# Patient Record
Sex: MALE | Race: BLACK OR AFRICAN AMERICAN | Employment: OTHER | URBAN - METROPOLITAN AREA
[De-identification: names, ages, dates, MRNs, and addresses within clinical notes are randomized per-mention and may not be internally consistent; named-entity substitution may affect disease eponyms.]

---

## 2017-10-26 PROBLEM — S34.3XXA CAUDA EQUINA SPINAL CORD INJURY (HCC): Status: ACTIVE | Noted: 2017-10-26

## 2017-10-26 PROBLEM — R29.898 LEG WEAKNESS, BILATERAL: Status: ACTIVE | Noted: 2017-10-26

## 2017-10-26 PROBLEM — S39.92XA LOWER BACK INJURY: Status: ACTIVE | Noted: 2017-10-26

## 2017-10-26 PROBLEM — I10 ESSENTIAL HYPERTENSION: Status: ACTIVE | Noted: 2017-10-26

## 2017-10-26 PROBLEM — R29.898 WEAKNESS OF LEFT HAND: Status: ACTIVE | Noted: 2017-10-26

## 2017-10-26 PROBLEM — C61 PROSTATE CANCER (HCC): Status: ACTIVE | Noted: 2017-10-26

## 2017-10-26 PROBLEM — M16.0 OSTEOARTHRITIS OF BOTH HIPS: Status: ACTIVE | Noted: 2017-10-26

## 2017-10-26 PROBLEM — M54.59 INTRACTABLE LOW BACK PAIN: Status: ACTIVE | Noted: 2017-10-26

## 2017-10-26 PROBLEM — M48.061 SPINAL STENOSIS OF LUMBAR REGION AT MULTIPLE LEVELS: Status: ACTIVE | Noted: 2017-10-26

## 2018-03-20 ENCOUNTER — HOSPITAL ENCOUNTER (OUTPATIENT)
Dept: GENERAL RADIOLOGY | Age: 63
Discharge: HOME | End: 2018-03-22
Payer: MEDICAID

## 2018-03-20 ENCOUNTER — HOSPITAL ENCOUNTER (OUTPATIENT)
Age: 63
End: 2018-03-20
Payer: MEDICAID

## 2018-03-20 DIAGNOSIS — S34.3XXD CAUDA EQUINA SPINAL CORD INJURY, SUBSEQUENT ENCOUNTER: ICD-10-CM

## 2018-03-20 DIAGNOSIS — R29.898 LEG WEAKNESS, BILATERAL: ICD-10-CM

## 2018-03-20 DIAGNOSIS — M48.061 SPINAL STENOSIS OF LUMBAR REGION AT MULTIPLE LEVELS: ICD-10-CM

## 2018-03-20 PROCEDURE — 72100 X-RAY EXAM L-S SPINE 2/3 VWS: CPT

## 2018-03-21 ENCOUNTER — TELEPHONE (OUTPATIENT)
Dept: FAMILY MEDICINE CLINIC | Age: 63
End: 2018-03-21

## 2018-03-23 ENCOUNTER — OFFICE VISIT (OUTPATIENT)
Dept: FAMILY MEDICINE CLINIC | Age: 63
End: 2018-03-23
Payer: MEDICAID

## 2018-03-23 VITALS
HEART RATE: 61 BPM | OXYGEN SATURATION: 100 % | SYSTOLIC BLOOD PRESSURE: 145 MMHG | DIASTOLIC BLOOD PRESSURE: 80 MMHG | TEMPERATURE: 98 F | WEIGHT: 186.6 LBS | BODY MASS INDEX: 26.03 KG/M2

## 2018-03-23 DIAGNOSIS — M48.061 SPINAL STENOSIS OF LUMBAR REGION AT MULTIPLE LEVELS: Primary | ICD-10-CM

## 2018-03-23 PROCEDURE — G8484 FLU IMMUNIZE NO ADMIN: HCPCS | Performed by: FAMILY MEDICINE

## 2018-03-23 PROCEDURE — 3017F COLORECTAL CA SCREEN DOC REV: CPT | Performed by: FAMILY MEDICINE

## 2018-03-23 PROCEDURE — G8419 CALC BMI OUT NRM PARAM NOF/U: HCPCS | Performed by: FAMILY MEDICINE

## 2018-03-23 PROCEDURE — 99212 OFFICE O/P EST SF 10 MIN: CPT | Performed by: FAMILY MEDICINE

## 2018-03-23 PROCEDURE — 1036F TOBACCO NON-USER: CPT | Performed by: FAMILY MEDICINE

## 2018-03-23 PROCEDURE — G8427 DOCREV CUR MEDS BY ELIG CLIN: HCPCS | Performed by: FAMILY MEDICINE

## 2018-03-23 PROCEDURE — 99213 OFFICE O/P EST LOW 20 MIN: CPT | Performed by: FAMILY MEDICINE

## 2018-03-23 NOTE — PROGRESS NOTES
tablets by mouth every 4 hours as needed for Pain 30 tablet 0    vitamin D (ERGOCALCIFEROL) 72476 units capsule Take 1 capsule by mouth once a week 8 capsule 0     No current facility-administered medications for this visit. Allergies:    Ibuprofen and Benzocaine      Review of Systems:  Review of Systems   Constitutional: Negative for chills, fever, malaise/fatigue and weight loss. Respiratory: Negative for cough, shortness of breath and wheezing. Cardiovascular: Negative for chest pain, palpitations and leg swelling. Gastrointestinal: Negative for abdominal pain, constipation, diarrhea, nausea and vomiting. Musculoskeletal: Positive for back pain. Neurological: Positive for focal weakness (left LE weakness that is improving). Negative for dizziness, sensory change and headaches. Physical Exam   Vitals: BP (!) 145/80   Pulse 61   Temp 98 °F (36.7 °C) (Oral)   Wt 186 lb 9.6 oz (84.6 kg)   SpO2 100%   BMI 26.03 kg/m²   General Appearance: Well developed and Well nourished. No acute distress. HEENT: Normocephalic, atraumatic. Conjunctiva/corneas clear, EOM's intact, no pallor or icterus. Neck: Supple, symmetrical, trachea midline, no cervical LAD. Chest wall/Lung: Clear to auscultation bilaterally,  respirations unlabored. No rales/wheezing/rubs  Heart: Regular rate and rhythm, S1 and S2 normal. Pedal pulses 2+,  no edema  Abdomen: Soft, non-tender, bowel sounds normoactive, no masses, no organomegaly  Musculokeletal: No joint swelling, no muscle tenderness. Back brace in place. Diminished ROM of back due to brace. Neurologic:   Alert&Oriented. CNII-XII grossly intact. Normal and symmetric  strength in UEs. 5/5 strength in right LE and 4/5 strength in left LE,   Ambulates with walker            Labs and Imaging Studies   Basic Labs  No results found for this visit on 03/23/18.     Imaging Studies:  Radiology:   Xr Lumbar Spine (2-3 Views)    Result Date: 3/20/2018  Patient

## 2018-03-27 ENCOUNTER — TELEPHONE (OUTPATIENT)
Dept: FAMILY MEDICINE CLINIC | Age: 63
End: 2018-03-27

## 2018-03-28 ASSESSMENT — ENCOUNTER SYMPTOMS
NAUSEA: 0
SHORTNESS OF BREATH: 0
WHEEZING: 0
CONSTIPATION: 0
VOMITING: 0
DIARRHEA: 0
ABDOMINAL PAIN: 0
BACK PAIN: 1
COUGH: 0

## 2018-05-02 ENCOUNTER — TELEPHONE (OUTPATIENT)
Dept: FAMILY MEDICINE CLINIC | Age: 63
End: 2018-05-02

## 2018-05-24 ENCOUNTER — HOSPITAL ENCOUNTER (OUTPATIENT)
Age: 63
Discharge: HOME | End: 2018-05-26
Payer: MEDICAID

## 2018-05-24 LAB — PROSTATE SPECIFIC ANTIGEN: 0.13 NG/ML (ref 0–4)

## 2018-05-24 PROCEDURE — 84153 ASSAY OF PSA TOTAL: CPT

## 2018-06-13 ENCOUNTER — OFFICE VISIT (OUTPATIENT)
Dept: FAMILY MEDICINE CLINIC | Age: 63
End: 2018-06-13
Payer: MEDICAID

## 2018-06-13 VITALS
RESPIRATION RATE: 16 BRPM | SYSTOLIC BLOOD PRESSURE: 130 MMHG | HEIGHT: 71 IN | BODY MASS INDEX: 26.18 KG/M2 | HEART RATE: 78 BPM | WEIGHT: 187 LBS | OXYGEN SATURATION: 98 % | DIASTOLIC BLOOD PRESSURE: 62 MMHG | TEMPERATURE: 97.8 F

## 2018-06-13 DIAGNOSIS — C61 PROSTATE CANCER (HCC): ICD-10-CM

## 2018-06-13 DIAGNOSIS — E55.9 VITAMIN D DEFICIENCY: ICD-10-CM

## 2018-06-13 DIAGNOSIS — I10 ESSENTIAL HYPERTENSION: Primary | ICD-10-CM

## 2018-06-13 DIAGNOSIS — Z98.890 S/P LUMBAR SPINE OPERATION: ICD-10-CM

## 2018-06-13 PROCEDURE — 99212 OFFICE O/P EST SF 10 MIN: CPT | Performed by: FAMILY MEDICINE

## 2018-06-13 PROCEDURE — 99213 OFFICE O/P EST LOW 20 MIN: CPT | Performed by: FAMILY MEDICINE

## 2018-06-14 RX ORDER — GABAPENTIN 100 MG/1
100 CAPSULE ORAL 3 TIMES DAILY
Qty: 90 CAPSULE | Refills: 3 | Status: SHIPPED | OUTPATIENT
Start: 2018-06-14 | End: 2018-06-14 | Stop reason: SDUPTHER

## 2018-06-14 RX ORDER — ERGOCALCIFEROL (VITAMIN D2) 1250 MCG
50000 CAPSULE ORAL WEEKLY
Qty: 8 CAPSULE | Refills: 0 | Status: SHIPPED | OUTPATIENT
Start: 2018-06-14 | End: 2018-08-27 | Stop reason: ALTCHOICE

## 2018-06-14 RX ORDER — MELATONIN
1 DAILY
Qty: 30 TABLET | Refills: 3 | Status: SHIPPED | OUTPATIENT
Start: 2018-06-14 | End: 2018-08-27 | Stop reason: SDUPTHER

## 2018-06-14 RX ORDER — TAMSULOSIN HYDROCHLORIDE 0.4 MG/1
0.4 CAPSULE ORAL DAILY
Qty: 30 CAPSULE | Refills: 3 | Status: SHIPPED | OUTPATIENT
Start: 2018-06-14 | End: 2018-08-27 | Stop reason: SDUPTHER

## 2018-06-14 RX ORDER — CHLORTHALIDONE 25 MG/1
25 TABLET ORAL DAILY
Qty: 30 TABLET | Refills: 3 | Status: SHIPPED | OUTPATIENT
Start: 2018-06-14 | End: 2018-08-27 | Stop reason: SDUPTHER

## 2018-06-14 RX ORDER — PANTOPRAZOLE SODIUM 40 MG/1
40 TABLET, DELAYED RELEASE ORAL
Qty: 30 TABLET | Refills: 3 | Status: SHIPPED | OUTPATIENT
Start: 2018-06-14 | End: 2018-08-27 | Stop reason: SDUPTHER

## 2018-06-14 RX ORDER — PSEUDOEPHEDRINE HCL 30 MG
100 TABLET ORAL 2 TIMES DAILY
Qty: 60 CAPSULE | Refills: 3 | Status: SHIPPED | OUTPATIENT
Start: 2018-06-14 | End: 2018-08-27 | Stop reason: SDUPTHER

## 2018-06-14 RX ORDER — LISINOPRIL 10 MG/1
10 TABLET ORAL DAILY
Qty: 30 TABLET | Refills: 3 | Status: SHIPPED | OUTPATIENT
Start: 2018-06-14 | End: 2018-08-27 | Stop reason: SDUPTHER

## 2018-06-14 RX ORDER — FAMOTIDINE 20 MG/1
20 TABLET, FILM COATED ORAL NIGHTLY
Qty: 30 TABLET | Refills: 3 | Status: SHIPPED | OUTPATIENT
Start: 2018-06-14 | End: 2018-08-27 | Stop reason: SDUPTHER

## 2018-06-14 RX ORDER — GABAPENTIN 100 MG/1
100 CAPSULE ORAL 3 TIMES DAILY
Qty: 90 CAPSULE | Refills: 3 | Status: SHIPPED | OUTPATIENT
Start: 2018-06-14 | End: 2018-08-27 | Stop reason: SDUPTHER

## 2018-06-14 RX ORDER — FERROUS SULFATE 325(65) MG
325 TABLET ORAL 2 TIMES DAILY WITH MEALS
Qty: 60 TABLET | Refills: 3 | Status: SHIPPED | OUTPATIENT
Start: 2018-06-14 | End: 2018-08-27 | Stop reason: SDUPTHER

## 2018-06-18 ASSESSMENT — ENCOUNTER SYMPTOMS
BACK PAIN: 1
SHORTNESS OF BREATH: 0
NAUSEA: 0
WHEEZING: 0
COUGH: 0
ABDOMINAL PAIN: 0
DIARRHEA: 0
VOMITING: 0

## 2018-07-06 ENCOUNTER — TELEPHONE (OUTPATIENT)
Dept: FAMILY MEDICINE CLINIC | Age: 63
End: 2018-07-06

## 2018-08-27 ENCOUNTER — TELEPHONE (OUTPATIENT)
Dept: FAMILY MEDICINE CLINIC | Age: 63
End: 2018-08-27

## 2018-08-27 ENCOUNTER — OFFICE VISIT (OUTPATIENT)
Dept: FAMILY MEDICINE CLINIC | Age: 63
End: 2018-08-27
Payer: MEDICAID

## 2018-08-27 VITALS
SYSTOLIC BLOOD PRESSURE: 118 MMHG | HEART RATE: 68 BPM | WEIGHT: 183 LBS | BODY MASS INDEX: 25.62 KG/M2 | OXYGEN SATURATION: 96 % | TEMPERATURE: 98.2 F | DIASTOLIC BLOOD PRESSURE: 56 MMHG | RESPIRATION RATE: 16 BRPM | HEIGHT: 71 IN

## 2018-08-27 DIAGNOSIS — Z98.890 S/P LUMBAR SPINE OPERATION: ICD-10-CM

## 2018-08-27 DIAGNOSIS — I10 ESSENTIAL HYPERTENSION: ICD-10-CM

## 2018-08-27 DIAGNOSIS — I10 ESSENTIAL HYPERTENSION: Primary | ICD-10-CM

## 2018-08-27 DIAGNOSIS — C61 PROSTATE CANCER (HCC): ICD-10-CM

## 2018-08-27 DIAGNOSIS — E55.9 VITAMIN D DEFICIENCY: ICD-10-CM

## 2018-08-27 DIAGNOSIS — M16.0 OSTEOARTHRITIS OF BOTH HIPS, UNSPECIFIED OSTEOARTHRITIS TYPE: ICD-10-CM

## 2018-08-27 DIAGNOSIS — M54.50 ACUTE BILATERAL LOW BACK PAIN WITHOUT SCIATICA: ICD-10-CM

## 2018-08-27 PROCEDURE — 99213 OFFICE O/P EST LOW 20 MIN: CPT | Performed by: STUDENT IN AN ORGANIZED HEALTH CARE EDUCATION/TRAINING PROGRAM

## 2018-08-27 PROCEDURE — 99212 OFFICE O/P EST SF 10 MIN: CPT | Performed by: STUDENT IN AN ORGANIZED HEALTH CARE EDUCATION/TRAINING PROGRAM

## 2018-08-27 RX ORDER — PANTOPRAZOLE SODIUM 40 MG/1
40 TABLET, DELAYED RELEASE ORAL
Qty: 30 TABLET | Refills: 3 | Status: SHIPPED | OUTPATIENT
Start: 2018-08-27 | End: 2018-11-27 | Stop reason: SDUPTHER

## 2018-08-27 RX ORDER — TAMSULOSIN HYDROCHLORIDE 0.4 MG/1
0.4 CAPSULE ORAL DAILY
Qty: 30 CAPSULE | Refills: 3 | Status: SHIPPED | OUTPATIENT
Start: 2018-08-27 | End: 2019-03-01 | Stop reason: ALTCHOICE

## 2018-08-27 RX ORDER — FERROUS SULFATE 325(65) MG
325 TABLET ORAL 2 TIMES DAILY WITH MEALS
Qty: 60 TABLET | Refills: 3 | Status: SHIPPED | OUTPATIENT
Start: 2018-08-27 | End: 2019-03-01 | Stop reason: ALTCHOICE

## 2018-08-27 RX ORDER — GABAPENTIN 100 MG/1
100 CAPSULE ORAL 3 TIMES DAILY
Qty: 90 CAPSULE | Refills: 3 | Status: SHIPPED | OUTPATIENT
Start: 2018-08-27 | End: 2018-11-27 | Stop reason: SDUPTHER

## 2018-08-27 RX ORDER — ACETAMINOPHEN 325 MG/1
650 TABLET ORAL EVERY 4 HOURS PRN
Qty: 30 TABLET | Refills: 0 | Status: SHIPPED | OUTPATIENT
Start: 2018-08-27 | End: 2019-05-31

## 2018-08-27 RX ORDER — GABAPENTIN 100 MG/1
100 CAPSULE ORAL 3 TIMES DAILY
Qty: 90 CAPSULE | Refills: 3 | Status: CANCELLED | OUTPATIENT
Start: 2018-08-27

## 2018-08-27 RX ORDER — CHLORTHALIDONE 25 MG/1
25 TABLET ORAL DAILY
Qty: 30 TABLET | Refills: 3 | Status: SHIPPED | OUTPATIENT
Start: 2018-08-27 | End: 2018-11-27 | Stop reason: SDUPTHER

## 2018-08-27 RX ORDER — LISINOPRIL 10 MG/1
10 TABLET ORAL DAILY
Qty: 30 TABLET | Refills: 3 | Status: SHIPPED | OUTPATIENT
Start: 2018-08-27 | End: 2018-11-27 | Stop reason: SDUPTHER

## 2018-08-27 RX ORDER — FAMOTIDINE 20 MG/1
20 TABLET, FILM COATED ORAL NIGHTLY
Qty: 30 TABLET | Refills: 3 | Status: SHIPPED | OUTPATIENT
Start: 2018-08-27 | End: 2019-03-01 | Stop reason: ALTCHOICE

## 2018-08-27 RX ORDER — PSEUDOEPHEDRINE HCL 30 MG
100 TABLET ORAL 2 TIMES DAILY
Qty: 60 CAPSULE | Refills: 3 | Status: SHIPPED | OUTPATIENT
Start: 2018-08-27 | End: 2019-03-01 | Stop reason: ALTCHOICE

## 2018-08-27 RX ORDER — MELATONIN
1 DAILY
Qty: 30 TABLET | Refills: 3 | Status: SHIPPED | OUTPATIENT
Start: 2018-08-27 | End: 2019-03-01 | Stop reason: ALTCHOICE

## 2018-08-27 ASSESSMENT — PATIENT HEALTH QUESTIONNAIRE - PHQ9
SUM OF ALL RESPONSES TO PHQ QUESTIONS 1-9: 0
2. FEELING DOWN, DEPRESSED OR HOPELESS: 0
SUM OF ALL RESPONSES TO PHQ9 QUESTIONS 1 & 2: 0
1. LITTLE INTEREST OR PLEASURE IN DOING THINGS: 0
SUM OF ALL RESPONSES TO PHQ QUESTIONS 1-9: 0

## 2018-08-27 NOTE — TELEPHONE ENCOUNTER
The patient is at the pharmacy now.   The medication refills he has on file at the pharmacy cannot be filled because they are under DR Colby Hernández name and she is no longer under [facility]  The pharmacy tech said to have the current dr caleb Ricci all of the patients medications so they can be filled

## 2018-08-27 NOTE — PROGRESS NOTES
S: 61 y.o. male for follow up; HTN, HLD, chronic back pain, prostate CA. Has right lower extremity numbness chronically following back surgery. Tolerates gabapentin, which helps. HTN and HLD, taking medications. No CP or SOB, no palpitations. Prostate CA, sees urology regularly. In treatment. O: VS: BP (!) 118/56 (Site: Left Arm, Position: Sitting, Cuff Size: Medium Adult)   Pulse 68   Temp 98.2 °F (36.8 °C) (Oral)   Resp 16   Ht 5' 11\" (1.803 m)   Wt 183 lb (83 kg)   SpO2 96%   BMI 25.52 kg/m²    General: NAD, appropriate affect and grooming; ambulating well   HENT:  Clear   Neck:  Supple, no LA    CV:  RRR, no gallops, rubs, or murmurs   Resp: CTAB   Abd:  Soft, nontender   Ext:  No edema; distal MS intact. SLR caused pain on right side. Impression: HTN, HLD, chronic low back pain, prostate CA  Plan: continue current medications; may increase gabapentin gradually. RTO 3 months or sooner prn. Labs before next visit, check lipids, etc.  Offer vaccines. Attending Physician Statement  I have discussed the case, including pertinent history and exam findings with the resident. I agree with the documented assessment and plan.

## 2018-08-27 NOTE — LETTER
Attention: [facility]    Please be advised that my patient Randolphwilla Falcon. Veronica Martel has a history of chronic pain. He recently underwent corrective surgery of his spine. He has since had progression of his neuropathic pains in his legs. For this reason he is indicated to take gabapentin (Neurontin) three times daily. Please feel free to call with any concerns or questions.          Kayley Mcnulty MD.

## 2018-08-27 NOTE — LETTER
Attention: [facility]    Please be advised that my patient Randolphwilla Verdugoos. Veronica Martel has a history of chronic pain. He recently underwent corrective surgery of his spine. He has since had progression of his neuropathic pains in his legs. For this reason he is indicated to take gabapentin (Neurontin) three times daily. Please feel free to call with any concerns or questions.          Kayley Mcnulty MD.

## 2018-08-28 ENCOUNTER — HOSPITAL ENCOUNTER (EMERGENCY)
Age: 63
Discharge: HOME | End: 2018-08-28
Attending: EMERGENCY MEDICINE
Payer: MEDICAID

## 2018-08-28 ENCOUNTER — APPOINTMENT (OUTPATIENT)
Dept: CT IMAGING | Age: 63
End: 2018-08-28
Payer: MEDICAID

## 2018-08-28 VITALS
RESPIRATION RATE: 16 BRPM | WEIGHT: 183 LBS | HEART RATE: 71 BPM | BODY MASS INDEX: 25.62 KG/M2 | SYSTOLIC BLOOD PRESSURE: 141 MMHG | DIASTOLIC BLOOD PRESSURE: 75 MMHG | OXYGEN SATURATION: 97 % | TEMPERATURE: 97 F | HEIGHT: 71 IN

## 2018-08-28 DIAGNOSIS — R51.9 ACUTE NONINTRACTABLE HEADACHE, UNSPECIFIED HEADACHE TYPE: Primary | ICD-10-CM

## 2018-08-28 LAB
ALBUMIN SERPL-MCNC: 4.1 G/DL (ref 3.5–5.2)
ALP BLD-CCNC: 133 U/L (ref 40–129)
ALT SERPL-CCNC: 13 U/L (ref 0–40)
ANION GAP SERPL CALCULATED.3IONS-SCNC: 14 MMOL/L (ref 7–16)
AST SERPL-CCNC: 17 U/L (ref 0–39)
BASOPHILS ABSOLUTE: 0.03 E9/L (ref 0–0.2)
BASOPHILS RELATIVE PERCENT: 0.6 % (ref 0–2)
BILIRUB SERPL-MCNC: <0.2 MG/DL (ref 0–1.2)
BUN BLDV-MCNC: 33 MG/DL (ref 8–23)
CALCIUM SERPL-MCNC: 10.1 MG/DL (ref 8.6–10.2)
CHLORIDE BLD-SCNC: 103 MMOL/L (ref 98–107)
CO2: 23 MMOL/L (ref 22–29)
CREAT SERPL-MCNC: 1.3 MG/DL (ref 0.7–1.2)
EKG ATRIAL RATE: 77 BPM
EKG P AXIS: 39 DEGREES
EKG P-R INTERVAL: 122 MS
EKG Q-T INTERVAL: 400 MS
EKG QRS DURATION: 92 MS
EKG QTC CALCULATION (BAZETT): 452 MS
EKG R AXIS: -21 DEGREES
EKG T AXIS: 43 DEGREES
EKG VENTRICULAR RATE: 77 BPM
EOSINOPHILS ABSOLUTE: 0.17 E9/L (ref 0.05–0.5)
EOSINOPHILS RELATIVE PERCENT: 3.2 % (ref 0–6)
GFR AFRICAN AMERICAN: >60
GFR NON-AFRICAN AMERICAN: 56 ML/MIN/1.73
GLUCOSE BLD-MCNC: 105 MG/DL (ref 74–109)
HCT VFR BLD CALC: 38.6 % (ref 37–54)
HEMOGLOBIN: 12.6 G/DL (ref 12.5–16.5)
IMMATURE GRANULOCYTES #: 0.01 E9/L
IMMATURE GRANULOCYTES %: 0.2 % (ref 0–5)
LYMPHOCYTES ABSOLUTE: 1.44 E9/L (ref 1.5–4)
LYMPHOCYTES RELATIVE PERCENT: 27.4 % (ref 20–42)
MCH RBC QN AUTO: 28.5 PG (ref 26–35)
MCHC RBC AUTO-ENTMCNC: 32.6 % (ref 32–34.5)
MCV RBC AUTO: 87.3 FL (ref 80–99.9)
MONOCYTES ABSOLUTE: 0.57 E9/L (ref 0.1–0.95)
MONOCYTES RELATIVE PERCENT: 10.8 % (ref 2–12)
NEUTROPHILS ABSOLUTE: 3.04 E9/L (ref 1.8–7.3)
NEUTROPHILS RELATIVE PERCENT: 57.8 % (ref 43–80)
PDW BLD-RTO: 13.3 FL (ref 11.5–15)
PLATELET # BLD: 278 E9/L (ref 130–450)
PMV BLD AUTO: 10.4 FL (ref 7–12)
POTASSIUM SERPL-SCNC: 4.2 MMOL/L (ref 3.5–5)
RBC # BLD: 4.42 E12/L (ref 3.8–5.8)
SODIUM BLD-SCNC: 140 MMOL/L (ref 132–146)
TOTAL PROTEIN: 8.2 G/DL (ref 6.4–8.3)
TROPONIN: <0.01 NG/ML (ref 0–0.03)
WBC # BLD: 5.3 E9/L (ref 4.5–11.5)

## 2018-08-28 PROCEDURE — 2580000003 HC RX 258: Performed by: EMERGENCY MEDICINE

## 2018-08-28 PROCEDURE — 99284 EMERGENCY DEPT VISIT MOD MDM: CPT

## 2018-08-28 PROCEDURE — 36415 COLL VENOUS BLD VENIPUNCTURE: CPT

## 2018-08-28 PROCEDURE — 84484 ASSAY OF TROPONIN QUANT: CPT

## 2018-08-28 PROCEDURE — 80053 COMPREHEN METABOLIC PANEL: CPT

## 2018-08-28 PROCEDURE — 6360000002 HC RX W HCPCS: Performed by: EMERGENCY MEDICINE

## 2018-08-28 PROCEDURE — 70450 CT HEAD/BRAIN W/O DYE: CPT

## 2018-08-28 PROCEDURE — 96374 THER/PROPH/DIAG INJ IV PUSH: CPT

## 2018-08-28 PROCEDURE — 93005 ELECTROCARDIOGRAM TRACING: CPT | Performed by: NURSE PRACTITIONER

## 2018-08-28 PROCEDURE — 96375 TX/PRO/DX INJ NEW DRUG ADDON: CPT

## 2018-08-28 PROCEDURE — 85025 COMPLETE CBC W/AUTO DIFF WBC: CPT

## 2018-08-28 RX ORDER — 0.9 % SODIUM CHLORIDE 0.9 %
1000 INTRAVENOUS SOLUTION INTRAVENOUS ONCE
Status: COMPLETED | OUTPATIENT
Start: 2018-08-28 | End: 2018-08-28

## 2018-08-28 RX ORDER — DIPHENHYDRAMINE HYDROCHLORIDE 50 MG/ML
12.5 INJECTION INTRAMUSCULAR; INTRAVENOUS ONCE
Status: COMPLETED | OUTPATIENT
Start: 2018-08-28 | End: 2018-08-28

## 2018-08-28 RX ORDER — KETOROLAC TROMETHAMINE 30 MG/ML
15 INJECTION, SOLUTION INTRAMUSCULAR; INTRAVENOUS ONCE
Status: COMPLETED | OUTPATIENT
Start: 2018-08-28 | End: 2018-08-28

## 2018-08-28 RX ADMIN — PROCHLORPERAZINE EDISYLATE 10 MG: 5 INJECTION INTRAMUSCULAR; INTRAVENOUS at 14:44

## 2018-08-28 RX ADMIN — SODIUM CHLORIDE 1000 ML: 9 INJECTION, SOLUTION INTRAVENOUS at 14:41

## 2018-08-28 RX ADMIN — DIPHENHYDRAMINE HYDROCHLORIDE 12.5 MG: 50 INJECTION, SOLUTION INTRAMUSCULAR; INTRAVENOUS at 14:46

## 2018-08-28 RX ADMIN — KETOROLAC TROMETHAMINE 15 MG: 30 INJECTION, SOLUTION INTRAMUSCULAR at 14:43

## 2018-08-28 ASSESSMENT — PAIN SCALES - GENERAL
PAINLEVEL_OUTOF10: 10
PAINLEVEL_OUTOF10: 10

## 2018-08-28 ASSESSMENT — PAIN DESCRIPTION - PAIN TYPE: TYPE: ACUTE PAIN

## 2018-08-28 ASSESSMENT — PAIN DESCRIPTION - LOCATION: LOCATION: HEAD

## 2018-08-28 NOTE — ED PROVIDER NOTES
use drugs. Family History: family history is not on file. The patients home medications have been reviewed. Allergies: Ibuprofen and Benzocaine      ---------------------------------------------------PHYSICAL EXAM--------------------------------------    Constitutional/General: Alert and oriented x3, well appearing, non toxic in NAD  Head: Normocephalic and atraumatic  Eyes: PERRL, EOMI  Mouth: Oropharynx clear, handling secretions  Neck: Supple, full ROM  Respiratory: Lungs clear to auscultation bilaterally, no wheezes, rales, or rhonchi. Not in respiratory distress  Cardiovascular:  Regular rate. Regular rhythm. No murmurs, gallops, or rubs. 2+ distal pulses  Chest: No chest wall tenderness  GI:  Abdomen Soft, Non tender, Non distended. +BS. No rebound, guarding, or rigidity. No pulsatile masses. Musculoskeletal: Moves all extremities x 4. Warm and well perfused, no edema. Integument: skin warm and dry. No rashes. Neurologic: GCS 15, no focal deficits, symmetric strength 5/5 in the upper and lower extremities bilaterally. Normal speech. Normal cerebellar function. No acute focal neurological deficits can be appreciated. Psychiatric: Normal Affect      -------------------------------------------------- RESULTS -------------------------------------------------  I have personally reviewed all laboratory and imaging results for this patient. Results are listed below.      LABS:  Results for orders placed or performed during the hospital encounter of 08/28/18   CBC Auto Differential   Result Value Ref Range    WBC 5.3 4.5 - 11.5 E9/L    RBC 4.42 3.80 - 5.80 E12/L    Hemoglobin 12.6 12.5 - 16.5 g/dL    Hematocrit 38.6 37.0 - 54.0 %    MCV 87.3 80.0 - 99.9 fL    MCH 28.5 26.0 - 35.0 pg    MCHC 32.6 32.0 - 34.5 %    RDW 13.3 11.5 - 15.0 fL    Platelets 847 785 - 156 E9/L    MPV 10.4 7.0 - 12.0 fL    Neutrophils % 57.8 43.0 - 80.0 %    Immature Granulocytes % 0.2 0.0 - 5.0 %    Lymphocytes % 27.4 20.0 - 42.0 %    Monocytes % 10.8 2.0 - 12.0 %    Eosinophils % 3.2 0.0 - 6.0 %    Basophils % 0.6 0.0 - 2.0 %    Neutrophils # 3.04 1.80 - 7.30 E9/L    Immature Granulocytes # 0.01 E9/L    Lymphocytes # 1.44 (L) 1.50 - 4.00 E9/L    Monocytes # 0.57 0.10 - 0.95 E9/L    Eosinophils # 0.17 0.05 - 0.50 E9/L    Basophils # 0.03 0.00 - 0.20 E9/L   Comprehensive Metabolic Panel   Result Value Ref Range    Sodium 140 132 - 146 mmol/L    Potassium 4.2 3.5 - 5.0 mmol/L    Chloride 103 98 - 107 mmol/L    CO2 23 22 - 29 mmol/L    Anion Gap 14 7 - 16 mmol/L    Glucose 105 74 - 109 mg/dL    BUN 33 (H) 8 - 23 mg/dL    CREATININE 1.3 (H) 0.7 - 1.2 mg/dL    GFR Non-African American 56 >=60 mL/min/1.73    GFR African American >60     Calcium 10.1 8.6 - 10.2 mg/dL    Total Protein 8.2 6.4 - 8.3 g/dL    Alb 4.1 3.5 - 5.2 g/dL    Total Bilirubin <0.2 0.0 - 1.2 mg/dL    Alkaline Phosphatase 133 (H) 40 - 129 U/L    ALT 13 0 - 40 U/L    AST 17 0 - 39 U/L   Troponin   Result Value Ref Range    Troponin <0.01 0.00 - 0.03 ng/mL   EKG 12 Lead   Result Value Ref Range    Ventricular Rate 77 BPM    Atrial Rate 77 BPM    P-R Interval 122 ms    QRS Duration 92 ms    Q-T Interval 400 ms    QTc Calculation (Bazett) 452 ms    P Axis 39 degrees    R Axis -21 degrees    T Axis 43 degrees       RADIOLOGY:  Interpreted by Radiologist.  CT Head WO Contrast   Final Result   1. No evidence of intracranial hemorrhage, extra axial collection,   space-occupying mass lesion or mass effect, midline shift, or acute   territorial infarction. If there is strong clinical suspicion for   acute infarct of the brain, then MR imaging of the brain with   diffusion-weighted sequence may be obtained for further evaluation. 2. Chronic involutional changes and moderate microvascular ischemic   disease as described above. EKG:  This EKG is signed and interpreted by the EP.     Time: 1141  Rate: 77  Rhythm: normal sinus   Interpretation: NSR with no ischemic changes or arrhythmias   Comparison: stable as compared to patient's most recent EKG and no previous EKG available    ------------------------- NURSING NOTES AND VITALS REVIEWED ---------------------------   The nursing notes within the ED encounter and vital signs as below have been reviewed by myself. BP (!) 141/75   Pulse 71   Temp 97 °F (36.1 °C) (Oral)   Resp 16   Ht 5' 11\" (1.803 m)   Wt 183 lb (83 kg)   SpO2 97%   BMI 25.52 kg/m²   Oxygen Saturation Interpretation: Normal    The patients available past medical records and past encounters were reviewed. ------------------------------ ED COURSE/MEDICAL DECISION MAKING----------------------  Medications   0.9 % sodium chloride bolus (0 mLs Intravenous Stopped 8/28/18 1924)   ketorolac (TORADOL) injection 15 mg (15 mg Intravenous Given 8/28/18 1443)   prochlorperazine (COMPAZINE) injection 10 mg (10 mg Intravenous Given 8/28/18 1444)   diphenhydrAMINE (BENADRYL) injection 12.5 mg (12.5 mg Intravenous Given 8/28/18 1446)       Medical Decision Making: Christiano Duran presents with a headache. Pt with normal vital signs and is very well appearing. Was at a doctor's appointment when he developed the headache and suggested to him to come here to be evaluated. Not worst headache in his life. Has not yet taken any analgesia. No falls or head trauma. No associated symptoms. Labs all baseline for the patient and CT head showing no acute findings. Pt treated symptomatically with IVF, toradol, compazine, and benadryl. Headache resolved and pt standing, asking to be discharged on reevaluation. All questions answered. Return indications and follow up discussed. Patient agreeable with the plan and discharge. This patient's ED course included: a personal history and physicial examination, re-evaluation prior to disposition and IV medications    This patient has remained hemodynamically stable during their ED course.     Re-Evaluations:           Time 3:47 PM  Patient is stable. Discussed results. Upon re-examination, patient is resting, NAD. Patients symptoms are improving. States that his headache feels better. The results of today's workup were reviewed and discussed with the patient, and any questions or concerns were answered. Emergency provider has shared the specific conditions for return, as well as the importance of follow-up with PCP. Counseling: The emergency provider has spoken with the patient and discussed todays results, in addition to providing specific details for the plan of care and counseling regarding the diagnosis and prognosis. Questions are answered at this time and they are agreeable with the plan.       --------------------------------- IMPRESSION AND DISPOSITION ---------------------------------    IMPRESSION  1. Acute nonintractable headache, unspecified headache type        DISPOSITION  Disposition: Discharge to home  Patient condition is stable    SCRIBE ATTESTATION  8/28/18, 1:54 PM.    This note is prepared by Jonas Nickerson acting as Scribe for Amanda Pepper MD.    Amanda Pepper MD:  The scribe's documentation has been prepared under my direction and personally reviewed by me in its entirety. I confirm that the note above accurately reflects all work, treatment, procedures, and medical decision making performed by me.              Amanda Pepper MD  09/04/18 [id number]

## 2018-09-08 ASSESSMENT — ENCOUNTER SYMPTOMS
NAUSEA: 0
BACK PAIN: 1
COLOR CHANGE: 0
COUGH: 0
WHEEZING: 0
DIARRHEA: 0
SHORTNESS OF BREATH: 0
VOMITING: 0
RHINORRHEA: 0
ABDOMINAL PAIN: 0
CONSTIPATION: 0
SORE THROAT: 0
ABDOMINAL DISTENTION: 0

## 2018-11-26 ENCOUNTER — HOSPITAL ENCOUNTER (OUTPATIENT)
Age: 63
Discharge: HOME | End: 2018-11-28
Payer: MEDICAID

## 2018-11-26 LAB — PROSTATE SPECIFIC ANTIGEN: 0.18 NG/ML (ref 0–4)

## 2018-11-26 PROCEDURE — 84153 ASSAY OF PSA TOTAL: CPT

## 2018-11-27 ENCOUNTER — OFFICE VISIT (OUTPATIENT)
Dept: FAMILY MEDICINE CLINIC | Age: 63
End: 2018-11-27
Payer: MEDICAID

## 2018-11-27 VITALS
WEIGHT: 191 LBS | BODY MASS INDEX: 26.74 KG/M2 | OXYGEN SATURATION: 93 % | RESPIRATION RATE: 16 BRPM | HEIGHT: 71 IN | DIASTOLIC BLOOD PRESSURE: 73 MMHG | SYSTOLIC BLOOD PRESSURE: 118 MMHG | HEART RATE: 78 BPM | TEMPERATURE: 97.9 F

## 2018-11-27 DIAGNOSIS — Z98.890 S/P LUMBAR SPINE OPERATION: ICD-10-CM

## 2018-11-27 DIAGNOSIS — I10 ESSENTIAL HYPERTENSION: Primary | ICD-10-CM

## 2018-11-27 DIAGNOSIS — E78.5 HYPERLIPIDEMIA, UNSPECIFIED HYPERLIPIDEMIA TYPE: ICD-10-CM

## 2018-11-27 PROCEDURE — 99213 OFFICE O/P EST LOW 20 MIN: CPT | Performed by: STUDENT IN AN ORGANIZED HEALTH CARE EDUCATION/TRAINING PROGRAM

## 2018-11-27 PROCEDURE — 99212 OFFICE O/P EST SF 10 MIN: CPT | Performed by: STUDENT IN AN ORGANIZED HEALTH CARE EDUCATION/TRAINING PROGRAM

## 2018-11-27 RX ORDER — PANTOPRAZOLE SODIUM 40 MG/1
40 TABLET, DELAYED RELEASE ORAL
Qty: 30 TABLET | Refills: 3 | Status: SHIPPED | OUTPATIENT
Start: 2018-11-27 | End: 2019-06-27 | Stop reason: SDUPTHER

## 2018-11-27 RX ORDER — GABAPENTIN 300 MG/1
300 CAPSULE ORAL 2 TIMES DAILY
Qty: 120 CAPSULE | Refills: 3 | Status: SHIPPED | OUTPATIENT
Start: 2018-11-27 | End: 2018-11-27

## 2018-11-27 RX ORDER — CHLORTHALIDONE 25 MG/1
25 TABLET ORAL DAILY
Qty: 30 TABLET | Refills: 3 | Status: SHIPPED | OUTPATIENT
Start: 2018-11-27 | End: 2019-05-31

## 2018-11-27 RX ORDER — GABAPENTIN 100 MG/1
300 CAPSULE ORAL 2 TIMES DAILY
Qty: 180 CAPSULE | Refills: 3 | Status: SHIPPED | OUTPATIENT
Start: 2018-11-27 | End: 2019-03-01 | Stop reason: ALTCHOICE

## 2018-11-27 RX ORDER — LISINOPRIL 10 MG/1
10 TABLET ORAL DAILY
Qty: 30 TABLET | Refills: 3 | Status: SHIPPED | OUTPATIENT
Start: 2018-11-27 | End: 2019-06-27 | Stop reason: SDUPTHER

## 2018-11-27 NOTE — PROGRESS NOTES
Attending Physician Statement    S:   Chief Complaint   Patient presents with    Hypertension     stomach pain    Health Maintenance     declines flu shot      Patient is a 61year old male with  History of Prostate cancer, hypertension, hld     Hypertension controlled and asymptomatic . Take his medications without issues. Has chronic back pain and neuropathy on gabapentin. Has had back surgeries. Has been taking 200mg bid of gabapentin . Continues to have numbness and tingling and feels like this is not well controlled. Follows with Dr. Susana Nelson for prostate cancer. Has been having abdominal pain. Passes gas and then feels better. Denies constipation,diarrhea, nausea, vomiting. No bloody or black stool. Pain is in left lower quadrant. Pain mostly present at night. Wakes him up from sleep. Does not radiate. O: Blood pressure 118/73, pulse 78, temperature 97.9 °F (36.6 °C), temperature source Oral, resp. rate 16, height 5' 11\" (1.803 m), weight 191 lb (86.6 kg), SpO2 93 %. Exam:   Heart - RRR   Lungs - clear   Abd - BS+, soft, NT/ND   Neuro - decreased sensation right than left. A: hypertension, prostate cancer, back pain with neuropathy , abdominal pain   P:  Increase water and fiber - if belly pain persists patient to follow up . Gabapentin 300mg bid    Follow-up as ordered    Attending Attestation   I have discussed the case, including pertinent history and exam findings with the resident. I agree with the documented assessment and plan.
well-nourished. No distress. HENT:   Head: Normocephalic. Right Ear: External ear normal.   Left Ear: External ear normal.   Nose: Nose normal.   Mouth/Throat: Oropharynx is clear and moist.   Eyes: Conjunctivae and EOM are normal. Right eye exhibits no discharge. Left eye exhibits no discharge. No scleral icterus. Neck: Normal range of motion. Neck supple. No JVD present. No thyromegaly present. Cardiovascular: Normal rate, regular rhythm, normal heart sounds and intact distal pulses. No murmur heard. Pulmonary/Chest: Effort normal and breath sounds normal. No respiratory distress. He has no wheezes. Abdominal: Soft. Bowel sounds are normal. He exhibits no distension. There is no tenderness. Musculoskeletal: He exhibits no edema, tenderness or deformity. Neurological: He is alert and oriented to person, place, and time. A sensory deficit is present. No cranial nerve deficit. Skin: Skin is warm and dry. No rash noted. He is not diaphoretic. No erythema. Vitals reviewed. ASSESSMENT/PLAN:  1. S/P lumbar spine operation  - gabapentin (NEURONTIN) 100 MG capsule; Take 3 capsules by mouth 2 times daily for 120 days. .  Dispense: 180 capsule; Refill: 3 (300 mg capsule not covered by insurance)    2. Essential hypertension  - chlorthalidone (HYGROTON) 25 MG tablet; Take 1 tablet by mouth daily  Dispense: 30 tablet; Refill: 3  - lisinopril (PRINIVIL;ZESTRIL) 10 MG tablet; Take 1 tablet by mouth daily  Dispense: 30 tablet; Refill: 3    3. Hyperlipidemia, unspecified hyperlipidemia type  - cpm      Return in about 3 months (around 2/27/2019) for f/u chronic conditions. An electronic signature was used to authenticate this note.     --Adriana Villatoro MD on 11/29/2018 at 1:10 PM

## 2018-11-29 ASSESSMENT — ENCOUNTER SYMPTOMS
ABDOMINAL PAIN: 0
CONSTIPATION: 0
BLOOD IN STOOL: 0
COUGH: 0
VOMITING: 0
CHEST TIGHTNESS: 0
DIARRHEA: 0
NAUSEA: 0
SHORTNESS OF BREATH: 0
EYE PAIN: 0
ABDOMINAL DISTENTION: 0
WHEEZING: 0
COLOR CHANGE: 0
EYE ITCHING: 0
RHINORRHEA: 0
BACK PAIN: 1

## 2019-02-14 ENCOUNTER — OFFICE VISIT (OUTPATIENT)
Dept: FAMILY MEDICINE CLINIC | Age: 64
End: 2019-02-14
Payer: MEDICAID

## 2019-02-14 VITALS
DIASTOLIC BLOOD PRESSURE: 70 MMHG | TEMPERATURE: 97.6 F | HEART RATE: 63 BPM | RESPIRATION RATE: 18 BRPM | OXYGEN SATURATION: 96 % | WEIGHT: 197.3 LBS | SYSTOLIC BLOOD PRESSURE: 128 MMHG | BODY MASS INDEX: 27.62 KG/M2 | HEIGHT: 71 IN

## 2019-02-14 DIAGNOSIS — S39.92XA INJURY OF LOW BACK, INITIAL ENCOUNTER: Primary | ICD-10-CM

## 2019-02-14 PROCEDURE — 99213 OFFICE O/P EST LOW 20 MIN: CPT | Performed by: STUDENT IN AN ORGANIZED HEALTH CARE EDUCATION/TRAINING PROGRAM

## 2019-02-14 PROCEDURE — 6360000002 HC RX W HCPCS

## 2019-02-14 PROCEDURE — 99212 OFFICE O/P EST SF 10 MIN: CPT | Performed by: STUDENT IN AN ORGANIZED HEALTH CARE EDUCATION/TRAINING PROGRAM

## 2019-02-14 RX ORDER — KETOROLAC TROMETHAMINE 30 MG/ML
30 INJECTION, SOLUTION INTRAMUSCULAR; INTRAVENOUS ONCE
Status: SHIPPED | OUTPATIENT
Start: 2019-02-14 | End: 2019-02-19

## 2019-02-14 RX ORDER — CYCLOBENZAPRINE HCL 10 MG
10 TABLET ORAL 3 TIMES DAILY PRN
Qty: 30 TABLET | Refills: 0 | Status: SHIPPED | OUTPATIENT
Start: 2019-02-14 | End: 2019-02-24

## 2019-02-14 RX ORDER — KETOROLAC TROMETHAMINE 10 MG/1
10 TABLET, FILM COATED ORAL EVERY 6 HOURS PRN
Qty: 20 TABLET | Refills: 0 | Status: SHIPPED | OUTPATIENT
Start: 2019-02-14 | End: 2019-02-14

## 2019-02-14 RX ORDER — KETOROLAC TROMETHAMINE 30 MG/ML
30 INJECTION, SOLUTION INTRAMUSCULAR; INTRAVENOUS ONCE
Status: COMPLETED | OUTPATIENT
Start: 2019-02-14 | End: 2019-02-14

## 2019-02-14 RX ADMIN — KETOROLAC TROMETHAMINE 30 MG: 30 INJECTION, SOLUTION INTRAMUSCULAR; INTRAVENOUS at 15:02

## 2019-02-14 ASSESSMENT — ENCOUNTER SYMPTOMS
DIARRHEA: 0
CHEST TIGHTNESS: 0
RHINORRHEA: 0
WHEEZING: 0
COUGH: 0
VOMITING: 0
BLOOD IN STOOL: 0
BACK PAIN: 1
ABDOMINAL DISTENTION: 0
EYE PAIN: 0
EYE ITCHING: 0
NAUSEA: 0
ABDOMINAL PAIN: 0
SHORTNESS OF BREATH: 0
COLOR CHANGE: 0
CONSTIPATION: 0

## 2019-02-14 ASSESSMENT — PATIENT HEALTH QUESTIONNAIRE - PHQ9
SUM OF ALL RESPONSES TO PHQ9 QUESTIONS 1 & 2: 0
SUM OF ALL RESPONSES TO PHQ QUESTIONS 1-9: 0
1. LITTLE INTEREST OR PLEASURE IN DOING THINGS: 0
SUM OF ALL RESPONSES TO PHQ QUESTIONS 1-9: 0
2. FEELING DOWN, DEPRESSED OR HOPELESS: 0

## 2019-03-01 ENCOUNTER — OFFICE VISIT (OUTPATIENT)
Dept: FAMILY MEDICINE CLINIC | Age: 64
End: 2019-03-01
Payer: MEDICAID

## 2019-03-01 VITALS
BODY MASS INDEX: 27.44 KG/M2 | HEIGHT: 71 IN | TEMPERATURE: 98.1 F | DIASTOLIC BLOOD PRESSURE: 94 MMHG | SYSTOLIC BLOOD PRESSURE: 144 MMHG | HEART RATE: 67 BPM | RESPIRATION RATE: 16 BRPM | WEIGHT: 196 LBS | OXYGEN SATURATION: 99 %

## 2019-03-01 DIAGNOSIS — J06.9 VIRAL URI: Primary | ICD-10-CM

## 2019-03-01 DIAGNOSIS — S39.92XD INJURY OF LOW BACK, SUBSEQUENT ENCOUNTER: ICD-10-CM

## 2019-03-01 PROCEDURE — 99213 OFFICE O/P EST LOW 20 MIN: CPT | Performed by: STUDENT IN AN ORGANIZED HEALTH CARE EDUCATION/TRAINING PROGRAM

## 2019-03-01 PROCEDURE — 99212 OFFICE O/P EST SF 10 MIN: CPT | Performed by: STUDENT IN AN ORGANIZED HEALTH CARE EDUCATION/TRAINING PROGRAM

## 2019-03-01 RX ORDER — DEXTROMETHORPHAN POLISTIREX 30 MG/5ML
60 SUSPENSION ORAL 2 TIMES DAILY PRN
COMMUNITY
Start: 2019-03-01 | End: 2019-03-11

## 2019-03-08 ASSESSMENT — ENCOUNTER SYMPTOMS
VOMITING: 0
NAUSEA: 0
SHORTNESS OF BREATH: 0
EYE PAIN: 0
COLOR CHANGE: 0
ABDOMINAL DISTENTION: 0
CONSTIPATION: 0
EYE ITCHING: 0
BACK PAIN: 1
RHINORRHEA: 0
WHEEZING: 0
BLOOD IN STOOL: 0
COUGH: 0
DIARRHEA: 0
CHEST TIGHTNESS: 0
ABDOMINAL PAIN: 0

## 2019-03-27 ENCOUNTER — TELEPHONE (OUTPATIENT)
Dept: FAMILY MEDICINE CLINIC | Age: 64
End: 2019-03-27

## 2019-03-27 DIAGNOSIS — M48.061 SPINAL STENOSIS OF LUMBAR REGION AT MULTIPLE LEVELS: Primary | ICD-10-CM

## 2019-03-27 DIAGNOSIS — Z98.890 S/P LUMBAR SPINE OPERATION: ICD-10-CM

## 2019-03-27 DIAGNOSIS — S39.92XD INJURY OF LOW BACK, SUBSEQUENT ENCOUNTER: ICD-10-CM

## 2019-04-01 ENCOUNTER — HOSPITAL ENCOUNTER (OUTPATIENT)
Dept: PHYSICAL THERAPY | Age: 64
Setting detail: THERAPIES SERIES
Discharge: HOME | End: 2019-04-01
Payer: MEDICAID

## 2019-04-01 PROCEDURE — 97161 PT EVAL LOW COMPLEX 20 MIN: CPT
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
## 2019-04-15 ENCOUNTER — HOSPITAL ENCOUNTER (EMERGENCY)
Age: 64
Discharge: HOME | End: 2019-04-15
Attending: EMERGENCY MEDICINE
Payer: MEDICAID

## 2019-04-15 ENCOUNTER — APPOINTMENT (OUTPATIENT)
Dept: GENERAL RADIOLOGY | Age: 64
End: 2019-04-15
Payer: MEDICAID

## 2019-04-15 VITALS
HEART RATE: 101 BPM | OXYGEN SATURATION: 98 % | SYSTOLIC BLOOD PRESSURE: 114 MMHG | RESPIRATION RATE: 18 BRPM | TEMPERATURE: 97.1 F | DIASTOLIC BLOOD PRESSURE: 85 MMHG

## 2019-04-15 DIAGNOSIS — M79.89 PAIN AND SWELLING OF TOE OF RIGHT FOOT: ICD-10-CM

## 2019-04-15 DIAGNOSIS — M79.674 PAIN AND SWELLING OF TOE OF RIGHT FOOT: ICD-10-CM

## 2019-04-15 DIAGNOSIS — M10.9 ACUTE GOUT OF RIGHT FOOT, UNSPECIFIED CAUSE: Primary | ICD-10-CM

## 2019-04-15 LAB
ALBUMIN SERPL-MCNC: 4.1 G/DL (ref 3.5–5.2)
ALP BLD-CCNC: 136 U/L (ref 40–129)
ALT SERPL-CCNC: 14 U/L (ref 0–40)
ANION GAP SERPL CALCULATED.3IONS-SCNC: 14 MMOL/L (ref 7–16)
AST SERPL-CCNC: 15 U/L (ref 0–39)
BASOPHILS ABSOLUTE: 0.04 E9/L (ref 0–0.2)
BASOPHILS RELATIVE PERCENT: 0.5 % (ref 0–2)
BILIRUB SERPL-MCNC: <0.2 MG/DL (ref 0–1.2)
BUN BLDV-MCNC: 25 MG/DL (ref 8–23)
C-REACTIVE PROTEIN: 3 MG/DL (ref 0–0.4)
CALCIUM SERPL-MCNC: 10.3 MG/DL (ref 8.6–10.2)
CHLORIDE BLD-SCNC: 102 MMOL/L (ref 98–107)
CO2: 26 MMOL/L (ref 22–29)
CREAT SERPL-MCNC: 1.3 MG/DL (ref 0.7–1.2)
EOSINOPHILS ABSOLUTE: 0.43 E9/L (ref 0.05–0.5)
EOSINOPHILS RELATIVE PERCENT: 5.1 % (ref 0–6)
GFR AFRICAN AMERICAN: >60
GFR NON-AFRICAN AMERICAN: 56 ML/MIN/1.73
GLUCOSE BLD-MCNC: 129 MG/DL (ref 74–99)
HCT VFR BLD CALC: 39.1 % (ref 37–54)
HEMOGLOBIN: 12.7 G/DL (ref 12.5–16.5)
IMMATURE GRANULOCYTES #: 0.03 E9/L
IMMATURE GRANULOCYTES %: 0.4 % (ref 0–5)
LACTIC ACID: 2.8 MMOL/L (ref 0.5–2.2)
LYMPHOCYTES ABSOLUTE: 1.72 E9/L (ref 1.5–4)
LYMPHOCYTES RELATIVE PERCENT: 20.5 % (ref 20–42)
MCH RBC QN AUTO: 28.2 PG (ref 26–35)
MCHC RBC AUTO-ENTMCNC: 32.5 % (ref 32–34.5)
MCV RBC AUTO: 86.7 FL (ref 80–99.9)
MONOCYTES ABSOLUTE: 0.73 E9/L (ref 0.1–0.95)
MONOCYTES RELATIVE PERCENT: 8.7 % (ref 2–12)
NEUTROPHILS ABSOLUTE: 5.44 E9/L (ref 1.8–7.3)
NEUTROPHILS RELATIVE PERCENT: 64.8 % (ref 43–80)
PDW BLD-RTO: 13.7 FL (ref 11.5–15)
PLATELET # BLD: 343 E9/L (ref 130–450)
PMV BLD AUTO: 11.1 FL (ref 7–12)
POTASSIUM SERPL-SCNC: 3.4 MMOL/L (ref 3.5–5)
RBC # BLD: 4.51 E12/L (ref 3.8–5.8)
SEDIMENTATION RATE, ERYTHROCYTE: 84 MM/HR (ref 0–15)
SODIUM BLD-SCNC: 142 MMOL/L (ref 132–146)
TOTAL PROTEIN: 8.5 G/DL (ref 6.4–8.3)
URIC ACID, SERUM: 8.1 MG/DL (ref 3.4–7)
WBC # BLD: 8.4 E9/L (ref 4.5–11.5)

## 2019-04-15 PROCEDURE — 99283 EMERGENCY DEPT VISIT LOW MDM: CPT

## 2019-04-15 PROCEDURE — 80053 COMPREHEN METABOLIC PANEL: CPT

## 2019-04-15 PROCEDURE — 73630 X-RAY EXAM OF FOOT: CPT

## 2019-04-15 PROCEDURE — 6360000002 HC RX W HCPCS: Performed by: STUDENT IN AN ORGANIZED HEALTH CARE EDUCATION/TRAINING PROGRAM

## 2019-04-15 PROCEDURE — 6370000000 HC RX 637 (ALT 250 FOR IP): Performed by: STUDENT IN AN ORGANIZED HEALTH CARE EDUCATION/TRAINING PROGRAM

## 2019-04-15 PROCEDURE — 86140 C-REACTIVE PROTEIN: CPT

## 2019-04-15 PROCEDURE — 36415 COLL VENOUS BLD VENIPUNCTURE: CPT

## 2019-04-15 PROCEDURE — 85651 RBC SED RATE NONAUTOMATED: CPT

## 2019-04-15 PROCEDURE — 85025 COMPLETE CBC W/AUTO DIFF WBC: CPT

## 2019-04-15 PROCEDURE — 83605 ASSAY OF LACTIC ACID: CPT

## 2019-04-15 PROCEDURE — 84550 ASSAY OF BLOOD/URIC ACID: CPT

## 2019-04-15 RX ORDER — COLCHICINE 0.6 MG/1
0.6 TABLET ORAL 2 TIMES DAILY
Qty: 10 TABLET | Refills: 0 | Status: SHIPPED | OUTPATIENT
Start: 2019-04-15 | End: 2019-06-27

## 2019-04-15 RX ORDER — COLCHICINE 0.6 MG/1
1.2 TABLET ORAL ONCE
Status: COMPLETED | OUTPATIENT
Start: 2019-04-15 | End: 2019-04-15

## 2019-04-15 RX ORDER — DEXAMETHASONE 4 MG/1
10 TABLET ORAL ONCE
Status: DISCONTINUED | OUTPATIENT
Start: 2019-04-15 | End: 2019-04-15

## 2019-04-15 RX ORDER — DEXAMETHASONE SODIUM PHOSPHATE 10 MG/ML
10 INJECTION INTRAMUSCULAR; INTRAVENOUS ONCE
Status: COMPLETED | OUTPATIENT
Start: 2019-04-15 | End: 2019-04-15

## 2019-04-15 RX ORDER — COLCHICINE 0.6 MG/1
1.2 TABLET ORAL ONCE
Status: DISCONTINUED | OUTPATIENT
Start: 2019-04-15 | End: 2019-04-15

## 2019-04-15 RX ADMIN — COLCHICINE 1.2 MG: 0.6 TABLET, FILM COATED ORAL at 16:00

## 2019-04-15 RX ADMIN — DEXAMETHASONE SODIUM PHOSPHATE 10 MG: 10 INJECTION INTRAMUSCULAR; INTRAVENOUS at 15:45

## 2019-04-15 ASSESSMENT — PAIN DESCRIPTION - ORIENTATION: ORIENTATION: RIGHT

## 2019-04-15 ASSESSMENT — PAIN SCALES - GENERAL: PAINLEVEL_OUTOF10: 10

## 2019-04-15 ASSESSMENT — ENCOUNTER SYMPTOMS
VOMITING: 0
DIARRHEA: 0
WHEEZING: 0
SHORTNESS OF BREATH: 0
ABDOMINAL PAIN: 0
COUGH: 0
CONSTIPATION: 0
COLOR CHANGE: 0
NAUSEA: 0

## 2019-04-15 ASSESSMENT — PAIN DESCRIPTION - PAIN TYPE: TYPE: ACUTE PAIN

## 2019-04-15 ASSESSMENT — PAIN DESCRIPTION - LOCATION: LOCATION: FOOT

## 2019-04-15 NOTE — ED PROVIDER NOTES
Patient is a 60-year-old male with history of hypertension, lytic bone lesions of the hip, prostate cancer, who presents to the ED for right foot swelling. Patient states that he noticed on Friday that he began having right sided toe pain. Patient states that he called his PCP and got a hold of the nurse practitioner and stated that with history of lytic bone lesions to come to the ED for further evaluation. Patient states that he has not had any recent trauma to the area. He does state on Friday, he took some Toradol as well as Flexeril for his back pain, and states that he had an anaphylactic reaction with the Toradol or the box or rale. He was instructed to discontinue both, and the patient symptoms did decrease. He thinks that his right foot swelling could be related to the anaphylaxis seen. Patient denies any numbness or tingling to the area. Patient states that he does have tenderness to the right 1st metatarsal. He does not have a history of gout. The history is provided by the patient. No  was used. Review of Systems   Constitutional: Negative for chills and fever. Respiratory: Negative for cough, shortness of breath and wheezing. Cardiovascular: Positive for leg swelling (Right-sided foot swelling.). Negative for chest pain and palpitations. Gastrointestinal: Negative for abdominal pain, constipation, diarrhea, nausea and vomiting. Genitourinary: Negative for dysuria and hematuria. Musculoskeletal: Negative for neck pain and neck stiffness. Skin: Negative for color change, pallor, rash and wound. Neurological: Negative for dizziness, syncope, light-headedness, numbness and headaches. Psychiatric/Behavioral: Negative for confusion and decreased concentration. The patient is not nervous/anxious. Physical Exam   Constitutional: He is oriented to person, place, and time. He appears well-developed and well-nourished. No distress.    HENT:   Head: Normocephalic and atraumatic. Right Ear: External ear normal.   Left Ear: External ear normal.   Mouth/Throat: No oropharyngeal exudate. Eyes: Pupils are equal, round, and reactive to light. EOM are normal.   Neck: Normal range of motion. Cardiovascular: Normal rate, regular rhythm, normal heart sounds and intact distal pulses. Exam reveals no gallop and no friction rub. No murmur heard. Pulmonary/Chest: Effort normal and breath sounds normal. No respiratory distress. He has no wheezes. He has no rales. He exhibits no tenderness. Abdominal: Soft. Bowel sounds are normal. He exhibits no distension and no mass. There is no tenderness. There is no rebound and no guarding. No hernia. Musculoskeletal: Normal range of motion. He exhibits edema (Metatarsal and distal edema of the right dorsal aspect of the foot, with tenderness to palpation of the right 1st metatarsal.) and tenderness. He exhibits no deformity. Lymphadenopathy:     He has no cervical adenopathy. Neurological: He is alert and oriented to person, place, and time. No cranial nerve deficit. Skin: Skin is warm and dry. Capillary refill takes less than 2 seconds. No rash noted. He is not diaphoretic. No erythema. No pallor. Psychiatric: He has a normal mood and affect. His behavior is normal. Judgment and thought content normal.   Nursing note and vitals reviewed. Procedures  --------------------------------------------- PAST HISTORY ---------------------------------------------  Past Medical History:  has a past medical history of Cancer ([facility] [address] Erectile dysfunction, Hyperlipidemia, Hypertension, Osteoarthritis, hip/pelvic region and thigh, and Prostate cancer ([facility] [address] Past Surgical History:  has a past surgical history that includes Prostate Biopsy; Leg Surgery; hip surgery (Right); Lumbar spine surgery (10/30/2017); and back surgery. Social History:  reports that he quit smoking about 15 months ago.  He has a 2.50 pack-year smoking history. He has never used smokeless tobacco. He reports that he drinks alcohol. He reports that he does not use drugs. Family History: family history is not on file. The patients home medications have been reviewed.     Allergies: Ibuprofen and Benzocaine    -------------------------------------------------- RESULTS -------------------------------------------------  Labs:  Results for orders placed or performed during the hospital encounter of 04/15/19   CBC Auto Differential   Result Value Ref Range    WBC 8.4 4.5 - 11.5 E9/L    RBC 4.51 3.80 - 5.80 E12/L    Hemoglobin 12.7 12.5 - 16.5 g/dL    Hematocrit 39.1 37.0 - 54.0 %    MCV 86.7 80.0 - 99.9 fL    MCH 28.2 26.0 - 35.0 pg    MCHC 32.5 32.0 - 34.5 %    RDW 13.7 11.5 - 15.0 fL    Platelets 296 292 - 701 E9/L    MPV 11.1 7.0 - 12.0 fL    Neutrophils % 64.8 43.0 - 80.0 %    Immature Granulocytes % 0.4 0.0 - 5.0 %    Lymphocytes % 20.5 20.0 - 42.0 %    Monocytes % 8.7 2.0 - 12.0 %    Eosinophils % 5.1 0.0 - 6.0 %    Basophils % 0.5 0.0 - 2.0 %    Neutrophils # 5.44 1.80 - 7.30 E9/L    Immature Granulocytes # 0.03 E9/L    Lymphocytes # 1.72 1.50 - 4.00 E9/L    Monocytes # 0.73 0.10 - 0.95 E9/L    Eosinophils # 0.43 0.05 - 0.50 E9/L    Basophils # 0.04 0.00 - 0.20 E9/L   Comprehensive Metabolic Panel   Result Value Ref Range    Sodium 142 132 - 146 mmol/L    Potassium 3.4 (L) 3.5 - 5.0 mmol/L    Chloride 102 98 - 107 mmol/L    CO2 26 22 - 29 mmol/L    Anion Gap 14 7 - 16 mmol/L    Glucose 129 (H) 74 - 99 mg/dL    BUN 25 (H) 8 - 23 mg/dL    CREATININE 1.3 (H) 0.7 - 1.2 mg/dL    GFR Non-African American 56 >=60 mL/min/1.73    GFR African American >60     Calcium 10.3 (H) 8.6 - 10.2 mg/dL    Total Protein 8.5 (H) 6.4 - 8.3 g/dL    Alb 4.1 3.5 - 5.2 g/dL    Total Bilirubin <0.2 0.0 - 1.2 mg/dL    Alkaline Phosphatase 136 (H) 40 - 129 U/L    ALT 14 0 - 40 U/L    AST 15 0 - 39 U/L   Lactic Acid, Plasma   Result Value Ref Range    Lactic Acid 2.8 (H) 0.5 - 2.2 mmol/L Uric acid   Result Value Ref Range    Uric Acid, Serum 8.1 (H) 3.4 - 7.0 mg/dL   Sedimentation Rate   Result Value Ref Range    Sed Rate 84 (H) 0 - 15 mm/Hr   C-reactive protein   Result Value Ref Range    CRP 3.0 (H) 0.0 - 0.4 mg/dL       Radiology:  XR FOOT RIGHT (MIN 3 VIEWS)   Final Result   Probable cystic degenerative change involving the head of   the right first metatarsal. No fracture or dislocation. Soft tissue   prominence, or swelling.          ------------------------- NURSING NOTES AND VITALS REVIEWED ---------------------------  Date / Time Roomed:  4/15/2019  2:18 PM  ED Bed Assignment:  14B/14B-14    The nursing notes within the ED encounter and vital signs as below have been reviewed. /85   Pulse 101   Temp 97.1 °F (36.2 °C) (Infrared)   Resp 18   SpO2 98%   Oxygen Saturation Interpretation: Normal      ------------------------------------------ PROGRESS NOTES ------------------------------------------         5:16 PM  I have spoken with the patient and discussed todays results, in addition to providing specific details for the plan of care and counseling regarding the diagnosis and prognosis. Their questions are answered at this time and they are agreeable with the plan. I discussed at length with them reasons for immediate return here for re evaluation. They will followup with their primary care physician by calling their office on Monday.      --------------------------------- ADDITIONAL PROVIDER NOTES ---------------------------------  At this time the patient is without objective evidence of an acute process requiring hospitalization or inpatient management. They have remained hemodynamically stable throughout their entire ED visit and are stable for discharge with outpatient follow-up. The plan has been discussed in detail and they are aware of the specific conditions for emergent return, as well as the importance of follow-up.       Discharge Medication List as of 4/15/2019  3:52 PM      START taking these medications    Details   colchicine (COLCRYS) 0.6 MG tablet Take 1 tablet by mouth 2 times daily for 5 days, Disp-10 tablet, R-0Print             Diagnosis:  1. Acute gout of right foot, unspecified cause    2. Pain and swelling of toe of right foot        Disposition:  Patient's disposition: Discharge to home  Patient's condition is stable. MDM  Number of Diagnoses or Management Options  Acute gout of right foot, unspecified cause:   Pain and swelling of toe of right foot:   Diagnosis management comments: Patient is a 60-year-old male who presents to the ED with right-sided foot swelling. Patient was evaluated, was found to have mild edema to the right distal aspect of the metatarsals of the right foot, as well as tenderness palpation of the right 1st metatarsal. Bilateral pedal pulses were positive. Patient lab work was remarkable for a uric acid, as well as sediment rate. Patient was otherwise resting comfortably in no acute distress. This could be related to gout, versus lower swelling and edema from anaphylactic reaction to Toradol or Flexeril. Patient otherwise had no other physical exam findings, the remainder of his lab was unremarkable. Patient will be discharged at this time, and instructed to follow-up with his PCP. Patient was instructed to come back to the ED if his symptoms worsen.        Amount and/or Complexity of Data Reviewed  Clinical lab tests: ordered and reviewed  Tests in the radiology section of CPT®: ordered and reviewed                Steve Amaro DO  Resident  04/15/19 0678

## 2019-04-15 NOTE — ED NOTES
FIRST PROVIDER CONTACT ASSESSMENT NOTE      Department of Emergency Medicine   4/15/19  12:11 PM    Chief Complaint: Leg Swelling (Patient has right side leg and body swelling, states Dr Ruth Lam sent him over to check for bone cancer. )      History of Present Illness:    Lisandro Cooney is a 59 y.o. male who presents to the ED by private car for right foot pain. Sent in by PCP to \"get checked for bone cancer in foot. \" Denies injury or trauma. Pain with ambulation. Focused Screening Exam:  Constitutional:  Alert, appears stated age and is in no distress.       *ALLERGIES*     Ibuprofen and Benzocaine     ED Triage Vitals   BP Temp Temp Source Pulse Resp SpO2 Height Weight   04/15/19 1208 04/15/19 1208 04/15/19 1208 04/15/19 1158 04/15/19 1208 04/15/19 1158 -- --   114/85 97.1 °F (36.2 °C) Infrared 90 18 96 %          Initial Plan of Care:  Initiate Treatment-Testing, Proceed toTreatment Area When Bed Available for ED Attending/MLP to Continue Care    -----------------END OF FIRST PROVIDER CONTACT ASSESSMENT NOTE--------------  Electronically signed by CARLOS Mcdermott   DD: 4/15/19       CARLOS Mcdermott  04/15/19 9586

## 2019-04-16 ENCOUNTER — TELEPHONE (OUTPATIENT)
Dept: FAMILY MEDICINE CLINIC | Age: 64
End: 2019-04-16

## 2019-04-16 NOTE — TELEPHONE ENCOUNTER
Patient needs prior auth for Colcrys, this medication was prescribed at [facility] [place] [facility]. Pharmacy told him that PCP has to do Prior auth.

## 2019-04-16 NOTE — ED NOTES
ED Note:  Patient presented to the ED due to inability to obtain colchicine prescription due to prior authorization. Discussed w/ Dr. Leena Guidry, called new prescription into [facility] for prednisone 40 mg PO daily x 7 days, no refills.     Mouna Greene, PharmD, BCPS 4/16/2019 12:19 PM  Pager [phone]

## 2019-04-18 ENCOUNTER — HOSPITAL ENCOUNTER (OUTPATIENT)
Dept: PHYSICAL THERAPY | Age: 64
Setting detail: THERAPIES SERIES
Discharge: HOME | End: 2019-04-18
Payer: MEDICAID

## 2019-04-18 NOTE — PROGRESS NOTES
[facility]                Phone: [phone]  Fax: [phone]    Physical Therapy  Cancellation/No-show Note  Patient Name:  Venessa Souza  :  1955   Date:  2019    For today's appointment patient:  [x]  Cancelled  [x]  Rescheduled appointment  []  No-show     Reason given by patient:  []  Patient ill  []  Conflicting appointment  []  No transportation    []  Conflict with work  []  No reason given  [x]  Other:   Pt reports that he has gout and his foot is still swollen and painful. Rescheduled appointment today. He reports that he is going to  gout medications today.           Electronically signed by:    Eliel Olivera DPT  [id number]

## 2019-04-23 ENCOUNTER — HOSPITAL ENCOUNTER (OUTPATIENT)
Dept: PHYSICAL THERAPY | Age: 64
Setting detail: THERAPIES SERIES
Discharge: HOME | End: 2019-04-23
Payer: MEDICAID

## 2019-04-23 NOTE — PROGRESS NOTES
[address] [facility]                Phone: [phone]  Fax: [phone]    Physical Therapy  Cancellation/No-show Note  Patient Name:  Ghazal Russo  :  1955   Date:  2019    For today's appointment patient:  [x]  Cancelled  []  Rescheduled appointment  []  No-show     Reason given by patient:  []  Patient ill  []  Conflicting appointment  []  No transportation    []  Conflict with work  [x]  No reason given  []  Other:          Electronically signed by:    Wily Inman DPT  [id number]

## 2019-04-26 ENCOUNTER — HOSPITAL ENCOUNTER (OUTPATIENT)
Age: 64
Discharge: HOME | End: 2019-04-28
Payer: MEDICAID

## 2019-04-26 ENCOUNTER — OFFICE VISIT (OUTPATIENT)
Dept: PAIN MANAGEMENT | Age: 64
End: 2019-04-26
Payer: MEDICAID

## 2019-04-26 VITALS
OXYGEN SATURATION: 99 % | SYSTOLIC BLOOD PRESSURE: 130 MMHG | HEART RATE: 62 BPM | RESPIRATION RATE: 16 BRPM | TEMPERATURE: 98.7 F | DIASTOLIC BLOOD PRESSURE: 74 MMHG | WEIGHT: 196 LBS | BODY MASS INDEX: 27.44 KG/M2 | HEIGHT: 71 IN

## 2019-04-26 DIAGNOSIS — M16.0 PRIMARY OSTEOARTHRITIS OF BOTH HIPS: ICD-10-CM

## 2019-04-26 DIAGNOSIS — M51.9 LUMBAR DISC DISORDER: Primary | ICD-10-CM

## 2019-04-26 DIAGNOSIS — M47.816 LUMBAR SPONDYLOSIS: ICD-10-CM

## 2019-04-26 DIAGNOSIS — M48.061 SPINAL STENOSIS OF LUMBAR REGION WITHOUT NEUROGENIC CLAUDICATION: ICD-10-CM

## 2019-04-26 DIAGNOSIS — M47.816 LUMBAR FACET ARTHROPATHY: ICD-10-CM

## 2019-04-26 DIAGNOSIS — G89.4 CHRONIC PAIN SYNDROME: ICD-10-CM

## 2019-04-26 LAB — SPECIFIC GRAVITY UA: 1.02 (ref 1–1.03)

## 2019-04-26 PROCEDURE — 99204 OFFICE O/P NEW MOD 45 MIN: CPT | Performed by: PAIN MEDICINE

## 2019-04-26 PROCEDURE — 80307 DRUG TEST PRSMV CHEM ANLYZR: CPT

## 2019-04-26 PROCEDURE — G0480 DRUG TEST DEF 1-7 CLASSES: HCPCS

## 2019-04-26 NOTE — PROGRESS NOTES
Hector Emmanuel presents to the [facility] on 4/26/2019. Ashley Carpenter is complaining of pain back. The pain is constant. The pain is described as aching. Pain is rated on his best day at a 10, on his worst day at a 10, and on average at a 10 on the VAS scale. He took his last dose of        Any procedures since your last visit. He has not been on anticoagulation medications to include none and is managed by none. Pacemaker or defibrilator: No Physician managing device is none.        /74   Pulse 62   Temp 98.7 °F (37.1 °C) (Oral)   Resp 16   Ht 5' 11\" (1.803 m)   Wt 196 lb (88.9 kg)   SpO2 99%   BMI 27.34 kg/m²

## 2019-04-29 ENCOUNTER — HOSPITAL ENCOUNTER (OUTPATIENT)
Dept: GENERAL RADIOLOGY | Age: 64
Discharge: HOME | End: 2019-05-01
Payer: MEDICAID

## 2019-04-29 ENCOUNTER — HOSPITAL ENCOUNTER (OUTPATIENT)
Age: 64
Discharge: HOME | End: 2019-05-01
Payer: MEDICAID

## 2019-04-29 DIAGNOSIS — R52 PAIN: ICD-10-CM

## 2019-04-29 PROCEDURE — 73521 X-RAY EXAM HIPS BI 2 VIEWS: CPT

## 2019-04-30 LAB
Lab: NORMAL
REPORT: NORMAL
THIS TEST SENT TO: NORMAL

## 2019-05-01 LAB
6AM URINE: <10 NG/ML
7-AMINOCLONAZEPAM, URINE: <5 NG/ML
ALPHA-HYDROXYALPRAZOLAM, URINE: <5 NG/ML
ALPHA-HYDROXYMIDAZOLAM, URINE: <20 NG/ML
ALPRAZOLAM, URINE: <5 NG/ML
CHLORDIAZEPOXIDE, URINE: <20 NG/ML
CLONAZEPAM, URINE: <5 NG/ML
CODEINE, URINE: <20 NG/ML
DIAZEPAM, URINE: <20 NG/ML
HYDROCODONE, URINE: <20 NG/ML
HYDROMORPHONE, URINE: <20 NG/ML
LORAZEPAM, URINE: <20 NG/ML
MIDAZOLAM, URINE: <20 NG/ML
MORPHINE URINE: <20 NG/ML
NORDIAZEPAM, URINE: <20 NG/ML
NORHYDROCODONE, URINE: <20 NG/ML
NOROXYCODONE, URINE: <20 NG/ML
NOROXYMORPHONE, URINE: <20 NG/ML
OXAZEPAM, URINE: <20 NG/ML
OXYCODONE, URINE CONFIRMATION: <20 NG/ML
OXYMORPHONE, URINE: <20 NG/ML
TEMAZEPAM, URINE: <20 NG/ML

## 2019-05-20 ENCOUNTER — HOSPITAL ENCOUNTER (OUTPATIENT)
Dept: PHYSICAL THERAPY | Age: 64
Setting detail: THERAPIES SERIES
Discharge: HOME | End: 2019-05-20
Payer: MEDICAID

## 2019-05-20 NOTE — PROGRESS NOTES
[address] [facility]                Phone: [phone]   Fax: [phone]      Physical Therapy  Non compliance/Attendance Issue    DATE:   5/20/2019            MRN: [id number]     PATIENT NAME:  Miguel Asa              Diagnosis:  BLE weakness, Chronic low back pain         Total Visits to Date: eval only   Cancels/No shows to Date: 2/1    Dear Dr. Ced Albright        This is to notify you that per our physical therapy department policy your patient, noted above, is being discharged from Physical Therapy due to the following reason(s):     · If a Physical Therapy out patient is absent from three consecutive scheduled appointments without notification    · If a Physical Therapy out patient is absent for 50% of the scheduled visits     · Pt's last contact with PT department was on initial eval which was on 4/1/19. If you have any questions, please feel free to contact me at [phone].     Thank You,    Electronically Signed by:   Ravin Rajput DPT        [id number]  5/20/2019

## 2019-05-29 ENCOUNTER — HOSPITAL ENCOUNTER (OUTPATIENT)
Age: 64
Discharge: HOME | End: 2019-05-31
Payer: MEDICAID

## 2019-05-29 LAB — PROSTATE SPECIFIC ANTIGEN: 0.13 NG/ML (ref 0–4)

## 2019-05-29 PROCEDURE — G0103 PSA SCREENING: HCPCS

## 2019-05-31 ENCOUNTER — OFFICE VISIT (OUTPATIENT)
Dept: PAIN MANAGEMENT | Age: 64
End: 2019-05-31
Payer: MEDICAID

## 2019-05-31 ENCOUNTER — TELEPHONE (OUTPATIENT)
Dept: PAIN MANAGEMENT | Age: 64
End: 2019-05-31

## 2019-05-31 VITALS
OXYGEN SATURATION: 98 % | SYSTOLIC BLOOD PRESSURE: 130 MMHG | RESPIRATION RATE: 16 BRPM | TEMPERATURE: 98.9 F | HEIGHT: 71 IN | BODY MASS INDEX: 27.44 KG/M2 | DIASTOLIC BLOOD PRESSURE: 70 MMHG | WEIGHT: 196 LBS | HEART RATE: 63 BPM

## 2019-05-31 DIAGNOSIS — M16.0 PRIMARY OSTEOARTHRITIS OF BOTH HIPS: ICD-10-CM

## 2019-05-31 DIAGNOSIS — M51.9 LUMBAR DISC DISORDER: ICD-10-CM

## 2019-05-31 DIAGNOSIS — M48.061 SPINAL STENOSIS OF LUMBAR REGION WITHOUT NEUROGENIC CLAUDICATION: ICD-10-CM

## 2019-05-31 DIAGNOSIS — M47.816 LUMBAR FACET ARTHROPATHY: ICD-10-CM

## 2019-05-31 DIAGNOSIS — M47.816 LUMBAR SPONDYLOSIS: ICD-10-CM

## 2019-05-31 DIAGNOSIS — M47.816 LUMBAR FACET ARTHROPATHY: Primary | ICD-10-CM

## 2019-05-31 DIAGNOSIS — G89.4 CHRONIC PAIN SYNDROME: ICD-10-CM

## 2019-05-31 DIAGNOSIS — M48.061 SPINAL STENOSIS, LUMBAR REGION, WITHOUT NEUROGENIC CLAUDICATION: Primary | ICD-10-CM

## 2019-05-31 PROCEDURE — 99214 OFFICE O/P EST MOD 30 MIN: CPT | Performed by: PAIN MEDICINE

## 2019-05-31 PROCEDURE — 99213 OFFICE O/P EST LOW 20 MIN: CPT | Performed by: PAIN MEDICINE

## 2019-05-31 RX ORDER — CHLORTHALIDONE 25 MG/1
25 TABLET ORAL DAILY
COMMUNITY
End: 2019-06-27 | Stop reason: SDUPTHER

## 2019-05-31 RX ORDER — MULTIVIT-MIN/IRON/FOLIC ACID/K 18-600-40
CAPSULE ORAL
COMMUNITY
End: 2019-06-27 | Stop reason: SDUPTHER

## 2019-05-31 RX ORDER — TRAMADOL HYDROCHLORIDE 50 MG/1
50 TABLET ORAL DAILY PRN
Qty: 30 TABLET | Refills: 0 | Status: SHIPPED | OUTPATIENT
Start: 2019-05-31 | End: 2019-06-28 | Stop reason: SDUPTHER

## 2019-05-31 NOTE — PROGRESS NOTES
Kylie Napier presents to the [address] [address] on 5/31/2019. Sydney Biswas is complaining of pain in lower back. . The pain is constant. The pain is described as aching. Pain is rated on his best day at a 8, on his worst day at a 10, and on average at a 8 on the VAS scale. He took his last dose of nothing at this time . Any procedures since your last visit: No,  He has not been on anticoagulation medications to include none and is managed by NA.      Pacemaker or defibrilator: No Physician managing device is NA.       /70   Pulse 63   Temp 98.9 °F (37.2 °C) (Oral)   Resp 16   Ht 5' 11\" (1.803 m)   Wt 196 lb (88.9 kg)   SpO2 98%   BMI 27.34 kg/m²

## 2019-05-31 NOTE — PROGRESS NOTES
[facility]  [address] [facility], [address] [place] [address]  [phone]    Follow up Note      Marisela Ann     Date of Visit:  5/31/2019    CC:  Patient presents for follow up   Chief Complaint   Patient presents with    Lower Back Pain     review xray results     HPI:    Pain is unchanged. Change in quality of symptoms:no. Medication side effects:not applicable . Recent diagnostic testing:bilateral hip Xray. Recent interventional procedures:none. .    He has not been on anticoagulation medications to include none. The patient  has not been on herbal supplements. The patient is not diabetic. Imaging: CT lumbar spine 01/2018   ALERT:  THIS IS AN ABNORMAL REPORT   1. Postoperative changes of the lumbar spine status post posterior   spinal fixation L2-S1. No hardware failure. No change in alignment. 2. Questionable abutment of exiting left L3, exiting left L4 and   suspected abutment of exiting bilateral L5 spinal nerve roots at   appropriate levels, see above for level level details. 3. Suspected left adrenal adenoma. Continued surveillance within 6-12   months recommended.      Bilateral hip Xrays 04/2019  Severe degenerative changes of bilateral hips. Previous treatments: Physical Therapy, Surgery L2-S1 fusion and medications. .         Potential Aberrant Drug-Related Behavior:  No    Urine Drug Screening:  First office visit urine screen showed no narcotics    OARRS report:  05/2019 consistent     Past Medical History:   Diagnosis Date    Cancer [facility]) PROSTATE    2005, patient reports bone mets    Erectile dysfunction     Hyperlipidemia     Hypertension 2005    Osteoarthritis, hip/pelvic region and thigh 3/29/2013    Prostate cancer ([facility] [address] 2/26/2013     Past Surgical History:   Procedure Laterality Date    BACK SURGERY      HIP SURGERY Right     LEG SURGERY      LUMBAR SPINE SURGERY  10/30/2017    PLIF L2 - S1   and laminectomy    PROSTATE BIOPSY Prior to Admission medications    Medication Sig Start Date End Date Taking? Authorizing Provider   colchicine (COLCRYS) 0.6 MG tablet Take 1 tablet by mouth 2 times daily for 5 days 4/15/19 4/20/19  Clayburn Sleeper, DO   pantoprazole (PROTONIX) 40 MG tablet Take 1 tablet by mouth every morning (before breakfast) 18   Jo Ann Ferrara MD   chlorthalidone (HYGROTON) 25 MG tablet Take 1 tablet by mouth daily 18   Jo Ann Ferrara MD   lisinopril (PRINIVIL;ZESTRIL) 10 MG tablet Take 1 tablet by mouth daily 18   Jo Ann Ferrara MD   acetaminophen (TYLENOL) 325 MG tablet Take 2 tablets by mouth every 4 hours as needed for Pain 18   Jo Ann Ferrara MD     Allergies   Allergen Reactions    Ibuprofen Swelling    Benzocaine      EDEMA      Social History     Socioeconomic History    Marital status:       Spouse name: Not on file    Number of children: Not on file    Years of education: Not on file    Highest education level: Not on file   Occupational History     Employer: NONE   Social Needs    Financial resource strain: Not on file    Food insecurity:     Worry: Not on file     Inability: Not on file    Transportation needs:     Medical: Not on file     Non-medical: Not on file   Tobacco Use    Smoking status: Former Smoker     Packs/day: 0.25     Years: 10.00     Pack years: 2.50     Last attempt to quit: 2017     Years since quittin.4    Smokeless tobacco: Never Used   Substance and Sexual Activity    Alcohol use: Yes     Comment: SOCIALLY    Drug use: No    Sexual activity: Not Currently     Partners: Female   Lifestyle    Physical activity:     Days per week: Not on file     Minutes per session: Not on file    Stress: Not on file   Relationships    Social connections:     Talks on phone: Not on file     Gets together: Not on file     Attends [employer] service: Not on file     Active member of club or organization: Not on file     Attends meetings of clubs or organizations: Not on file     Relationship status: Not on file    Intimate partner violence:     Fear of current or ex partner: Not on file     Emotionally abused: Not on file     Physically abused: Not on file     Forced sexual activity: Not on file   Other Topics Concern    Not on file   Social History Narrative    Not on file     No family history on file. REVIEW OF SYSTEMS:     Vickie Cosby denies fever/chills, chest pain, shortness of breath, new bowel or bladder complaints. All other review of systems was negative. PHYSICAL EXAMINATION:      /70   Pulse 63   Temp 98.9 °F (37.2 °C) (Oral)   Resp 16   Ht 5' 11\" (1.803 m)   Wt 196 lb (88.9 kg)   SpO2 98%   BMI 27.34 kg/m²     General:       General appearance:   frail, elderly, pleasant and well-hydrated. , in mild to moderate discomfort and A & O x3  Build:Normal Weight     HEENT:     Head:normocephalic and atraumatic  Pupils:regular, round and equal.  Sclera: icterus absent,      Lungs:     Breathing:Breathing Pattern: regular, no distress     Abdomen:     Shape:non-distended and normal  Tenderness:none     Lumbar spine:     Spine inspection:surgical incision scar   CVA tenderness:No   Palpation:tenderness paravertebral muscles, facet loading, left, right, positive and tenderness.   Range of motion:abnormal severely Lateral bending, flexion, extension rotation bilateral and is  painful.     Musculoskeletal:     Trigger points in Paraveteral:absent bilaterally  SI joint tenderness:positive right, positive left              JIMMY test:negative right, negative             left  Piriformis tenderness:negative right, negative left  Trochanteric bursa tenderness:negative right, negative left  SLR:negative right, negative left, sitting      Extremities:     Tremors:None bilaterally upper and lower  Range of motion:pain with internal rotation of hips positive and limited Left worse than Right  Intact:Yes  Cyanosis:none  Edema:Normal     Neurological:     Sensory:diminsihed to light touch Right foot   Motor:                             Right Quadriceps1/5          Left Quadriceps3/5           Right Gastrocnemius2/5    Left Gastrocnemius4/5  Right Ant Tibialis0/5  Left Ant Tibialis4/5  Reflexes:    Right Quadriceps reflex0  Left Quadriceps reflex0  Right Achilles reflex0  Left Achilles reflex0  Gait:antalgic with a walker      Dermatology:     Skin:no unusual rashes and no skin lesions     Impression:  Low back pain with no radiculopathy with h/o L2-S1 fusion in 2017 with h/o gout and advannced prostate cancer  Patient was doing okay until he fell recently and is complaining of increased pain(according to  notes his most recent lumbar spine Xray shows that fusion is intact)  Plan:  Follow up on his low back pain  Results of bilateral hip Xray were discussed with the patient. Will consider hip injections if needed later  Will hold off on interventional procedures for his low back  Patient had side effects with NSAIDs (facial swelling)  Will start patient on Ultram 50 mg QD PRN  Reviewed first office visit urine screen  OARRS report reviewed  Patient encouraged to stay active, will refer patient to aquatherapy  Treatment plan discussed with the patient including medications and procedure side effects       We discussed with the patient that combining opioids, benzodiazepines, alcohol, illicit drugs or sleep aids increases the risk of respiratory depression including death. We discussed that these medications may cause drowsiness, sedation or dizziness and have counseled the patient not to drive or operate machinery. We have discussed that these medications will be prescribed only by one provider. We have discussed with the patient about age related risk factors and have thoroughly discussed the importance of taking these medications as prescribed.  The patient verbalizes understanding. margot Abreu M.D.

## 2019-05-31 NOTE — TELEPHONE ENCOUNTER
Isidoro Cordoba called and wanted his script sent to the [facility] fax # [phone], but the pharmacy did not come up, message left for patient to call.

## 2019-06-03 ENCOUNTER — TELEPHONE (OUTPATIENT)
Dept: PAIN MANAGEMENT | Age: 64
End: 2019-06-03

## 2019-06-06 ENCOUNTER — TELEPHONE (OUTPATIENT)
Dept: PAIN MANAGEMENT | Age: 64
End: 2019-06-06

## 2019-06-06 NOTE — TELEPHONE ENCOUNTER
Coni Fernando called stating he wanted Dr. July Zaldivar notified about physical/aqua therapy. I called her office to make them aware. Also he wanted [employer] to do the physical /aquatherapy.  I faxed to [phone] to get the evaluation started

## 2019-06-27 ENCOUNTER — OFFICE VISIT (OUTPATIENT)
Dept: FAMILY MEDICINE CLINIC | Age: 64
End: 2019-06-27
Payer: MEDICAID

## 2019-06-27 VITALS
SYSTOLIC BLOOD PRESSURE: 142 MMHG | WEIGHT: 197 LBS | TEMPERATURE: 98.2 F | DIASTOLIC BLOOD PRESSURE: 88 MMHG | BODY MASS INDEX: 27.58 KG/M2 | HEART RATE: 80 BPM | OXYGEN SATURATION: 97 % | RESPIRATION RATE: 18 BRPM | HEIGHT: 71 IN

## 2019-06-27 DIAGNOSIS — M48.061 SPINAL STENOSIS OF LUMBAR REGION AT MULTIPLE LEVELS: ICD-10-CM

## 2019-06-27 DIAGNOSIS — I10 ESSENTIAL HYPERTENSION: ICD-10-CM

## 2019-06-27 DIAGNOSIS — R73.9 ELEVATED BLOOD SUGAR: Primary | ICD-10-CM

## 2019-06-27 LAB — HBA1C MFR BLD: 6.2 %

## 2019-06-27 PROCEDURE — 99212 OFFICE O/P EST SF 10 MIN: CPT | Performed by: STUDENT IN AN ORGANIZED HEALTH CARE EDUCATION/TRAINING PROGRAM

## 2019-06-27 PROCEDURE — 99213 OFFICE O/P EST LOW 20 MIN: CPT | Performed by: STUDENT IN AN ORGANIZED HEALTH CARE EDUCATION/TRAINING PROGRAM

## 2019-06-27 PROCEDURE — 83036 HEMOGLOBIN GLYCOSYLATED A1C: CPT | Performed by: STUDENT IN AN ORGANIZED HEALTH CARE EDUCATION/TRAINING PROGRAM

## 2019-06-27 RX ORDER — LISINOPRIL 10 MG/1
10 TABLET ORAL DAILY
Qty: 30 TABLET | Refills: 3 | Status: SHIPPED | OUTPATIENT
Start: 2019-06-27 | End: 2019-10-10 | Stop reason: SDUPTHER

## 2019-06-27 RX ORDER — CHLORTHALIDONE 25 MG/1
25 TABLET ORAL DAILY
Qty: 30 TABLET | Refills: 3 | Status: SHIPPED | OUTPATIENT
Start: 2019-06-27 | End: 2019-11-18 | Stop reason: ALTCHOICE

## 2019-06-27 RX ORDER — PANTOPRAZOLE SODIUM 40 MG/1
40 TABLET, DELAYED RELEASE ORAL
Qty: 30 TABLET | Refills: 3 | Status: SHIPPED | OUTPATIENT
Start: 2019-06-27 | End: 2019-10-10 | Stop reason: SDUPTHER

## 2019-06-27 RX ORDER — MULTIVIT-MIN/IRON/FOLIC ACID/K 18-600-40
1 CAPSULE ORAL DAILY
Qty: 30 CAPSULE | Refills: 3 | Status: SHIPPED | OUTPATIENT
Start: 2019-06-27 | End: 2019-10-10 | Stop reason: SDUPTHER

## 2019-06-27 NOTE — PROGRESS NOTES
For refills   Hx HTN   Back pain   Prostate Ca on treatment   Didn't take medications today  Was in a hurry   BPs at home normal    Sees pain mgmnt and Neuro   To get aquatherapy   No improved      Blood pressure (!) 142/88, pulse 80, temperature 98.2 °F (36.8 °C), temperature source Oral, resp. rate 18, height 5' 11\" (1.803 m), weight 197 lb (89.4 kg), SpO2 97 %. HEENT WNL     Heart regular    Lungs clear    abd non-tender      No edema    Pulses intact         refill rx  Labs as ordered  RTO 8 weekms    Attending Physician Statement  I have discussed the case, including pertinent history and exam findings with the resident. I agree with the documented assessment and plan.

## 2019-06-28 DIAGNOSIS — M47.816 LUMBAR FACET ARTHROPATHY: ICD-10-CM

## 2019-06-28 DIAGNOSIS — M48.061 SPINAL STENOSIS OF LUMBAR REGION WITHOUT NEUROGENIC CLAUDICATION: ICD-10-CM

## 2019-06-29 ASSESSMENT — ENCOUNTER SYMPTOMS
BLOOD IN STOOL: 0
EYE PAIN: 0
ABDOMINAL PAIN: 0
RHINORRHEA: 0
DIARRHEA: 0
EYE ITCHING: 0
CHEST TIGHTNESS: 0
VOMITING: 0
WHEEZING: 0
SHORTNESS OF BREATH: 0
ABDOMINAL DISTENTION: 0
CONSTIPATION: 0
NAUSEA: 0
COUGH: 0
COLOR CHANGE: 0
BACK PAIN: 1

## 2019-06-29 NOTE — PROGRESS NOTES
2019     Christine Cotter (:  1955) is a 59 y.o. male, here for evaluation of the following medical concerns:    HPI  Chronic Back Pain: Pain is better. On average, pain is perceived as moderate (4-6 pain scale). Change in quality of symptoms: yes - improved. Associated symptoms: weakness- chronic weakness of LE's, albiet improved with medication and change in gait- more stability compared to previously . He denies paresthesias, saddle anesthesia and new bowel or bladder dysfunction. Current treatment: ultram.  Medication side effects: none. Recent diagnostic testing: none. Hypertension:  Home blood pressure monitoring: No.  He is adherent to a low sodium diet. Patient denies chest pain, shortness of breath, headache, lightheadedness, blurred vision, peripheral edema, palpitations, dry cough and fatigue. Antihypertensive medication side effects: no medication side effects noted. Use of agents associated with hypertension: none. Sodium (mmol/L)   Date Value   04/15/2019 142    BUN (mg/dL)   Date Value   04/15/2019 25 (H)    Glucose (mg/dL)   Date Value   04/15/2019 129 (H)   2010 122 (H)      Potassium (mmol/L)   Date Value   04/15/2019 3.4 (L)    CREATININE (mg/dL)   Date Value   04/15/2019 1.3 (H)           Review of Systems   Constitutional: Negative for activity change, fatigue, fever and unexpected weight change. HENT: Negative for congestion, rhinorrhea and tinnitus. Eyes: Negative for pain, itching and visual disturbance. Respiratory: Negative for cough, chest tightness, shortness of breath and wheezing. Cardiovascular: Negative for chest pain, palpitations and leg swelling. Gastrointestinal: Negative for abdominal distention, abdominal pain, blood in stool, constipation, diarrhea, nausea and vomiting. Endocrine: Negative for cold intolerance, heat intolerance, polydipsia and polyuria.    Genitourinary: Negative for frequency, hematuria and urgency. Musculoskeletal: Positive for arthralgias, back pain and gait problem. Negative for myalgias. Skin: Negative for color change, pallor, rash and wound. Neurological: Positive for weakness. Negative for dizziness, light-headedness, numbness and headaches. Psychiatric/Behavioral: Negative for agitation and suicidal ideas. The patient is not nervous/anxious. Prior to Visit Medications    Medication Sig Taking? Authorizing Provider   chlorthalidone (HYGROTON) 25 MG tablet Take 1 tablet by mouth daily Yes Zan Paz MD   lisinopril (PRINIVIL;ZESTRIL) 10 MG tablet Take 1 tablet by mouth daily Yes Zan Paz MD   pantoprazole (PROTONIX) 40 MG tablet Take 1 tablet by mouth every morning (before breakfast) Yes Zan Paz MD   Cholecalciferol (VITAMIN D) 2000 units CAPS capsule Take 1 capsule by mouth daily Yes Zan Paz MD   goserelin (ZOLADEX) 10.8 MG injection Inject 10.8 mg into the skin every 3 months Yes Historical Provider, MD   traMADol (ULTRAM) 50 MG tablet Take 1 tablet by mouth daily as needed for Pain for up to 30 days. Yes Hamilton Rich MD        Social History     Tobacco Use    Smoking status: Former Smoker     Packs/day: 0.25     Years: 10.00     Pack years: 2.50     Last attempt to quit: 2017     Years since quittin.5    Smokeless tobacco: Never Used   Substance Use Topics    Alcohol use: Yes     Comment: SOCIALLY        Vitals:    19 1420 19 1432   BP: (!) 148/84 (!) 142/88   Site: Right Upper Arm Right Upper Arm   Position: Sitting Sitting   Cuff Size: Medium Adult Medium Adult   Pulse: 80    Resp: 18    Temp: 98.2 °F (36.8 °C)    TempSrc: Oral    SpO2: 97%    Weight: 197 lb (89.4 kg)    Height: 5' 11\" (1.803 m)      Estimated body mass index is 27.48 kg/m² as calculated from the following:    Height as of this encounter: 5' 11\" (1.803 m). Weight as of this encounter: 197 lb (89.4 kg).     Physical Exam Constitutional: He is oriented to person, place, and time. He appears well-developed and well-nourished. No distress. HENT:   Head: Normocephalic. Right Ear: External ear normal.   Left Ear: External ear normal.   Nose: Nose normal.   Mouth/Throat: Oropharynx is clear and moist.   Eyes: Conjunctivae and EOM are normal. Right eye exhibits no discharge. Left eye exhibits no discharge. No scleral icterus. Neck: Normal range of motion. Neck supple. No JVD present. No thyromegaly present. Cardiovascular: Normal rate, regular rhythm, normal heart sounds and intact distal pulses. No murmur heard. Pulmonary/Chest: Effort normal and breath sounds normal. No respiratory distress. He has no wheezes. Abdominal: Soft. Bowel sounds are normal. He exhibits no distension. There is no tenderness. Musculoskeletal: He exhibits no edema, tenderness or deformity. Walks with wheeled walker, nontender to palpation, pain with SLR and quick movements of bilateral lower extremitie     Neurological: He is alert and oriented to person, place, and time. No cranial nerve deficit. Skin: Skin is warm and dry. No rash noted. He is not diaphoretic. No erythema. Vitals reviewed. ASSESSMENT/PLAN:  1. Essential hypertension  - elevated secondary to missing his medications today. Asymptomatic  - refills for lisinopril (PRINIVIL;ZESTRIL) 10 MG tablet; Take 1 tablet by mouth daily  Dispense: 30 tablet; Refill: 3  - CBC; Future  - COMPREHENSIVE METABOLIC PANEL; Future  - LIPID PANEL; Future  - Vitamin D 25 Hydroxy; Future    2. Spinal stenosis of lumbar region at multiple levels  - continue with pain management  - much improved with ultram    3. Elevated blood sugar  -screening for DM    Return in about 3 months (around 9/27/2019) for f/u htn. An electronic signature was used to authenticate this note.     --Jayla Dasilva MD on 6/29/2019 at 6:21 PM

## 2019-07-01 ENCOUNTER — TELEPHONE (OUTPATIENT)
Dept: PAIN MANAGEMENT | Age: 64
End: 2019-07-01

## 2019-07-01 RX ORDER — TRAMADOL HYDROCHLORIDE 50 MG/1
50 TABLET ORAL DAILY PRN
Qty: 21 TABLET | Refills: 0 | Status: SHIPPED | OUTPATIENT
Start: 2019-07-01 | End: 2019-07-18 | Stop reason: SDUPTHER

## 2019-07-01 NOTE — TELEPHONE ENCOUNTER
Kapil Ordonez called in checking on his script, instructed that it is taken care of and he can pick it up.

## 2019-07-18 ENCOUNTER — OFFICE VISIT (OUTPATIENT)
Dept: PAIN MANAGEMENT | Age: 64
End: 2019-07-18
Payer: MEDICAID

## 2019-07-18 VITALS
HEIGHT: 70 IN | TEMPERATURE: 98.9 F | BODY MASS INDEX: 27.77 KG/M2 | SYSTOLIC BLOOD PRESSURE: 130 MMHG | WEIGHT: 194 LBS | HEART RATE: 89 BPM | OXYGEN SATURATION: 97 % | DIASTOLIC BLOOD PRESSURE: 80 MMHG

## 2019-07-18 DIAGNOSIS — M51.9 LUMBAR DISC DISORDER: ICD-10-CM

## 2019-07-18 DIAGNOSIS — M48.061 SPINAL STENOSIS OF LUMBAR REGION WITHOUT NEUROGENIC CLAUDICATION: ICD-10-CM

## 2019-07-18 DIAGNOSIS — M47.816 LUMBAR FACET ARTHROPATHY: Primary | ICD-10-CM

## 2019-07-18 DIAGNOSIS — M16.0 PRIMARY OSTEOARTHRITIS OF BOTH HIPS: ICD-10-CM

## 2019-07-18 DIAGNOSIS — M47.816 LUMBAR SPONDYLOSIS: ICD-10-CM

## 2019-07-18 DIAGNOSIS — M48.061 SPINAL STENOSIS, LUMBAR REGION, WITHOUT NEUROGENIC CLAUDICATION: ICD-10-CM

## 2019-07-18 DIAGNOSIS — G89.4 CHRONIC PAIN SYNDROME: ICD-10-CM

## 2019-07-18 PROCEDURE — 99213 OFFICE O/P EST LOW 20 MIN: CPT | Performed by: PAIN MEDICINE

## 2019-07-18 PROCEDURE — 99213 OFFICE O/P EST LOW 20 MIN: CPT

## 2019-07-18 RX ORDER — TRAMADOL HYDROCHLORIDE 50 MG/1
50 TABLET ORAL DAILY PRN
Qty: 30 TABLET | Refills: 1 | Status: SHIPPED | OUTPATIENT
Start: 2019-07-18 | End: 2019-08-17

## 2019-07-18 NOTE — PROGRESS NOTES
[address]  [address] [facility], [address] [place] [address] [place]  [phone]    Follow up Note      Greg Lyons     Date of Visit:  7/18/2019    CC:  Patient presents for follow up   Chief Complaint   Patient presents with    Back Pain     lower back     HPI:    Pain is unchanged. Change in quality of symptoms:no. Medication side effects:none  Recent diagnostic testing:none  Recent interventional procedures:none. .    He has not been on anticoagulation medications to include none. The patient  has not been on herbal supplements. The patient is not diabetic. Imaging: CT lumbar spine 01/2018   ALERT:  THIS IS AN ABNORMAL REPORT   1. Postoperative changes of the lumbar spine status post posterior   spinal fixation L2-S1. No hardware failure. No change in alignment. 2. Questionable abutment of exiting left L3, exiting left L4 and   suspected abutment of exiting bilateral L5 spinal nerve roots at   appropriate levels   3. Suspected left adrenal adenoma. Continued surveillance within 6-12   months recommended.      Bilateral hip Xrays 04/2019  Severe degenerative changes of bilateral hips. Previous treatments: Physical Therapy, Surgery L2-S1 fusion and medications. .         Potential Aberrant Drug-Related Behavior:  No    Urine Drug Screening:  First office visit urine screen showed no narcotics    OARRS report:  05/2019 consistent   07/2019 consistent   Past Medical History:   Diagnosis Date    Cancer [facility]) PROSTATE    2005, patient reports bone mets    Erectile dysfunction     Hyperlipidemia     Hypertension 2005    Osteoarthritis, hip/pelvic region and thigh 3/29/2013    Prostate cancer ([facility] [address] 2/26/2013     Past Surgical History:   Procedure Laterality Date    BACK SURGERY      HIP SURGERY Right     LEG SURGERY      LUMBAR SPINE SURGERY  10/30/2017    PLIF L2 - S1   and laminectomy    PROSTATE BIOPSY       Prior to Admission medications    Medication Sig Start Date

## 2019-07-18 NOTE — PROGRESS NOTES
Grover Toussaint presents to the [address] [address] on 7/18/2019. Martín Gary is complaining of pain lower back. The pain is persistent. The pain is described as aching. Pain is rated on his best day at a 7, on his worst day at a 10, and on average at a 8 on the VAS scale. He took his last dose of Tramadol July 15th. Any procedures since your last visit: No, with  % relief. He has not been on anticoagulation medications to include none and is managed by . Pacemaker or defibrilator: No Physician managing device is . /80 (Site: Left Upper Arm, Position: Sitting, Cuff Size: Medium Adult)   Pulse 89   Temp 98.9 °F (37.2 °C) (Oral)   Ht 5' 10\" (1.778 m)   Wt 194 lb (88 kg)   SpO2 97%   BMI 27.84 kg/m²      No LMP for male patient.

## 2019-07-22 ENCOUNTER — HOSPITAL ENCOUNTER (OUTPATIENT)
Dept: PHYSICAL THERAPY | Age: 64
Setting detail: THERAPIES SERIES
Discharge: HOME | End: 2019-07-22
Payer: MEDICAID

## 2019-07-22 PROCEDURE — 97161 PT EVAL LOW COMPLEX 20 MIN: CPT | Performed by: PHYSICAL THERAPIST

## 2019-07-22 ASSESSMENT — PAIN DESCRIPTION - FREQUENCY: FREQUENCY: CONTINUOUS

## 2019-07-22 ASSESSMENT — PAIN DESCRIPTION - LOCATION: LOCATION: BACK

## 2019-07-22 ASSESSMENT — PAIN SCALES - GENERAL: PAINLEVEL_OUTOF10: 7

## 2019-07-22 ASSESSMENT — PAIN DESCRIPTION - PAIN TYPE: TYPE: CHRONIC PAIN

## 2019-07-22 ASSESSMENT — PAIN DESCRIPTION - ORIENTATION: ORIENTATION: LOWER

## 2019-07-22 ASSESSMENT — PAIN DESCRIPTION - DESCRIPTORS: DESCRIPTORS: ACHING

## 2019-07-22 NOTE — PROGRESS NOTES
Flat foot contact across BLEs  Balance  Sitting - Static: Good  Sitting - Dynamic: Good  Standing - Static: Fair  Standing - Dynamic: Fair     Assessment   Conditions Requiring Skilled Therapeutic Intervention  Assessment: pt presents to PT s/p L2-S1 PLIF (2017); limited AROM/strength across trunk with hindered posturing/gait   Prognosis: Fair  Decision Making: Low Complexity  Patient Education: pt educated on importance of upright posturing   REQUIRES PT FOLLOW UP: Yes         Plan   Plan  Times per week: 2x/week x 6 weeks for aquatic therapy   Current Treatment Recommendations: Aquatics    Goals  Short term goals  Time Frame for Short term goals: 3 weeks   Short term goal 1: increase AROM of trunk to within 50% functional limits   Short term goal 2: increase strength of trunk/BLEs to grossly 4- to 4/5 to improve posture/gait  Short term goal 3: increase ambulation with pt demonstrating increased heel-toe progression with use of AD   Short term goal 4: decrease pain to < 5/10 across back region with activity   Long term goals  Time Frame for Long term goals : 6 weeks   Long term goal 1: increase AROM of trunk to within 75% functional limits   Long term goal 2: increase strength of trunk/BLEs to grossly 4 to 4+/5 to improve posture/gait  Long term goal 3: increase ambulation with pt demonstrating proper gait mechanics with/without AD   Long term goal 4: decrease pain to < 3/10 across back region with activity   Long term goal 5: pt demonstrates independence with HEP   Patient Goals   Patient goals : to reduce back pain        Therapy Time   Individual Concurrent Group Co-treatment   Time In 0815         Time Out 0900         Minutes [address], PT

## 2019-07-26 ENCOUNTER — TELEPHONE (OUTPATIENT)
Dept: FAMILY MEDICINE CLINIC | Age: 64
End: 2019-07-26

## 2019-07-26 ENCOUNTER — HOSPITAL ENCOUNTER (OUTPATIENT)
Dept: PHYSICAL THERAPY | Age: 64
Setting detail: THERAPIES SERIES
Discharge: HOME | End: 2019-07-26
Payer: MEDICAID

## 2019-07-26 DIAGNOSIS — M10.9 GOUT, UNSPECIFIED CAUSE, UNSPECIFIED CHRONICITY, UNSPECIFIED SITE: Primary | ICD-10-CM

## 2019-07-26 PROCEDURE — 97113 AQUATIC THERAPY/EXERCISES: CPT

## 2019-07-26 RX ORDER — COLCHICINE 0.6 MG/1
0.6 TABLET ORAL DAILY
Qty: 30 TABLET | Refills: 3 | Status: SHIPPED | OUTPATIENT
Start: 2019-07-26 | End: 2019-07-26 | Stop reason: SDUPTHER

## 2019-07-26 RX ORDER — COLCHICINE 0.6 MG/1
0.6 TABLET ORAL DAILY
Qty: 30 TABLET | Refills: 3 | Status: SHIPPED | OUTPATIENT
Start: 2019-07-26 | End: 2019-12-06 | Stop reason: SDUPTHER

## 2019-07-26 NOTE — PROGRESS NOTES
[facility]  Phone: [phone] Fax: [phone]        Physical Therapy Aquatic Flow Sheet   Date:  2019    Patient Name:  Greg Lyons    :  1955  Restrictions/Precautions:    Diagnosis:   spinal stenosis   Treatment Diagnosis:   spinal stenosis   Insurance/Certification information:Medicaid     Referring Physician:  Aubree Foley [facility] signed (Y/N):    Visit# / total visits:   Pain level: 8/10   Electronically signed by:  Angelita Reed PTA  Time JS:7155  Time Out:1030    Subjective:  Patient presents to PT with c/o back pain. Patient reports ongoing issue. Had PLIF L2-S1 from Oct 2017. Patient has had therapy in the past with minimal relief. Patient did have past fall. Followed up with surgeon with no damage to hardware. Patient admits to limited AROM across trunk. States hindered ADLs. Patient ambulates with rollator at all times. Patient admits to LE weakness due OA B hips. Pt takes tramadol for pain relief.     Key  B= Belt DB= Dumbells T= Theratube   H= Hydrotone N= Noodles W= Weights   P= Paddles S= Speedo equipment K= Kickboard     Exercises/Activities   Warm-up/Amb    Exercises      Slow forward  5  HR/TR  20x    Slow sideways  5  Marches  20x    Slow backwards    Mini-squats  20x    Medium forward    4-way SLR  20x    Medium sideways    Hip circles/fig 8      Small shuffle    Hamstring curls      Jog    Knee extension      Braiding    Pelvic tilts      Bicycling    Scap squeezes          Shoulder flex/ext      Functional    Shoulder abd/add      Step    Shoulder H. abd/add      Lifting    Shoulder IR/ER      Hand to opp knee    Rowing      Push down squat    Bilateral pull down      UE PNF    Push/pull      LE PNF    Push downs      Wall push ups    Arm circles      SLS    Elbow flex/ext          Chin tuck      Stretching    UT shrugs/rolls      Gastroc/Soleus    Rocking horse      Hamstring          SKTC    Other      Piriformis          Hip flexor

## 2019-07-29 ENCOUNTER — HOSPITAL ENCOUNTER (OUTPATIENT)
Dept: PHYSICAL THERAPY | Age: 64
Setting detail: THERAPIES SERIES
Discharge: HOME | End: 2019-07-29
Payer: MEDICAID

## 2019-07-29 PROCEDURE — 97113 AQUATIC THERAPY/EXERCISES: CPT

## 2019-07-29 NOTE — PROGRESS NOTES
[facility]  Phone: [phone] Fax: [phone]        Physical Therapy Aquatic Flow Sheet   Date:  2019    Patient Name:  Lala Interiano    :  1955  Restrictions/Precautions:    Diagnosis:   spinal stenosis   Treatment Diagnosis:   spinal stenosis   Insurance/Certification information:Medicaid     Referring Physician:  Aubree [facility] signed (Y/N):    Visit# / total visits: 3/12  Pain level: 8/10   Electronically signed by:  Ambrosio Sinclair PTA  Time AL:970  Time Out:1030    Key  B= Belt DB= Dumbells T= Theratube   H= Hydrotone N= Noodles W= Weights   P= Paddles S= Speedo equipment K= Kickboard     Exercises/Activities   Warm-up/Amb    Exercises      Slow forward  5  HR/TR  20x    Slow sideways  5  Marches  20x    Slow backwards    Mini-squats  20x    Medium forward    4-way SLR  20x    Medium sideways    Hip circles/fig 8      Small shuffle    Hamstring curls      Jog    Knee extension      Braiding    Pelvic tilts      Bicycling    Scap squeezes          Shoulder flex/ext      Functional    Shoulder abd/add      Step    Shoulder H. abd/add      Lifting    Shoulder IR/ER      Hand to opp knee    Rowing      Push down squat    Bilateral pull down      UE PNF    Push/pull      LE PNF    Push downs      Wall push ups    Arm circles      SLS    Elbow flex/ext          Chin tuck      Stretching    UT shrugs/rolls      Gastroc/Soleus    Rocking horse      Hamstring          SKTC    Other      Piriformis          Hip flexor          Ladder pull          Pec stretch          Post deltoid           Timed Code Treatment Minutes:  45    Total Treatment Minutes:  55    Treatment/Activity Tolerance:  [] Patient tolerated treatment well [x] Patient limited by fatique  [x] Patient limited by pain [] Patient limited by other medical complications  [] Other: deconditioned status -many rest breaks -very low tolerance for ALL activity-Close Sup per PTA    Prognosis: [] Good [x]

## 2019-08-09 ENCOUNTER — HOSPITAL ENCOUNTER (OUTPATIENT)
Dept: PHYSICAL THERAPY | Age: 64
Setting detail: THERAPIES SERIES
Discharge: HOME | End: 2019-08-09
Payer: MEDICAID

## 2019-08-09 PROCEDURE — 97113 AQUATIC THERAPY/EXERCISES: CPT

## 2019-08-12 ENCOUNTER — HOSPITAL ENCOUNTER (OUTPATIENT)
Dept: PHYSICAL THERAPY | Age: 64
Setting detail: THERAPIES SERIES
Discharge: HOME | End: 2019-08-12
Payer: MEDICAID

## 2019-08-12 PROCEDURE — 97113 AQUATIC THERAPY/EXERCISES: CPT

## 2019-08-12 NOTE — PROGRESS NOTES
[facility]  Phone: [phone] Fax: [phone]        Physical Therapy Aquatic Flow Sheet   Date:  2019    Patient Name:  Briana Bowers    :  1955  Restrictions/Precautions:    Diagnosis:   spinal stenosis   Treatment Diagnosis:   spinal stenosis   Insurance/Certification information:Medicaid     Referring Physician:  Aubree Foley [facility] signed (Y/N):    Visit# / total visits:   Pain level: 8/10   Electronically signed by:  Esthela Jones, PTA  Time PZ:5186  Time Out:1030    Key  B= Belt DB= Dumbells T= Theratube   H= Hydrotone N= Noodles W= Weights   P= Paddles S= Speedo equipment K= Kickboard     Exercises/Activities   Warm-up/Amb    Exercises      Slow forward  5  HR/TR  20x    Slow sideways  5  Marches  20x    Slow backwards  5  Mini-squats  20x    Medium forward    4-way SLR  20x    Medium sideways    Hip circles/fig 8      Small shuffle    Hamstring curls      Jog    Knee extension      Braiding    Pelvic tilts      Bicycling  5  Scap squeezes      Marching  5  Shoulder flex/ext      Functional    Shoulder abd/add      Step    Shoulder H. abd/add      Lifting    Shoulder IR/ER      Hand to opp knee    Rowing      Push down squat    Bilateral pull down      UE PNF    Push/pull      LE PNF    Push downs      Wall push ups    Arm circles      SLS    Elbow flex/ext          Chin tuck      Stretching    UT shrugs/rolls      Gastroc/Soleus    Rocking horse      Hamstring          SKTC    Other      Piriformis          Hip flexor          Ladder pull          Pec stretch          Post deltoid           Timed Code Treatment Minutes:  45    Total Treatment Minutes:  55    Treatment/Activity Tolerance:  [] Patient tolerated treatment well [x] Patient limited by fatique  [x] Patient limited by pain [] Patient limited by other medical complications  [] Other: deconditioned status -many rest breaks -very low tolerance for ALL activity-Close Sup per PTA    Prognosis: []

## 2019-08-13 ENCOUNTER — TELEPHONE (OUTPATIENT)
Dept: PAIN MANAGEMENT | Age: 64
End: 2019-08-13

## 2019-08-16 ENCOUNTER — HOSPITAL ENCOUNTER (OUTPATIENT)
Dept: PHYSICAL THERAPY | Age: 64
Setting detail: THERAPIES SERIES
Discharge: HOME | End: 2019-08-16
Payer: MEDICAID

## 2019-08-16 PROCEDURE — 97113 AQUATIC THERAPY/EXERCISES: CPT

## 2019-08-16 NOTE — PROGRESS NOTES
[facility]  Phone: [phone] Fax: [phone]        Physical Therapy Aquatic Flow Sheet   Date:  2019    Patient Name:  Jinny Hodgkin    :  1955  Restrictions/Precautions:    Diagnosis:   spinal stenosis   Treatment Diagnosis:   spinal stenosis   Insurance/Certification information:Medicaid     Referring Physician:  Aubree Foley [facility] signed (Y/N):    Visit# / total visits:   Pain level: 8/10   Electronically signed by:  Juanpablo Park PTA  Time FL:2161  Time Out:1030    Subjective:  Pt reports \"I want to be able to put my socks on without using sock aid. \"  Pt is at  aquatic treatment sessions; feels 50% better since initiation of therapy.     Key  B= Belt DB= Dumbells T= Theratube   H= Hydrotone N= Noodles W= Weights   P= Paddles S= Speedo equipment K= Kickboard     Exercises/Activities   Warm-up/Amb    Exercises      Slow forward  5  HR/TR  20x    Slow sideways  5  Marches  20x    Slow backwards  5  Mini-squats  20x    Medium forward    4-way SLR  20x    Medium sideways    Hip circles/fig 8      Small shuffle    Hamstring curls      Jog    Knee extension      Braiding    Pelvic tilts      Bicycling  5  Scap squeezes      Marching  5  Shoulder flex/ext      Functional    Shoulder abd/add      Step    Shoulder H. abd/add      Lifting    Shoulder IR/ER      Hand to opp knee    Rowing      Push down squat    Bilateral pull down      UE PNF    Push/pull      LE PNF    Push downs      Wall push ups    Arm circles      SLS    Elbow flex/ext          Chin tuck      Stretching    UT shrugs/rolls      Gastroc/Soleus    Rocking horse      Hamstring          SKTC    Other      Piriformis          Hip flexor          Ladder pull          Pec stretch          Post deltoid           Timed Code Treatment Minutes:  45    Total Treatment Minutes:  55    Treatment/Activity Tolerance:  [x] Patient tolerated treatment well [] Patient limited by dwight  [] Patient limited by pain [] Patient limited by other medical complications  [x] Other: Pt pleasant and cooperative -gives great effort  Functional improvement status -less rest breaks -better tolerance for ALL activity-PTA on deck    Prognosis: [] Good [x] Fair  [] Poor    Patient Requires Follow-up: [x] Yes  [] No    Plan:   [x] Continue per plan of care [] Alter current plan (see comments)  [] Plan of care initiated [] Hold pending MD visit [] Discharge        Electronically signed by:  Maryjo Bird  PTA [id number]

## 2019-08-22 ENCOUNTER — NURSE ONLY (OUTPATIENT)
Dept: FAMILY MEDICINE CLINIC | Age: 64
End: 2019-08-22
Payer: MEDICAID

## 2019-08-22 ENCOUNTER — HOSPITAL ENCOUNTER (OUTPATIENT)
Age: 64
Discharge: HOME | End: 2019-08-24
Payer: MEDICAID

## 2019-08-22 DIAGNOSIS — M10.9 GOUT OF FOOT, UNSPECIFIED CAUSE, UNSPECIFIED CHRONICITY, UNSPECIFIED LATERALITY: ICD-10-CM

## 2019-08-22 LAB
ALBUMIN SERPL-MCNC: 4.6 G/DL (ref 3.5–5.2)
ALP BLD-CCNC: 117 U/L (ref 40–129)
ALT SERPL-CCNC: 16 U/L (ref 0–40)
ANION GAP SERPL CALCULATED.3IONS-SCNC: 16 MMOL/L (ref 7–16)
AST SERPL-CCNC: 22 U/L (ref 0–39)
BILIRUB SERPL-MCNC: 0.4 MG/DL (ref 0–1.2)
BUN BLDV-MCNC: 22 MG/DL (ref 8–23)
CALCIUM SERPL-MCNC: 10.3 MG/DL (ref 8.6–10.2)
CHLORIDE BLD-SCNC: 105 MMOL/L (ref 98–107)
CHOLESTEROL, TOTAL: 232 MG/DL (ref 0–199)
CO2: 22 MMOL/L (ref 22–29)
CREAT SERPL-MCNC: 1.3 MG/DL (ref 0.7–1.2)
GFR AFRICAN AMERICAN: >60
GFR NON-AFRICAN AMERICAN: 55 ML/MIN/1.73
GLUCOSE BLD-MCNC: 99 MG/DL (ref 74–99)
HCT VFR BLD CALC: 42.3 % (ref 37–54)
HDLC SERPL-MCNC: 52 MG/DL
HEMOGLOBIN: 13.4 G/DL (ref 12.5–16.5)
LDL CHOLESTEROL CALCULATED: 163 MG/DL (ref 0–99)
MCH RBC QN AUTO: 28.8 PG (ref 26–35)
MCHC RBC AUTO-ENTMCNC: 31.7 % (ref 32–34.5)
MCV RBC AUTO: 91 FL (ref 80–99.9)
PDW BLD-RTO: 14 FL (ref 11.5–15)
PLATELET # BLD: 230 E9/L (ref 130–450)
PMV BLD AUTO: 11.7 FL (ref 7–12)
POTASSIUM SERPL-SCNC: 4.2 MMOL/L (ref 3.5–5)
RBC # BLD: 4.65 E12/L (ref 3.8–5.8)
SODIUM BLD-SCNC: 143 MMOL/L (ref 132–146)
TOTAL PROTEIN: 8.3 G/DL (ref 6.4–8.3)
TRIGL SERPL-MCNC: 85 MG/DL (ref 0–149)
VITAMIN D 25-HYDROXY: 41 NG/ML (ref 30–100)
VLDLC SERPL CALC-MCNC: 17 MG/DL
WBC # BLD: 5.2 E9/L (ref 4.5–11.5)

## 2019-08-22 PROCEDURE — 80061 LIPID PANEL: CPT

## 2019-08-22 PROCEDURE — 82306 VITAMIN D 25 HYDROXY: CPT

## 2019-08-22 PROCEDURE — 85027 COMPLETE CBC AUTOMATED: CPT

## 2019-08-22 PROCEDURE — 36415 COLL VENOUS BLD VENIPUNCTURE: CPT

## 2019-08-22 PROCEDURE — 80053 COMPREHEN METABOLIC PANEL: CPT

## 2019-08-22 NOTE — PROGRESS NOTES
Pt was here for labs only today  When results are in pt needs them to be faxed to the [facility] Dr. Roseanna Halsted fax #[phone].

## 2019-09-04 ENCOUNTER — HOSPITAL ENCOUNTER (OUTPATIENT)
Dept: PHYSICAL THERAPY | Age: 64
Setting detail: THERAPIES SERIES
Discharge: HOME | End: 2019-09-04
Payer: MEDICAID

## 2019-09-04 PROCEDURE — 97113 AQUATIC THERAPY/EXERCISES: CPT

## 2019-09-06 ENCOUNTER — HOSPITAL ENCOUNTER (OUTPATIENT)
Dept: PHYSICAL THERAPY | Age: 64
Setting detail: THERAPIES SERIES
Discharge: HOME | End: 2019-09-06
Payer: MEDICAID

## 2019-09-06 PROCEDURE — 97113 AQUATIC THERAPY/EXERCISES: CPT

## 2019-09-06 NOTE — PROGRESS NOTES
receptive  Timed Code Treatment Minutes:  45    Total Treatment Minutes:  55    Treatment/Activity Tolerance:  [x] Patient tolerated treatment well [] Patient limited by fatique  [] Patient limited by pain [] Patient limited by other medical complications  [x] Other:  LTG achieved: Pt able increase ambulation;demonstrating proper gait mechanics without AD   Prognosis: [] Good [x] Fair  [] Poor    Patient Requires Follow-up: [x] Yes  [] No    Plan:   [x] Continue per plan of care [] Alter current plan (see comments)  [] Plan of care initiated [] Hold pending MD visit [] Discharge        Electronically signed by:  Shauna Lux  PTA [id number]

## 2019-09-09 ENCOUNTER — APPOINTMENT (OUTPATIENT)
Dept: PHYSICAL THERAPY | Age: 64
End: 2019-09-09
Payer: MEDICAID

## 2019-09-13 ENCOUNTER — APPOINTMENT (OUTPATIENT)
Dept: PHYSICAL THERAPY | Age: 64
End: 2019-09-13
Payer: MEDICAID

## 2019-09-16 ENCOUNTER — APPOINTMENT (OUTPATIENT)
Dept: PHYSICAL THERAPY | Age: 64
End: 2019-09-16
Payer: MEDICAID

## 2019-09-17 ENCOUNTER — TELEPHONE (OUTPATIENT)
Dept: PAIN MANAGEMENT | Age: 64
End: 2019-09-17

## 2019-09-18 ENCOUNTER — OFFICE VISIT (OUTPATIENT)
Dept: PAIN MANAGEMENT | Age: 64
End: 2019-09-18
Payer: MEDICAID

## 2019-09-18 VITALS
BODY MASS INDEX: 27.16 KG/M2 | HEIGHT: 71 IN | TEMPERATURE: 98.2 F | WEIGHT: 194 LBS | RESPIRATION RATE: 16 BRPM | OXYGEN SATURATION: 99 % | SYSTOLIC BLOOD PRESSURE: 118 MMHG | DIASTOLIC BLOOD PRESSURE: 78 MMHG | HEART RATE: 62 BPM

## 2019-09-18 DIAGNOSIS — M47.816 LUMBAR FACET ARTHROPATHY: ICD-10-CM

## 2019-09-18 DIAGNOSIS — M16.0 PRIMARY OSTEOARTHRITIS OF BOTH HIPS: ICD-10-CM

## 2019-09-18 DIAGNOSIS — M48.061 SPINAL STENOSIS OF LUMBAR REGION WITHOUT NEUROGENIC CLAUDICATION: ICD-10-CM

## 2019-09-18 DIAGNOSIS — G89.4 CHRONIC PAIN SYNDROME: ICD-10-CM

## 2019-09-18 DIAGNOSIS — M47.816 LUMBAR SPONDYLOSIS: Primary | ICD-10-CM

## 2019-09-18 PROCEDURE — 99213 OFFICE O/P EST LOW 20 MIN: CPT | Performed by: NURSE PRACTITIONER

## 2019-09-18 RX ORDER — TRAMADOL HYDROCHLORIDE 50 MG/1
TABLET ORAL
Refills: 0 | COMMUNITY
Start: 2019-08-19 | End: 2019-09-18 | Stop reason: SDUPTHER

## 2019-09-18 RX ORDER — TRAMADOL HYDROCHLORIDE 50 MG/1
50 TABLET ORAL DAILY PRN
Qty: 30 TABLET | Refills: 1 | Status: SHIPPED | OUTPATIENT
Start: 2019-09-18 | End: 2019-11-18 | Stop reason: SDUPTHER

## 2019-09-18 NOTE — PROGRESS NOTES
Marysol Montiel presents to the [facility] on 9/18/2019. Carolee Celis is complaining of pain in lower back. . The pain is constant. The pain is described as aching. Pain is rated on his best day at a 7, on his worst day at a 10, and on average at a 8 on the VAS scale. He took his last dose of Tramadol last Friday. .        Any procedures since your last visit: No, Patient is currently going to PT  has not been on anticoagulation medications to include none and is managed by NA. Pacemaker or defibrilator: No Physician managing device is NA.       /78   Pulse 62   Temp 98.2 °F (36.8 °C) (Oral)   Resp 16   Ht 5' 11\" (1.803 m)   Wt 194 lb (88 kg)   SpO2 99%   BMI 27.06 kg/m²      No LMP for male patient.

## 2019-09-18 NOTE — PROGRESS NOTES
Procedure Laterality Date    BACK SURGERY      HIP SURGERY Right     LEG SURGERY      LUMBAR SPINE SURGERY  10/30/2017    PLIF L2 - S1   and laminectomy    PROSTATE BIOPSY         Prior to Admission medications    Medication Sig Start Date End Date Taking? Authorizing Provider   colchicine (COLCRYS) 0.6 MG tablet Take 1 tablet by mouth daily 19  Yes Jose Soler MD   chlorthalidone (HYGROTON) 25 MG tablet Take 1 tablet by mouth daily 19  Yes Jose Soler MD   lisinopril (PRINIVIL;ZESTRIL) 10 MG tablet Take 1 tablet by mouth daily 19  Yes Jose Soler MD   pantoprazole (PROTONIX) 40 MG tablet Take 1 tablet by mouth every morning (before breakfast) 19  Yes Jose Soler MD   Cholecalciferol (VITAMIN D) 2000 units CAPS capsule Take 1 capsule by mouth daily 19  Yes Jose Soler MD   goserelin (ZOLADEX) 10.8 MG injection Inject 10.8 mg into the skin every 3 months   Yes Historical Provider, MD   traMADol Tilmon Dynes) 50 MG tablet  19   Historical Provider, MD       Allergies   Allergen Reactions    Ibuprofen Swelling    Benzocaine      EDEMA        Social History     Socioeconomic History    Marital status:       Spouse name: Not on file    Number of children: Not on file    Years of education: Not on file    Highest education level: Not on file   Occupational History     Employer: NONE   Social Needs    Financial resource strain: Not on file    Food insecurity:     Worry: Not on file     Inability: Not on file    Transportation needs:     Medical: Not on file     Non-medical: Not on file   Tobacco Use    Smoking status: Former Smoker     Packs/day: 0.25     Years: 10.00     Pack years: 2.50     Last attempt to quit: 2017     Years since quittin.7    Smokeless tobacco: Never Used   Substance and Sexual Activity    Alcohol use: Yes     Comment: SOCIALLY    Drug use: No    Sexual activity: Not Currently     Partners: Female Lifestyle    Physical activity:     Days per week: Not on file     Minutes per session: Not on file    Stress: Not on file   Relationships    Social connections:     Talks on phone: Not on file     Gets together: Not on file     Attends Latter day service: Not on file     Active member of club or organization: Not on file     Attends meetings of clubs or organizations: Not on file     Relationship status: Not on file    Intimate partner violence:     Fear of current or ex partner: Not on file     Emotionally abused: Not on file     Physically abused: Not on file     Forced sexual activity: Not on file   Other Topics Concern    Not on file   Social History Narrative    Not on file       History reviewed. No pertinent family history. REVIEW OF SYSTEMS:     Arian Mulligan denies fever/chills, chest pain, shortness of breath, new bowel or bladder complaints. All other review of systems was negative. Denies any new neurologic complaints and/or red flag symptoms. PHYSICAL EXAMINATION:      /78   Pulse 62   Temp 98.2 °F (36.8 °C) (Oral)   Resp 16   Ht 5' 11\" (1.803 m)   Wt 194 lb (88 kg)   SpO2 99%   BMI 27.06 kg/m²     General:      General appearance: Pleasant, elderly and well-hydrated. In moderate discomfort and alert and oriented x3  Build:Normal weight  Function:Rises from a seated position with difficulty     HEENT:     Head:normocephalic and atraumatic  Pupils:regular, round and equal.  Sclera: icterus absent     Abdomen:     Shape: normal and non-distended  Tenderness:none  Guarding:none     Lumbar spine:     Spine inspection: Incisional scar  CVA tenderness: None  Palpation: Tenderness paravertebral muscles, facet loading, left, right, positive and tenderness. Range of motion: Abnormal severely Lateral bending, flexion, extension rotation bilateral and is painful.  Walks in a flexed position     Musculoskeletal:     Trigger points in Paravertebral: absent bilaterally  SI joint tenderness: Positive Right and Left   Fabien test: Negative  Piriformis tenderness: Negative  Trochanteric bursa tenderness: Negative  SLR: Negative, sitting     Extremities:     Tremors: None bilaterally upper and lower  Range of motion: Generally normal shoulders, pain with internal rotation of hips positive  Left worse than right  Intact: Yes  Edema: Normal     Neurological:     Sensory: Diminished to light touch Right foot   Motor:  Right Quadriceps1/5  Left Quadriceps3/5  Right Gastrocnemius2/5  Left Gastrocnemius4/5  Right Ant Tibialis0/5  Left Ant Tibialis4/5  Reflexes:  Right Quadriceps reflex0  Left Quadriceps reflex0  Right Achilles reflex0  Left Achilles reflex0  Gait: Antalgic with a walker      Dermatology:     Skin: No unusual rashes and no skin lesions    Assessment/Plan:    Patient seen for low back pain with no radiculopathy  Managed on his current regimen  H/o decompressive lumbar fusion and laminectomy in 2017 L2-S1, gout and advanced prostate CA  Not interested in interventions   Not a candidate for NSAIDS  Failed Gabapentin  Continue Tramadol 50 mg daily prn for severe pain   [facility] report reviewed  Patient encouraged to remain active as tolerated  Patient continues with aqua therapy which is helpful  Treatment plan discussed with the patient including medications and side effects    ccreferring physic    Kelsie Head, APRN - CNP

## 2019-09-20 ENCOUNTER — HOSPITAL ENCOUNTER (OUTPATIENT)
Dept: PHYSICAL THERAPY | Age: 64
Setting detail: THERAPIES SERIES
Discharge: HOME | End: 2019-09-20
Payer: MEDICAID

## 2019-09-20 PROCEDURE — 97113 AQUATIC THERAPY/EXERCISES: CPT

## 2019-09-23 ENCOUNTER — TELEPHONE (OUTPATIENT)
Dept: PAIN MANAGEMENT | Age: 64
End: 2019-09-23

## 2019-09-23 ENCOUNTER — HOSPITAL ENCOUNTER (OUTPATIENT)
Dept: PHYSICAL THERAPY | Age: 64
Setting detail: THERAPIES SERIES
Discharge: HOME | End: 2019-09-23
Payer: MEDICAID

## 2019-09-23 DIAGNOSIS — M47.816 LUMBAR FACET ARTHROPATHY: ICD-10-CM

## 2019-09-23 DIAGNOSIS — G89.4 CHRONIC PAIN SYNDROME: Primary | ICD-10-CM

## 2019-09-23 DIAGNOSIS — M16.0 PRIMARY OSTEOARTHRITIS OF BOTH HIPS: ICD-10-CM

## 2019-09-23 DIAGNOSIS — M51.9 LUMBAR DISC DISORDER: ICD-10-CM

## 2019-09-23 DIAGNOSIS — M47.816 LUMBAR SPONDYLOSIS: ICD-10-CM

## 2019-09-23 PROCEDURE — 97113 AQUATIC THERAPY/EXERCISES: CPT

## 2019-09-27 ENCOUNTER — HOSPITAL ENCOUNTER (OUTPATIENT)
Dept: PHYSICAL THERAPY | Age: 64
Setting detail: THERAPIES SERIES
Discharge: HOME | End: 2019-09-27
Payer: MEDICAID

## 2019-09-27 PROCEDURE — 97113 AQUATIC THERAPY/EXERCISES: CPT

## 2019-09-30 ENCOUNTER — HOSPITAL ENCOUNTER (OUTPATIENT)
Dept: PHYSICAL THERAPY | Age: 64
Setting detail: THERAPIES SERIES
Discharge: HOME | End: 2019-09-30
Payer: MEDICAID

## 2019-10-04 ENCOUNTER — HOSPITAL ENCOUNTER (OUTPATIENT)
Dept: PHYSICAL THERAPY | Age: 64
Setting detail: THERAPIES SERIES
Discharge: HOME | End: 2019-10-04
Payer: MEDICAID

## 2019-10-04 ENCOUNTER — APPOINTMENT (OUTPATIENT)
Dept: PHYSICAL THERAPY | Age: 64
End: 2019-10-04
Payer: MEDICAID

## 2019-10-04 PROCEDURE — 97164 PT RE-EVAL EST PLAN CARE: CPT | Performed by: PHYSICAL THERAPIST

## 2019-10-07 ENCOUNTER — HOSPITAL ENCOUNTER (OUTPATIENT)
Dept: PHYSICAL THERAPY | Age: 64
Setting detail: THERAPIES SERIES
Discharge: HOME | End: 2019-10-07
Payer: MEDICAID

## 2019-10-07 PROCEDURE — 97113 AQUATIC THERAPY/EXERCISES: CPT

## 2019-10-10 ENCOUNTER — OFFICE VISIT (OUTPATIENT)
Dept: FAMILY MEDICINE CLINIC | Age: 64
End: 2019-10-10
Payer: MEDICAID

## 2019-10-10 VITALS
RESPIRATION RATE: 18 BRPM | WEIGHT: 192 LBS | DIASTOLIC BLOOD PRESSURE: 79 MMHG | OXYGEN SATURATION: 97 % | SYSTOLIC BLOOD PRESSURE: 128 MMHG | BODY MASS INDEX: 26.88 KG/M2 | HEIGHT: 71 IN | TEMPERATURE: 98.4 F | HEART RATE: 66 BPM

## 2019-10-10 DIAGNOSIS — I10 ESSENTIAL HYPERTENSION: Primary | ICD-10-CM

## 2019-10-10 DIAGNOSIS — M10.9 GOUT OF FOOT, UNSPECIFIED CAUSE, UNSPECIFIED CHRONICITY, UNSPECIFIED LATERALITY: ICD-10-CM

## 2019-10-10 DIAGNOSIS — E78.5 HYPERLIPIDEMIA, UNSPECIFIED HYPERLIPIDEMIA TYPE: ICD-10-CM

## 2019-10-10 PROCEDURE — 99212 OFFICE O/P EST SF 10 MIN: CPT | Performed by: STUDENT IN AN ORGANIZED HEALTH CARE EDUCATION/TRAINING PROGRAM

## 2019-10-10 PROCEDURE — 99213 OFFICE O/P EST LOW 20 MIN: CPT | Performed by: STUDENT IN AN ORGANIZED HEALTH CARE EDUCATION/TRAINING PROGRAM

## 2019-10-10 RX ORDER — ATORVASTATIN CALCIUM 40 MG/1
40 TABLET, FILM COATED ORAL DAILY
Qty: 90 TABLET | Refills: 1 | Status: SHIPPED | OUTPATIENT
Start: 2019-10-10 | End: 2019-12-06 | Stop reason: SDUPTHER

## 2019-10-10 RX ORDER — LISINOPRIL 10 MG/1
10 TABLET ORAL DAILY
Qty: 30 TABLET | Refills: 3 | Status: SHIPPED | OUTPATIENT
Start: 2019-10-10 | End: 2019-10-10

## 2019-10-10 RX ORDER — MULTIVIT-MIN/IRON/FOLIC ACID/K 18-600-40
1 CAPSULE ORAL DAILY
Qty: 30 CAPSULE | Refills: 3 | Status: SHIPPED | OUTPATIENT
Start: 2019-10-10 | End: 2020-01-03 | Stop reason: SDUPTHER

## 2019-10-10 RX ORDER — PANTOPRAZOLE SODIUM 40 MG/1
40 TABLET, DELAYED RELEASE ORAL
Qty: 30 TABLET | Refills: 3 | Status: SHIPPED | OUTPATIENT
Start: 2019-10-10 | End: 2020-01-03 | Stop reason: SDUPTHER

## 2019-10-10 RX ORDER — LISINOPRIL 20 MG/1
20 TABLET ORAL DAILY
Qty: 60 TABLET | Refills: 3 | Status: SHIPPED | OUTPATIENT
Start: 2019-10-10 | End: 2020-01-03 | Stop reason: SDUPTHER

## 2019-10-10 RX ORDER — CHLORTHALIDONE 25 MG/1
25 TABLET ORAL DAILY
Qty: 30 TABLET | Refills: 3 | Status: CANCELLED | OUTPATIENT
Start: 2019-10-10

## 2019-10-11 ENCOUNTER — HOSPITAL ENCOUNTER (OUTPATIENT)
Dept: PHYSICAL THERAPY | Age: 64
Setting detail: THERAPIES SERIES
Discharge: HOME | End: 2019-10-11
Payer: MEDICAID

## 2019-10-11 PROCEDURE — 97113 AQUATIC THERAPY/EXERCISES: CPT

## 2019-10-12 ASSESSMENT — ENCOUNTER SYMPTOMS
COLOR CHANGE: 0
EYE PAIN: 0
SHORTNESS OF BREATH: 0
BACK PAIN: 1
BLOOD IN STOOL: 0
EYE ITCHING: 0
CONSTIPATION: 0
VOMITING: 0
NAUSEA: 0
CHEST TIGHTNESS: 0
RHINORRHEA: 0
ABDOMINAL PAIN: 0
WHEEZING: 0
DIARRHEA: 0
COUGH: 0
ABDOMINAL DISTENTION: 0

## 2019-10-14 ENCOUNTER — HOSPITAL ENCOUNTER (OUTPATIENT)
Dept: PHYSICAL THERAPY | Age: 64
Setting detail: THERAPIES SERIES
Discharge: HOME | End: 2019-10-14
Payer: MEDICAID

## 2019-10-14 PROCEDURE — 97113 AQUATIC THERAPY/EXERCISES: CPT

## 2019-10-18 ENCOUNTER — HOSPITAL ENCOUNTER (OUTPATIENT)
Dept: PHYSICAL THERAPY | Age: 64
Setting detail: THERAPIES SERIES
Discharge: HOME | End: 2019-10-18
Payer: MEDICAID

## 2019-10-18 PROCEDURE — 97113 AQUATIC THERAPY/EXERCISES: CPT

## 2019-10-21 ENCOUNTER — HOSPITAL ENCOUNTER (OUTPATIENT)
Dept: PHYSICAL THERAPY | Age: 64
Setting detail: THERAPIES SERIES
Discharge: HOME | End: 2019-10-21
Payer: MEDICAID

## 2019-10-21 PROCEDURE — 97113 AQUATIC THERAPY/EXERCISES: CPT

## 2019-10-25 ENCOUNTER — HOSPITAL ENCOUNTER (OUTPATIENT)
Dept: PHYSICAL THERAPY | Age: 64
Setting detail: THERAPIES SERIES
Discharge: HOME | End: 2019-10-25
Payer: MEDICAID

## 2019-10-25 PROCEDURE — 97113 AQUATIC THERAPY/EXERCISES: CPT

## 2019-10-28 ENCOUNTER — APPOINTMENT (OUTPATIENT)
Dept: PHYSICAL THERAPY | Age: 64
End: 2019-10-28
Payer: MEDICAID

## 2019-11-01 ENCOUNTER — HOSPITAL ENCOUNTER (OUTPATIENT)
Dept: PHYSICAL THERAPY | Age: 64
Setting detail: THERAPIES SERIES
Discharge: HOME | End: 2019-11-01
Payer: MEDICAID

## 2019-11-01 PROCEDURE — 97113 AQUATIC THERAPY/EXERCISES: CPT

## 2019-11-04 ENCOUNTER — HOSPITAL ENCOUNTER (OUTPATIENT)
Dept: PHYSICAL THERAPY | Age: 64
Setting detail: THERAPIES SERIES
Discharge: HOME | End: 2019-11-04
Payer: MEDICAID

## 2019-11-04 PROCEDURE — 97113 AQUATIC THERAPY/EXERCISES: CPT

## 2019-11-08 ENCOUNTER — HOSPITAL ENCOUNTER (OUTPATIENT)
Dept: PHYSICAL THERAPY | Age: 64
Setting detail: THERAPIES SERIES
Discharge: HOME | End: 2019-11-08
Payer: MEDICAID

## 2019-11-08 PROCEDURE — 97113 AQUATIC THERAPY/EXERCISES: CPT

## 2019-11-11 ENCOUNTER — HOSPITAL ENCOUNTER (OUTPATIENT)
Dept: PHYSICAL THERAPY | Age: 64
Setting detail: THERAPIES SERIES
Discharge: HOME | End: 2019-11-11
Payer: MEDICAID

## 2019-11-11 ENCOUNTER — TELEPHONE (OUTPATIENT)
Dept: PAIN MANAGEMENT | Age: 64
End: 2019-11-11

## 2019-11-11 DIAGNOSIS — M47.816 LUMBAR FACET ARTHROPATHY: ICD-10-CM

## 2019-11-11 DIAGNOSIS — G89.4 CHRONIC PAIN SYNDROME: ICD-10-CM

## 2019-11-11 DIAGNOSIS — M51.9 LUMBAR DISC DISORDER: Primary | ICD-10-CM

## 2019-11-11 DIAGNOSIS — M47.816 LUMBAR SPONDYLOSIS: ICD-10-CM

## 2019-11-11 DIAGNOSIS — M48.061 SPINAL STENOSIS OF LUMBAR REGION WITHOUT NEUROGENIC CLAUDICATION: ICD-10-CM

## 2019-11-11 PROCEDURE — 97113 AQUATIC THERAPY/EXERCISES: CPT

## 2019-11-15 ENCOUNTER — HOSPITAL ENCOUNTER (OUTPATIENT)
Dept: PHYSICAL THERAPY | Age: 64
Setting detail: THERAPIES SERIES
Discharge: HOME | End: 2019-11-15
Payer: MEDICAID

## 2019-11-18 ENCOUNTER — OFFICE VISIT (OUTPATIENT)
Dept: PAIN MANAGEMENT | Age: 64
End: 2019-11-18
Payer: MEDICAID

## 2019-11-18 ENCOUNTER — HOSPITAL ENCOUNTER (OUTPATIENT)
Age: 64
Discharge: HOME | End: 2019-11-20
Payer: MEDICAID

## 2019-11-18 VITALS
RESPIRATION RATE: 16 BRPM | OXYGEN SATURATION: 99 % | WEIGHT: 194 LBS | TEMPERATURE: 98.4 F | HEART RATE: 60 BPM | BODY MASS INDEX: 27.16 KG/M2 | DIASTOLIC BLOOD PRESSURE: 80 MMHG | HEIGHT: 71 IN | SYSTOLIC BLOOD PRESSURE: 120 MMHG

## 2019-11-18 DIAGNOSIS — M48.061 SPINAL STENOSIS OF LUMBAR REGION WITHOUT NEUROGENIC CLAUDICATION: ICD-10-CM

## 2019-11-18 DIAGNOSIS — G89.4 CHRONIC PAIN SYNDROME: ICD-10-CM

## 2019-11-18 DIAGNOSIS — M51.9 LUMBAR DISC DISORDER: Primary | ICD-10-CM

## 2019-11-18 DIAGNOSIS — M47.816 LUMBAR SPONDYLOSIS: ICD-10-CM

## 2019-11-18 DIAGNOSIS — M16.0 PRIMARY OSTEOARTHRITIS OF BOTH HIPS: ICD-10-CM

## 2019-11-18 DIAGNOSIS — M47.816 LUMBAR FACET ARTHROPATHY: ICD-10-CM

## 2019-11-18 LAB — SPECIFIC GRAVITY UA: 1.02 (ref 1–1.03)

## 2019-11-18 PROCEDURE — G0480 DRUG TEST DEF 1-7 CLASSES: HCPCS

## 2019-11-18 PROCEDURE — 80307 DRUG TEST PRSMV CHEM ANLYZR: CPT

## 2019-11-18 PROCEDURE — 99213 OFFICE O/P EST LOW 20 MIN: CPT | Performed by: NURSE PRACTITIONER

## 2019-11-18 RX ORDER — TRAMADOL HYDROCHLORIDE 50 MG/1
50 TABLET ORAL DAILY PRN
Qty: 30 TABLET | Refills: 1 | Status: SHIPPED | OUTPATIENT
Start: 2019-11-18 | End: 2020-01-16 | Stop reason: SDUPTHER

## 2019-11-21 ENCOUNTER — HOSPITAL ENCOUNTER (OUTPATIENT)
Dept: PHYSICAL THERAPY | Age: 64
Setting detail: THERAPIES SERIES
End: 2019-11-21
Payer: MEDICAID

## 2019-11-21 LAB
Lab: NORMAL
REPORT: NORMAL
THIS TEST SENT TO: NORMAL

## 2019-12-02 ENCOUNTER — HOSPITAL ENCOUNTER (OUTPATIENT)
Dept: PHYSICAL THERAPY | Age: 64
Setting detail: THERAPIES SERIES
Discharge: HOME | End: 2019-12-02
Payer: COMMERCIAL

## 2019-12-02 PROCEDURE — 97164 PT RE-EVAL EST PLAN CARE: CPT | Performed by: PHYSICAL THERAPIST

## 2019-12-05 ENCOUNTER — HOSPITAL ENCOUNTER (OUTPATIENT)
Dept: PHYSICAL THERAPY | Age: 64
Setting detail: THERAPIES SERIES
Discharge: HOME | End: 2019-12-05
Payer: COMMERCIAL

## 2019-12-05 PROCEDURE — 97110 THERAPEUTIC EXERCISES: CPT | Performed by: PHYSICAL THERAPIST

## 2019-12-06 ENCOUNTER — OFFICE VISIT (OUTPATIENT)
Dept: FAMILY MEDICINE CLINIC | Age: 64
End: 2019-12-06
Payer: MEDICAID

## 2019-12-06 VITALS
BODY MASS INDEX: 26.6 KG/M2 | OXYGEN SATURATION: 97 % | HEIGHT: 71 IN | TEMPERATURE: 98.3 F | WEIGHT: 190 LBS | HEART RATE: 76 BPM | SYSTOLIC BLOOD PRESSURE: 129 MMHG | DIASTOLIC BLOOD PRESSURE: 74 MMHG

## 2019-12-06 DIAGNOSIS — R14.0 ABDOMINAL BLOATING: ICD-10-CM

## 2019-12-06 DIAGNOSIS — E78.5 HYPERLIPIDEMIA, UNSPECIFIED HYPERLIPIDEMIA TYPE: ICD-10-CM

## 2019-12-06 DIAGNOSIS — M10.9 GOUT, UNSPECIFIED CAUSE, UNSPECIFIED CHRONICITY, UNSPECIFIED SITE: ICD-10-CM

## 2019-12-06 DIAGNOSIS — M16.11 PRIMARY OSTEOARTHRITIS OF RIGHT HIP: Primary | ICD-10-CM

## 2019-12-06 PROCEDURE — 99213 OFFICE O/P EST LOW 20 MIN: CPT | Performed by: STUDENT IN AN ORGANIZED HEALTH CARE EDUCATION/TRAINING PROGRAM

## 2019-12-06 PROCEDURE — 99212 OFFICE O/P EST SF 10 MIN: CPT | Performed by: STUDENT IN AN ORGANIZED HEALTH CARE EDUCATION/TRAINING PROGRAM

## 2019-12-06 RX ORDER — COLCHICINE 0.6 MG/1
0.6 TABLET ORAL DAILY
Qty: 90 TABLET | Refills: 3 | Status: SHIPPED | OUTPATIENT
Start: 2019-12-06 | End: 2020-01-03 | Stop reason: SDUPTHER

## 2019-12-06 RX ORDER — ATORVASTATIN CALCIUM 40 MG/1
40 TABLET, FILM COATED ORAL DAILY
Qty: 90 TABLET | Refills: 3 | Status: SHIPPED | OUTPATIENT
Start: 2019-12-06 | End: 2020-01-03 | Stop reason: SDUPTHER

## 2019-12-06 RX ORDER — SIMETHICONE 80 MG
80 TABLET,CHEWABLE ORAL 4 TIMES DAILY PRN
Qty: 180 TABLET | Refills: 3 | Status: SHIPPED | OUTPATIENT
Start: 2019-12-06 | End: 2020-01-03 | Stop reason: SDUPTHER

## 2019-12-10 ENCOUNTER — HOSPITAL ENCOUNTER (OUTPATIENT)
Dept: PHYSICAL THERAPY | Age: 64
Setting detail: THERAPIES SERIES
Discharge: HOME | End: 2019-12-10
Payer: COMMERCIAL

## 2019-12-10 PROCEDURE — 97110 THERAPEUTIC EXERCISES: CPT | Performed by: PHYSICAL THERAPIST

## 2019-12-10 ASSESSMENT — ENCOUNTER SYMPTOMS
COLOR CHANGE: 0
ABDOMINAL DISTENTION: 1
CHEST TIGHTNESS: 0
EYE PAIN: 0
RHINORRHEA: 0
NAUSEA: 0
EYE ITCHING: 0
SHORTNESS OF BREATH: 0
BLOOD IN STOOL: 0
COUGH: 0
BACK PAIN: 0
CONSTIPATION: 1
WHEEZING: 0
DIARRHEA: 0
ABDOMINAL PAIN: 1
VOMITING: 0

## 2019-12-16 ENCOUNTER — HOSPITAL ENCOUNTER (OUTPATIENT)
Dept: PHYSICAL THERAPY | Age: 64
Setting detail: THERAPIES SERIES
Discharge: HOME | End: 2019-12-16
Payer: COMMERCIAL

## 2019-12-16 PROCEDURE — 97110 THERAPEUTIC EXERCISES: CPT | Performed by: PHYSICAL THERAPIST

## 2019-12-27 ENCOUNTER — HOSPITAL ENCOUNTER (OUTPATIENT)
Dept: PHYSICAL THERAPY | Age: 64
Setting detail: THERAPIES SERIES
Discharge: HOME | End: 2019-12-27
Payer: COMMERCIAL

## 2019-12-27 PROCEDURE — 97530 THERAPEUTIC ACTIVITIES: CPT

## 2019-12-27 PROCEDURE — 97110 THERAPEUTIC EXERCISES: CPT

## 2020-01-03 ENCOUNTER — OFFICE VISIT (OUTPATIENT)
Dept: FAMILY MEDICINE CLINIC | Age: 65
End: 2020-01-03
Payer: MEDICARE

## 2020-01-03 VITALS
DIASTOLIC BLOOD PRESSURE: 78 MMHG | OXYGEN SATURATION: 96 % | HEART RATE: 75 BPM | WEIGHT: 187 LBS | BODY MASS INDEX: 26.08 KG/M2 | TEMPERATURE: 98.2 F | RESPIRATION RATE: 16 BRPM | SYSTOLIC BLOOD PRESSURE: 142 MMHG

## 2020-01-03 PROCEDURE — 99212 OFFICE O/P EST SF 10 MIN: CPT | Performed by: STUDENT IN AN ORGANIZED HEALTH CARE EDUCATION/TRAINING PROGRAM

## 2020-01-03 PROCEDURE — 99213 OFFICE O/P EST LOW 20 MIN: CPT | Performed by: STUDENT IN AN ORGANIZED HEALTH CARE EDUCATION/TRAINING PROGRAM

## 2020-01-03 RX ORDER — MULTIVIT-MIN/IRON/FOLIC ACID/K 18-600-40
1 CAPSULE ORAL DAILY
Qty: 30 CAPSULE | Refills: 5 | Status: SHIPPED
Start: 2020-01-03 | End: 2020-09-22

## 2020-01-03 RX ORDER — COLCHICINE 0.6 MG/1
0.6 TABLET ORAL DAILY
Qty: 90 TABLET | Refills: 5 | Status: SHIPPED
Start: 2020-01-03 | End: 2020-09-22

## 2020-01-03 RX ORDER — LISINOPRIL 20 MG/1
20 TABLET ORAL DAILY
Qty: 60 TABLET | Refills: 5 | Status: SHIPPED
Start: 2020-01-03 | End: 2020-09-22 | Stop reason: SDUPTHER

## 2020-01-03 RX ORDER — PANTOPRAZOLE SODIUM 40 MG/1
40 TABLET, DELAYED RELEASE ORAL
Qty: 30 TABLET | Refills: 5 | Status: SHIPPED
Start: 2020-01-03 | End: 2020-09-22

## 2020-01-03 RX ORDER — ATORVASTATIN CALCIUM 40 MG/1
40 TABLET, FILM COATED ORAL DAILY
Qty: 90 TABLET | Refills: 5 | Status: SHIPPED
Start: 2020-01-03 | End: 2020-09-22

## 2020-01-03 RX ORDER — SIMETHICONE 80 MG
80 TABLET,CHEWABLE ORAL 4 TIMES DAILY PRN
Qty: 180 TABLET | Refills: 5 | Status: SHIPPED
Start: 2020-01-03 | End: 2020-09-22

## 2020-01-03 ASSESSMENT — ENCOUNTER SYMPTOMS
COLOR CHANGE: 0
VOMITING: 0
CHEST TIGHTNESS: 0
ABDOMINAL PAIN: 0
BLOOD IN STOOL: 0
WHEEZING: 0
SHORTNESS OF BREATH: 0
EYE ITCHING: 0
COUGH: 0
EYE PAIN: 0
NAUSEA: 0
RHINORRHEA: 0
BACK PAIN: 1
CONSTIPATION: 0
ABDOMINAL DISTENTION: 0
DIARRHEA: 0

## 2020-01-03 ASSESSMENT — PATIENT HEALTH QUESTIONNAIRE - PHQ9: DEPRESSION UNABLE TO ASSESS: PT REFUSES

## 2020-01-03 NOTE — PROGRESS NOTES
polyuria. Genitourinary: Negative for frequency, hematuria and urgency. Musculoskeletal: Positive for arthralgias, back pain, gait problem and myalgias. Skin: Negative for color change, pallor, rash and wound. Neurological: Negative for dizziness, weakness, light-headedness and headaches. Psychiatric/Behavioral: Negative for agitation and suicidal ideas. The patient is not nervous/anxious. Prior to Visit Medications    Medication Sig Taking?  Authorizing Provider   atorvastatin (LIPITOR) 40 MG tablet Take 1 tablet by mouth daily Yes Macario Biswas MD   colchicine (COLCRYS) 0.6 MG tablet Take 1 tablet by mouth daily Yes Macario Biswas MD   simethicone (MYLICON) 80 MG chewable tablet Take 1 tablet by mouth 4 times daily as needed for Flatulence Yes Macario Biswas MD   pantoprazole (PROTONIX) 40 MG tablet Take 1 tablet by mouth every morning (before breakfast) Yes Macario Biswas MD   Cholecalciferol (VITAMIN D) 50 MCG (2000 UT) CAPS capsule Take 1 capsule by mouth daily Yes Macario Biswas MD   lisinopril (PRINIVIL;ZESTRIL) 20 MG tablet Take 1 tablet by mouth daily Yes Macario Biswas MD   Lidocaine (ZTLIDO) 1.8 % PTCH Given sample Yes ANASTASIA Genao - CNP   goserelin (ZOLADEX) 10.8 MG injection Inject 10.8 mg into the skin every 3 months Yes Historical Provider, MD        Social History     Tobacco Use    Smoking status: Former Smoker     Packs/day: 0.25     Years: 10.00     Pack years: 2.50     Last attempt to quit: 2017     Years since quittin.0    Smokeless tobacco: Never Used   Substance Use Topics    Alcohol use: Yes     Comment: SOCIALLY        Vitals:    20 0901 20 0907 20 0924   BP: (!) 164/87 (!) 174/94 (!) 142/78   Site: Right Upper Arm Right Upper Arm    Position: Sitting Sitting    Cuff Size: Medium Adult Medium Adult    Pulse: 75     Resp: 16     Temp: 98.2 °F (36.8 °C)     TempSrc: Oral     SpO2: 96%     Weight: 187 lb (84.8 kg) Estimated body mass index is 26.08 kg/m² as calculated from the following:    Height as of 12/6/19: 5' 11\" (1.803 m). Weight as of this encounter: 187 lb (84.8 kg). Physical Exam  Vitals signs reviewed. Constitutional:       General: He is not in acute distress. Appearance: He is well-developed. He is not diaphoretic. HENT:      Head: Normocephalic. Nose: Nose normal.      Mouth/Throat:      Mouth: Mucous membranes are moist.   Eyes:      General: No scleral icterus. Right eye: No discharge. Left eye: No discharge. Conjunctiva/sclera: Conjunctivae normal.   Neck:      Musculoskeletal: Normal range of motion and neck supple. Thyroid: No thyromegaly. Vascular: No JVD. Cardiovascular:      Rate and Rhythm: Normal rate and regular rhythm. Heart sounds: Normal heart sounds. No murmur. Pulmonary:      Effort: Pulmonary effort is normal. No respiratory distress. Breath sounds: Normal breath sounds. No wheezing. Abdominal:      General: Bowel sounds are normal. There is no distension. Palpations: Abdomen is soft. Tenderness: There is no tenderness. Musculoskeletal:         General: No tenderness or deformity. Right hip: He exhibits decreased range of motion and decreased strength. He exhibits no tenderness, no bony tenderness, no swelling and no crepitus. Left hip: He exhibits decreased range of motion and decreased strength. He exhibits no tenderness, no bony tenderness, no swelling and no crepitus. Skin:     General: Skin is warm and dry. Findings: No erythema or rash. Neurological:      Mental Status: He is alert and oriented to person, place, and time. Cranial Nerves: No cranial nerve deficit. ASSESSMENT/PLAN:  1. Essential hypertension  - elevated secondary to acute stressors and anxiety.  Improved with relaxation  - Relaxation techniques provided  - Refill lisinopril  - Come back for BP check in 2-3 months      2. Hyperlipidemia, unspecified hyperlipidemia type  - lipid panel reviewed, recheck with next visit  - atorvastatin (LIPITOR) 40 MG tablet; Take 1 tablet by mouth daily  Dispense: 90 tablet; Refill: 5    3. Gout, unspecified cause, unspecified chronicity, unspecified site  - colchicine (COLCRYS) 0.6 MG tablet; Take 1 tablet by mouth daily  Dispense: 90 tablet; Refill: 5    4. Abdominal bloating  - simethicone (MYLICON) 80 MG chewable tablet; Take 1 tablet by mouth 4 times daily as needed for Flatulence  Dispense: 180 tablet; Refill: 5      Return in about 3 months (around 4/3/2020) for f/u bp. An electronic signature was used to authenticate this note.     --Gabriele Torres MD on 1/3/2020 at 9:58 AM

## 2020-01-03 NOTE — PATIENT INSTRUCTIONS
Patient Education      Patient Education        Learning About Guided Imagery for Stress  What are guided imagery and stress? Stress is what you feel when you have to handle more than you are used to. A lot of things can cause stress. You may feel stress when you go on a job interview, take a test, or run a race. This kind of short-term stress is normal and even useful. It can help you if you need to work hard or react quickly. Stress also can last a long time. Long-term stress is caused by stressful situations or events. Examples of long-term stress include long-term health problems, ongoing problems at work, and conflicts in your family. Long-term stress can harm your health. Guided imagery is a technique of directed thoughts and suggestions that guide your mind toward a relaxed, focused state. This technique helps you use your mind to take you to a calm, peaceful place. You can use it to relax, which can lower blood pressure and reduce other problems related to stress. How does guided imagery help to relieve stress? Because of the way the mind and body are connected, guided imagery can make you feel like you are experiencing something just by imagining it. You can achieve a relaxed state when you imagine all the details of a safe, comfortable place, such as a beach or a garden. This relaxed state may help you feel more in control of your emotions and thought processes. Feeling in control may improve your attitude, health, and sense of well-being. How do you do guided imagery? You can use a smartphone janeen or a video to lead you through the process. You use all of your senses in guided imagery. For example, if you want a tropical setting, you can imagine the warm breeze on your skin, the bright blue of the water, the sound of the surf, the sweet scent of tropical flowers, and the taste of coconut. Imagining those things can make you actually feel like you're there.   But you don't have to imagine the tropics to feel peace. Guided imagery can take you to your own restful place. To give guided imagery a try, follow these steps:  · Lean back comfortably in your chair. If you can, close your eyes. Put your arms on the armrests, or fold your hands in your lap. · Take a deep breath through your nose. Breathe in slowly, and then let the air out completely through your mouth. · Do it again slowly. Keep breathing like this. Gather up any tension in your body, and send it out with every breath. · Let a feeling of warmth spread from your lungs to your neck and head, down your arms to your fingertips, through your body and into your legs, all the way down to your toes. Stay this way for a moment. · Now imagine a pleasant day. You're at a park or at a place you've visited in the past where you felt at peace. · In your mind's eye, look at what lies in front of you. Maybe you see the sun, filtered through trees. Maybe clouds are drifting by. · Look to one side, and then the other. Notice the feel of the air around you. Notice how it feels on your face and on your arms. · Stay here for a while. Let it become real for you. Follow-up care is a key part of your treatment and safety. Be sure to make and go to all appointments, and call your doctor if you are having problems. It's also a good idea to know your test results and keep a list of the medicines you take. Where can you learn more? Go to https://[employer]. org and sign in to your [employer] account. Enter N291 in the Paperlinks box to learn more about \"Learning About Guided Imagery for Stress. \"     If you do not have an account, please click on the \"Sign Up Now\" link. Current as of: June 28, 2018  Content Version: 12.1  © 6144-0514 [employer]. Care instructions adapted under license by [facility] ([facility]).  If you have questions about a medical condition or this instruction, always ask your healthcare professional. [employer] disclaims any warranty or liability for your use of this information. Learning About High Blood Pressure  What is high blood pressure? Blood pressure is a measure of how hard the blood pushes against the walls of your arteries. It's normal for blood pressure to go up and down throughout the day, but if it stays up, you have high blood pressure. Another name for high blood pressure is hypertension. Two numbers tell you your blood pressure. The first number is the systolic pressure. It shows how hard the blood pushes when your heart is pumping. The second number is the diastolic pressure. It shows how hard the blood pushes between heartbeats, when your heart is relaxed and filling with blood. Your doctor will give you a goal for your blood pressure. Your goal will be based on your health and your age. High blood pressure (hypertension) means that the top number stays high, or the bottom number stays high, or both. High blood pressure increases the risk of stroke, heart attack, and other problems. You and your doctor will talk about your risks of these problems based on your blood pressure. What happens when you have high blood pressure? · Blood flows through your arteries with too much force. Over time, this damages the walls of your arteries. But you can't feel it. High blood pressure usually doesn't cause symptoms. · Fat and calcium start to build up in your arteries. This buildup is called plaque. Plaque makes your arteries narrower and stiffer. Blood can't flow through them as easily. · This lack of good blood flow starts to damage some of the organs in your body. This can lead to problems such as coronary artery disease and heart attack, heart failure, stroke, kidney failure, and eye damage. How can you prevent high blood pressure? · Stay at a healthy weight. · Try to limit how much sodium you eat to less than 2,300 milligrams (mg) a day.  If you limit your sodium to 1,500 mg a

## 2020-01-07 ENCOUNTER — OFFICE VISIT (OUTPATIENT)
Dept: ORTHOPEDIC SURGERY | Age: 65
End: 2020-01-07
Payer: MEDICARE

## 2020-01-07 ENCOUNTER — HOSPITAL ENCOUNTER (OUTPATIENT)
Dept: GENERAL RADIOLOGY | Age: 65
Discharge: HOME | End: 2020-01-09
Payer: MEDICARE

## 2020-01-07 VITALS
BODY MASS INDEX: 26.18 KG/M2 | HEART RATE: 54 BPM | HEIGHT: 71 IN | WEIGHT: 187 LBS | SYSTOLIC BLOOD PRESSURE: 178 MMHG | DIASTOLIC BLOOD PRESSURE: 81 MMHG

## 2020-01-07 PROCEDURE — 99203 OFFICE O/P NEW LOW 30 MIN: CPT | Performed by: ORTHOPAEDIC SURGERY

## 2020-01-07 PROCEDURE — 73521 X-RAY EXAM HIPS BI 2 VIEWS: CPT

## 2020-01-07 PROCEDURE — 99212 OFFICE O/P EST SF 10 MIN: CPT | Performed by: ORTHOPAEDIC SURGERY

## 2020-01-07 PROCEDURE — 99202 OFFICE O/P NEW SF 15 MIN: CPT

## 2020-01-07 NOTE — PROGRESS NOTES
no pedal edema, no clubbing or cyanosis, no pedal edema noted, no edema, redness or tenderness in the calves or thighs, Roldan's sign negative bilaterally, no ulcers, gangrene or atrophic changes  Skin - normal coloration and turgor, no rashes, no suspicious skin lesions noted      XR: X-ray taken today of bilateral hips shows severe DJD bilaterally with subchondral cyst and bone-on-bone arthritis bilaterally. DISCUSSION: Talk to the patient detail about the fact that his hips are significantly warm. There is a very good chance he will need total hip arthroplasties. He would like to try an injection in the left hip which I think is very reasonable due to the fact that it works so on the right side. We will order an interventional radiology injection of the left hip. I will then see him back in 3 months. He is agreeable with the plan.     IMP: Bilateral hip severe degenerative joint disease, primary      PLAN:    Injection left hip by interventional radiology    Follow-up in 3 months    Call sooner with any questions, concerns or issues with injection

## 2020-01-08 ENCOUNTER — TELEPHONE (OUTPATIENT)
Dept: FAMILY MEDICINE CLINIC | Age: 65
End: 2020-01-08

## 2020-01-08 NOTE — TELEPHONE ENCOUNTER
Aydee Beauchamp called in and is stating that his BP is still elevated and giving him extreme headaches, he is asking if you could please put him back on his previous BP meds.  Please advise  Thank you

## 2020-01-09 RX ORDER — METOPROLOL SUCCINATE 25 MG/1
25 TABLET, EXTENDED RELEASE ORAL DAILY
Qty: 90 TABLET | Refills: 1 | Status: SHIPPED
Start: 2020-01-09 | End: 2020-09-22

## 2020-01-13 ENCOUNTER — APPOINTMENT (OUTPATIENT)
Dept: CT IMAGING | Age: 65
End: 2020-01-13
Payer: COMMERCIAL

## 2020-01-13 ENCOUNTER — PRE-PROCEDURE TELEPHONE (OUTPATIENT)
Dept: INTERVENTIONAL RADIOLOGY/VASCULAR | Age: 65
End: 2020-01-13

## 2020-01-13 ENCOUNTER — HOSPITAL ENCOUNTER (EMERGENCY)
Age: 65
Discharge: HOME | End: 2020-01-14
Attending: EMERGENCY MEDICINE
Payer: COMMERCIAL

## 2020-01-13 VITALS
RESPIRATION RATE: 14 BRPM | OXYGEN SATURATION: 98 % | SYSTOLIC BLOOD PRESSURE: 164 MMHG | BODY MASS INDEX: 26.18 KG/M2 | WEIGHT: 187 LBS | TEMPERATURE: 97.5 F | DIASTOLIC BLOOD PRESSURE: 99 MMHG | HEIGHT: 71 IN | HEART RATE: 72 BPM

## 2020-01-13 LAB
ALBUMIN SERPL-MCNC: 4 G/DL (ref 3.5–5.2)
ALP BLD-CCNC: 133 U/L (ref 40–129)
ALT SERPL-CCNC: 17 U/L (ref 0–40)
ANION GAP SERPL CALCULATED.3IONS-SCNC: 11 MMOL/L (ref 7–16)
AST SERPL-CCNC: 21 U/L (ref 0–39)
BILIRUB SERPL-MCNC: <0.2 MG/DL (ref 0–1.2)
BUN BLDV-MCNC: 25 MG/DL (ref 8–23)
CALCIUM SERPL-MCNC: 10.8 MG/DL (ref 8.6–10.2)
CHLORIDE BLD-SCNC: 104 MMOL/L (ref 98–107)
CO2: 30 MMOL/L (ref 22–29)
CREAT SERPL-MCNC: 1.4 MG/DL (ref 0.7–1.2)
GFR AFRICAN AMERICAN: >60
GFR NON-AFRICAN AMERICAN: >60 ML/MIN/1.73
GLUCOSE BLD-MCNC: 110 MG/DL (ref 74–99)
HCT VFR BLD CALC: 40.7 % (ref 37–54)
HEMOGLOBIN: 12.5 G/DL (ref 12.5–16.5)
MCH RBC QN AUTO: 28 PG (ref 26–35)
MCHC RBC AUTO-ENTMCNC: 30.7 % (ref 32–34.5)
MCV RBC AUTO: 91.3 FL (ref 80–99.9)
PDW BLD-RTO: 13.7 FL (ref 11.5–15)
PLATELET # BLD: 261 E9/L (ref 130–450)
PMV BLD AUTO: 11.3 FL (ref 7–12)
POTASSIUM SERPL-SCNC: 3.9 MMOL/L (ref 3.5–5)
RBC # BLD: 4.46 E12/L (ref 3.8–5.8)
SODIUM BLD-SCNC: 145 MMOL/L (ref 132–146)
TOTAL PROTEIN: 8 G/DL (ref 6.4–8.3)
WBC # BLD: 6.1 E9/L (ref 4.5–11.5)

## 2020-01-13 PROCEDURE — 99284 EMERGENCY DEPT VISIT MOD MDM: CPT

## 2020-01-13 PROCEDURE — 36415 COLL VENOUS BLD VENIPUNCTURE: CPT

## 2020-01-13 PROCEDURE — 85027 COMPLETE CBC AUTOMATED: CPT

## 2020-01-13 PROCEDURE — 70450 CT HEAD/BRAIN W/O DYE: CPT

## 2020-01-13 PROCEDURE — 84484 ASSAY OF TROPONIN QUANT: CPT

## 2020-01-13 PROCEDURE — 93005 ELECTROCARDIOGRAM TRACING: CPT | Performed by: EMERGENCY MEDICINE

## 2020-01-13 PROCEDURE — 80053 COMPREHEN METABOLIC PANEL: CPT

## 2020-01-13 RX ORDER — METOCLOPRAMIDE HYDROCHLORIDE 5 MG/ML
10 INJECTION INTRAMUSCULAR; INTRAVENOUS ONCE
Status: COMPLETED | OUTPATIENT
Start: 2020-01-13 | End: 2020-01-14

## 2020-01-13 RX ORDER — DIPHENHYDRAMINE HYDROCHLORIDE 50 MG/ML
25 INJECTION INTRAMUSCULAR; INTRAVENOUS ONCE
Status: COMPLETED | OUTPATIENT
Start: 2020-01-13 | End: 2020-01-14

## 2020-01-14 ENCOUNTER — HOSPITAL ENCOUNTER (OUTPATIENT)
Dept: INTERVENTIONAL RADIOLOGY/VASCULAR | Age: 65
Discharge: HOME | End: 2020-01-16
Payer: COMMERCIAL

## 2020-01-14 VITALS
TEMPERATURE: 98 F | SYSTOLIC BLOOD PRESSURE: 180 MMHG | DIASTOLIC BLOOD PRESSURE: 85 MMHG | OXYGEN SATURATION: 98 % | HEART RATE: 52 BPM | RESPIRATION RATE: 18 BRPM

## 2020-01-14 LAB
EKG ATRIAL RATE: 62 BPM
EKG P AXIS: 42 DEGREES
EKG P-R INTERVAL: 138 MS
EKG Q-T INTERVAL: 462 MS
EKG QRS DURATION: 102 MS
EKG QTC CALCULATION (BAZETT): 468 MS
EKG R AXIS: 20 DEGREES
EKG T AXIS: 55 DEGREES
EKG VENTRICULAR RATE: 62 BPM
TROPONIN: <0.01 NG/ML (ref 0–0.03)

## 2020-01-14 PROCEDURE — 20610 DRAIN/INJ JOINT/BURSA W/O US: CPT

## 2020-01-14 PROCEDURE — 6360000004 HC RX CONTRAST MEDICATION: Performed by: NURSE PRACTITIONER

## 2020-01-14 PROCEDURE — 96374 THER/PROPH/DIAG INJ IV PUSH: CPT

## 2020-01-14 PROCEDURE — 6360000002 HC RX W HCPCS: Performed by: NURSE PRACTITIONER

## 2020-01-14 PROCEDURE — 77002 NEEDLE LOCALIZATION BY XRAY: CPT

## 2020-01-14 PROCEDURE — 7100000011 HC PHASE II RECOVERY - ADDTL 15 MIN

## 2020-01-14 PROCEDURE — 93010 ELECTROCARDIOGRAM REPORT: CPT | Performed by: INTERNAL MEDICINE

## 2020-01-14 PROCEDURE — 2500000003 HC RX 250 WO HCPCS: Performed by: NURSE PRACTITIONER

## 2020-01-14 PROCEDURE — 7100000010 HC PHASE II RECOVERY - FIRST 15 MIN

## 2020-01-14 PROCEDURE — 2500000003 HC RX 250 WO HCPCS: Performed by: RADIOLOGY

## 2020-01-14 PROCEDURE — 6360000002 HC RX W HCPCS: Performed by: EMERGENCY MEDICINE

## 2020-01-14 PROCEDURE — 96375 TX/PRO/DX INJ NEW DRUG ADDON: CPT

## 2020-01-14 RX ORDER — LIDOCAINE HYDROCHLORIDE 20 MG/ML
INJECTION, SOLUTION INFILTRATION; PERINEURAL
Status: COMPLETED | OUTPATIENT
Start: 2020-01-14 | End: 2020-01-14

## 2020-01-14 RX ORDER — BUPIVACAINE HYDROCHLORIDE 2.5 MG/ML
INJECTION, SOLUTION EPIDURAL; INFILTRATION; INTRACAUDAL
Status: COMPLETED | OUTPATIENT
Start: 2020-01-14 | End: 2020-01-14

## 2020-01-14 RX ORDER — LIDOCAINE HYDROCHLORIDE 10 MG/ML
INJECTION, SOLUTION EPIDURAL; INFILTRATION; INTRACAUDAL; PERINEURAL
Status: COMPLETED | OUTPATIENT
Start: 2020-01-14 | End: 2020-01-14

## 2020-01-14 RX ORDER — TRIAMCINOLONE ACETONIDE 40 MG/ML
INJECTION, SUSPENSION INTRA-ARTICULAR; INTRAMUSCULAR
Status: COMPLETED | OUTPATIENT
Start: 2020-01-14 | End: 2020-01-14

## 2020-01-14 RX ADMIN — LIDOCAINE HYDROCHLORIDE 1 ML: 10 INJECTION, SOLUTION EPIDURAL; INFILTRATION; INTRACAUDAL; PERINEURAL at 09:54

## 2020-01-14 RX ADMIN — IOVERSOL 2 ML: 678 INJECTION INTRA-ARTERIAL; INTRAVENOUS at 09:53

## 2020-01-14 RX ADMIN — LIDOCAINE HYDROCHLORIDE 1 ML: 20 INJECTION, SOLUTION INFILTRATION; PERINEURAL at 09:49

## 2020-01-14 RX ADMIN — LIDOCAINE HYDROCHLORIDE 1 ML: 20 INJECTION, SOLUTION INFILTRATION; PERINEURAL at 09:50

## 2020-01-14 RX ADMIN — LIDOCAINE HYDROCHLORIDE 4 ML: 20 INJECTION, SOLUTION INFILTRATION; PERINEURAL at 09:39

## 2020-01-14 RX ADMIN — METOCLOPRAMIDE 10 MG: 5 INJECTION, SOLUTION INTRAMUSCULAR; INTRAVENOUS at 00:16

## 2020-01-14 RX ADMIN — TRIAMCINOLONE ACETONIDE 40 MG: 40 INJECTION, SUSPENSION INTRA-ARTICULAR; INTRAMUSCULAR at 09:53

## 2020-01-14 RX ADMIN — BUPIVACAINE HYDROCHLORIDE 1 ML: 2.5 INJECTION, SOLUTION EPIDURAL; INFILTRATION; INTRACAUDAL; PERINEURAL at 09:54

## 2020-01-14 RX ADMIN — DIPHENHYDRAMINE HYDROCHLORIDE 25 MG: 50 INJECTION, SOLUTION INTRAMUSCULAR; INTRAVENOUS at 00:16

## 2020-01-14 RX ADMIN — LIDOCAINE HYDROCHLORIDE 1 ML: 20 INJECTION, SOLUTION INFILTRATION; PERINEURAL at 09:42

## 2020-01-14 ASSESSMENT — PAIN - FUNCTIONAL ASSESSMENT
PAIN_FUNCTIONAL_ASSESSMENT: 0-10
PAIN_FUNCTIONAL_ASSESSMENT: 0-10

## 2020-01-14 NOTE — BRIEF OP NOTE
Brief Postoperative Note    [place]  YOB: 1955  [id number]    Pre-operative Diagnosis:Osteoarthritis. Post-operative Diagnosis: Same    Procedure: Fluoroscopic guided left hip steroid injection. Anesthesia: Local    Medications: Kenolog 40mg, lidocaine 1%, marcaine 0.25%    Estimated Blood Loss: < 10 cc    Provider: Anusha Meza. Shira Muse CNP under the indirect supervision of Hayden HOWARD     Complications: none    Specimen obtained: none     Findings: none     Electronically signed by ANASTASIA Young CNP  1/14/20 9:07 AM

## 2020-01-14 NOTE — PROGRESS NOTES
5381 Patient arrived via ambulatory with self  to Radiology department for Image guided left hip injection. Allergies, home medications, H&P and fasting instructions reviewed with patient. Vital signs taken. 3931 Procedural instructions given by EMILY Reid NP, questions answered, understanding expressed and consent signed. 8837 To procedure via cart with RN.  1005 Returned via cart with RN. Alert. Vss.Dressing dry and intact to left hip injection site. 1020 Pt. Aware that bp 180/85,states he was in ER this am and spoke with his primary doctor regarding bp. States bp meds. adjusted and he will be fine. States he must catch the bus at 1045. Asymptomatic of elevated bp.  1035 Discharge instructions reviewed with patient and understanding expressed. Signed copy given to patient and copy placed in folder for Medical Records. Vaughn Discharged ambulatory with self. Blu Chris

## 2020-01-14 NOTE — ED PROVIDER NOTES
Troponin   Result Value Ref Range    Troponin <0.01 0.00 - 0.03 ng/mL   EKG 12 Lead   Result Value Ref Range    Ventricular Rate 62 BPM    Atrial Rate 62 BPM    P-R Interval 138 ms    QRS Duration 102 ms    Q-T Interval 462 ms    QTc Calculation (Bazett) 468 ms    P Axis 42 degrees    R Axis 20 degrees    T Axis 55 degrees       RADIOLOGY:  Interpreted by Radiologist.  CT Head WO Contrast   Final Result      No evidence for acute intracranial process. Cortical atrophy and chronic periventricular microangiopathy. EKG: This EKG is signed and interpreted by the EP. EKG shows normal sinus rhythm with sinus arrhythmia at a 62 bpm.  Normal axis. Normal QRS. No ST elevation or depression noted. no  STEMI.    ------------------------- NURSING NOTES AND VITALS REVIEWED ---------------------------   The nursing notes within the ED encounter and vital signs as below have been reviewed by myself. BP (!) 164/99   Pulse 72   Temp 97.5 °F (36.4 °C) (Temporal)   Resp 14   Ht 5' 11\" (1.803 m)   Wt 187 lb (84.8 kg)   SpO2 98%   BMI 26.08 kg/m²   Oxygen Saturation Interpretation: Normal    The patients available past medical records and past encounters were reviewed. ------------------------------ ED COURSE/MEDICAL DECISION MAKING----------------------  Medications   diphenhydrAMINE (BENADRYL) injection 25 mg (25 mg Intravenous Given 1/14/20 0016)   metoclopramide (REGLAN) injection 10 mg (10 mg Intravenous Given 1/14/20 0016)       Medical Decision Making: This is a 49-year-old male presented to the ED for headache and elevated blood pressure. Patient underwent laboratory work-up which showed a normal CBC normal chemistry except for a glucose of 110 BUN and creatinine 25/1.4. Troponin negative. EKG shows no ischemic findings. CT head showed no acute intracranial abnormalities. Patient given Benadryl and Reglan with resolution of his headache.   Patient's blood pressure has down trended and is currently in the 155L systolic. Patient is currently asymptomatic therefore he will be discharged home with close PCP follow-up. Return precautions given. Patient agrees with plan. This patient's ED course included: a personal history and physicial examination and re-evaluation prior to disposition    This patient has remained hemodynamically stable during their ED course. Re-Evaluations:           Re-evaluation. Patients symptoms are improving      Counseling: The emergency provider has spoken with the patient and discussed todays results, in addition to providing specific details for the plan of care and counseling regarding the diagnosis and prognosis. Questions are answered at this time and they are agreeable with the plan.     --------------------------------- IMPRESSION AND DISPOSITION ---------------------------------    IMPRESSION  1. Essential hypertension    2. Nonintractable episodic headache, unspecified headache type        DISPOSITION  Disposition: Discharge to home  Patient condition is stable    1/13/20, 10:08 PM.    This note is prepared by Francois Devine, acting as Scribe for Andrew Carrion DO. Andrew Carrion DO:  The scribe's documentation has been prepared under my direction and personally reviewed by me in its entirety. I confirm that the note above accurately reflects all work, treatment, procedures, and medical decision making performed by me.         Andrew Carrion DO  01/14/20 4020

## 2020-01-14 NOTE — H&P
Interventional Radiology  Attending Pre-operative History and Physical    DIAGNOSIS:    Patient Active Problem List   Diagnosis    Prostate cancer ([facility]    HTN (hypertension)    Osteoarthritis, hip/pelvic region and thigh    Lytic bone lesion of hip on xray    Synovitis of hip    Degenerative arthritis of hip    Smoking    Intractable low back pain    Cauda equina spinal cord injury ([facility]    Spinal stenosis of lumbar region at multiple levels    Essential hypertension    Osteoarthritis of both hips    Prostate cancer ([facility]    Lower back injury    Weakness of left hand    Leg weakness, bilateral    Chronic pain syndrome    Lumbar disc disorder    Lumbar spondylosis    Spinal stenosis of lumbar region without neurogenic claudication    Lumbar facet arthropathy    Primary osteoarthritis of both hips       CHIEF COMPLAINT: Osteoarthritis.        Current Outpatient Medications:     metoprolol succinate (TOPROL XL) 25 MG extended release tablet, Take 1 tablet by mouth daily, Disp: 90 tablet, Rfl: 1    atorvastatin (LIPITOR) 40 MG tablet, Take 1 tablet by mouth daily, Disp: 90 tablet, Rfl: 5    colchicine (COLCRYS) 0.6 MG tablet, Take 1 tablet by mouth daily, Disp: 90 tablet, Rfl: 5    simethicone (MYLICON) 80 MG chewable tablet, Take 1 tablet by mouth 4 times daily as needed for Flatulence, Disp: 180 tablet, Rfl: 5    pantoprazole (PROTONIX) 40 MG tablet, Take 1 tablet by mouth every morning (before breakfast), Disp: 30 tablet, Rfl: 5    Cholecalciferol (VITAMIN D) 50 MCG (2000 UT) CAPS capsule, Take 1 capsule by mouth daily, Disp: 30 capsule, Rfl: 5    lisinopril (PRINIVIL;ZESTRIL) 20 MG tablet, Take 1 tablet by mouth daily, Disp: 60 tablet, Rfl: 5    Lidocaine (ZTLIDO) 1.8 % PTCH, Given sample, Disp: 2 patch, Rfl: 0    goserelin (ZOLADEX) 10.8 MG injection, Inject 10.8 mg into the skin every 3 months, Disp: , Rfl:     Allergies   Allergen Reactions    Ibuprofen Swelling    Forced sexual activity: Not on file   Other Topics Concern    Not on file   Social History Narrative    Not on file       ROS: Non-contributory other than as noted above    PHYSICAL EXAM:      Heent: Alert and orientated. Heart:  Rapid regular rhythm    Lungs:  demonstrate no contraindications to proceed    Abdomen:  normal      DATA:  CBC:   Lab Results   Component Value Date    WBC 6.1 01/13/2020    RBC 4.46 01/13/2020    HGB 12.5 01/13/2020    HCT 40.7 01/13/2020    MCV 91.3 01/13/2020    MCH 28.0 01/13/2020    MCHC 30.7 01/13/2020    RDW 13.7 01/13/2020     01/13/2020    MPV 11.3 01/13/2020     CBC with Differential:    Lab Results   Component Value Date    WBC 6.1 01/13/2020    RBC 4.46 01/13/2020    HGB 12.5 01/13/2020    HCT 40.7 01/13/2020     01/13/2020    MCV 91.3 01/13/2020    MCH 28.0 01/13/2020    MCHC 30.7 01/13/2020    RDW 13.7 01/13/2020    SEGSPCT 67 03/27/2013    LYMPHOPCT 20.5 04/15/2019    MONOPCT 8.7 04/15/2019    BASOPCT 0.5 04/15/2019    MONOSABS 0.73 04/15/2019    LYMPHSABS 1.72 04/15/2019    EOSABS 0.43 04/15/2019    BASOSABS 0.04 04/15/2019     Platelets:    Lab Results   Component Value Date     01/13/2020     BUN/Creatinine:    Lab Results   Component Value Date    BUN 25 01/13/2020    CREATININE 1.4 01/13/2020       ASSESSMENT AND PLAN:  1. Fluoroscopic guided left hip steroid injection. 2.  Procedure options, risks and benefits reviewed with patient. Patient expresses understanding.     Electronically signed by ANASTASIA Powell CNP,  on 1/14/20 at 9:06 AM

## 2020-01-14 NOTE — DISCHARGE INSTR - COC
Continuity of Care Form    Patient Name: Paris Denise   :  1955  MRN:  [id number]    Admit date:  2020  Discharge date:  ***    Code Status Order: Prior   Advance Directives:     Admitting Physician:  No admitting provider for patient encounter. PCP: Jaciel Rivas MD    Discharging Nurse: [facility] Unit/Room#: [address] [facility]  Discharging Unit Phone Number: ***    Emergency Contact:   Extended Emergency Contact Information  Primary Emergency Contact: Brock Mohan Phone: [phone]  Relation: Other   needed? No    Past Surgical History:  Past Surgical History:   Procedure Laterality Date    BACK SURGERY      HIP SURGERY Right     LEG SURGERY      LUMBAR SPINE SURGERY  10/30/2017    PLIF L2 - S1   and laminectomy    PROSTATE BIOPSY         Immunization History:   Immunization History   Administered Date(s) Administered    Influenza Virus Vaccine 2015    Pneumococcal Polysaccharide ([facility]jlen[facility]) 2015       Active Problems:  Patient Active Problem List   Diagnosis Code    Prostate cancer ([facility] C61    HTN (hypertension) I10    Osteoarthritis, hip/pelvic region and thigh M16.9    Lytic bone lesion of hip on xray M89.8X5    Synovitis of hip M65.9    Degenerative arthritis of hip M16.9    Smoking F17.200    Intractable low back pain M54.5    Cauda equina spinal cord injury ([address] S34. 3XXA    Spinal stenosis of lumbar region at multiple levels M48.061    Essential hypertension I10    Osteoarthritis of both hips M16.0    Prostate cancer ([facility] C61    Lower back injury S39. 92XA    Weakness of left hand R29.898    Leg weakness, bilateral R29.898    Chronic pain syndrome G89.4    Lumbar disc disorder M51.9    Lumbar spondylosis M47.816    Spinal stenosis of lumbar region without neurogenic claudication M48.061    Lumbar facet arthropathy M47.816    Primary osteoarthritis of both hips M16.0       Isolation/Infection:   Isolation          No Status/Restrictions: [address] [facility] [employer]}  Other Medical Equipment (for information only, NOT a DME order):  {EQUIPMENT:991773241}  Other Treatments: ***    Patient's personal belongings (please select all that are sent with patient):  {CHP DME Belongings:633646715}    RN SIGNATURE:  {Esignature:337437290}    CASE MANAGEMENT/SOCIAL WORK SECTION    Inpatient Status Date: ***    Readmission Risk Assessment Score:  Readmission Risk              Risk of Unplanned Readmission:        0           Discharging to Facility/ Agency   · Name:   · Address:  · Phone:  · Fax:    Dialysis Facility (if applicable)   · Name:  · Address:  · Dialysis Schedule:  · Phone:  · Fax:    / signature: {Esignature:582703744}    PHYSICIAN SECTION    Prognosis: {Prognosis:8404089400}    Condition at Discharge: [address] Patient Condition:229311061}    Rehab Potential (if transferring to Rehab): {Prognosis:3893815052}    Recommended Labs or Other Treatments After Discharge: ***    Physician Certification: I certify the above information and transfer of Arely Mclain  is necessary for the continuing treatment of the diagnosis listed and that he requires {Admit to Appropriate Level of Care:35940} for {GREATER/LESS:231074835} 30 days.      Update Admission H&P: {CHP DME Changes in YYUMQ:057863969}    PHYSICIAN SIGNATURE:  {Esignature:671566263}

## 2020-01-16 ENCOUNTER — TELEPHONE (OUTPATIENT)
Dept: PAIN MANAGEMENT | Age: 65
End: 2020-01-16

## 2020-01-16 RX ORDER — TRAMADOL HYDROCHLORIDE 50 MG/1
50 TABLET ORAL DAILY PRN
Qty: 6 TABLET | Refills: 0 | Status: SHIPPED | OUTPATIENT
Start: 2020-01-16 | End: 2020-01-22 | Stop reason: SDUPTHER

## 2020-01-16 NOTE — TELEPHONE ENCOUNTER
Daren Isbell had an appointment today, but his ride did not come, he was sitting at 0700 for his ride to come. He needs his Tramadol. Oarrs consistent. 12/18/2019 2 11/18/2019 Tramadol Hcl 50 Mg Tablet   30.00 30 Da Angela Wallis [id number] [facility] ([id number]) 1 5.00 MME Medicaid [place]  Rescheduled for 1-22-20.

## 2020-01-22 ENCOUNTER — TELEPHONE (OUTPATIENT)
Dept: PAIN MANAGEMENT | Age: 65
End: 2020-01-22

## 2020-01-22 ENCOUNTER — OFFICE VISIT (OUTPATIENT)
Dept: PAIN MANAGEMENT | Age: 65
End: 2020-01-22
Payer: COMMERCIAL

## 2020-01-22 VITALS
BODY MASS INDEX: 26.18 KG/M2 | TEMPERATURE: 98.5 F | HEIGHT: 71 IN | WEIGHT: 187 LBS | HEART RATE: 60 BPM | OXYGEN SATURATION: 99 % | RESPIRATION RATE: 16 BRPM | SYSTOLIC BLOOD PRESSURE: 136 MMHG | DIASTOLIC BLOOD PRESSURE: 80 MMHG

## 2020-01-22 PROCEDURE — G8419 CALC BMI OUT NRM PARAM NOF/U: HCPCS | Performed by: PHYSICIAN ASSISTANT

## 2020-01-22 PROCEDURE — 1036F TOBACCO NON-USER: CPT | Performed by: PHYSICIAN ASSISTANT

## 2020-01-22 PROCEDURE — 99213 OFFICE O/P EST LOW 20 MIN: CPT | Performed by: PHYSICIAN ASSISTANT

## 2020-01-22 PROCEDURE — G8484 FLU IMMUNIZE NO ADMIN: HCPCS | Performed by: PHYSICIAN ASSISTANT

## 2020-01-22 PROCEDURE — G8427 DOCREV CUR MEDS BY ELIG CLIN: HCPCS | Performed by: PHYSICIAN ASSISTANT

## 2020-01-22 PROCEDURE — 3017F COLORECTAL CA SCREEN DOC REV: CPT | Performed by: PHYSICIAN ASSISTANT

## 2020-01-22 RX ORDER — TRAMADOL HYDROCHLORIDE 50 MG/1
50 TABLET ORAL DAILY PRN
Qty: 30 TABLET | Refills: 0 | Status: SHIPPED
Start: 2020-01-22 | End: 2020-02-20 | Stop reason: SDUPTHER

## 2020-01-22 NOTE — PROGRESS NOTES
education: Not on file    Highest education level: Not on file   Occupational History     Employer: NONE   Social Needs    Financial resource strain: Not on file    Food insecurity:     Worry: Not on file     Inability: Not on file    Transportation needs:     Medical: Not on file     Non-medical: Not on file   Tobacco Use    Smoking status: Former Smoker     Packs/day: 0.25     Years: 10.00     Pack years: 2.50     Last attempt to quit: 2017     Years since quittin.0    Smokeless tobacco: Never Used   Substance and Sexual Activity    Alcohol use: Yes     Comment: SOCIALLY    Drug use: No    Sexual activity: Not Currently     Partners: Female   Lifestyle    Physical activity:     Days per week: Not on file     Minutes per session: Not on file    Stress: Not on file   Relationships    Social connections:     Talks on phone: Not on file     Gets together: Not on file     Attends Anabaptism service: Not on file     Active member of club or organization: Not on file     Attends meetings of clubs or organizations: Not on file     Relationship status: Not on file    Intimate partner violence:     Fear of current or ex partner: Not on file     Emotionally abused: Not on file     Physically abused: Not on file     Forced sexual activity: Not on file   Other Topics Concern    Not on file   Social History Narrative    Not on file       History reviewed. No pertinent family history. REVIEW OF SYSTEMS:     Nidhi Berman denies fever/chills, chest pain, shortness of breath, new bowel or bladder complaints. All other review of systems was negative. PHYSICAL EXAMINATION:      /80   Pulse 60   Temp 98.5 °F (36.9 °C) (Oral)   Resp 16   Ht 5' 11\" (1.803 m)   Wt 187 lb (84.8 kg)   SpO2 99%   BMI 26.08 kg/m²     General:      General appearance:   pleasant and well-hydrated.    , in no discomfort and A & O x3  Build:Normal Weight  Function:Moves about room without difficulty. HEENT:    Head:normocephalic and atraumatic  Pupils:regular, round and equal.  Sclera: icterus absent,    Lungs:    Breathing:Normal expansion. Clear to auscultation. No rales, rhonchi, or wheezing. Abdomen:    Shape:non-distended and normal  Tenderness:none  Guarding:none    Lumbar spine:    Spine inspection:surgical incision scar   CVA tenderness:No   Palpation:tenderness paravertebral muscles, midline tenderness, facet loading, left, right and negative. Range of motion:abnormal mildly Lateral bending, flexion, extension rotation bilateral and is not painful. Musculoskeletal:    SI joint tenderness:negative right, negative left              JIMMY test:not done right, not done left  Piriformis tenderness:negative right, negative left  Trochanteric bursa tenderness:negative right, negative left  SLR:negative right, negative left, sitting     Extremities:    Tremors:None bilaterally upper and lower  Range of motion:Generally normal shoulders, pain with internal rotation of hips negative. Intact:Yes  Varicose veins:not assessed   Pulses:radial pulse 2+ left  Cyanosis:none  Edema:Normal    Neurological:    Sensory:normal to light touch   Motor:         Right Quadriceps5/5          Left Quadriceps5/5           Right Gastrocnemius5/5    Left Gastrocnemius5/5  Right Ant Tibialis5/5  Left Ant Tibialis5/5  Gait:antalgic    Dermatology:    Skin:no unusual rashes, no skin lesions, no palpable subcutaneous nodules and good skin turgor    Impression:    Patient seen for low back pain with no radiculopathy and hip pain due to OA  H/o decompressive lumbar fusion and laminectomy in 2017 L2-S1, gout and advanced prostate CA  Not interested in interventions    Not a candidate for NSAIDS, failed membrane stabilizer  Continue Tramadol 50 mg daily prn for severe pain - ordered today. OARRS report reviewed 01/2020.   Patient encouraged to remain active as tolerated  Patient continues with physical therapy as it

## 2020-01-22 NOTE — TELEPHONE ENCOUNTER
[facility] [address] [facility] no longer does rehab, can we try [facility] on [facility], she has been there before. Thank You.

## 2020-01-30 ENCOUNTER — HOSPITAL ENCOUNTER (OUTPATIENT)
Dept: PHYSICAL THERAPY | Age: 65
Setting detail: THERAPIES SERIES
Discharge: HOME | End: 2020-01-30
Payer: COMMERCIAL

## 2020-01-30 PROCEDURE — 97161 PT EVAL LOW COMPLEX 20 MIN: CPT | Performed by: PHYSICAL THERAPIST

## 2020-01-30 ASSESSMENT — PAIN DESCRIPTION - LOCATION: LOCATION: BACK

## 2020-01-30 ASSESSMENT — PAIN SCALES - GENERAL: PAINLEVEL_OUTOF10: 8

## 2020-01-30 ASSESSMENT — PAIN DESCRIPTION - PAIN TYPE: TYPE: CHRONIC PAIN

## 2020-01-30 ASSESSMENT — PAIN DESCRIPTION - DESCRIPTORS: DESCRIPTORS: ACHING

## 2020-01-30 ASSESSMENT — PAIN DESCRIPTION - FREQUENCY: FREQUENCY: CONTINUOUS

## 2020-01-30 ASSESSMENT — PAIN DESCRIPTION - ORIENTATION: ORIENTATION: LOWER

## 2020-01-30 NOTE — PROGRESS NOTES
Functions, Activity limitations: Decreased functional mobility ; Decreased ROM; Decreased strength; Increased pain;Decreased posture  Assessment: pt presents to PT for chronic back pain; limited AROM/strength across trunk with hindered posturing/gait   Prognosis: Fair  Decision Making: Low Complexity  REQUIRES PT FOLLOW UP: Yes         Plan   Plan  Times per week: 2x/week x 6 weeks for aquatic therapy   Current Treatment Recommendations: Aquatics    Goals  Short term goals  Time Frame for Short term goals: 3 weeks   Short term goal 1: increase AROM of trunk to within 50% functional limits   Short term goal 2: increase strength of trunk/BLEs to grossly 4/5 to improve posture/gait  Short term goal 3: increase ambulation with pt demonstrating increased heel-toe progression without AD  Short term goal 4: decrease pain to < 5/10 across back region with activity   Long term goals  Time Frame for Long term goals : 6 weeks   Long term goal 1: increase AROM of trunk to within 75% functional limits   Long term goal 2: increase strength of trunk/BLEs to grossly 4 to 4+/5 to improve posture/gait  Long term goal 3: increase ambulation with pt demonstrating proper gait mechanics without AD   Long term goal 4: decrease pain to < 3/10 across back region with activity   Long term goal 5: pt demonstrates independence with HEP   Patient Goals   Patient goals : to reduce back pain        Therapy Time   Individual Concurrent Group Co-treatment   Time In 0805         Time Out 0835         Minutes 30                 Keegan Jensen PT

## 2020-02-04 ENCOUNTER — HOSPITAL ENCOUNTER (OUTPATIENT)
Dept: PHYSICAL THERAPY | Age: 65
Setting detail: THERAPIES SERIES
Discharge: HOME | End: 2020-02-04
Payer: COMMERCIAL

## 2020-02-04 PROCEDURE — 97113 AQUATIC THERAPY/EXERCISES: CPT

## 2020-02-04 NOTE — PROGRESS NOTES
[facility]  Phone: [phone] Fax: [phone]        Physical Therapy Aquatic Flow Sheet   Date:  2020    Patient Name:  Paolo Cross    :  1955  Restrictions/Precautions:    Diagnosis:   chronic pain syndrome   Treatment Diagnosis:  chronic pain syndrome    Insurance/Certification information:   Marcos Osorio   Referring Physician:  Ruthie GONZALEZ  Plan of care signed (Y/N):    Visit# / total visits:    Pain level: 9/10   Electronically signed by:  Melody Brand PTA  Time In:0900  Time [facility]:6355    Subjective:   Pt with multiple c/o pain due additional surgical procedure 4 weeks ago. He presents with Poor posture; trunk moderately flexed.     Key  B= Belt DB= Dumbells T= Theratube   H= Hydrotone N= Noodles W= Weights   P= Paddles S= Speedo equipment K= Kickboard     Exercises/Activities   Warm-up/Amb  Minutes  Exercises  Minutes    Slow forward  5  HR/TR  2 min    Slow sideways  5  Marches  2 min    Slow backwards  3minutes  Mini-squats  2 min    Medium forward   Forward backward kick  2 min    Medium sideways    Hip abduction  2 min    Small shuffle    Hamstring curls      Jog    Knee extension      Braiding    Pelvic tilts      Bicycling  3 min  Scap squeezes      Marching  3 min  Shoulder flex/ext      Functional    Shoulder abd/add      Step    Shoulder H. abd/add      Lifting    Shoulder IR/ER      Hand to opp knee    Rowing      Push down squat    Bilateral pull down      UE PNF    Push/pull      LE PNF    Push downs      Wall push ups    Arm circles      SLS    Elbow flex/ext          Chin tuck      Stretching    UT shrugs/rolls      Gastroc/Soleus    Rocking horse      Hamstring          SKTC    Other      Piriformis          Hip flexor          Ladder pull          Pec stretch          Post deltoid           Timed Code Treatment Minutes:  45    Total Treatment Minutes:  50    Treatment/Activity Tolerance:  [] Patient tolerated treatment well [x] Patient

## 2020-02-07 ENCOUNTER — HOSPITAL ENCOUNTER (OUTPATIENT)
Dept: PHYSICAL THERAPY | Age: 65
Setting detail: THERAPIES SERIES
Discharge: HOME | End: 2020-02-07
Payer: COMMERCIAL

## 2020-02-07 PROCEDURE — 97113 AQUATIC THERAPY/EXERCISES: CPT

## 2020-02-07 NOTE — PROGRESS NOTES
[facility]  Phone: [phone] Fax: [phone]        Physical Therapy Aquatic Flow Sheet   Date:  2020    Patient Name:  Neto Krueger    :  1955  Restrictions/Precautions:    Diagnosis:  chronic pain syndrome    Treatment Diagnosis:   chronic pain syndrome   Insurance/Certification information:  Christiano Almaraz   Referring Physician:  Lorne GONZALEZ  Plan of care signed (Y/N):    Visit# / total visits: 3 /12  Pain level: 5/10   Electronically signed by:  Quinton Delarosa PTA  Time IS:3530  Time Out:1030    Subjective:   Pt with multiple c/o pain due additional surgical procedure 4 weeks ago. He presents with Poor posture; trunk moderately flexed.     Key  B= Belt DB= Dumbells T= Theratube   H= Hydrotone N= Noodles W= Weights   P= Paddles S= Speedo equipment K= Kickboard     Exercises/Activities   Warm-up/Amb  Minutes  Exercises  Minutes     Slow forward   5  HR/TR  4 min    Slow sideways  5  Marches  4 min    Slow backwards  3minutes  Mini-squats  4 min    Medium forward  Hip abduction  2 min    Medium sideways    Forward backward kick  2 min    Small shuffle    Hamstring curls      Jog    Knee extension      Braiding    Pelvic tilts      Bicycling  3 minutes   Scap squeezes      Marching  3 minutes  Shoulder flex/ext      Functional    Shoulder abd/add      Step    Shoulder H. abd/add      Lifting    Shoulder IR/ER      Hand to opp knee    Rowing      Push down squat    Bilateral pull down      UE PNF    Push/pull      LE PNF    Push downs      Wall push ups    Arm circles      SLS    Elbow flex/ext          Chin tuck      Stretching    UT shrugs/rolls      Gastroc/Soleus    Rocking horse      Hamstring          SKTC    Other      Piriformis    Sit to stand   2 sets of 5 reps    Hip flexor          Ladder pull          Pec stretch          Post deltoid           Timed Code Treatment Minutes:  50    Total Treatment Minutes:  60  Treatment/Activity Tolerance:  [] Patient tolerated treatment well [x] Patient limited by fatique  [x] Patient limited by pain [] Patient limited by other medical complications  [] Other:     Prognosis: [] Good [x] Fair  [] Poor    Patient Requires Follow-up: [x] Yes  [] No    Plan:   [x] Continue per plan of care [] Alter current plan (see comments)  [] Plan of care initiated [] Hold pending MD visit [] Discharge        Electronically signed by:  Fracisco Foster PTA [id number]

## 2020-02-11 ENCOUNTER — HOSPITAL ENCOUNTER (OUTPATIENT)
Dept: PHYSICAL THERAPY | Age: 65
Setting detail: THERAPIES SERIES
Discharge: HOME | End: 2020-02-11
Payer: COMMERCIAL

## 2020-02-11 PROCEDURE — 97113 AQUATIC THERAPY/EXERCISES: CPT

## 2020-02-11 NOTE — PROGRESS NOTES
[facility]  Phone: [phone] Fax: [phone]        Physical Therapy Aquatic Flow Sheet   Date:  2020    Patient Name:  Rob Ly    :  1955  Restrictions/Precautions:    Diagnosis:    Treatment Diagnosis:    Insurance/Certification information:    Referring Physician:    Plan of care signed (Y/N):    Visit# / total visits:  /  Pain level: /10   Electronically signed by:  Kiesha Cornejo PTA  Time In:  Time Out:    Subjective:   Pt with multiple c/o pain due additional surgical procedure 4 weeks ago. He presents with Poor posture; trunk moderately flexed.     Key  B= Belt DB= Dumbells T= Theratube   H= Hydrotone N= Noodles W= Weights   P= Paddles S= Speedo equipment K= Kickboard     Exercises/Activities   Warm-up/Amb  Minutes  Exercises  Minutes    Slow forward   5  HR/TR  2 min    Slow sideways  5  Marches  4 min    Slow backwards  3minutes  Mini-squats  4 min    Medium forward    Hip abduction  4 min    Medium sideways   Forward backward kick  4 min    Small shuffle    Hamstring curls      Jog    Knee extension      Braiding    Pelvic tilts      Bicycling  5 -seated  Scap squeezes      Marching  5 seated  Shoulder flex/ext      Functional    Shoulder abd/add      Step    Shoulder H. abd/add      Lifting    Shoulder IR/ER      Hand to opp knee    Rowing      Push down squat    Bilateral pull down      UE PNF    Push/pull      LE PNF    Push downs      Wall push ups    Arm circles      SLS    Elbow flex/ext          Chin tuck      Stretching    UT shrugs/rolls      Gastroc/Soleus    Rocking horse      Hamstring          SKTC    Other      Piriformis          Hip flexor          Ladder pull          Pec stretch          Post deltoid           Timed Code Treatment Minutes:  55    Total Treatment Minutes:  60    Treatment/Activity Tolerance:  [] Patient tolerated treatment well [x] Patient limited by fatique  [x] Patient limited by pain [] Patient limited by other

## 2020-02-14 ENCOUNTER — HOSPITAL ENCOUNTER (OUTPATIENT)
Dept: PHYSICAL THERAPY | Age: 65
Setting detail: THERAPIES SERIES
Discharge: HOME | End: 2020-02-14
Payer: COMMERCIAL

## 2020-02-14 PROCEDURE — 97113 AQUATIC THERAPY/EXERCISES: CPT

## 2020-02-14 NOTE — PROGRESS NOTES
[facility]  Phone: [phone] Fax: [phone]        Physical Therapy Aquatic Flow Sheet   Date:  2020    Patient Name:  Ines Pallas    :  1955  Pain level: 6/10   Electronically signed by:  Cony Martinez PTA  Time QZ:0216  Time Out:1030    Subjective:   Pt with multiple c/o pain due additional surgical procedure 4 weeks ago. He presents with Poor posture; trunk moderately flexed.     Key  B= Belt DB= Dumbells T= Theratube   H= Hydrotone N= Noodles W= Weights   P= Paddles S= Speedo equipment K= Kickboard     Exercises/Activities   Warm-up/Amb  Minutes  Exercises  M    Slow forward   5  HR/TR  4 min    Slow sideways  5  Marches  4 min    Slow backwards  5  Mini-squats 4 min    Medium forward    4-way SLR  4 min    Medium sideways    Hip circles/fig 8      Small shuffle    Hamstring curls      Jog    Knee extension      Braiding    Pelvic tilts      Bicycling  5  Scap squeezes      Marching  5  Shoulder flex/ext      Functional    Shoulder abd/add      Step    Shoulder H. abd/add      Lifting    Shoulder IR/ER      Hand to opp knee    Rowing      Push down squat    Bilateral pull down      UE PNF    Push/pull      LE PNF    Push downs      Wall push ups    Arm circles      SLS    Elbow flex/ext          Chin tuck      Stretching    UT shrugs/rolls      Gastroc/Soleus    Rocking horse      Hamstring          SKTC    Other Post_rx paijn control    Piriformis    BLE/trunk hang   x 15 min    Hip flexor          Ladder pull          Pec stretch          Post deltoid           Timed Code Treatment Minutes:  45    Total Treatment Minutes:  55    Treatment/Activity Tolerance:  [] Patient tolerated treatment well [x] Patient limited by fatique  [x] Patient limited by pain [] Patient limited by other medical complications  [] Other:     Prognosis: [] Good [x] Fair  [] Poor    Patient Requires Follow-up: [x] Yes  [] No    Plan:   [x] Continue per plan of care [] Alter current plan (see comments)  [] Plan of care initiated [] Hold pending MD visit [] Discharge        Electronically signed by:  Rodney Jung PTA [id number]

## 2020-02-17 ENCOUNTER — HOSPITAL ENCOUNTER (EMERGENCY)
Age: 65
Discharge: HOME | End: 2020-02-17
Payer: COMMERCIAL

## 2020-02-17 VITALS
HEART RATE: 88 BPM | OXYGEN SATURATION: 98 % | SYSTOLIC BLOOD PRESSURE: 159 MMHG | TEMPERATURE: 96.3 F | RESPIRATION RATE: 18 BRPM | DIASTOLIC BLOOD PRESSURE: 83 MMHG

## 2020-02-17 PROCEDURE — 6370000000 HC RX 637 (ALT 250 FOR IP): Performed by: NURSE PRACTITIONER

## 2020-02-17 PROCEDURE — 99282 EMERGENCY DEPT VISIT SF MDM: CPT

## 2020-02-17 RX ORDER — TETRACAINE HYDROCHLORIDE 5 MG/ML
2 SOLUTION OPHTHALMIC ONCE
Status: COMPLETED | OUTPATIENT
Start: 2020-02-17 | End: 2020-02-17

## 2020-02-17 RX ORDER — ERYTHROMYCIN 5 MG/G
OINTMENT OPHTHALMIC
Qty: 1 TUBE | Refills: 0 | Status: SHIPPED | OUTPATIENT
Start: 2020-02-17 | End: 2020-02-27

## 2020-02-17 RX ADMIN — TETRACAINE HYDROCHLORIDE 2 DROP: 5 SOLUTION OPHTHALMIC at 09:00

## 2020-02-17 RX ADMIN — FLUORESCEIN SODIUM 1 MG: 1 STRIP OPHTHALMIC at 09:00

## 2020-02-17 ASSESSMENT — VISUAL ACUITY
OS: 20/25
OD: 20/30

## 2020-02-17 NOTE — ED PROVIDER NOTES
and symptoms which require emergent evaluation. Counseling: The emergency provider has spoken with the patient and discussed todays results, in addition to providing specific details for the plan of care and counseling regarding the diagnosis and prognosis. Questions are answered at this time and they are agreeable with the plan. Assessment      1. Abrasion of left cornea, initial encounter      Plan   Discharge to home  Patient condition is good    New Medications     Discharge Medication List as of 2/17/2020  9:17 AM      START taking these medications    Details   erythromycin (ROMYCIN) 5 MG/GM ophthalmic ointment Apply 0.5 inch to left eye QID for 5 days, Disp-1 Tube, R-0, Print           Electronically signed by ANASTASIA Gardner CNP   DD: 2/17/20  **This report was transcribed using voice recognition software. Every effort was made to ensure accuracy; however, inadvertent computerized transcription errors may be present.   END OF ED PROVIDER NOTE      ANASTASIA Batres CNP  02/17/20 [id number]

## 2020-02-20 ENCOUNTER — OFFICE VISIT (OUTPATIENT)
Dept: PAIN MANAGEMENT | Age: 65
End: 2020-02-20
Payer: COMMERCIAL

## 2020-02-20 VITALS
SYSTOLIC BLOOD PRESSURE: 128 MMHG | TEMPERATURE: 97.8 F | HEART RATE: 72 BPM | BODY MASS INDEX: 25.76 KG/M2 | WEIGHT: 184 LBS | HEIGHT: 71 IN | RESPIRATION RATE: 16 BRPM | DIASTOLIC BLOOD PRESSURE: 72 MMHG

## 2020-02-20 PROCEDURE — G8419 CALC BMI OUT NRM PARAM NOF/U: HCPCS | Performed by: PHYSICIAN ASSISTANT

## 2020-02-20 PROCEDURE — 99213 OFFICE O/P EST LOW 20 MIN: CPT | Performed by: PHYSICIAN ASSISTANT

## 2020-02-20 PROCEDURE — G8427 DOCREV CUR MEDS BY ELIG CLIN: HCPCS | Performed by: PHYSICIAN ASSISTANT

## 2020-02-20 PROCEDURE — 3017F COLORECTAL CA SCREEN DOC REV: CPT | Performed by: PHYSICIAN ASSISTANT

## 2020-02-20 PROCEDURE — 1036F TOBACCO NON-USER: CPT | Performed by: PHYSICIAN ASSISTANT

## 2020-02-20 PROCEDURE — G8484 FLU IMMUNIZE NO ADMIN: HCPCS | Performed by: PHYSICIAN ASSISTANT

## 2020-02-20 RX ORDER — TRAMADOL HYDROCHLORIDE 50 MG/1
50 TABLET ORAL DAILY PRN
Qty: 30 TABLET | Refills: 0 | Status: SHIPPED | OUTPATIENT
Start: 2020-02-21 | End: 2020-03-22

## 2020-02-20 NOTE — PROGRESS NOTES
Carisa Delgado presents to the [facility] on 2/20/2020. Nidhi Berman is complaining of pain in his low back. The pain is constant. The pain is described as aching, colicky, throbbing and sharp. Pain is rated on his best day at a 10, on his worst day at a 10, and on average at a 10 on the VAS scale. He took his last dose of Tramadol 2 days ago. Falguni De does not have issues with constipation. Any procedures since your last visit: No    He is not on NSAIDS and  is not on anticoagulation medications to include none. Pacemaker or defibrilator: No.       /72   Pulse 72   Temp 97.8 °F (36.6 °C) (Oral)   Resp 16   Ht 5' 11\" (1.803 m)   Wt 184 lb (83.5 kg)   BMI 25.66 kg/m²      No LMP for male patient.

## 2020-02-20 NOTE — PROGRESS NOTES
Social History     Socioeconomic History    Marital status:      Spouse name: Not on file    Number of children: Not on file    Years of education: Not on file    Highest education level: Not on file   Occupational History     Employer: NONE   Social Needs    Financial resource strain: Not on file    Food insecurity:     Worry: Not on file     Inability: Not on file    Transportation needs:     Medical: Not on file     Non-medical: Not on file   Tobacco Use    Smoking status: Former Smoker     Packs/day: 0.25     Years: 10.00     Pack years: 2.50     Last attempt to quit: 2017     Years since quittin.1    Smokeless tobacco: Never Used   Substance and Sexual Activity    Alcohol use: Yes     Comment: SOCIALLY    Drug use: No    Sexual activity: Not Currently     Partners: Female   Lifestyle    Physical activity:     Days per week: Not on file     Minutes per session: Not on file    Stress: Not on file   Relationships    Social connections:     Talks on phone: Not on file     Gets together: Not on file     Attends Church service: Not on file     Active member of club or organization: Not on file     Attends meetings of clubs or organizations: Not on file     Relationship status: Not on file    Intimate partner violence:     Fear of current or ex partner: Not on file     Emotionally abused: Not on file     Physically abused: Not on file     Forced sexual activity: Not on file   Other Topics Concern    Not on file   Social History Narrative    Not on file       History reviewed. No pertinent family history. REVIEW OF SYSTEMS:     Danie Holloway denies fever/chills, chest pain, shortness of breath, new bowel or bladder complaints. All other review of systems was negative.     PHYSICAL EXAMINATION:      /72   Pulse 72   Temp 97.8 °F (36.6 °C) (Oral)   Resp 16   Ht 5' 11\" (1.803 m)   Wt 184 lb (83.5 kg)   BMI 25.66 kg/m²     General:      General appearance:   pleasant and well-hydrated. , in no discomfort and A & O x3  Build:Normal Weight  Function:Moves about room without difficulty. HEENT:    Head:normocephalic and atraumatic  Pupils:regular, round and equal.  Sclera: icterus absent,    Lungs:    Breathing:Normal expansion. Clear to auscultation. No rales, rhonchi, or wheezing. Abdomen:    Shape:non-distended and normal  Tenderness:none  Guarding:none    Lumbar spine:    Spine inspection:surgical incision scar   CVA tenderness:No   Palpation:tenderness paravertebral muscles, midline tenderness, facet loading, left, right and negative. Range of motion:abnormal mildly Lateral bending, flexion, extension rotation bilateral and is not painful. Musculoskeletal:    SI joint tenderness:negative right, negative left              JIMMY test:not done right, not done left  Piriformis tenderness:negative right, negative left  Trochanteric bursa tenderness:negative right, negative left  SLR:negative right, negative left, sitting     Extremities:    Tremors:None bilaterally upper and lower  Range of motion:Generally normal shoulders, pain with internal rotation of hips negative. Intact:Yes  Varicose veins:not assessed   Pulses:radial pulse 2+ left  Cyanosis:none  Edema:Normal    Neurological:    Sensory:normal to light touch   Motor:         Right Quadriceps5/5          Left Quadriceps5/5           Right Gastrocnemius5/5    Left Gastrocnemius5/5  Right Ant Tibialis5/5  Left Ant Tibialis5/5  Gait:antalgic    Dermatology:    Skin:no unusual rashes, no skin lesions, no palpable subcutaneous nodules and good skin turgor    Impression:    Patient seen for low back pain with no radiculopathy and hip pain due to OA  H/o decompressive lumbar fusion and laminectomy in 2017 L2-S1, gout and advanced prostate CA  Not interested in interventions    Not a candidate for NSAIDS, failed membrane stabilizer  Continue Tramadol 50 mg daily prn for severe pain - ordered for 02/21/2020.   OARRS report

## 2020-02-28 ENCOUNTER — HOSPITAL ENCOUNTER (OUTPATIENT)
Dept: PHYSICAL THERAPY | Age: 65
Setting detail: THERAPIES SERIES
Discharge: HOME | End: 2020-02-28
Payer: COMMERCIAL

## 2020-02-28 PROCEDURE — 97113 AQUATIC THERAPY/EXERCISES: CPT

## 2020-02-28 NOTE — PROGRESS NOTES
Prognosis: [] Good [x] Fair  [] Poor    Patient Requires Follow-up: [x] Yes  [] No    Plan:   [x] Continue per plan of care [] Alter current plan (see comments)  [] Plan of care initiated [] Hold pending MD visit [] Discharge        Electronically signed by:  Monserrat Szymanski [facility] [id number]

## 2020-03-03 ENCOUNTER — HOSPITAL ENCOUNTER (OUTPATIENT)
Dept: PHYSICAL THERAPY | Age: 65
Setting detail: THERAPIES SERIES
Discharge: HOME | End: 2020-03-03
Payer: COMMERCIAL

## 2020-03-03 PROCEDURE — 97113 AQUATIC THERAPY/EXERCISES: CPT

## 2020-03-06 ENCOUNTER — HOSPITAL ENCOUNTER (OUTPATIENT)
Dept: PHYSICAL THERAPY | Age: 65
Setting detail: THERAPIES SERIES
Discharge: HOME | End: 2020-03-06
Payer: COMMERCIAL

## 2020-03-06 PROCEDURE — 97113 AQUATIC THERAPY/EXERCISES: CPT

## 2020-03-06 NOTE — PROGRESS NOTES
[facility]  Phone: [phone] Fax: [phone]        Physical Therapy Aquatic Flow Sheet   Date:  3/6/2020    Patient Name:  Paris Denise    :  1955  Restrictions/Precautions:    Diagnosis:  chronic pain syndrome    Treatment Diagnosis:   chronic pain syndrome   Insurance/Certification information:  [employer] [facility]   Referring Physician:  Curtis GONZALEZ  Plan of care signed (Y/N):    Visit# / total visits:   Pain level: 7-8/10  Electronically signed by:  Nima Cabrera PTA  Time In:1000  Time Out:1100   Subjective:  Pt presents with better posture this visit. He's making better effort to stand more upright; no AD required.   Key  B= Belt DB= Dumbells T= Theratube   H= Hydrotone N= Noodles W= Weights   P= Paddles S= Speedo equipment K= Kickboard     Exercises/Activities   Warm-up/Amb  Minutes  Exercises  Minutes     Slow forward  5  HR/TR  4 min    Slow sideways  5  Marches  4 min    Slow backwards  5 minutes  Mini-squats  4 min    Medium forward  Hip abduction  2 min    Medium sideways    Forward backward kick  2 min    Small shuffle    Hamstring curls      Jog    Knee extension      Braiding    Pelvic tilts      Bicycling  5 minutes   Scap squeezes      Marching  5 minutes  Shoulder flex/ext      Functional    Shoulder abd/add      Step    Shoulder H. abd/add      Lifting    Shoulder IR/ER      Hand to opp knee    Rowing      Push down squat    Bilateral pull down      UE PNF    Push/pull      LE PNF    Push downs      Wall push ups    Arm circles      SLS    Elbow flex/ext          Chin tuck      Stretching    UT shrugs/rolls      Gastroc/Soleus    Rocking horse      Hamstring          SKTC    Other      Piriformis    Sit to stand   2 sets of 10 reps    Hip flexor          Ladder pull          Pec stretch          Post deltoid           Timed Code Treatment Minutes:  50    Total Treatment Minutes:  60  Treatment/Activity Tolerance:  [x] Patient tolerated treatment well [x] Patient limited by fatique  [] Patient limited by pain [] Patient limited by other medical complications  [] Other:     Prognosis: [] Good [x] Fair  [] Poor    Patient Requires Follow-up: [x] Yes  [] No    Plan:   [x] Continue per plan of care [] Alter current plan (see comments)  [] Plan of care initiated [] Hold pending MD visit [] Discharge        Electronically signed by:  Jr Henderson PTA [id number]

## 2020-03-10 ENCOUNTER — HOSPITAL ENCOUNTER (OUTPATIENT)
Dept: PHYSICAL THERAPY | Age: 65
Setting detail: THERAPIES SERIES
Discharge: HOME | End: 2020-03-10
Payer: COMMERCIAL

## 2020-03-10 PROCEDURE — 97113 AQUATIC THERAPY/EXERCISES: CPT

## 2020-03-13 ENCOUNTER — HOSPITAL ENCOUNTER (OUTPATIENT)
Dept: PHYSICAL THERAPY | Age: 65
Setting detail: THERAPIES SERIES
Discharge: HOME | End: 2020-03-13
Payer: COMMERCIAL

## 2020-03-13 PROCEDURE — 97113 AQUATIC THERAPY/EXERCISES: CPT

## 2020-03-13 NOTE — PROGRESS NOTES
[facility]  Phone: [phone] Fax: [phone]        Physical Therapy Aquatic Flow Sheet   Date:  3/13/2020    Patient Name:  Yoselyn Patton    :  1955  Restrictions/Precautions:    Diagnosis:  chronic pain syndrome    Treatment Diagnosis:   chronic pain syndrome   Insurance/Certification information:  [employer] [facility]   Referring Physician:  Franky GONZALEZ  Plan of care signed (Y/N):    Visit# / total visits: 10/12  Pain level: 6-7/10  Electronically signed by:  Jordi Montero PTA  Time In:1000  Time Out:1100   Subjective:  Pt at 10 of 12 aquatic therapy visits. Physician recheck on 2020. Key  B= Belt DB= Dumbells T= Theratube   H= Hydrotone N= Noodles W= Weights   P= Paddles S= Speedo equipment K= Kickboard     Exercises/Activities   Warm-up/Amb  Minutes  Exercises  Minutes     Slow forward  5  HR/TR  4 min    Slow sideways  5  Marches  4 min    Slow backwards  5 minutes  Mini-squats  4 min    Medium forward  Hip abduction  2 min    Medium sideways    Forward backward kick  2 min    Small shuffle    Hamstring curls      Jog    Knee extension      Braiding    Pelvic tilts      Bicycling  5 minutes   Scap squeezes      Marching  5 minutes  Shoulder flex/ext      Functional    Shoulder abd/add      Step    Shoulder H. abd/add      Lifting    Shoulder IR/ER      Hand to opp knee    Rowing      Push down squat    Bilateral pull down      UE PNF    Push/pull      LE PNF    Push downs      Wall push ups    Arm circles      SLS    Elbow flex/ext          Chin tuck      Stretching    UT shrugs/rolls      Gastroc/Soleus    Rocking horse      Hamstring          SKTC    Other      Piriformis    Sit to stand   2 sets of 10 reps    Hip flexor          Ladder pull          Pec stretch          Post deltoid         Patient education on energy conservation. Instruct pt to alternate seated and standing activities-Pt receptive.   Timed Code Treatment Minutes:  50    Total Treatment

## 2020-03-17 ENCOUNTER — APPOINTMENT (OUTPATIENT)
Dept: PHYSICAL THERAPY | Age: 65
End: 2020-03-17
Payer: COMMERCIAL

## 2020-03-18 NOTE — PROGRESS NOTES
[address]  [address] [facility], [address] [place] [address] [place]  [phone]    Follow up Note      Paris Windham     Date of Visit:  3/19/2020    CC:  Patient presents for follow up   Chief Complaint   Patient presents with    Follow-up    Back Pain     entire back       HPI:    Pain is unchanged. Aquatherapy on hold due to construction and pandemic. Left hip still feeling good. Pain when transferring from sitting and standing. Appropriate analgesia with current medications regimen: yes  Change in quality of symptoms:no. Medication side effects:none. Recent diagnostic testing:none. Recent interventional procedures:none since last visit. He has not been on anticoagulation medications to include ASA, NSAIDS, Plavix, heparin, LMW heparin and warfarin. The patient  has not been on herbal supplements. The patient is not diabetic. Imaging studies:    CT lumbar spine 01/2018   ALERT:  THIS IS AN ABNORMAL REPORT   1. Postoperative changes of the lumbar spine status post posterior   spinal fixation L2-S1. No hardware failure. No change in alignment. 2. Questionable abutment of exiting left L3, exiting left L4 and   suspected abutment of exiting bilateral L5 spinal nerve roots at   appropriate levels   3. Suspected left adrenal adenoma. Continued surveillance within 6-12   months recommended.      Bilateral hip Xrays 04/2019  Severe degenerative changes of bilateral hips. Potential Aberrant Drug-Related Behavior: None     Urine Drug Screening:  First office visit urine screen showed no narcotics  11/2019:  Negative for all. Does take daily prn and reports takes last dose at approximately 6 pm.    02/2020:  + for cocaine and metabolite.   Negative for tramadol.       OARRS report:  5/2019 to 03/2020 consistent    Past Medical History:   Diagnosis Date    Cancer [facility]) PROSTATE    2005, patient reports bone mets    Erectile dysfunction     Hyperlipidemia     Hypertension 2005    Osteoarthritis, hip/pelvic region and thigh 3/29/2013    Prostate cancer ([facility] [address] 2/26/2013       Past Surgical History:   Procedure Laterality Date    BACK SURGERY      HIP SURGERY Right     LEG SURGERY      LUMBAR SPINE SURGERY  10/30/2017    PLIF L2 - S1   and laminectomy    PROSTATE BIOPSY         Prior to Admission medications    Medication Sig Start Date End Date Taking? Authorizing Provider   traMADol (ULTRAM) 50 MG tablet Take 1 tablet by mouth daily as needed for Pain for up to 30 days. 2/21/20 3/22/20 Yes CARLOS Clifton   colchicine (COLCRYS) 0.6 MG tablet Take 1 tablet by mouth daily 1/3/20  Yes Colletta Beach, MD   pantoprazole (PROTONIX) 40 MG tablet Take 1 tablet by mouth every morning (before breakfast) 1/3/20  Yes Colletta Beach, MD   Cholecalciferol (VITAMIN D) 50 MCG (2000 UT) CAPS capsule Take 1 capsule by mouth daily 1/3/20  Yes Colletta Beach, MD   lisinopril (PRINIVIL;ZESTRIL) 20 MG tablet Take 1 tablet by mouth daily 1/3/20  Yes Colletta Beach, MD   Lidocaine (ZTLIDO) 1.8 % PTCH Given sample 11/18/19  Yes ANASTASIA Bazan - CNP   goserelin (ZOLADEX) 10.8 MG injection Inject 10.8 mg into the skin every 3 months   Yes Historical Provider, MD   metoprolol succinate (TOPROL XL) 25 MG extended release tablet Take 1 tablet by mouth daily  Patient not taking: Reported on 3/19/2020 1/9/20   Colletta Beach, MD   atorvastatin (LIPITOR) 40 MG tablet Take 1 tablet by mouth daily  Patient not taking: Reported on 3/19/2020 1/3/20   Colletta Beach, MD   simethicone (MYLICON) 80 MG chewable tablet Take 1 tablet by mouth 4 times daily as needed for Flatulence  Patient not taking: Reported on 3/19/2020 1/3/20   Colletta Beach, MD       Allergies   Allergen Reactions    Ibuprofen Swelling    Benzocaine      EDEMA        Social History     Socioeconomic History    Marital status:       Spouse name: Not on file    Number of difficulty. HEENT:    Head:normocephalic and atraumatic  Pupils:regular, round and equal.  Sclera: icterus absent,    Lungs:    Breathing:Normal expansion. Clear to auscultation. No rales, rhonchi, or wheezing. Abdomen:    Shape:non-distended and normal  Tenderness:none  Guarding:none    Extremities:    Tremors:None bilaterally upper and lower  Range of motion:Generally normal shoulders, pain with internal rotation of hips negative. Intact:Yes  Varicose veins:not assessed   Cyanosis:none  Edema:Normal    Neurological:    Sensory:normal to light touch   Motor:         Right Quadriceps5/5          Left Quadriceps5/5           Right Gastrocnemius5/5    Left Gastrocnemius5/5  Right Ant Tibialis5/5  Left Ant Tibialis5/5  [address]    Dermatology:    Skin:no unusual rashes, no skin lesions, no palpable subcutaneous nodules and good skin turgor    Impression:    Patient seen for low back pain with no radiculopathy and hip pain due to OA  H/o decompressive lumbar fusion and laminectomy in 2017 L2-S1, gout and advanced prostate CA  Not interested in interventions    Not a candidate for NSAIDS, failed membrane stabilizer  Discontinue Tramadol 50 mg daily prn due to UDS inconsistency for cocaine. OARRS report reviewed 03/2020. Patient encouraged to remain active as tolerated  Patient continues with physical therapy as it is helpful. New aquatherapy script given today. Treatment plan discussed with the patient including medications and side effects  Urine screen reviewed and is inconsistent for cocaine and metabolite and inconsistent for no tramadol. He may remain a patient of ours but will not prescribe narcotics. ZT lido patches  - he is not sure if they are helping.   Has 2 boxes.         Controlled Substance Monitoring:    Acute and Chronic Pain Monitoring:   RX Monitoring 3/19/2020   Periodic Controlled Substance Monitoring Potential drug abuse or diversion identified, see note

## 2020-03-19 ENCOUNTER — OFFICE VISIT (OUTPATIENT)
Dept: PAIN MANAGEMENT | Age: 65
End: 2020-03-19
Payer: COMMERCIAL

## 2020-03-19 VITALS
RESPIRATION RATE: 16 BRPM | DIASTOLIC BLOOD PRESSURE: 66 MMHG | SYSTOLIC BLOOD PRESSURE: 124 MMHG | BODY MASS INDEX: 26.74 KG/M2 | TEMPERATURE: 97.9 F | HEIGHT: 71 IN | HEART RATE: 76 BPM | WEIGHT: 191 LBS

## 2020-03-19 PROCEDURE — G8427 DOCREV CUR MEDS BY ELIG CLIN: HCPCS | Performed by: PHYSICIAN ASSISTANT

## 2020-03-19 PROCEDURE — 1123F ACP DISCUSS/DSCN MKR DOCD: CPT | Performed by: PHYSICIAN ASSISTANT

## 2020-03-19 PROCEDURE — G8419 CALC BMI OUT NRM PARAM NOF/U: HCPCS | Performed by: PHYSICIAN ASSISTANT

## 2020-03-19 PROCEDURE — 99213 OFFICE O/P EST LOW 20 MIN: CPT | Performed by: PHYSICIAN ASSISTANT

## 2020-03-19 PROCEDURE — G8484 FLU IMMUNIZE NO ADMIN: HCPCS | Performed by: PHYSICIAN ASSISTANT

## 2020-03-19 PROCEDURE — 3017F COLORECTAL CA SCREEN DOC REV: CPT | Performed by: PHYSICIAN ASSISTANT

## 2020-03-19 PROCEDURE — 4040F PNEUMOC VAC/ADMIN/RCVD: CPT | Performed by: PHYSICIAN ASSISTANT

## 2020-03-19 PROCEDURE — 1036F TOBACCO NON-USER: CPT | Performed by: PHYSICIAN ASSISTANT

## 2020-03-19 NOTE — PROGRESS NOTES
Do you currently have any of the following:    Fever: No  Headache:  No  Cough: No  Shortness of breath: No  Exposed to anyone with these symptoms: No                                                                                                                David Razo presents to the [address] [address] on 3/19/2020. Santhosh Bradford is complaining of pain in his entire back. The pain is constant. The pain is described as colicky, shooting, sharp and numb. Pain is rated on his best day at a 8, on his worst day at a 10, and on average at a 9 on the VAS scale. He took his last dose of Tramadol 3 days ago. Earl Ames does not have issues with constipation. Any procedures since your last visit: No.    He is not on NSAIDS and  is not on anticoagulation medications to include none. Pacemaker or defibrilator: No.       /66   Pulse 76   Temp 97.9 °F (36.6 °C) (Oral)   Resp 16   Ht 5' 11\" (1.803 m)   Wt 193 lb (87.5 kg)   BMI 26.92 kg/m²      No LMP for male patient.

## 2020-03-26 ENCOUNTER — TELEPHONE (OUTPATIENT)
Dept: ORTHOPEDIC SURGERY | Age: 65
End: 2020-03-26

## 2020-03-27 NOTE — DISCHARGE SUMMARY
Date:  3/27/2020          Dear  TOMÁS Miller  :       This is to inform you that, as per 92Nhan Garza, your patient Birdie Stone is as of todays date being discharged from Physical Therapy secondary to the COVID-19 pandemic. Patients have been notified. A new prescription would be required to return to therapy. If you have any questions, please feel free to call at [phone]      Thank you          Gonzales Overton PTA 9840      The information contained in this Fax the designated recipients intend message only for the personal and confidential use named above. This information is to be handled as privileged and confidential.  If the reader of this message is not the intended recipient, you are hereby notified that you have received this document in error and that any review, dissemination, distribution or copying of this message is strictly prohibited. If you receive this communication in error, please notify us immediately at the above number and return the original to us by mail.   Thank you      [address]  T: [phone]  F: [phone]

## 2020-07-21 ENCOUNTER — HOSPITAL ENCOUNTER (OUTPATIENT)
Dept: ULTRASOUND IMAGING | Age: 65
Discharge: HOME | End: 2020-07-23
Payer: COMMERCIAL

## 2020-07-21 PROCEDURE — 76775 US EXAM ABDO BACK WALL LIM: CPT

## 2020-09-22 ENCOUNTER — OFFICE VISIT (OUTPATIENT)
Dept: FAMILY MEDICINE CLINIC | Age: 65
End: 2020-09-22
Payer: COMMERCIAL

## 2020-09-22 VITALS
BODY MASS INDEX: 25.9 KG/M2 | HEIGHT: 71 IN | SYSTOLIC BLOOD PRESSURE: 137 MMHG | HEART RATE: 68 BPM | OXYGEN SATURATION: 99 % | TEMPERATURE: 97.8 F | RESPIRATION RATE: 20 BRPM | DIASTOLIC BLOOD PRESSURE: 67 MMHG | WEIGHT: 185 LBS

## 2020-09-22 LAB — HBA1C MFR BLD: 6.2 %

## 2020-09-22 PROCEDURE — 99213 OFFICE O/P EST LOW 20 MIN: CPT | Performed by: STUDENT IN AN ORGANIZED HEALTH CARE EDUCATION/TRAINING PROGRAM

## 2020-09-22 PROCEDURE — G8417 CALC BMI ABV UP PARAM F/U: HCPCS | Performed by: STUDENT IN AN ORGANIZED HEALTH CARE EDUCATION/TRAINING PROGRAM

## 2020-09-22 PROCEDURE — 83036 HEMOGLOBIN GLYCOSYLATED A1C: CPT | Performed by: STUDENT IN AN ORGANIZED HEALTH CARE EDUCATION/TRAINING PROGRAM

## 2020-09-22 PROCEDURE — 3017F COLORECTAL CA SCREEN DOC REV: CPT | Performed by: STUDENT IN AN ORGANIZED HEALTH CARE EDUCATION/TRAINING PROGRAM

## 2020-09-22 PROCEDURE — 4040F PNEUMOC VAC/ADMIN/RCVD: CPT | Performed by: STUDENT IN AN ORGANIZED HEALTH CARE EDUCATION/TRAINING PROGRAM

## 2020-09-22 PROCEDURE — 1123F ACP DISCUSS/DSCN MKR DOCD: CPT | Performed by: STUDENT IN AN ORGANIZED HEALTH CARE EDUCATION/TRAINING PROGRAM

## 2020-09-22 PROCEDURE — 99212 OFFICE O/P EST SF 10 MIN: CPT | Performed by: STUDENT IN AN ORGANIZED HEALTH CARE EDUCATION/TRAINING PROGRAM

## 2020-09-22 PROCEDURE — 1036F TOBACCO NON-USER: CPT | Performed by: STUDENT IN AN ORGANIZED HEALTH CARE EDUCATION/TRAINING PROGRAM

## 2020-09-22 PROCEDURE — G8427 DOCREV CUR MEDS BY ELIG CLIN: HCPCS | Performed by: STUDENT IN AN ORGANIZED HEALTH CARE EDUCATION/TRAINING PROGRAM

## 2020-09-22 RX ORDER — CHLORTHALIDONE 25 MG/1
12.5 TABLET ORAL DAILY
COMMUNITY
End: 2020-09-22 | Stop reason: SDUPTHER

## 2020-09-22 RX ORDER — SILDENAFIL 100 MG/1
100 TABLET, FILM COATED ORAL PRN
Qty: 30 TABLET | Refills: 3 | Status: SHIPPED
Start: 2020-09-22 | End: 2021-01-12 | Stop reason: SDUPTHER

## 2020-09-22 RX ORDER — CHLORTHALIDONE 25 MG/1
12.5 TABLET ORAL DAILY
Qty: 30 TABLET | Refills: 2 | Status: SHIPPED
Start: 2020-09-22 | End: 2021-01-12 | Stop reason: SDUPTHER

## 2020-09-22 RX ORDER — LISINOPRIL 5 MG/1
5 TABLET ORAL DAILY
Qty: 90 TABLET | Refills: 1 | Status: SHIPPED
Start: 2020-09-22 | End: 2021-01-12 | Stop reason: SDUPTHER

## 2020-09-22 RX ORDER — CAPSAICIN 0.025 %
CREAM (GRAM) TOPICAL 2 TIMES DAILY
Qty: 45 G | Refills: 2 | Status: SHIPPED
Start: 2020-09-22 | End: 2021-12-20

## 2020-09-22 RX ORDER — CAPSAICIN 0.025 %
CREAM (GRAM) TOPICAL 2 TIMES DAILY
COMMUNITY
End: 2020-09-22 | Stop reason: SDUPTHER

## 2020-09-22 ASSESSMENT — ENCOUNTER SYMPTOMS
NAUSEA: 0
DIARRHEA: 0
VOMITING: 0
ABDOMINAL PAIN: 0
SHORTNESS OF BREATH: 0
COLOR CHANGE: 0
CHOKING: 0

## 2020-09-22 NOTE — PROGRESS NOTES
Patient is here for follow-up blood pressure. Patient is doing well. He is taking his medication regularly. He said his gout is stable and he is not taking any pills for the gout anymore. Patient denies any headache, dizziness, chest pain, palpitation, abdominal pain, leg swelling, blood in his stool or melena or hematemesis. Hypertension; controlled with medication. Compliance with medication    Gout; stable    Blood pressure 137/67, pulse 68, temperature 97.8 °F (36.6 °C), temperature source Oral, resp. rate 20, height 5' 11\" (1.803 m), weight 185 lb (83.9 kg), SpO2 99 %. HEENT WNL     Heart regular    Lungs clear    abd non-tender      No edema    Pulses intact       Assessment and plan  Hypertension  Stable  Continue current regimen    Patient declined for vaccination  We will order A1c because patient last A1c was 6.2  Follow-up in 4-month    Attending Physician Statement  I have discussed the case, including pertinent history and exam findings with the resident. I agree with the documented assessment and plan.

## 2020-09-22 NOTE — PROGRESS NOTES
[facility] MEDICINE RESIDENCY PROGRAM  DATE OF VISIT : 2020    Patient : Caity Muse   Age : 72 y.o.  : 1955   MRN : [id number]     Chief Complaint :   Chief Complaint   Patient presents with    Check-Up     new to provider       HPI : Caity Muse is 72 y.o. male with past medical history of hypertension, gout who presented to the clinic today for medication refill and follow-up. Patient reports he is doing well. He is taking his medication regularly. Denies any headache, dizziness, chest pain, palpitation, abdominal pain, leg swelling, blood in his stool, hematemesis or melena. Hypertension: Stable, compliance with medication    Gout: Stable. Patient is not taking any pills currently and denies any flare of symptoms. .        Past Medical History :  Past Medical History:   Diagnosis Date    Cancer ([address] PROSTATE    , patient reports bone mets    Erectile dysfunction     Hyperlipidemia     Hypertension     Osteoarthritis, hip/pelvic region and thigh 3/29/2013    Prostate cancer ([facility] [address] 2013     Past Surgical History:   Procedure Laterality Date    BACK SURGERY      HIP SURGERY Right     LEG SURGERY      LUMBAR SPINE SURGERY  10/30/2017    PLIF L2 - S1   and laminectomy    PROSTATE BIOPSY         Allergies : Allergies   Allergen Reactions    Ibuprofen Swelling    Benzocaine      EDEMA        Medication List :    Current Outpatient Medications   Medication Sig Dispense Refill    chlorthalidone (HYGROTON) 25 MG tablet Take 0.5 tablets by mouth daily 30 tablet 2    capsaicin (ZOSTRIX) 0.025 % cream Apply topically 2 times daily Apply topically 2 times daily. Apply topically 2 times daily Apply topically 2 times daily.  45 g 2    lisinopril (PRINIVIL;ZESTRIL) 5 MG tablet Take 1 tablet by mouth daily 90 tablet 1    sildenafil (VIAGRA) 100 MG tablet Take 1 tablet by mouth as needed for Erectile Dysfunction 30 tablet 3    goserelin (ZOLADEX) 10.8 MG injection Inject 10.8 mg into the skin every 3 months       No current facility-administered medications for this visit. Review of Systems :  Review of Systems   Constitutional: Negative for activity change, fatigue and fever. HENT: Negative for congestion. Eyes: Negative for visual disturbance. Respiratory: Negative for choking and shortness of breath. Cardiovascular: Negative for chest pain, palpitations and leg swelling. Gastrointestinal: Negative for abdominal pain, diarrhea, nausea and vomiting. Genitourinary: Negative for dysuria. Musculoskeletal: Negative for arthralgias. Skin: Negative for color change, pallor and rash. Neurological: Negative for syncope, facial asymmetry, speech difficulty, weakness and light-headedness. Hematological: Negative for adenopathy. Psychiatric/Behavioral: Negative for behavioral problems. Physical Exam :    Vitals: /67 (Site: Left Upper Arm, Position: Sitting, Cuff Size: Medium Adult)   Pulse 68   Temp 97.8 °F (36.6 °C) (Oral)   Resp 20   Ht 5' 11\" (1.803 m)   Wt 185 lb (83.9 kg)   SpO2 99%   BMI 25.80 kg/m²     General Appearance: Well developed, awake, alert, oriented, and not in acute distress  HEENT: NCAT, MMM, no pallor or icterus. Neck: Supple, symmetrical, trachea midline. .  Chest wall/Lung: Clear to auscultation bilaterally, respirations unlabored. No ronchi/wheezing/rales   Heart[de-identified] Regular rate and rhythm, normal S1 and S2, no murmurs, rubs or gallops. Abdomen: Soft, nontender, not distended +BSx4  Extremities: Extremities normal, atraumatic, no cyanosis, clubbing or edema. Skin: Skin color, texture, turgor normal, no rashes or lesions  Neurologic: Alert&Oriented x3. Moves all 4 limbs. Sensation grossly intact. Psychiatric: has a normal mood and affect. Behavior is normal.     Assessment & Plan : The patient is seen today for hypertension and medication refill    1.  Essential hypertension  -Controlled with medication  -Continue current regimen  - chlorthalidone (HYGROTON) 25 MG tablet; Take 0.5 tablets by mouth daily  Dispense: 30 tablet; Refill: 2  - lisinopril (PRINIVIL;ZESTRIL) 5 MG tablet; Take 1 tablet by mouth daily  Dispense: 90 tablet; Refill: 1    -Last A1c was 6.2.  -Repeat POCT glycosylated hemoglobin (Hb A1C) is 6.2 today.  -Controlled diet  Will follow-up A1c every 6-months    Patient declined flu vaccination today    Follow-up in 4 months.       Ricardo Goncalves MD

## 2020-11-03 PROBLEM — M47.816 LUMBAR FACET ARTHROPATHY: Status: RESOLVED | Noted: 2019-04-26 | Resolved: 2020-11-03

## 2021-01-12 ENCOUNTER — OFFICE VISIT (OUTPATIENT)
Dept: FAMILY MEDICINE CLINIC | Age: 66
End: 2021-01-12
Payer: COMMERCIAL

## 2021-01-12 VITALS
RESPIRATION RATE: 16 BRPM | BODY MASS INDEX: 26.04 KG/M2 | DIASTOLIC BLOOD PRESSURE: 74 MMHG | HEIGHT: 71 IN | HEART RATE: 59 BPM | WEIGHT: 186 LBS | OXYGEN SATURATION: 97 % | SYSTOLIC BLOOD PRESSURE: 131 MMHG | TEMPERATURE: 97.4 F

## 2021-01-12 DIAGNOSIS — I10 ESSENTIAL HYPERTENSION: ICD-10-CM

## 2021-01-12 LAB
ALBUMIN SERPL-MCNC: 4 G/DL (ref 3.5–5.2)
ALP BLD-CCNC: 99 U/L (ref 40–129)
ALT SERPL-CCNC: 11 U/L (ref 0–40)
ANION GAP SERPL CALCULATED.3IONS-SCNC: 14 MMOL/L (ref 7–16)
AST SERPL-CCNC: 18 U/L (ref 0–39)
BASOPHILS ABSOLUTE: 0.02 E9/L (ref 0–0.2)
BASOPHILS RELATIVE PERCENT: 0.4 % (ref 0–2)
BILIRUB SERPL-MCNC: 0.3 MG/DL (ref 0–1.2)
BUN BLDV-MCNC: 25 MG/DL (ref 8–23)
CALCIUM SERPL-MCNC: 9.8 MG/DL (ref 8.6–10.2)
CHLORIDE BLD-SCNC: 106 MMOL/L (ref 98–107)
CHOLESTEROL, TOTAL: 235 MG/DL (ref 0–199)
CO2: 24 MMOL/L (ref 22–29)
CREAT SERPL-MCNC: 1.2 MG/DL (ref 0.7–1.2)
EOSINOPHILS ABSOLUTE: 0.12 E9/L (ref 0.05–0.5)
EOSINOPHILS RELATIVE PERCENT: 2.2 % (ref 0–6)
GFR AFRICAN AMERICAN: >60
GFR NON-AFRICAN AMERICAN: >60 ML/MIN/1.73
GLUCOSE BLD-MCNC: 88 MG/DL (ref 74–99)
HBA1C MFR BLD: 6.2 % (ref 4–5.6)
HCT VFR BLD CALC: 41.2 % (ref 37–54)
HDLC SERPL-MCNC: 67 MG/DL
HEMOGLOBIN: 13.1 G/DL (ref 12.5–16.5)
IMMATURE GRANULOCYTES #: 0.02 E9/L
IMMATURE GRANULOCYTES %: 0.4 % (ref 0–5)
LDL CHOLESTEROL CALCULATED: 139 MG/DL (ref 0–99)
LYMPHOCYTES ABSOLUTE: 1.09 E9/L (ref 1.5–4)
LYMPHOCYTES RELATIVE PERCENT: 20.3 % (ref 20–42)
MCH RBC QN AUTO: 29.6 PG (ref 26–35)
MCHC RBC AUTO-ENTMCNC: 31.8 % (ref 32–34.5)
MCV RBC AUTO: 93 FL (ref 80–99.9)
MONOCYTES ABSOLUTE: 0.57 E9/L (ref 0.1–0.95)
MONOCYTES RELATIVE PERCENT: 10.6 % (ref 2–12)
NEUTROPHILS ABSOLUTE: 3.55 E9/L (ref 1.8–7.3)
NEUTROPHILS RELATIVE PERCENT: 66.1 % (ref 43–80)
PDW BLD-RTO: 13.7 FL (ref 11.5–15)
PLATELET # BLD: 245 E9/L (ref 130–450)
PMV BLD AUTO: 11.1 FL (ref 7–12)
POTASSIUM SERPL-SCNC: 3.8 MMOL/L (ref 3.5–5)
RBC # BLD: 4.43 E12/L (ref 3.8–5.8)
SODIUM BLD-SCNC: 144 MMOL/L (ref 132–146)
TOTAL PROTEIN: 7.6 G/DL (ref 6.4–8.3)
TRIGL SERPL-MCNC: 145 MG/DL (ref 0–149)
TSH SERPL DL<=0.05 MIU/L-ACNC: 0.85 UIU/ML (ref 0.27–4.2)
URIC ACID, SERUM: 8.3 MG/DL (ref 3.4–7)
VLDLC SERPL CALC-MCNC: 29 MG/DL
WBC # BLD: 5.4 E9/L (ref 4.5–11.5)

## 2021-01-12 PROCEDURE — 3017F COLORECTAL CA SCREEN DOC REV: CPT | Performed by: STUDENT IN AN ORGANIZED HEALTH CARE EDUCATION/TRAINING PROGRAM

## 2021-01-12 PROCEDURE — 1036F TOBACCO NON-USER: CPT | Performed by: STUDENT IN AN ORGANIZED HEALTH CARE EDUCATION/TRAINING PROGRAM

## 2021-01-12 PROCEDURE — 36415 COLL VENOUS BLD VENIPUNCTURE: CPT | Performed by: STUDENT IN AN ORGANIZED HEALTH CARE EDUCATION/TRAINING PROGRAM

## 2021-01-12 PROCEDURE — 99213 OFFICE O/P EST LOW 20 MIN: CPT | Performed by: STUDENT IN AN ORGANIZED HEALTH CARE EDUCATION/TRAINING PROGRAM

## 2021-01-12 PROCEDURE — G8417 CALC BMI ABV UP PARAM F/U: HCPCS | Performed by: STUDENT IN AN ORGANIZED HEALTH CARE EDUCATION/TRAINING PROGRAM

## 2021-01-12 PROCEDURE — G8427 DOCREV CUR MEDS BY ELIG CLIN: HCPCS | Performed by: STUDENT IN AN ORGANIZED HEALTH CARE EDUCATION/TRAINING PROGRAM

## 2021-01-12 PROCEDURE — 1123F ACP DISCUSS/DSCN MKR DOCD: CPT | Performed by: STUDENT IN AN ORGANIZED HEALTH CARE EDUCATION/TRAINING PROGRAM

## 2021-01-12 PROCEDURE — G8484 FLU IMMUNIZE NO ADMIN: HCPCS | Performed by: STUDENT IN AN ORGANIZED HEALTH CARE EDUCATION/TRAINING PROGRAM

## 2021-01-12 PROCEDURE — 4040F PNEUMOC VAC/ADMIN/RCVD: CPT | Performed by: STUDENT IN AN ORGANIZED HEALTH CARE EDUCATION/TRAINING PROGRAM

## 2021-01-12 PROCEDURE — 99212 OFFICE O/P EST SF 10 MIN: CPT | Performed by: STUDENT IN AN ORGANIZED HEALTH CARE EDUCATION/TRAINING PROGRAM

## 2021-01-12 RX ORDER — LISINOPRIL 5 MG/1
5 TABLET ORAL DAILY
Qty: 90 TABLET | Refills: 1 | Status: SHIPPED
Start: 2021-01-12 | End: 2021-06-28 | Stop reason: SDUPTHER

## 2021-01-12 RX ORDER — CHLORTHALIDONE 25 MG/1
12.5 TABLET ORAL DAILY
Qty: 30 TABLET | Refills: 2 | Status: SHIPPED
Start: 2021-01-12 | End: 2021-06-28 | Stop reason: SDUPTHER

## 2021-01-12 RX ORDER — SILDENAFIL 100 MG/1
100 TABLET, FILM COATED ORAL PRN
Qty: 30 TABLET | Refills: 0 | Status: SHIPPED
Start: 2021-01-12 | End: 2021-02-22

## 2021-01-12 ASSESSMENT — ENCOUNTER SYMPTOMS
BLOOD IN STOOL: 0
VOMITING: 0
BACK PAIN: 0
RHINORRHEA: 0
NAUSEA: 0
CHEST TIGHTNESS: 0
COUGH: 0
ABDOMINAL DISTENTION: 0
SHORTNESS OF BREATH: 0
ABDOMINAL PAIN: 0
COLOR CHANGE: 0

## 2021-01-12 ASSESSMENT — PATIENT HEALTH QUESTIONNAIRE - PHQ9
SUM OF ALL RESPONSES TO PHQ QUESTIONS 1-9: 0
1. LITTLE INTEREST OR PLEASURE IN DOING THINGS: 0

## 2021-01-12 NOTE — PATIENT INSTRUCTIONS
Patient Education        Preventing Falls: Care Instructions  Your Care Instructions     Getting around your home safely can be a challenge if you have injuries or health problems that make it easy for you to fall. Loose rugs and furniture in walkways are among the dangers for many older people who have problems walking or who have poor eyesight. People who have conditions such as arthritis, osteoporosis, or dementia also have to be careful not to fall. You can make your home safer with a few simple measures. Follow-up care is a key part of your treatment and safety. Be sure to make and go to all appointments, and call your doctor if you are having problems. It's also a good idea to know your test results and keep a list of the medicines you take. How can you care for yourself at home? Taking care of yourself  · You may get dizzy if you do not drink enough water. To prevent dehydration, drink plenty of fluids, enough so that your urine is light yellow or clear like water. Choose water and other caffeine-free clear liquids. If you have kidney, heart, or liver disease and have to limit fluids, talk with your doctor before you increase the amount of fluids you drink. · Exercise regularly to improve your strength, muscle tone, and balance. Walk if you can. Swimming may be a good choice if you cannot walk easily. · Have your vision and hearing checked each year or any time you notice a change. If you have trouble seeing and hearing, you might not be able to avoid objects and could lose your balance. · Know the side effects of the medicines you take. Ask your doctor or pharmacist whether the medicines you take can affect your balance. Sleeping pills or sedatives can affect your balance. · Limit the amount of alcohol you drink. Alcohol can impair your balance and other senses. · Ask your doctor whether calluses or corns on your feet need to be removed.  If you wear loose-fitting shoes because of calluses or corns, you can lose your balance and fall. · Talk to your doctor if you have numbness in your feet. Preventing falls at home  · Remove raised doorway thresholds, throw rugs, and clutter. Repair loose carpet or raised areas in the floor. · Move furniture and electrical cords to keep them out of walking paths. · Use nonskid floor wax, and wipe up spills right away, especially on ceramic tile floors. · If you use a walker or cane, put rubber tips on it. If you use crutches, clean the bottoms of them regularly with an abrasive pad, such as steel wool. · Keep your house well lit, especially Corlis Thai, and outside walkways. Use night-lights in areas such as hallways and bathrooms. Add extra light switches or use remote switches (such as switches that go on or off when you clap your hands) to make it easier to turn lights on if you have to get up during the night. · Install sturdy handrails on stairways. · Move items in your cabinets so that the things you use a lot are on the lower shelves (about waist level). · Keep a cordless phone and a flashlight with new batteries by your bed. If possible, put a phone in each of the main rooms of your house, or carry a cell phone in case you fall and cannot reach a phone. Or, you can wear a device around your neck or wrist. You push a button that sends a signal for help. · Wear low-heeled shoes that fit well and give your feet good support. Use footwear with nonskid soles. Check the heels and soles of your shoes for wear. Repair or replace worn heels or soles. · Do not wear socks without shoes on wood floors. · Walk on the grass when the sidewalks are slippery. If you live in an area that gets snow and ice in the winter, sprinkle salt on slippery steps and sidewalks. Preventing falls in the bath  · Install grab bars and nonskid mats inside and outside your shower or tub and near the toilet and sinks. · Use shower chairs and bath benches.   · Use a hand-held shower head that will allow you to sit while showering. · Get into a tub or shower by putting the weaker leg in first. Get out of a tub or shower with your strong side first.  · Repair loose toilet seats and consider installing a raised toilet seat to make getting on and off the toilet easier. · Keep your bathroom door unlocked while you are in the shower. Where can you learn more? Go to [email or web address] org and sign in to your [employer] account. Enter [phone] in the [employer] box to learn more about \"Preventing Falls: Care Instructions. \"     If you do not have an account, please click on the \"Sign Up Now\" link. Current as of: April 15, 2020               Content Version: 12.6  © 0161-0741 [employer]. Care instructions adapted under license by [facility] ([facility]). If you have questions about a medical condition or this instruction, always ask your healthcare professional. [address] any warranty or liability for your use of this information.

## 2021-01-12 NOTE — PROGRESS NOTES
Jem Quezada is a 60-year-old male here for follow-up for hypertension. Is currently taking chlorthalidone and lisinopril he is taking them daily reports no side effects with these medications. Is interested in taking Viagra however he was counseled about concern of taking Viagra with his current blood pressure medications. He is currently seen at the [address] [place] [address] where they ordered CBC and BMP on him. This was these results were normal.  He also reports that he received a colonoscopy there a few years prior. He is he receives [facility]. His home health aide told him his next colonoscopy is due in 2022. Results of his last colonoscopy showed one benign polyp. He was also offered the Pneumovax flu shot and shingles vaccine. He denied all 3 of these vaccines. 9    Review of systems: He denies chest pain, shortness of breath, headache, dizziness, nausea, vomiting,. He reports he is eating and drinking okay and is able to perform his daily activities. Blood pressure 131/74, pulse 59, temperature 97.4 °F (36.3 °C), temperature source Temporal, resp. rate 16, height 5' 11\" (1.803 m), weight 186 lb (84.4 kg), SpO2 97 %. HEENT WNL     Heart regular    Lungs clear    abd non-tender      No edema    Pulses intact     Assessment and plan  Hypertension: Elevated today. Patient is asymptomatic. Continue current regimen and keep blood pressure log book. Do lipid panel, A1c and TSH today    Patient wants refill on Viagra. 10 reports he need prior authorization and we will working on that    Follow-up in 2 to 3 months    Attending Physician Statement  I have discussed the case, including pertinent history and exam findings with the resident. I agree with the documented assessment and plan.

## 2021-01-12 NOTE — PROGRESS NOTES
[facility]  FAMILY MEDICINE RESIDENCY PROGRAM  DATE OF VISIT : 2021    Patient : Tuyet Rhoades   Age : 72 y.o.  : 1955   MRN : [id number]   Chief Complaint :   Chief Complaint   Patient presents with    Hypertension    Results       HPI : Tuyet Rhoades is 72 y.o. male with PMHx of HTN  who presented to the clinic today for lab result and medication Refill. He does follow with [facility] clinic. Patient is requesting for Viagra refill today. He ran out of re fill and he need prior authorization. HTN: Controlled with medications. Currently on Chlorthalidone and Lisinopril. Past Medical History :  Past Medical History:   Diagnosis Date    Cancer [facility]) PROSTATE    , patient reports bone mets    Erectile dysfunction     Hyperlipidemia     Hypertension     Osteoarthritis, hip/pelvic region and thigh 3/29/2013    Prostate cancer ([facility] [address] 2013     Past Surgical History:   Procedure Laterality Date    BACK SURGERY      HIP SURGERY Right     LEG SURGERY      LUMBAR SPINE SURGERY  10/30/2017    PLIF L2 - S1   and laminectomy    PROSTATE BIOPSY         Allergies : Allergies   Allergen Reactions    Ibuprofen Swelling    Benzocaine      EDEMA        Medication List :    Current Outpatient Medications   Medication Sig Dispense Refill    chlorthalidone (HYGROTON) 25 MG tablet Take 0.5 tablets by mouth daily 30 tablet 2    capsaicin (ZOSTRIX) 0.025 % cream Apply topically 2 times daily Apply topically 2 times daily. Apply topically 2 times daily Apply topically 2 times daily. 45 g 2    lisinopril (PRINIVIL;ZESTRIL) 5 MG tablet Take 1 tablet by mouth daily 90 tablet 1    sildenafil (VIAGRA) 100 MG tablet Take 1 tablet by mouth as needed for Erectile Dysfunction 30 tablet 3    goserelin (ZOLADEX) 10.8 MG injection Inject 10.8 mg into the skin every 3 months       No current facility-administered medications for this visit.          Review of Systems :  Review of Systems   Constitutional: Negative for activity change, fatigue and fever. HENT: Negative for congestion, rhinorrhea and sneezing. Eyes: Negative for visual disturbance. Respiratory: Negative for cough, chest tightness and shortness of breath. Cardiovascular: Negative for chest pain, palpitations and leg swelling. Gastrointestinal: Negative for abdominal distention, abdominal pain, blood in stool, nausea and vomiting. Genitourinary: Negative for difficulty urinating. Musculoskeletal: Positive for arthralgias. Negative for back pain and joint swelling. Skin: Negative for color change. Neurological: Negative for dizziness, weakness, numbness and headaches. Hematological: Negative for adenopathy. Psychiatric/Behavioral: Negative for agitation. Physical Exam :    Vitals: /74   Pulse 59   Temp 97.4 °F (36.3 °C) (Temporal)   Resp 16   Ht 5' 11\" (1.803 m)   Wt 186 lb (84.4 kg)   SpO2 97%   BMI 25.94 kg/m²     General Appearance: Well developed, awake, alert, oriented, and not in acute distress  HEENT: NCAT, MMM, no pallor or icterus. Neck: Supple, symmetrical, trachea midline. No carotid bruits. Chest wall/Lung: Clear to ausculation bilaterally, respirations unlabored. No ronchi/wheezing/rales   Heart[de-identified] Regular marysol and rhythm , normal S1 and S2, no murmurs  Abdomen: soft, non tender and not distended  Extremities: Extremities normal, atraumatic, no cyanosis, clubbing or edema. Skin: Skin color, texture, turgor normal, no rashes or lesions  Musculokeletal: ROM grossly normal in all joints of extremities, no obvious joint swelling. Neurologic: Alert&Oriented x3. Motor and sensation grossly intact. Psychiatric: has a normal mood and affect. Behavior is normal.       Assessment & Plan : Patient seen today for lab result and medication refill    1. Essential hypertension  - Today BP is elevated   - chlorthalidone (HYGROTON) 25 MG tablet;  Take 0.5 tablets by mouth daily Dispense: 30 tablet; Refill: 2  - lisinopril (PRINIVIL;ZESTRIL) 5 MG tablet; Take 1 tablet by mouth daily  Dispense: 90 tablet; Refill: 1  - Recent CBC, CMP and Urinalysis from [place] are normal  - will ordered Lipid panel, A1c, TSH     2. Joint pain/Arthalagia   - reviewed previous lab which showed elevated Uric acid  - repeat Uric acid today   - no obvious joint swelling or redness  - continue Capsaicin    Screening: Does have home health care. Next due for colonoscopy in 2022. Patient says his last colonoscopy showed one benign polyp. Declined for Pneumovax and Shingles vaccines.      Barb Humphrey MD  Family Medicine, Malia Gibbons MD

## 2021-02-22 ENCOUNTER — OFFICE VISIT (OUTPATIENT)
Dept: FAMILY MEDICINE CLINIC | Age: 66
End: 2021-02-22
Payer: COMMERCIAL

## 2021-02-22 VITALS
HEART RATE: 74 BPM | OXYGEN SATURATION: 97 % | HEIGHT: 71 IN | WEIGHT: 185 LBS | RESPIRATION RATE: 16 BRPM | DIASTOLIC BLOOD PRESSURE: 76 MMHG | BODY MASS INDEX: 25.9 KG/M2 | SYSTOLIC BLOOD PRESSURE: 150 MMHG | TEMPERATURE: 97.8 F

## 2021-02-22 DIAGNOSIS — I10 ESSENTIAL HYPERTENSION: Primary | ICD-10-CM

## 2021-02-22 DIAGNOSIS — I10 ESSENTIAL HYPERTENSION: ICD-10-CM

## 2021-02-22 DIAGNOSIS — M10.072 IDIOPATHIC GOUT INVOLVING TOE OF LEFT FOOT, UNSPECIFIED CHRONICITY: ICD-10-CM

## 2021-02-22 PROCEDURE — G8417 CALC BMI ABV UP PARAM F/U: HCPCS | Performed by: STUDENT IN AN ORGANIZED HEALTH CARE EDUCATION/TRAINING PROGRAM

## 2021-02-22 PROCEDURE — 3017F COLORECTAL CA SCREEN DOC REV: CPT | Performed by: STUDENT IN AN ORGANIZED HEALTH CARE EDUCATION/TRAINING PROGRAM

## 2021-02-22 PROCEDURE — 1036F TOBACCO NON-USER: CPT | Performed by: STUDENT IN AN ORGANIZED HEALTH CARE EDUCATION/TRAINING PROGRAM

## 2021-02-22 PROCEDURE — 1123F ACP DISCUSS/DSCN MKR DOCD: CPT | Performed by: STUDENT IN AN ORGANIZED HEALTH CARE EDUCATION/TRAINING PROGRAM

## 2021-02-22 PROCEDURE — G8484 FLU IMMUNIZE NO ADMIN: HCPCS | Performed by: STUDENT IN AN ORGANIZED HEALTH CARE EDUCATION/TRAINING PROGRAM

## 2021-02-22 PROCEDURE — 99213 OFFICE O/P EST LOW 20 MIN: CPT | Performed by: STUDENT IN AN ORGANIZED HEALTH CARE EDUCATION/TRAINING PROGRAM

## 2021-02-22 PROCEDURE — 99212 OFFICE O/P EST SF 10 MIN: CPT | Performed by: STUDENT IN AN ORGANIZED HEALTH CARE EDUCATION/TRAINING PROGRAM

## 2021-02-22 PROCEDURE — G8427 DOCREV CUR MEDS BY ELIG CLIN: HCPCS | Performed by: STUDENT IN AN ORGANIZED HEALTH CARE EDUCATION/TRAINING PROGRAM

## 2021-02-22 PROCEDURE — 4040F PNEUMOC VAC/ADMIN/RCVD: CPT | Performed by: STUDENT IN AN ORGANIZED HEALTH CARE EDUCATION/TRAINING PROGRAM

## 2021-02-22 RX ORDER — ALLOPURINOL 100 MG/1
100 TABLET ORAL DAILY
Qty: 90 TABLET | Refills: 1 | Status: SHIPPED
Start: 2021-03-08 | End: 2021-02-22

## 2021-02-22 RX ORDER — CAPSAICIN 0.025 %
CREAM (GRAM) TOPICAL 2 TIMES DAILY
Qty: 45 G | Refills: 2 | Status: CANCELLED | OUTPATIENT
Start: 2021-02-22

## 2021-02-22 RX ORDER — SILDENAFIL CITRATE 20 MG/1
20 TABLET ORAL PRN
Qty: 30 TABLET | Refills: 3 | Status: SHIPPED | OUTPATIENT
Start: 2021-02-22 | End: 2021-02-22

## 2021-02-22 RX ORDER — ATORVASTATIN CALCIUM 20 MG/1
20 TABLET, FILM COATED ORAL DAILY
Qty: 60 TABLET | Refills: 2 | Status: ON HOLD
Start: 2021-02-22 | End: 2021-12-15 | Stop reason: HOSPADM

## 2021-02-22 RX ORDER — SILDENAFIL CITRATE 20 MG/1
20 TABLET ORAL PRN
Qty: 30 TABLET | Refills: 3 | Status: SHIPPED | OUTPATIENT
Start: 2021-02-22 | End: 2021-02-24 | Stop reason: SDUPTHER

## 2021-02-22 RX ORDER — ATORVASTATIN CALCIUM 40 MG/1
20 TABLET, FILM COATED ORAL DAILY
Qty: 30 TABLET | Refills: 2 | Status: SHIPPED
Start: 2021-02-22 | End: 2021-02-22

## 2021-02-22 RX ORDER — ALLOPURINOL 100 MG/1
100 TABLET ORAL DAILY
Qty: 90 TABLET | Refills: 1 | Status: SHIPPED
Start: 2021-03-04 | End: 2021-12-13

## 2021-02-22 RX ORDER — LISINOPRIL 5 MG/1
5 TABLET ORAL DAILY
Qty: 90 TABLET | Refills: 1 | Status: CANCELLED | OUTPATIENT
Start: 2021-02-22

## 2021-02-22 RX ORDER — NAPROXEN 500 MG/1
500 TABLET ORAL 2 TIMES DAILY WITH MEALS
Qty: 60 TABLET | Refills: 1 | Status: SHIPPED
Start: 2021-02-22 | End: 2021-06-28

## 2021-02-22 RX ORDER — SILDENAFIL 100 MG/1
100 TABLET, FILM COATED ORAL PRN
Qty: 30 TABLET | Refills: 0 | Status: CANCELLED | OUTPATIENT
Start: 2021-02-22

## 2021-02-22 RX ORDER — CHLORTHALIDONE 25 MG/1
12.5 TABLET ORAL DAILY
Qty: 30 TABLET | Refills: 2 | Status: CANCELLED | OUTPATIENT
Start: 2021-02-22

## 2021-02-22 ASSESSMENT — ENCOUNTER SYMPTOMS
DIARRHEA: 0
NAUSEA: 0
VOMITING: 0
BACK PAIN: 1
ABDOMINAL PAIN: 0
COLOR CHANGE: 0
COUGH: 0
SORE THROAT: 0
SHORTNESS OF BREATH: 0

## 2021-02-22 NOTE — PROGRESS NOTES
S: 72 y.o. male here for HTN. Not controlled. No cp or palps. ED,   L big toe swelling today. Not on anything for this. \"artificial spinal cord\" per pt, follows w/ [facility]  HLD. The 10-year ASCVD risk score (Al Garrett, et al., 2013) is: 20.4%    Values used to calculate the score:      Age: 72 years      Sex: Male      Is Non- : Yes      Diabetic: No      Tobacco smoker: No      Systolic Blood Pressure: 056 mmHg      Is BP treated: Yes      HDL Cholesterol: 67 mg/dL      Total Cholesterol: 235 mg/dL   Not on statin. O: VS: BP (!) 150/76 (Site: Right Upper Arm, Position: Sitting, Cuff Size: Medium Adult)   Pulse 74   Temp 97.8 °F (36.6 °C) (Cerebral)   Resp 16   Ht 5' 11\" (1.803 m)   Wt 185 lb (83.9 kg)   SpO2 97%   BMI 25.80 kg/m²    General: NAD, alert and interacting appropriately. CV:  RRR, no gallops, rubs, or murmurs    Resp: CTAB   Abd:  Soft, nondistended   Ext:  No edema. Swelling and erythema L great toe. Impression: HTN. Gout. ED. HLD. Plan:   HTN marginally controlled, but home BP readings nl. CPM  Naproxen, then start allopurinol when attack resolved and continue naproxen for 2 wks afterward. rtc 1 mo for gout and uric acid recheck  Sildenafil, coupon given  lipitor    Attending Physician Statement  I have discussed the case, including pertinent history and exam findings with the resident. I agree with the documented assessment and plan.

## 2021-02-22 NOTE — PROGRESS NOTES
[facility] MEDICINE RESIDENCY PROGRAM  DATE OF VISIT : 2021    Patient : Ketan Jaimes   Age : 72 y.o.  : 1955   MRN : [id number]     Chief Complaint :   Chief Complaint   Patient presents with    Hypertension     refill       HPI : Ketan Jaimes is 72 y.o. male with PMHx of HTN  who presented to the clinic today for BP follow up    HTN: BP is improving but still elevated. Patient presents with BP logs which shows around 140-150/70-80. Denies any headache, dizziness, chest pain, sob, legs swelling or weakness. Currently on Lisinopril 5 MG adn Chorthalidone  12.5 MG daily. Left big toe swelling and pain: patient reports symptom started few days ago recently. Associated with redness, pain and limited movements. Patient said that he usually had similar symptoms every 2-3 weeks. Never treated for gout though his Uric acid was elevated previously. Did not try any medications. No hx of trauma or fall. Past Medical History :  Past Medical History:   Diagnosis Date    Cancer [facility]) PROSTATE    , patient reports bone mets    Erectile dysfunction     Hyperlipidemia     Hypertension     Osteoarthritis, hip/pelvic region and thigh 3/29/2013    Prostate cancer ([address] 2013     Past Surgical History:   Procedure Laterality Date    BACK SURGERY      HIP SURGERY Right     LEG SURGERY      LUMBAR SPINE SURGERY  10/30/2017    PLIF L2 - S1   and laminectomy    PROSTATE BIOPSY         Allergies : Allergies   Allergen Reactions    Ibuprofen Swelling    Benzocaine      EDEMA        Medication List :    Current Outpatient Medications   Medication Sig Dispense Refill    naproxen (NAPROSYN) 500 MG tablet Take 1 tablet by mouth 2 times daily (with meals) 60 tablet 1    [START ON 3/8/2021] allopurinol (ZYLOPRIM) 100 MG tablet Take 1 tablet by mouth daily Please start once flare up resolved.  90 tablet 1    atorvastatin (LIPITOR) 20 MG tablet Take 1 tablet by mouth daily 60 tablet 2    sildenafil (REVATIO) 20 MG tablet Take 1 tablet by mouth as needed (30 minutes before intercourse) 30 tablet 3    chlorthalidone (HYGROTON) 25 MG tablet Take 0.5 tablets by mouth daily 30 tablet 2    lisinopril (PRINIVIL;ZESTRIL) 5 MG tablet Take 1 tablet by mouth daily 90 tablet 1    capsaicin (ZOSTRIX) 0.025 % cream Apply topically 2 times daily Apply topically 2 times daily. Apply topically 2 times daily Apply topically 2 times daily. 45 g 2    goserelin (ZOLADEX) 10.8 MG injection Inject 10.8 mg into the skin every 3 months       No current facility-administered medications for this visit. Review of Systems :  Review of Systems   Constitutional: Negative for fatigue and fever. HENT: Negative for congestion, sneezing and sore throat. Eyes: Negative for visual disturbance. Respiratory: Negative for cough and shortness of breath. Cardiovascular: Negative for chest pain and palpitations. Gastrointestinal: Negative for abdominal pain, diarrhea, nausea and vomiting. Genitourinary: Negative for difficulty urinating. Musculoskeletal: Positive for arthralgias, back pain and joint swelling. Skin: Negative for color change. Neurological: Negative for dizziness, speech difficulty, weakness, numbness and headaches. Hematological: Does not bruise/bleed easily. Psychiatric/Behavioral: Negative for agitation. Physical Exam :    Vitals: BP (!) 150/76 (Site: Right Upper Arm, Position: Sitting, Cuff Size: Medium Adult)   Pulse 74   Temp 97.8 °F (36.6 °C) (Cerebral)   Resp 16   Ht 5' 11\" (1.803 m)   Wt 185 lb (83.9 kg)   SpO2 97%   BMI 25.80 kg/m²     General Appearance: Well developed, awake, alert, oriented, and not in acute distress  HEENT: NCAT, MMM, no pallor or icterus. Neck: Supple, symmetrical, trachea midline. No carotid bruits. Chest wall/Lung: Clear to ausculation, respirations unlabored.  No ronchi/wheezing/rales  Heart[de-identified] RRR, normal S1 and S2  Abdomen: soft, non tender and not distended   Extremities:left big toe is swollen, red and tender to touch. +ve tophi   Skin: Skin color, texture, turgor normal, no rashes or lesions  Musculokeletal: restricted ROM of left big toe  Neurologic: Alert&Oriented x3. Motor and sensation grossly intact. Psychiatric: has a normal mood and affect. Behavior is normal.     Assessment & Plan :    1. Essential hypertension  - improving but not well controlled  - continue current regimen  - keep BP logs at home  - The 10-year ASCVD risk score (Jayson Moss, et al., 2013) is: 20.4%  - will start on Lipitor 20 mg daily     2. Idiopathic gout involving toe of left foot, unspecified chronicity  - consistent with gout flare up  - recent Uric acid is elevated( 8.3)  - never diagnosed and treat for gout   - Naproxen 500 mg XBID and start Allopurinol once flare up resolve. Continue naproxen for 2 weeks with Allopurinol afterward. - follow up in 1 month   - will recheck uric acid and BMP     Given written prescription for Sildenafil and coupon  Given for discount.  Do not need prior authorization       Follow up in 1 month for gout and     Kiko Coffey MD

## 2021-02-24 RX ORDER — SILDENAFIL CITRATE 20 MG/1
20 TABLET ORAL PRN
Qty: 30 TABLET | Refills: 3 | Status: SHIPPED
Start: 2021-02-24 | End: 2021-12-20

## 2021-02-24 NOTE — TELEPHONE ENCOUNTER
Last Appointment:  2/22/2021  Future Appointments   Date Time Provider Alee Tapia   4/26/2021  9:40 AM MD Nawaf [facility]

## 2021-03-26 ENCOUNTER — HOSPITAL ENCOUNTER (OUTPATIENT)
Dept: NUCLEAR MEDICINE | Age: 66
Discharge: HOME | End: 2021-03-28
Payer: COMMERCIAL

## 2021-03-26 ENCOUNTER — HOSPITAL ENCOUNTER (OUTPATIENT)
Dept: CT IMAGING | Age: 66
Discharge: HOME | End: 2021-03-28
Payer: COMMERCIAL

## 2021-03-26 ENCOUNTER — HOSPITAL ENCOUNTER (OUTPATIENT)
Age: 66
Discharge: HOME | End: 2021-03-26
Payer: COMMERCIAL

## 2021-03-26 DIAGNOSIS — C61 MALIGNANT NEOPLASM OF PROSTATE (HCC): ICD-10-CM

## 2021-03-26 LAB
BUN BLDV-MCNC: 27 MG/DL (ref 8–23)
CREAT SERPL-MCNC: 1.2 MG/DL (ref 0.7–1.2)
GFR AFRICAN AMERICAN: >60
GFR NON-AFRICAN AMERICAN: >60 ML/MIN/1.73

## 2021-03-26 PROCEDURE — 84520 ASSAY OF UREA NITROGEN: CPT

## 2021-03-26 PROCEDURE — 6360000004 HC RX CONTRAST MEDICATION: Performed by: RADIOLOGY

## 2021-03-26 PROCEDURE — A9503 TC99M MEDRONATE: HCPCS | Performed by: RADIOLOGY

## 2021-03-26 PROCEDURE — 78306 BONE IMAGING WHOLE BODY: CPT | Performed by: RADIOLOGY

## 2021-03-26 PROCEDURE — 82565 ASSAY OF CREATININE: CPT

## 2021-03-26 PROCEDURE — 78306 BONE IMAGING WHOLE BODY: CPT

## 2021-03-26 PROCEDURE — 2580000003 HC RX 258: Performed by: RADIOLOGY

## 2021-03-26 PROCEDURE — 36415 COLL VENOUS BLD VENIPUNCTURE: CPT

## 2021-03-26 PROCEDURE — 74177 CT ABD & PELVIS W/CONTRAST: CPT

## 2021-03-26 PROCEDURE — 3430000000 HC RX DIAGNOSTIC RADIOPHARMACEUTICAL: Performed by: RADIOLOGY

## 2021-03-26 RX ORDER — TC 99M MEDRONATE 20 MG/10ML
25 INJECTION, POWDER, LYOPHILIZED, FOR SOLUTION INTRAVENOUS
Status: COMPLETED | OUTPATIENT
Start: 2021-03-26 | End: 2021-03-26

## 2021-03-26 RX ORDER — SODIUM CHLORIDE 0.9 % (FLUSH) 0.9 %
10 SYRINGE (ML) INJECTION ONCE
Status: COMPLETED | OUTPATIENT
Start: 2021-03-26 | End: 2021-03-26

## 2021-03-26 RX ADMIN — Medication 10 ML: at 11:26

## 2021-03-26 RX ADMIN — TC 99M MEDRONATE 25 MILLICURIE: 20 INJECTION, POWDER, LYOPHILIZED, FOR SOLUTION INTRAVENOUS at 09:43

## 2021-03-26 RX ADMIN — IOPAMIDOL 90 ML: 755 INJECTION, SOLUTION INTRAVENOUS at 11:26

## 2021-04-12 ENCOUNTER — HOSPITAL ENCOUNTER (OUTPATIENT)
Dept: GENERAL RADIOLOGY | Age: 66
Discharge: HOME | End: 2021-04-14
Payer: COMMERCIAL

## 2021-04-12 DIAGNOSIS — C61 MALIGNANT NEOPLASM OF PROSTATE (HCC): ICD-10-CM

## 2021-04-12 PROCEDURE — 77080 DXA BONE DENSITY AXIAL: CPT

## 2021-04-19 ENCOUNTER — HOSPITAL ENCOUNTER (EMERGENCY)
Age: 66
Discharge: HOME | End: 2021-04-19
Payer: COMMERCIAL

## 2021-04-19 ENCOUNTER — APPOINTMENT (OUTPATIENT)
Dept: GENERAL RADIOLOGY | Age: 66
End: 2021-04-19
Payer: COMMERCIAL

## 2021-04-19 VITALS
DIASTOLIC BLOOD PRESSURE: 77 MMHG | RESPIRATION RATE: 16 BRPM | HEART RATE: 55 BPM | OXYGEN SATURATION: 99 % | SYSTOLIC BLOOD PRESSURE: 146 MMHG | TEMPERATURE: 97 F

## 2021-04-19 DIAGNOSIS — T78.3XXA ANGIOEDEMA, INITIAL ENCOUNTER: Primary | ICD-10-CM

## 2021-04-19 LAB
ALBUMIN SERPL-MCNC: 3.6 G/DL (ref 3.5–5.2)
ALP BLD-CCNC: 104 U/L (ref 40–129)
ALT SERPL-CCNC: 17 U/L (ref 0–40)
ANION GAP SERPL CALCULATED.3IONS-SCNC: 14 MMOL/L (ref 7–16)
AST SERPL-CCNC: 20 U/L (ref 0–39)
BASOPHILS ABSOLUTE: 0.03 E9/L (ref 0–0.2)
BASOPHILS RELATIVE PERCENT: 0.6 % (ref 0–2)
BILIRUB SERPL-MCNC: 0.2 MG/DL (ref 0–1.2)
BUN BLDV-MCNC: 37 MG/DL (ref 8–23)
CALCIUM SERPL-MCNC: 9.5 MG/DL (ref 8.6–10.2)
CHLORIDE BLD-SCNC: 103 MMOL/L (ref 98–107)
CO2: 21 MMOL/L (ref 22–29)
CREAT SERPL-MCNC: 1.1 MG/DL (ref 0.7–1.2)
EOSINOPHILS ABSOLUTE: 0.08 E9/L (ref 0.05–0.5)
EOSINOPHILS RELATIVE PERCENT: 1.5 % (ref 0–6)
GFR AFRICAN AMERICAN: >60
GFR NON-AFRICAN AMERICAN: >60 ML/MIN/1.73
GLUCOSE BLD-MCNC: 116 MG/DL (ref 74–99)
HCT VFR BLD CALC: 43.5 % (ref 37–54)
HEMOGLOBIN: 13.9 G/DL (ref 12.5–16.5)
IMMATURE GRANULOCYTES #: 0.02 E9/L
IMMATURE GRANULOCYTES %: 0.4 % (ref 0–5)
LYMPHOCYTES ABSOLUTE: 1.02 E9/L (ref 1.5–4)
LYMPHOCYTES RELATIVE PERCENT: 19.6 % (ref 20–42)
MCH RBC QN AUTO: 29.8 PG (ref 26–35)
MCHC RBC AUTO-ENTMCNC: 32 % (ref 32–34.5)
MCV RBC AUTO: 93.3 FL (ref 80–99.9)
MONOCYTES ABSOLUTE: 0.48 E9/L (ref 0.1–0.95)
MONOCYTES RELATIVE PERCENT: 9.2 % (ref 2–12)
NEUTROPHILS ABSOLUTE: 3.58 E9/L (ref 1.8–7.3)
NEUTROPHILS RELATIVE PERCENT: 68.7 % (ref 43–80)
PDW BLD-RTO: 12.8 FL (ref 11.5–15)
PLATELET # BLD: 211 E9/L (ref 130–450)
PMV BLD AUTO: 10.6 FL (ref 7–12)
POTASSIUM SERPL-SCNC: 3.6 MMOL/L (ref 3.5–5)
RBC # BLD: 4.66 E12/L (ref 3.8–5.8)
SODIUM BLD-SCNC: 138 MMOL/L (ref 132–146)
TOTAL PROTEIN: 7.3 G/DL (ref 6.4–8.3)
WBC # BLD: 5.2 E9/L (ref 4.5–11.5)

## 2021-04-19 PROCEDURE — 85025 COMPLETE CBC W/AUTO DIFF WBC: CPT

## 2021-04-19 PROCEDURE — 2500000003 HC RX 250 WO HCPCS: Performed by: PHYSICIAN ASSISTANT

## 2021-04-19 PROCEDURE — 80053 COMPREHEN METABOLIC PANEL: CPT

## 2021-04-19 PROCEDURE — 93005 ELECTROCARDIOGRAM TRACING: CPT | Performed by: PHYSICIAN ASSISTANT

## 2021-04-19 PROCEDURE — 71046 X-RAY EXAM CHEST 2 VIEWS: CPT

## 2021-04-19 PROCEDURE — 96375 TX/PRO/DX INJ NEW DRUG ADDON: CPT

## 2021-04-19 PROCEDURE — 99285 EMERGENCY DEPT VISIT HI MDM: CPT

## 2021-04-19 PROCEDURE — 96374 THER/PROPH/DIAG INJ IV PUSH: CPT

## 2021-04-19 PROCEDURE — 6360000002 HC RX W HCPCS: Performed by: PHYSICIAN ASSISTANT

## 2021-04-19 RX ORDER — FAMOTIDINE 20 MG/1
20 TABLET, FILM COATED ORAL 2 TIMES DAILY
Qty: 20 TABLET | Refills: 0 | Status: SHIPPED | OUTPATIENT
Start: 2021-04-19 | End: 2021-06-29

## 2021-04-19 RX ORDER — DIPHENHYDRAMINE HYDROCHLORIDE 50 MG/ML
50 INJECTION INTRAMUSCULAR; INTRAVENOUS ONCE
Status: COMPLETED | OUTPATIENT
Start: 2021-04-19 | End: 2021-04-19

## 2021-04-19 RX ORDER — EPINEPHRINE 0.3 MG/.3ML
0.3 INJECTION SUBCUTANEOUS ONCE
Qty: 2 EACH | Refills: 0 | Status: SHIPPED | OUTPATIENT
Start: 2021-04-19 | End: 2022-05-12

## 2021-04-19 RX ORDER — METHYLPREDNISOLONE 4 MG/1
TABLET ORAL
Qty: 1 KIT | Refills: 0 | Status: SHIPPED | OUTPATIENT
Start: 2021-04-19 | End: 2021-04-25

## 2021-04-19 RX ORDER — DIPHENHYDRAMINE HCL 25 MG
25 TABLET ORAL EVERY 8 HOURS PRN
Qty: 30 TABLET | Refills: 0 | Status: SHIPPED | OUTPATIENT
Start: 2021-04-19 | End: 2021-04-29

## 2021-04-19 RX ORDER — METHYLPREDNISOLONE SODIUM SUCCINATE 125 MG/2ML
125 INJECTION, POWDER, LYOPHILIZED, FOR SOLUTION INTRAMUSCULAR; INTRAVENOUS ONCE
Status: COMPLETED | OUTPATIENT
Start: 2021-04-19 | End: 2021-04-19

## 2021-04-19 RX ADMIN — METHYLPREDNISOLONE SODIUM SUCCINATE 125 MG: 125 INJECTION, POWDER, FOR SOLUTION INTRAMUSCULAR; INTRAVENOUS at 10:54

## 2021-04-19 RX ADMIN — DIPHENHYDRAMINE HYDROCHLORIDE 50 MG: 50 INJECTION, SOLUTION INTRAMUSCULAR; INTRAVENOUS at 10:54

## 2021-04-19 RX ADMIN — FAMOTIDINE 20 MG: 10 INJECTION INTRAVENOUS at 10:54

## 2021-04-19 NOTE — ED PROVIDER NOTES
[facility]  Department of Emergency Medicine   ED  Encounter Note  Admit Date/RoomTime: 2021  9:31 AM  ED Room:     NAME: Estefania Elliott  : 1955  MRN: [id number]     Chief Complaint:  Swelling (pt reports top lip swelling, states he ate tomatoes last night and thinks he is allergic, denies difficulty swallowing, denies sob/chest pain)    History of Present Illness        Estefania Elliott is a 77 y.o. old male who presents to the emergency department by private vehicle, for gradual onset of upper lip swelling after exposure to \"canned tomatoes\" last night. The pt reports eating the canned tomatoes last night for dinner and then woke up this morning with upper lip swelling. He noted he was unsure of how old the tomatoes were. Since onset the symptoms have been gradually improving and mild-moderate in severity. Pt reported he used mouth wash this morning and had a mild improvement in his symptoms. Patient states \"the tomatoes made it worse. \"  The patient has a history of a similar episode about 6 years ago and reports it was because he is allergic to \"ibuprofen and benzocaine. \" He denies generalized hives, generalized swelling, breathing difficulty, throat swelling, wheezing, chest tightness or shortness of breath. Patient denies any new denture glue or any type of contact irritant that could have caused this. Patient denies any trauma to the area. ROS   Pertinent positives and negatives are stated within HPI, all other systems reviewed and are negative. Past Medical History:  [facility] Erectile dysfunction, Hyperlipidemia, Hypertension, Osteoarthritis, hip/pelvic region and thigh, and Prostate cancer ([facility] [address] Surgical History:  has a past surgical history that includes Prostate Biopsy; Leg Surgery; hip surgery (Right); Lumbar spine surgery (10/30/2017); and back surgery.     Social History:  reports that he quit smoking about 3 years ago. He has a 2.50 pack-year smoking history. He has never used smokeless tobacco. He reports current alcohol use. He reports that he does not use drugs. Family History: family history is not on file. Allergies: Ibuprofen and Benzocaine    Physical Exam   Oxygen Saturation Interpretation: Normal.        ED Triage Vitals   BP Temp Temp src Pulse Resp SpO2 Height Weight   04/19/21 0929 04/19/21 0922 -- 04/19/21 0922 04/19/21 0929 04/19/21 0922 -- --   (!) 134/94 96.6 °F (35.9 °C)  64 18 96 %         General Appearance/Constitutional:  Alert, development consistent with age. In no acute distress. HEENT:  NC/NT. PERRLA. Airway patent. Mild upper lip swelling. No ulnar deviation. No swelling of posterior oropharynx. No teeth present. No tracheal deviation, no lymphadenopathy. No airway compromise noted  Neck:  Normal ROM. Supple. No lymphadenopathy  Respiratory:  Clear to auscultation and breath sounds equal.  CV:  Regular rate and rhythm, normal heart sounds, without pathological murmurs, ectopy, gallops, or rubs. GI:  Abdomen Soft, nontender, good bowel sounds. No firm or pulsatile mass. Back:  No costovertebral tenderness. Extremities: No tenderness or edema noted. Integument:  Normal turgor. Warm, dry, without visible rash, unless noted elsewhere. Lymphatics: No lymphangitis or adenopathy noted. Neurological:  Oriented. Motor functions intact.      Lab / Imaging Results     (All laboratory and radiology results have been personally reviewed by myself)  Labs:  Results for orders placed or performed during the hospital encounter of 04/19/21   CBC auto differential   Result Value Ref Range    WBC 5.2 4.5 - 11.5 E9/L    RBC 4.66 3.80 - 5.80 E12/L    Hemoglobin 13.9 12.5 - 16.5 g/dL    Hematocrit 43.5 37.0 - 54.0 %    MCV 93.3 80.0 - 99.9 fL    MCH 29.8 26.0 - 35.0 pg    MCHC 32.0 32.0 - 34.5 %    RDW 12.8 11.5 - 15.0 fL    Platelets 247 981 - 118 E9/L    MPV 10.6 7.0 - 12.0 fL Neutrophils % 68.7 43.0 - 80.0 %    Immature Granulocytes % 0.4 0.0 - 5.0 %    Lymphocytes % 19.6 (L) 20.0 - 42.0 %    Monocytes % 9.2 2.0 - 12.0 %    Eosinophils % 1.5 0.0 - 6.0 %    Basophils % 0.6 0.0 - 2.0 %    Neutrophils Absolute 3.58 1.80 - 7.30 E9/L    Immature Granulocytes # 0.02 E9/L    Lymphocytes Absolute 1.02 (L) 1.50 - 4.00 E9/L    Monocytes Absolute 0.48 0.10 - 0.95 E9/L    Eosinophils Absolute 0.08 0.05 - 0.50 E9/L    Basophils Absolute 0.03 0.00 - 0.20 E9/L   Comprehensive Metabolic Panel   Result Value Ref Range    Sodium 138 132 - 146 mmol/L    Potassium 3.6 3.5 - 5.0 mmol/L    Chloride 103 98 - 107 mmol/L    CO2 21 (L) 22 - 29 mmol/L    Anion Gap 14 7 - 16 mmol/L    Glucose 116 (H) 74 - 99 mg/dL    BUN 37 (H) 8 - 23 mg/dL    CREATININE 1.1 0.7 - 1.2 mg/dL    GFR Non-African American >60 >=60 mL/min/1.73    GFR African American >60     Calcium 9.5 8.6 - 10.2 mg/dL    Total Protein 7.3 6.4 - 8.3 g/dL    Albumin 3.6 3.5 - 5.2 g/dL    Total Bilirubin 0.2 0.0 - 1.2 mg/dL    Alkaline Phosphatase 104 40 - 129 U/L    ALT 17 0 - 40 U/L    AST 20 0 - 39 U/L     Imaging: All Radiology results interpreted by Radiologist unless otherwise noted. XR CHEST (2 VW)   Final Result   No acute process. ED Course / Medical Decision Making     Medications   methylPREDNISolone sodium (SOLU-MEDROL) injection 125 mg (125 mg Intravenous Given 4/19/21 1054)   diphenhydrAMINE (BENADRYL) injection 50 mg (50 mg Intravenous Given 4/19/21 1054)   famotidine (PEPCID) injection 20 mg (20 mg Intravenous Given 4/19/21 1054)     ED Course as of Apr 19 1215   Mon Apr 19, 2021   1148 Patient was checked on after an hour of the medications being given and states he feels \"100% better and feels like his lip is gone down. \"  Patient stop lip is slightly still swollen but improvement is noted.   No uvula swelling, tongue swelling, airway compromise at this time.    [AM]      ED Course User Index  [AM] Caprice Connors PA-C and verbalized understanding. Patient was explicitly instructed on specific signs and symptoms on which to return to the emergency room for. Patient was instructed to return to the ER for any new or worsening symptoms. Additional discharge instructions were given verbally. All questions were answered. Patient is comfortable and agreeable with discharge plan. Patient in no acute distress and non-toxic in appearance. Plan of Care/Counseling:  I reviewed today's visit with the patient in addition to providing specific details for the plan of care and counseling regarding the diagnosis and prognosis. Questions are answered at this time and are agreeable with the plan. Assessment      1. Angioedema, initial encounter      Plan   Discharge home  Patient condition is good    New Medications     Discharge Medication List as of 4/19/2021 11:52 AM      START taking these medications    Details   EPINEPHrine (EPIPEN 2-AGUSTO) 0.3 MG/0.3ML SOAJ injection Inject 0.3 mLs into the muscle once for 1 dose Use as directed for SEVERE allergic reaction, Disp-2 each, R-0Print      diphenhydrAMINE (BENADRYL ALLERGY) 25 MG tablet Take 1 tablet by mouth every 8 hours as needed for Itching, Disp-30 tablet, R-0Print      methylPREDNISolone (MEDROL, AGUSTO,) 4 MG tablet Take as directed, Disp-1 kit, R-0Print      famotidine (PEPCID) 20 MG tablet Take 1 tablet by mouth 2 times daily for 10 days, Disp-20 tablet, R-0Print           Electronically signed by Torres Ann PA-C   DD: 4/19/21  **This report was transcribed using voice recognition software. Every effort was made to ensure accuracy; however, inadvertent computerized transcription errors may be present.   END OF ED PROVIDER NOTE      Torres Ann PA-C  04/19/21 [id number]

## 2021-04-20 LAB
EKG ATRIAL RATE: 58 BPM
EKG P AXIS: 9 DEGREES
EKG P-R INTERVAL: 132 MS
EKG Q-T INTERVAL: 438 MS
EKG QRS DURATION: 94 MS
EKG QTC CALCULATION (BAZETT): 429 MS
EKG R AXIS: 32 DEGREES
EKG T AXIS: 45 DEGREES
EKG VENTRICULAR RATE: 58 BPM

## 2021-04-20 PROCEDURE — 93010 ELECTROCARDIOGRAM REPORT: CPT | Performed by: INTERNAL MEDICINE

## 2021-06-28 ENCOUNTER — OFFICE VISIT (OUTPATIENT)
Dept: FAMILY MEDICINE CLINIC | Age: 66
End: 2021-06-28
Payer: COMMERCIAL

## 2021-06-28 VITALS
WEIGHT: 181 LBS | OXYGEN SATURATION: 98 % | HEART RATE: 76 BPM | DIASTOLIC BLOOD PRESSURE: 75 MMHG | HEIGHT: 71 IN | SYSTOLIC BLOOD PRESSURE: 138 MMHG | BODY MASS INDEX: 25.34 KG/M2 | TEMPERATURE: 97.4 F

## 2021-06-28 DIAGNOSIS — Z00.00 HEALTH CARE MAINTENANCE: ICD-10-CM

## 2021-06-28 DIAGNOSIS — I10 ESSENTIAL HYPERTENSION: ICD-10-CM

## 2021-06-28 DIAGNOSIS — M10.079 IDIOPATHIC GOUT INVOLVING TOE, UNSPECIFIED CHRONICITY, UNSPECIFIED LATERALITY: Primary | ICD-10-CM

## 2021-06-28 PROCEDURE — 99213 OFFICE O/P EST LOW 20 MIN: CPT | Performed by: STUDENT IN AN ORGANIZED HEALTH CARE EDUCATION/TRAINING PROGRAM

## 2021-06-28 PROCEDURE — 1123F ACP DISCUSS/DSCN MKR DOCD: CPT | Performed by: STUDENT IN AN ORGANIZED HEALTH CARE EDUCATION/TRAINING PROGRAM

## 2021-06-28 PROCEDURE — G8417 CALC BMI ABV UP PARAM F/U: HCPCS | Performed by: STUDENT IN AN ORGANIZED HEALTH CARE EDUCATION/TRAINING PROGRAM

## 2021-06-28 PROCEDURE — 3017F COLORECTAL CA SCREEN DOC REV: CPT | Performed by: STUDENT IN AN ORGANIZED HEALTH CARE EDUCATION/TRAINING PROGRAM

## 2021-06-28 PROCEDURE — 4040F PNEUMOC VAC/ADMIN/RCVD: CPT | Performed by: STUDENT IN AN ORGANIZED HEALTH CARE EDUCATION/TRAINING PROGRAM

## 2021-06-28 PROCEDURE — 1036F TOBACCO NON-USER: CPT | Performed by: STUDENT IN AN ORGANIZED HEALTH CARE EDUCATION/TRAINING PROGRAM

## 2021-06-28 PROCEDURE — 99212 OFFICE O/P EST SF 10 MIN: CPT | Performed by: STUDENT IN AN ORGANIZED HEALTH CARE EDUCATION/TRAINING PROGRAM

## 2021-06-28 PROCEDURE — G8427 DOCREV CUR MEDS BY ELIG CLIN: HCPCS | Performed by: STUDENT IN AN ORGANIZED HEALTH CARE EDUCATION/TRAINING PROGRAM

## 2021-06-28 RX ORDER — CHLORTHALIDONE 25 MG/1
12.5 TABLET ORAL DAILY
Qty: 30 TABLET | Refills: 2 | Status: SHIPPED
Start: 2021-06-28 | End: 2021-12-20

## 2021-06-28 RX ORDER — LISINOPRIL 5 MG/1
5 TABLET ORAL DAILY
Qty: 90 TABLET | Refills: 1 | Status: ON HOLD
Start: 2021-06-28 | End: 2021-12-15 | Stop reason: HOSPADM

## 2021-06-28 SDOH — ECONOMIC STABILITY: FOOD INSECURITY: WITHIN THE PAST 12 MONTHS, THE FOOD YOU BOUGHT JUST DIDN'T LAST AND YOU DIDN'T HAVE MONEY TO GET MORE.: SOMETIMES TRUE

## 2021-06-28 SDOH — ECONOMIC STABILITY: FOOD INSECURITY: WITHIN THE PAST 12 MONTHS, YOU WORRIED THAT YOUR FOOD WOULD RUN OUT BEFORE YOU GOT MONEY TO BUY MORE.: SOMETIMES TRUE

## 2021-06-28 ASSESSMENT — SOCIAL DETERMINANTS OF HEALTH (SDOH): HOW HARD IS IT FOR YOU TO PAY FOR THE VERY BASICS LIKE FOOD, HOUSING, MEDICAL CARE, AND HEATING?: SOMEWHAT HARD

## 2021-06-28 NOTE — PROGRESS NOTES
[facility] RESIDENCY PROGRAM  DATE OF VISIT : 2021    Patient : Julian Porter   Age : 77 y.o.  : 1955   MRN : [id number]     Chief Complaint :   Chief Complaint   Patient presents with    Check-Up       HPI : Julian Porter is 77 y.o. male with PMHx of HTN, OA, Gout  who presented to the clinic today for HTN follow up. HTN: Controlled with medications. Currently on lisinopril 5 mg and chorthalidone 12.5 mg daily. No concern. Gout: Patient reports no flare up since started on allopurinol on last visit. He feels better. He completed overlap with naproxen for 2 weeks initially then flare up resolved. Currently taking Allopurinol 100 mg daily. Past Medical History :  Past Medical History:   Diagnosis Date    Cancer [facility]) PROSTATE    , patient reports bone mets    Erectile dysfunction     Hyperlipidemia     Hypertension     Osteoarthritis, hip/pelvic region and thigh 3/29/2013    Prostate cancer ([facility] [address] 2013     Allergies : Allergies   Allergen Reactions    Ibuprofen Swelling    Benzocaine      EDEMA        Medication List :    Current Outpatient Medications   Medication Sig Dispense Refill    chlorthalidone (HYGROTON) 25 MG tablet Take 0.5 tablets by mouth daily 30 tablet 2    lisinopril (PRINIVIL;ZESTRIL) 5 MG tablet Take 1 tablet by mouth daily 90 tablet 1    EPINEPHrine (EPIPEN 2-AGUSTO) 0.3 MG/0.3ML SOAJ injection Inject 0.3 mLs into the muscle once for 1 dose Use as directed for SEVERE allergic reaction 2 each 0    famotidine (PEPCID) 20 MG tablet Take 1 tablet by mouth 2 times daily for 10 days 20 tablet 0    sildenafil (REVATIO) 20 MG tablet Take 1 tablet by mouth as needed (30 minutes before intercourse) 30 tablet 3    atorvastatin (LIPITOR) 20 MG tablet Take 1 tablet by mouth daily 60 tablet 2    allopurinol (ZYLOPRIM) 100 MG tablet Take 1 tablet by mouth daily Please start once flare up resolved.  90 tablet 1    capsaicin (ZOSTRIX) 0.025 % cream Apply topically 2 times daily Apply topically 2 times daily. Apply topically 2 times daily Apply topically 2 times daily. 45 g 2    goserelin (ZOLADEX) 10.8 MG injection Inject 10.8 mg into the skin every 3 months       No current facility-administered medications for this visit. Review of Systems :  Review of Systems   Constitutional: Negative for activity change, fatigue and fever. HENT: Negative for congestion. Eyes: Negative for visual disturbance. Respiratory: Negative for cough and shortness of breath. Cardiovascular: Negative for chest pain, palpitations and leg swelling. Gastrointestinal: Negative for abdominal pain and blood in stool. Musculoskeletal: Positive for arthralgias. Negative for gait problem and myalgias. Skin: Negative for color change. Neurological: Negative for dizziness, syncope, weakness and numbness. Hematological: Does not bruise/bleed easily. Psychiatric/Behavioral: Negative for agitation. Physical Exam :    Vitals: /75 (Site: Right Upper Arm, Position: Sitting, Cuff Size: Medium Adult)   Pulse 76   Temp 97.4 °F (36.3 °C) (Temporal)   Ht 5' 11\" (1.803 m)   Wt 181 lb (82.1 kg)   SpO2 98%   BMI 25.24 kg/m²     General Appearance: Well developed, awake, alert, oriented, and not in acute distress  Neck: Supple, symmetrical, trachea midline. No  carotid bruits. Chest wall/Lung: CTAB, respirations unlabored. No ronchi/wheezing/rales  Heart[de-identified] RRR, normal S1 and S2, no murmurs, rubs or gallops. Abdomen: soft, non tender and not distended  Extremities: Extremities normal, atraumatic, no cyanosis, clubbing or edema. Skin: Skin color, texture, turgor normal, no rashes or lesions  Musculokeletal: ROM grossly normal in all joints of extremities, no obvious joint swelling. Neurologic: Alert&Oriented x3. Motor and Sensation grossly intact. Psychiatric: has a normal mood and affect.  Behavior is normal.       Assessment & Plan :    1. Essential hypertension  - chlorthalidone (HYGROTON) 25 MG tablet; Take 0.5 tablets by mouth daily  Dispense: 30 tablet; Refill: 2  - lisinopril (PRINIVIL;ZESTRIL) 5 MG tablet; Take 1 tablet by mouth daily  Dispense: 90 tablet; Refill: 1  - Cologuard (For External Results Only); Future  - HEPATITIS C ANTIBODY; Future    2. Idiopathic gout involving toe, unspecified chronicity, unspecified laterality  - no signs and symptoms of flare up  - continue Allopurinol 100 mg daily   - URIC ACID; Future  - BASIC METABOLIC PANEL; Future  -  3.  Health care maintenance  - declined vaccinations   - decline dcolonoscopy  - ordered Cologuard, hepatitis C antibody     Folow up in 4 months for HTN     Luz Appiah MD   Family medicine, PGY3,

## 2021-06-29 ASSESSMENT — ENCOUNTER SYMPTOMS
ABDOMINAL PAIN: 0
BLOOD IN STOOL: 0
COUGH: 0
COLOR CHANGE: 0
SHORTNESS OF BREATH: 0

## 2021-07-23 ENCOUNTER — HOSPITAL ENCOUNTER (OUTPATIENT)
Dept: ULTRASOUND IMAGING | Age: 66
Discharge: HOME | End: 2021-07-25
Payer: OTHER GOVERNMENT

## 2021-07-23 DIAGNOSIS — N18.9 CHRONIC KIDNEY DISEASE, UNSPECIFIED CKD STAGE: ICD-10-CM

## 2021-07-23 PROCEDURE — 76770 US EXAM ABDO BACK WALL COMP: CPT

## 2021-12-13 ENCOUNTER — HOSPITAL ENCOUNTER (INPATIENT)
Age: 66
LOS: 2 days | Discharge: HOME | DRG: 305 | End: 2021-12-15
Attending: EMERGENCY MEDICINE | Admitting: FAMILY MEDICINE
Payer: OTHER GOVERNMENT

## 2021-12-13 ENCOUNTER — APPOINTMENT (OUTPATIENT)
Dept: GENERAL RADIOLOGY | Age: 66
DRG: 305 | End: 2021-12-13
Payer: OTHER GOVERNMENT

## 2021-12-13 ENCOUNTER — APPOINTMENT (OUTPATIENT)
Dept: CT IMAGING | Age: 66
DRG: 305 | End: 2021-12-13
Payer: OTHER GOVERNMENT

## 2021-12-13 DIAGNOSIS — R42 DIZZINESS: ICD-10-CM

## 2021-12-13 DIAGNOSIS — I10 HYPERTENSION, UNSPECIFIED TYPE: ICD-10-CM

## 2021-12-13 DIAGNOSIS — R51.9 NONINTRACTABLE HEADACHE, UNSPECIFIED CHRONICITY PATTERN, UNSPECIFIED HEADACHE TYPE: Primary | ICD-10-CM

## 2021-12-13 PROBLEM — I66.21 OCCLUSION AND STENOSIS OF RIGHT POSTERIOR CEREBRAL ARTERY: Status: ACTIVE | Noted: 2021-12-13

## 2021-12-13 PROBLEM — Z87.39 HISTORY OF GOUT: Status: ACTIVE | Noted: 2021-12-13

## 2021-12-13 PROBLEM — I63.9 ACUTE CEREBROVASCULAR ACCIDENT (CVA) (HCC): Status: ACTIVE | Noted: 2021-12-13

## 2021-12-13 LAB
ALBUMIN SERPL-MCNC: 4 G/DL (ref 3.5–5.2)
ALP BLD-CCNC: 140 U/L (ref 40–129)
ALT SERPL-CCNC: 14 U/L (ref 0–40)
ANION GAP SERPL CALCULATED.3IONS-SCNC: 13 MMOL/L (ref 7–16)
AST SERPL-CCNC: 16 U/L (ref 0–39)
BASOPHILS ABSOLUTE: 0.01 E9/L (ref 0–0.2)
BASOPHILS RELATIVE PERCENT: 0.2 % (ref 0–2)
BILIRUB SERPL-MCNC: 0.3 MG/DL (ref 0–1.2)
BUN BLDV-MCNC: 22 MG/DL (ref 6–23)
CALCIUM SERPL-MCNC: 9.8 MG/DL (ref 8.6–10.2)
CHLORIDE BLD-SCNC: 106 MMOL/L (ref 98–107)
CO2: 25 MMOL/L (ref 22–29)
CREAT SERPL-MCNC: 1 MG/DL (ref 0.7–1.2)
EOSINOPHILS ABSOLUTE: 0.06 E9/L (ref 0.05–0.5)
EOSINOPHILS RELATIVE PERCENT: 1 % (ref 0–6)
GFR AFRICAN AMERICAN: >60
GFR NON-AFRICAN AMERICAN: >60 ML/MIN/1.73
GLUCOSE BLD-MCNC: 119 MG/DL (ref 74–99)
HBA1C MFR BLD: 5.9 % (ref 4–5.6)
HCT VFR BLD CALC: 42.8 % (ref 37–54)
HEMOGLOBIN: 14.4 G/DL (ref 12.5–16.5)
IMMATURE GRANULOCYTES #: 0.03 E9/L
IMMATURE GRANULOCYTES %: 0.5 % (ref 0–5)
LYMPHOCYTES ABSOLUTE: 0.91 E9/L (ref 1.5–4)
LYMPHOCYTES RELATIVE PERCENT: 15.5 % (ref 20–42)
MCH RBC QN AUTO: 29.3 PG (ref 26–35)
MCHC RBC AUTO-ENTMCNC: 33.6 % (ref 32–34.5)
MCV RBC AUTO: 87 FL (ref 80–99.9)
MONOCYTES ABSOLUTE: 0.45 E9/L (ref 0.1–0.95)
MONOCYTES RELATIVE PERCENT: 7.7 % (ref 2–12)
NEUTROPHILS ABSOLUTE: 4.4 E9/L (ref 1.8–7.3)
NEUTROPHILS RELATIVE PERCENT: 75.1 % (ref 43–80)
PDW BLD-RTO: 13.4 FL (ref 11.5–15)
PLATELET # BLD: 261 E9/L (ref 130–450)
PMV BLD AUTO: 10.7 FL (ref 7–12)
POTASSIUM REFLEX MAGNESIUM: 4 MMOL/L (ref 3.5–5)
PRO-BNP: 719 PG/ML (ref 0–125)
RBC # BLD: 4.92 E12/L (ref 3.8–5.8)
SODIUM BLD-SCNC: 144 MMOL/L (ref 132–146)
TOTAL PROTEIN: 7.5 G/DL (ref 6.4–8.3)
TROPONIN, HIGH SENSITIVITY: 8 NG/L (ref 0–11)
WBC # BLD: 5.9 E9/L (ref 4.5–11.5)

## 2021-12-13 PROCEDURE — 83036 HEMOGLOBIN GLYCOSYLATED A1C: CPT

## 2021-12-13 PROCEDURE — 6360000002 HC RX W HCPCS

## 2021-12-13 PROCEDURE — 36415 COLL VENOUS BLD VENIPUNCTURE: CPT

## 2021-12-13 PROCEDURE — 93005 ELECTROCARDIOGRAM TRACING: CPT | Performed by: STUDENT IN AN ORGANIZED HEALTH CARE EDUCATION/TRAINING PROGRAM

## 2021-12-13 PROCEDURE — 2060000000 HC ICU INTERMEDIATE R&B

## 2021-12-13 PROCEDURE — 80053 COMPREHEN METABOLIC PANEL: CPT

## 2021-12-13 PROCEDURE — 96374 THER/PROPH/DIAG INJ IV PUSH: CPT

## 2021-12-13 PROCEDURE — 2580000003 HC RX 258: Performed by: STUDENT IN AN ORGANIZED HEALTH CARE EDUCATION/TRAINING PROGRAM

## 2021-12-13 PROCEDURE — 85025 COMPLETE CBC W/AUTO DIFF WBC: CPT

## 2021-12-13 PROCEDURE — 6360000004 HC RX CONTRAST MEDICATION: Performed by: RADIOLOGY

## 2021-12-13 PROCEDURE — 84484 ASSAY OF TROPONIN QUANT: CPT

## 2021-12-13 PROCEDURE — 83880 ASSAY OF NATRIURETIC PEPTIDE: CPT

## 2021-12-13 PROCEDURE — 71045 X-RAY EXAM CHEST 1 VIEW: CPT

## 2021-12-13 PROCEDURE — 80061 LIPID PANEL: CPT

## 2021-12-13 PROCEDURE — 99283 EMERGENCY DEPT VISIT LOW MDM: CPT

## 2021-12-13 PROCEDURE — 70496 CT ANGIOGRAPHY HEAD: CPT

## 2021-12-13 PROCEDURE — 6360000002 HC RX W HCPCS: Performed by: EMERGENCY MEDICINE

## 2021-12-13 PROCEDURE — 6370000000 HC RX 637 (ALT 250 FOR IP): Performed by: STUDENT IN AN ORGANIZED HEALTH CARE EDUCATION/TRAINING PROGRAM

## 2021-12-13 PROCEDURE — 70498 CT ANGIOGRAPHY NECK: CPT

## 2021-12-13 RX ORDER — POTASSIUM CHLORIDE 7.45 MG/ML
10 INJECTION INTRAVENOUS PRN
Status: DISCONTINUED | OUTPATIENT
Start: 2021-12-13 | End: 2021-12-15 | Stop reason: HOSPADM

## 2021-12-13 RX ORDER — CLOPIDOGREL BISULFATE 75 MG/1
75 TABLET ORAL DAILY
Status: DISCONTINUED | OUTPATIENT
Start: 2021-12-13 | End: 2021-12-15 | Stop reason: HOSPADM

## 2021-12-13 RX ORDER — ATORVASTATIN CALCIUM 40 MG/1
40 TABLET, FILM COATED ORAL NIGHTLY
Status: DISCONTINUED | OUTPATIENT
Start: 2021-12-13 | End: 2021-12-15 | Stop reason: HOSPADM

## 2021-12-13 RX ORDER — MAGNESIUM SULFATE IN WATER 40 MG/ML
2000 INJECTION, SOLUTION INTRAVENOUS PRN
Status: DISCONTINUED | OUTPATIENT
Start: 2021-12-13 | End: 2021-12-15 | Stop reason: HOSPADM

## 2021-12-13 RX ORDER — CAPSAICIN 0.025 %
CREAM (GRAM) TOPICAL PRN
Status: DISCONTINUED | OUTPATIENT
Start: 2021-12-13 | End: 2021-12-15 | Stop reason: HOSPADM

## 2021-12-13 RX ORDER — CHLORTHALIDONE 25 MG/1
12.5 TABLET ORAL DAILY
Status: DISCONTINUED | OUTPATIENT
Start: 2021-12-13 | End: 2021-12-14

## 2021-12-13 RX ORDER — HYDRALAZINE HYDROCHLORIDE 20 MG/ML
INJECTION INTRAMUSCULAR; INTRAVENOUS
Status: COMPLETED
Start: 2021-12-13 | End: 2021-12-13

## 2021-12-13 RX ORDER — ONDANSETRON 2 MG/ML
4 INJECTION INTRAMUSCULAR; INTRAVENOUS ONCE
Status: COMPLETED | OUTPATIENT
Start: 2021-12-13 | End: 2021-12-13

## 2021-12-13 RX ORDER — EPINEPHRINE 0.3 MG/.3ML
0.3 INJECTION SUBCUTANEOUS ONCE
Status: DISCONTINUED | OUTPATIENT
Start: 2021-12-13 | End: 2021-12-13 | Stop reason: CLARIF

## 2021-12-13 RX ORDER — SODIUM CHLORIDE 0.9 % (FLUSH) 0.9 %
5-40 SYRINGE (ML) INJECTION EVERY 12 HOURS SCHEDULED
Status: DISCONTINUED | OUTPATIENT
Start: 2021-12-13 | End: 2021-12-15 | Stop reason: HOSPADM

## 2021-12-13 RX ORDER — HYDRALAZINE HYDROCHLORIDE 20 MG/ML
5 INJECTION INTRAMUSCULAR; INTRAVENOUS EVERY 6 HOURS PRN
Status: DISCONTINUED | OUTPATIENT
Start: 2021-12-13 | End: 2021-12-13

## 2021-12-13 RX ORDER — SODIUM CHLORIDE 9 MG/ML
25 INJECTION, SOLUTION INTRAVENOUS PRN
Status: DISCONTINUED | OUTPATIENT
Start: 2021-12-13 | End: 2021-12-15 | Stop reason: HOSPADM

## 2021-12-13 RX ORDER — ACETAMINOPHEN 325 MG/1
650 TABLET ORAL ONCE
Status: COMPLETED | OUTPATIENT
Start: 2021-12-13 | End: 2021-12-13

## 2021-12-13 RX ORDER — ATORVASTATIN CALCIUM 20 MG/1
20 TABLET, FILM COATED ORAL DAILY
Status: DISCONTINUED | OUTPATIENT
Start: 2021-12-13 | End: 2021-12-13

## 2021-12-13 RX ORDER — HYDRALAZINE HYDROCHLORIDE 20 MG/ML
5 INJECTION INTRAMUSCULAR; INTRAVENOUS EVERY 6 HOURS PRN
Status: DISCONTINUED | OUTPATIENT
Start: 2021-12-13 | End: 2021-12-15 | Stop reason: HOSPADM

## 2021-12-13 RX ORDER — SODIUM CHLORIDE 0.9 % (FLUSH) 0.9 %
5-40 SYRINGE (ML) INJECTION PRN
Status: DISCONTINUED | OUTPATIENT
Start: 2021-12-13 | End: 2021-12-15 | Stop reason: HOSPADM

## 2021-12-13 RX ORDER — ACETAMINOPHEN 325 MG/1
650 TABLET ORAL EVERY 6 HOURS
Status: DISCONTINUED | OUTPATIENT
Start: 2021-12-13 | End: 2021-12-15 | Stop reason: HOSPADM

## 2021-12-13 RX ORDER — HYDRALAZINE HYDROCHLORIDE 20 MG/ML
10 INJECTION INTRAMUSCULAR; INTRAVENOUS ONCE
Status: COMPLETED | OUTPATIENT
Start: 2021-12-13 | End: 2021-12-13

## 2021-12-13 RX ORDER — ASPIRIN 81 MG/1
81 TABLET, CHEWABLE ORAL DAILY
Status: DISCONTINUED | OUTPATIENT
Start: 2021-12-13 | End: 2021-12-15 | Stop reason: HOSPADM

## 2021-12-13 RX ORDER — PANTOPRAZOLE SODIUM 40 MG/1
40 TABLET, DELAYED RELEASE ORAL
Status: DISCONTINUED | OUTPATIENT
Start: 2021-12-14 | End: 2021-12-15 | Stop reason: HOSPADM

## 2021-12-13 RX ORDER — ACETAMINOPHEN 650 MG/1
650 SUPPOSITORY RECTAL EVERY 6 HOURS PRN
Status: DISCONTINUED | OUTPATIENT
Start: 2021-12-13 | End: 2021-12-15 | Stop reason: HOSPADM

## 2021-12-13 RX ORDER — ACETAMINOPHEN 325 MG/1
2 TABLET ORAL EVERY 4 HOURS
Status: ON HOLD | COMMUNITY
End: 2022-08-17 | Stop reason: HOSPADM

## 2021-12-13 RX ORDER — FAMOTIDINE 20 MG/1
20 TABLET, FILM COATED ORAL DAILY
Status: DISCONTINUED | OUTPATIENT
Start: 2021-12-13 | End: 2021-12-15 | Stop reason: HOSPADM

## 2021-12-13 RX ORDER — POLYETHYLENE GLYCOL 3350 17 G/17G
17 POWDER, FOR SOLUTION ORAL DAILY PRN
Status: DISCONTINUED | OUTPATIENT
Start: 2021-12-13 | End: 2021-12-15 | Stop reason: HOSPADM

## 2021-12-13 RX ORDER — LISINOPRIL 10 MG/1
5 TABLET ORAL DAILY
Status: DISCONTINUED | OUTPATIENT
Start: 2021-12-13 | End: 2021-12-14

## 2021-12-13 RX ORDER — ACETAMINOPHEN 325 MG/1
650 TABLET ORAL EVERY 6 HOURS PRN
Status: DISCONTINUED | OUTPATIENT
Start: 2021-12-13 | End: 2021-12-15 | Stop reason: HOSPADM

## 2021-12-13 RX ADMIN — ASPIRIN 81 MG CHEWABLE TABLET 81 MG: 81 TABLET CHEWABLE at 21:54

## 2021-12-13 RX ADMIN — FAMOTIDINE 20 MG: 20 TABLET ORAL at 21:54

## 2021-12-13 RX ADMIN — ATORVASTATIN CALCIUM 40 MG: 40 TABLET, FILM COATED ORAL at 21:54

## 2021-12-13 RX ADMIN — ACETAMINOPHEN 650 MG: 325 TABLET ORAL at 21:54

## 2021-12-13 RX ADMIN — ACETAMINOPHEN 650 MG: 325 TABLET ORAL at 20:07

## 2021-12-13 RX ADMIN — HYDRALAZINE HYDROCHLORIDE 10 MG: 20 INJECTION INTRAMUSCULAR; INTRAVENOUS at 20:06

## 2021-12-13 RX ADMIN — CLOPIDOGREL BISULFATE 75 MG: 75 TABLET ORAL at 21:54

## 2021-12-13 RX ADMIN — HYDRALAZINE HYDROCHLORIDE 10 MG: 20 INJECTION, SOLUTION INTRAMUSCULAR; INTRAVENOUS at 20:06

## 2021-12-13 RX ADMIN — SODIUM CHLORIDE, PRESERVATIVE FREE 10 ML: 5 INJECTION INTRAVENOUS at 21:54

## 2021-12-13 RX ADMIN — IOPAMIDOL 60 ML: 755 INJECTION, SOLUTION INTRAVENOUS at 14:34

## 2021-12-13 RX ADMIN — ONDANSETRON HYDROCHLORIDE 4 MG: 2 SOLUTION INTRAMUSCULAR; INTRAVENOUS at 14:50

## 2021-12-13 ASSESSMENT — ENCOUNTER SYMPTOMS
ABDOMINAL PAIN: 0
BLOOD IN STOOL: 0
CONSTIPATION: 0
NAUSEA: 0
COUGH: 0
SHORTNESS OF BREATH: 1
DIARRHEA: 0
SORE THROAT: 0
WHEEZING: 0
BACK PAIN: 0
VOMITING: 0
RHINORRHEA: 0

## 2021-12-13 ASSESSMENT — PAIN SCALES - GENERAL
PAINLEVEL_OUTOF10: 0
PAINLEVEL_OUTOF10: 9
PAINLEVEL_OUTOF10: 7

## 2021-12-13 NOTE — ED PROVIDER NOTES
ATTENDING PROVIDER ATTESTATION:     Berenice Calle presented to the emergency department for evaluation of Headache (Patient has increased headaches and dizziness) and Dizziness   and was initially evaluated by the Medical Resident. See Original ED Note for H&P and ED course above. I have reviewed and discussed the case, including pertinent history (medical, surgical, family and social) and exam findings with the Medical Resident assigned to Berenice Calle. I have personally performed and/or participated in the history, exam, medical decision making, and procedures and agree with all pertinent clinical information and any additional changes or corrections are noted below in my assessment and plan. I have discussed this patient in detail with the resident, and provided the instruction and education,       I have reviewed my findings and recommendations with the assigned Medical Resident, Berenice Calle and members of family present at the time of disposition. I have performed a history and physical examination of this patient and directly supervised the resident caring for this patient      History of Present Illness:    Presents to the ED for headache and dizziness, beginning this morning. The complaint has been constant, moderate in severity, and worsened by movement. Reports around 8am he had sudden onset of headache, severe, as well as neck pain. Reports he was feeling dizzy/off balance and unsteady at the same time. All symptoms persisted and he went to the [place]. He was sent here for further testing. Sx constant since 8 this morning. He says he is off balance like he is drunk. No vertigo. No syncope. No vision changes. No other neuro sx. No unilateral weakness or deficits. No vomiting. No trauma. No chest pain or shortness of breath.         Review of Systems:   A complete review of systems was performed and pertinent positives and negatives are stated within HPI, all other systems reviewed and record    My Medical Decision Making:         Headache and ataxia. ?HTN related. ?CVA related. Not a TPA candidate based on LKW. CTA neg for LVO. I spoke with Dr. Cleo Goetz, discussed CTA, he recommended medical management as there is no LVO. Medicine to be consulted for admission        1. Nonintractable headache, unspecified chronicity pattern, unspecified headache type    2. Dizziness    3.  Hypertension, unspecified type           Magaly Sánchez MD  12/13/21 [id number]

## 2021-12-13 NOTE — ED PROVIDER NOTES
60-year-old male presenting for acute onset headache, neck pain, and dizziness. He states that he went to the bathroom today when the lights became very bright, then shut off, and then turned back to normal again. He states that he developed severe headache, neck pain, and blurry vision afterwards. He states he has felt off balance he does complain of some shortness of breath. He states that he went to his doctor at the [place] today, and his blood pressure was high, and he was sent here. He denies any recent fevers, chills, chest pain, nausea, emesis, and diarrhea. Review of Systems   Constitutional: Negative for chills and fever. HENT: Negative for congestion and sore throat. Eyes: Positive for visual disturbance. Respiratory: Positive for shortness of breath. Negative for cough. Cardiovascular: Negative for chest pain. Gastrointestinal: Negative for abdominal pain and nausea. Genitourinary: Negative for dysuria and flank pain. Musculoskeletal: Positive for neck pain. Negative for back pain. Skin: Negative for rash and wound. Neurological: Positive for dizziness and headaches. Negative for weakness and numbness. Physical Exam  Constitutional:       General: He is not in acute distress. Appearance: He is not ill-appearing. HENT:      Head: Normocephalic and atraumatic. Right Ear: External ear normal.      Left Ear: External ear normal.      Nose: Nose normal.   Eyes:      Extraocular Movements: Extraocular movements intact. Conjunctiva/sclera: Conjunctivae normal.      Pupils: Pupils are equal, round, and reactive to light. Cardiovascular:      Rate and Rhythm: Normal rate and regular rhythm. Pulmonary:      Effort: Pulmonary effort is normal.      Breath sounds: Normal breath sounds. Abdominal:      General: There is no distension. Palpations: Abdomen is soft. Tenderness: There is no abdominal tenderness.    Musculoskeletal:         General: No swelling or deformity. Skin:     General: Skin is warm and dry. Neurological:      General: No focal deficit present. Mental Status: He is alert. Cranial Nerves: No cranial nerve deficit. Sensory: No sensory deficit. Motor: No weakness. Psychiatric:         Mood and Affect: Mood normal.         Behavior: Behavior normal.          Procedures     MDM  Number of Diagnoses or Management Options  Dizziness  Hypertension, unspecified type  Nonintractable headache, unspecified chronicity pattern, unspecified headache type  Diagnosis management comments: 78 yo male presenting for acute onset of headache, neck ache, dizziness, and blurry vision. CTA head and neck showed severe stenosis vs occlusion of a right P2 superior branch and stenosis of proximal P2 segment. Interventional neurologist was consulted, no plans for intervention. Patient admitted to [facility] for further evaluation and treatment. Amount and/or Complexity of Data Reviewed  Clinical lab tests: reviewed                  --------------------------------------------- PAST HISTORY ---------------------------------------------  Past Medical History:  has a past medical history of Cancer ([facility] [address] Erectile dysfunction, Hyperlipidemia, Hypertension, Osteoarthritis, hip/pelvic region and thigh, and Prostate cancer ([facility] [address] Past Surgical History:  has a past surgical history that includes Prostate Biopsy; Leg Surgery; hip surgery (Right); Lumbar spine surgery (10/30/2017); and back surgery. Social History:  reports that he quit smoking about 3 years ago. He has a 2.50 pack-year smoking history. He has never used smokeless tobacco. He reports current alcohol use. He reports that he does not use drugs. Family History: family history is not on file. The patients home medications have been reviewed.     Allergies: Ibuprofen and Benzocaine    -------------------------------------------------- RESULTS -------------------------------------------------    LABS:  Results for orders placed or performed during the hospital encounter of 12/13/21   CBC Auto Differential   Result Value Ref Range    WBC 5.9 4.5 - 11.5 E9/L    RBC 4.92 3.80 - 5.80 E12/L    Hemoglobin 14.4 12.5 - 16.5 g/dL    Hematocrit 42.8 37.0 - 54.0 %    MCV 87.0 80.0 - 99.9 fL    MCH 29.3 26.0 - 35.0 pg    MCHC 33.6 32.0 - 34.5 %    RDW 13.4 11.5 - 15.0 fL    Platelets 750 791 - 160 E9/L    MPV 10.7 7.0 - 12.0 fL    Neutrophils % 75.1 43.0 - 80.0 %    Immature Granulocytes % 0.5 0.0 - 5.0 %    Lymphocytes % 15.5 (L) 20.0 - 42.0 %    Monocytes % 7.7 2.0 - 12.0 %    Eosinophils % 1.0 0.0 - 6.0 %    Basophils % 0.2 0.0 - 2.0 %    Neutrophils Absolute 4.40 1.80 - 7.30 E9/L    Immature Granulocytes # 0.03 E9/L    Lymphocytes Absolute 0.91 (L) 1.50 - 4.00 E9/L    Monocytes Absolute 0.45 0.10 - 0.95 E9/L    Eosinophils Absolute 0.06 0.05 - 0.50 E9/L    Basophils Absolute 0.01 0.00 - 0.20 E9/L   Comprehensive Metabolic Panel w/ Reflex to MG   Result Value Ref Range    Sodium 144 132 - 146 mmol/L    Potassium reflex Magnesium 4.0 3.5 - 5.0 mmol/L    Chloride 106 98 - 107 mmol/L    CO2 25 22 - 29 mmol/L    Anion Gap 13 7 - 16 mmol/L    Glucose 119 (H) 74 - 99 mg/dL    BUN 22 6 - 23 mg/dL    CREATININE 1.0 0.7 - 1.2 mg/dL    GFR Non-African American >60 >=60 mL/min/1.73    GFR African American >60     Calcium 9.8 8.6 - 10.2 mg/dL    Total Protein 7.5 6.4 - 8.3 g/dL    Albumin 4.0 3.5 - 5.2 g/dL    Total Bilirubin 0.3 0.0 - 1.2 mg/dL    Alkaline Phosphatase 140 (H) 40 - 129 U/L    ALT 14 0 - 40 U/L    AST 16 0 - 39 U/L   Troponin   Result Value Ref Range    Troponin, High Sensitivity 8 0 - 11 ng/L   Brain Natriuretic Peptide   Result Value Ref Range    Pro- (H) 0 - 125 pg/mL   Hemoglobin A1c   Result Value Ref Range    Hemoglobin A1C 5.9 (H) 4.0 - 5.6 %   Lipid panel   Result Value Ref Range    Cholesterol, Total 253 (H) 0 - 199 mg/dL    Triglycerides 78 0 - 149 mg/dL    HDL 71 >40 mg/dL    LDL Calculated 166 (H) 0 - 99 mg/dL    VLDL Cholesterol Calculated 16 mg/dL   EKG 12 Lead   Result Value Ref Range    Ventricular Rate 61 BPM    Atrial Rate 61 BPM    P-R Interval 114 ms    QRS Duration 98 ms    Q-T Interval 470 ms    QTc Calculation (Bazett) 473 ms    P Axis 43 degrees    R Axis 0 degrees    T Axis 67 degrees       RADIOLOGY:  XR CHEST PORTABLE   Final Result   No acute process. CTA HEAD W CONTRAST   Final Result   CT Head:      No acute intracranial hemorrhage or mass effect. No CT evidence of large acute infarct. Advanced chronic microvascular ischemic changes. CTA Neck:      No evidence of occlusion, high-grade stenosis, or dissection of the cervical   internal carotid and vertebral arteries. CTA Head:      Stenosis of the proximal right P2 segment. Focal occlusion versus severe   stenosis of a right P2 superior branch. No evidence of large vessel occlusion. The findings were sent to the Radiology Results [address] at 12:46   pm on 12/13/2021to be communicated to a licensed caregiver. CTA NECK W CONTRAST   Final Result   CT Head:      No acute intracranial hemorrhage or mass effect. No CT evidence of large acute infarct. Advanced chronic microvascular ischemic changes. CTA Neck:      No evidence of occlusion, high-grade stenosis, or dissection of the cervical   internal carotid and vertebral arteries. CTA Head:      Stenosis of the proximal right P2 segment. Focal occlusion versus severe   stenosis of a right P2 superior branch. No evidence of large vessel occlusion. The findings were sent to the Radiology Results [address] at 12:46   pm on 12/13/2021to be communicated to a licensed caregiver. MRI BRAIN WO CONTRAST    (Results Pending)       EKG: This EKG is signed and interpreted by me.     Rate: 58   Rhythm: Sinus  Interpretation: no ST changes, mild T wave inversions V2-V4  Comparison: changes compared to previous EKG      ------------------------- NURSING NOTES AND VITALS REVIEWED ---------------------------  Date / Time Roomed:  12/13/2021  1:10 PM  ED Bed Assignment:  [id number]    The nursing notes within the ED encounter and vital signs as below have been reviewed. Patient Vitals for the past 24 hrs:   BP Temp Temp src Pulse Resp SpO2 Height Weight   12/13/21 2100 (!) 153/90 97.1 °F (36.2 °C) Temporal 73 16 95 % -- --   12/13/21 1930 (!) 194/88 98.2 °F (36.8 °C) Oral 61 16 99 % -- --   12/13/21 1308 (!) 186/86 98.2 °F (36.8 °C) -- 61 14 98 % 5' 11\" (1.803 m) 181 lb (82.1 kg)       Oxygen Saturation Interpretation: Normal    ------------------------------------------ PROGRESS NOTES ------------------------------------------    Counseling:  I have spoken with the patient and discussed todays results, in addition to providing specific details for the plan of care and counseling regarding the diagnosis and prognosis. Their questions are answered at this time and they are agreeable with the plan of admission.    --------------------------------- ADDITIONAL PROVIDER NOTES ---------------------------------    This patient's ED course included: a personal history and physicial examination, re-evaluation prior to disposition, multiple bedside re-evaluations and IV medications    This patient has remained hemodynamically stable during their ED course. Diagnosis:  1. Nonintractable headache, unspecified chronicity pattern, unspecified headache type    2. Dizziness    3. Hypertension, unspecified type        Disposition:  Patient's disposition: Admit to telemetry  Patient's condition is stable.               Bk Colin MD  Resident  12/14/21 [id number]

## 2021-12-13 NOTE — ED NOTES
Radiology Procedure Waiver   Name: Marc Chun  : 1955  MRN: [id number]    Date:  21    Time: 2:11 PM EST    Benefits of immediately proceeding with Radiology exam(s) without pre-testing outweigh the risks or are not indicated as specified below and therefore the following is/are being waived:    [] Pregnancy test   [] Patients LMP on-time and regular.   [] Patient had Tubal Ligation or has other Contraception Device. [] Patient  is Menopausal or Premenarcheal.    [] Patient had Full or Partial Hysterectomy. [] Protocol for Iodine allergy    [] MRI Questionnaire     [x] BUN/Creatinine   [] Patient age w/no hx of renal dysfunction. [] Patient on Dialysis. [] Recent Normal Labs.   Electronically signed by Jerry Cruz MD on 21 at 2:11 PM EST               Jerry Cruz MD  Resident  [phone]

## 2021-12-14 ENCOUNTER — APPOINTMENT (OUTPATIENT)
Dept: MRI IMAGING | Age: 66
DRG: 305 | End: 2021-12-14
Payer: OTHER GOVERNMENT

## 2021-12-14 LAB
ALBUMIN SERPL-MCNC: 3.4 G/DL (ref 3.5–5.2)
ALP BLD-CCNC: 139 U/L (ref 40–129)
ALT SERPL-CCNC: 15 U/L (ref 0–40)
ANION GAP SERPL CALCULATED.3IONS-SCNC: 12 MMOL/L (ref 7–16)
AST SERPL-CCNC: 19 U/L (ref 0–39)
BASOPHILS ABSOLUTE: 0.02 E9/L (ref 0–0.2)
BASOPHILS RELATIVE PERCENT: 0.4 % (ref 0–2)
BILIRUB SERPL-MCNC: 0.3 MG/DL (ref 0–1.2)
BUN BLDV-MCNC: 22 MG/DL (ref 6–23)
CALCIUM SERPL-MCNC: 9.4 MG/DL (ref 8.6–10.2)
CHLORIDE BLD-SCNC: 101 MMOL/L (ref 98–107)
CHOLESTEROL, TOTAL: 253 MG/DL (ref 0–199)
CO2: 21 MMOL/L (ref 22–29)
CREAT SERPL-MCNC: 1 MG/DL (ref 0.7–1.2)
EOSINOPHILS ABSOLUTE: 0.1 E9/L (ref 0.05–0.5)
EOSINOPHILS RELATIVE PERCENT: 2.1 % (ref 0–6)
GFR AFRICAN AMERICAN: >60
GFR NON-AFRICAN AMERICAN: >60 ML/MIN/1.73
GLUCOSE BLD-MCNC: 108 MG/DL (ref 74–99)
HCT VFR BLD CALC: 41 % (ref 37–54)
HDLC SERPL-MCNC: 71 MG/DL
HEMOGLOBIN: 13.5 G/DL (ref 12.5–16.5)
IMMATURE GRANULOCYTES #: 0.01 E9/L
IMMATURE GRANULOCYTES %: 0.2 % (ref 0–5)
LDL CHOLESTEROL CALCULATED: 166 MG/DL (ref 0–99)
LYMPHOCYTES ABSOLUTE: 0.91 E9/L (ref 1.5–4)
LYMPHOCYTES RELATIVE PERCENT: 19.1 % (ref 20–42)
MCH RBC QN AUTO: 29.6 PG (ref 26–35)
MCHC RBC AUTO-ENTMCNC: 32.9 % (ref 32–34.5)
MCV RBC AUTO: 89.9 FL (ref 80–99.9)
MONOCYTES ABSOLUTE: 0.43 E9/L (ref 0.1–0.95)
MONOCYTES RELATIVE PERCENT: 9 % (ref 2–12)
NEUTROPHILS ABSOLUTE: 3.3 E9/L (ref 1.8–7.3)
NEUTROPHILS RELATIVE PERCENT: 69.2 % (ref 43–80)
PDW BLD-RTO: 13.3 FL (ref 11.5–15)
PLATELET # BLD: 233 E9/L (ref 130–450)
PMV BLD AUTO: 10.9 FL (ref 7–12)
POTASSIUM REFLEX MAGNESIUM: 3.7 MMOL/L (ref 3.5–5)
RBC # BLD: 4.56 E12/L (ref 3.8–5.8)
REASON FOR REJECTION: NORMAL
REJECTED TEST: NORMAL
SODIUM BLD-SCNC: 134 MMOL/L (ref 132–146)
TOTAL PROTEIN: 7.5 G/DL (ref 6.4–8.3)
TRIGL SERPL-MCNC: 78 MG/DL (ref 0–149)
VLDLC SERPL CALC-MCNC: 16 MG/DL
WBC # BLD: 4.8 E9/L (ref 4.5–11.5)

## 2021-12-14 PROCEDURE — 97530 THERAPEUTIC ACTIVITIES: CPT

## 2021-12-14 PROCEDURE — 99222 1ST HOSP IP/OBS MODERATE 55: CPT | Performed by: FAMILY MEDICINE

## 2021-12-14 PROCEDURE — 6370000000 HC RX 637 (ALT 250 FOR IP): Performed by: STUDENT IN AN ORGANIZED HEALTH CARE EDUCATION/TRAINING PROGRAM

## 2021-12-14 PROCEDURE — 6360000002 HC RX W HCPCS: Performed by: STUDENT IN AN ORGANIZED HEALTH CARE EDUCATION/TRAINING PROGRAM

## 2021-12-14 PROCEDURE — 2060000000 HC ICU INTERMEDIATE R&B

## 2021-12-14 PROCEDURE — 92523 SPEECH SOUND LANG COMPREHEN: CPT | Performed by: SPEECH-LANGUAGE PATHOLOGIST

## 2021-12-14 PROCEDURE — 92610 EVALUATE SWALLOWING FUNCTION: CPT | Performed by: SPEECH-LANGUAGE PATHOLOGIST

## 2021-12-14 PROCEDURE — 6370000000 HC RX 637 (ALT 250 FOR IP)

## 2021-12-14 PROCEDURE — 99222 1ST HOSP IP/OBS MODERATE 55: CPT | Performed by: PSYCHIATRY & NEUROLOGY

## 2021-12-14 PROCEDURE — 2580000003 HC RX 258: Performed by: STUDENT IN AN ORGANIZED HEALTH CARE EDUCATION/TRAINING PROGRAM

## 2021-12-14 PROCEDURE — 97165 OT EVAL LOW COMPLEX 30 MIN: CPT

## 2021-12-14 PROCEDURE — 97535 SELF CARE MNGMENT TRAINING: CPT

## 2021-12-14 PROCEDURE — 36415 COLL VENOUS BLD VENIPUNCTURE: CPT

## 2021-12-14 PROCEDURE — 70551 MRI BRAIN STEM W/O DYE: CPT

## 2021-12-14 PROCEDURE — 80053 COMPREHEN METABOLIC PANEL: CPT

## 2021-12-14 PROCEDURE — 85025 COMPLETE CBC W/AUTO DIFF WBC: CPT

## 2021-12-14 PROCEDURE — 97161 PT EVAL LOW COMPLEX 20 MIN: CPT

## 2021-12-14 RX ORDER — HYDRALAZINE HYDROCHLORIDE 20 MG/ML
5 INJECTION INTRAMUSCULAR; INTRAVENOUS ONCE
Status: COMPLETED | OUTPATIENT
Start: 2021-12-14 | End: 2021-12-14

## 2021-12-14 RX ORDER — CHLORTHALIDONE 25 MG/1
12.5 TABLET ORAL DAILY
Status: DISCONTINUED | OUTPATIENT
Start: 2021-12-14 | End: 2021-12-15 | Stop reason: HOSPADM

## 2021-12-14 RX ORDER — LISINOPRIL 10 MG/1
10 TABLET ORAL DAILY
Status: DISCONTINUED | OUTPATIENT
Start: 2021-12-14 | End: 2021-12-15

## 2021-12-14 RX ADMIN — ACETAMINOPHEN 650 MG: 325 TABLET ORAL at 15:03

## 2021-12-14 RX ADMIN — HYDRALAZINE HYDROCHLORIDE 5 MG: 20 INJECTION INTRAMUSCULAR; INTRAVENOUS at 18:41

## 2021-12-14 RX ADMIN — SODIUM CHLORIDE, PRESERVATIVE FREE 10 ML: 5 INJECTION INTRAVENOUS at 08:43

## 2021-12-14 RX ADMIN — ASPIRIN 81 MG CHEWABLE TABLET 81 MG: 81 TABLET CHEWABLE at 08:43

## 2021-12-14 RX ADMIN — ATORVASTATIN CALCIUM 40 MG: 40 TABLET, FILM COATED ORAL at 19:56

## 2021-12-14 RX ADMIN — SODIUM CHLORIDE, PRESERVATIVE FREE 10 ML: 5 INJECTION INTRAVENOUS at 21:04

## 2021-12-14 RX ADMIN — FAMOTIDINE 20 MG: 20 TABLET ORAL at 08:43

## 2021-12-14 RX ADMIN — ACETAMINOPHEN 650 MG: 325 TABLET ORAL at 03:05

## 2021-12-14 RX ADMIN — HYDRALAZINE HYDROCHLORIDE 5 MG: 20 INJECTION INTRAMUSCULAR; INTRAVENOUS at 11:53

## 2021-12-14 RX ADMIN — ACETAMINOPHEN 650 MG: 325 TABLET ORAL at 21:04

## 2021-12-14 RX ADMIN — PANTOPRAZOLE SODIUM 40 MG: 40 TABLET, DELAYED RELEASE ORAL at 05:12

## 2021-12-14 RX ADMIN — ACETAMINOPHEN 650 MG: 325 TABLET ORAL at 08:43

## 2021-12-14 RX ADMIN — LISINOPRIL 10 MG: 10 TABLET ORAL at 09:43

## 2021-12-14 RX ADMIN — CLOPIDOGREL BISULFATE 75 MG: 75 TABLET ORAL at 08:43

## 2021-12-14 RX ADMIN — HYDRALAZINE HYDROCHLORIDE 5 MG: 20 INJECTION INTRAMUSCULAR; INTRAVENOUS at 20:05

## 2021-12-14 RX ADMIN — CHLORTHALIDONE 12.5 MG: 25 TABLET ORAL at 17:42

## 2021-12-14 ASSESSMENT — PAIN SCALES - GENERAL
PAINLEVEL_OUTOF10: 0
PAINLEVEL_OUTOF10: 6
PAINLEVEL_OUTOF10: 0

## 2021-12-14 NOTE — PROGRESS NOTES
[facility] Inpatient   Resident Progress Note    S:  Hospital day: 1    Brief Synopsis: Zohaib Mahajan is a 77 y.o. male with a PMH of of HTN, Prostate cancer, HLD and OA of hip/pelvis who presents for acute onset headache, neck pain, blurry vision and dizziness. He states that he went to the bathroom to shower in the morning when the lights became very bright, then shut off, and then turned back to normal again. He states that he developed severe headache, neck pain, and blurry vision afterwards. He went to his doctor at the [place], and his blood pressure was high, so he was sent to the ED. He had an episode of hypertensive urgency in th past. His home meds for HTN are lisinopril 20 mg daily and chlorthalidone 25 mg daily. He only takes lisinopril, last time was this morning at 5 am. He is not taking any other medications. No acute events overnight. Patient was seen and examined this morning. He continues to have headache and blurry vision, but better than yesterday. His BP is still elevated, plan to resume BP medication today. Denies N/V, chest pain and SOB. Cont meds:    sodium chloride       Scheduled meds:    lisinopril  10 mg Oral Daily    acetaminophen  650 mg Oral Q6H    chlorthalidone  12.5 mg Oral Daily    [START ON 2/9/2022] goserelin  10.8 mg SubCUTAneous Q3 Months    sodium chloride flush  5-40 mL IntraVENous 2 times per day    famotidine  20 mg Oral Daily    aspirin  81 mg Oral Daily    clopidogrel  75 mg Oral Daily    pantoprazole  40 mg Oral QAM AC    atorvastatin  40 mg Oral Nightly     PRN meds: [Held by provider] capsaicin, sodium chloride flush, sodium chloride, polyethylene glycol, acetaminophen **OR** acetaminophen, magnesium sulfate, potassium chloride, hydrALAZINE, perflutren lipid microspheres     I reviewed the patient's Past Medical and Surgical History, Medications and Allergies.     O:  VS: BP (!) 181/77   Pulse 62   Temp 96.3 °F (35.7 °C) (Temporal)   Resp 18   Ht 5' 11\" (1.803 m)   Wt 181 lb (82.1 kg)   SpO2 100%   BMI 25.24 kg/m²     Physical Exam:  Physical Exam  Vitals reviewed. Constitutional:       General: He is not in acute distress. HENT:      Head: Normocephalic and atraumatic. Mouth/Throat:      Mouth: Mucous membranes are moist.      Pharynx: Oropharynx is clear. Eyes:      Pupils: Pupils are equal, round, and reactive to light. Neck:      Vascular: No carotid bruit. Cardiovascular:      Rate and Rhythm: Normal rate and regular rhythm. Pulses: Normal pulses. Heart sounds: Normal heart sounds. Pulmonary:      Effort: Pulmonary effort is normal. No respiratory distress. Breath sounds: Normal breath sounds. No stridor. Abdominal:      General: Bowel sounds are normal.      Palpations: Abdomen is soft. Tenderness: There is abdominal tenderness. Musculoskeletal:         General: No swelling. Cervical back: Tenderness present. Right lower leg: No edema. Left lower leg: No edema. Skin:     General: Skin is warm and dry. Capillary Refill: Capillary refill takes less than 2 seconds. Findings: No rash. Neurological:      Mental Status: He is alert and oriented to person, place, and time. GCS: GCS eye subscore is 4. GCS verbal subscore is 5. GCS motor subscore is 6. Cranial Nerves: Cranial nerves are intact. Sensory: Sensation is intact. Motor: Motor function is intact. Psychiatric:         Thought Content: Thought content normal.         Judgment: Judgment normal.         Labs:  Na/K/Cl/CO2:  134/3.7/101/21 (12/14 1078)  BUN/Cr/glu/ALT/AST/amyl/lip:  22/1.0/--/15/19/--/-- (12/14 1801)  WBC/Hgb/Hct/Plts:  5.9/14.4/42.8/261 (12/13 1415)  estimated creatinine clearance is 77 mL/min (based on SCr of 1 mg/dL). Other pertinent labs as noted below    New Imaging:  MRI BRAIN WO CONTRAST   Final Result   1. No acute intracranial abnormality. No acute infarct.    2. Mild global parenchymal volume loss with moderate chronic microvascular   ischemic changes. XR CHEST PORTABLE   Final Result   No acute process. CTA HEAD W CONTRAST   Final Result   CT Head:      No acute intracranial hemorrhage or mass effect. No CT evidence of large acute infarct. Advanced chronic microvascular ischemic changes. CTA Neck:      No evidence of occlusion, high-grade stenosis, or dissection of the cervical   internal carotid and vertebral arteries. CTA Head:      Stenosis of the proximal right P2 segment. Focal occlusion versus severe   stenosis of a right P2 superior branch. No evidence of large vessel occlusion. The findings were sent to the Radiology Results [address] at 12:46   pm on 12/13/2021to be communicated to a licensed caregiver. CTA NECK W CONTRAST   Final Result   CT Head:      No acute intracranial hemorrhage or mass effect. No CT evidence of large acute infarct. Advanced chronic microvascular ischemic changes. CTA Neck:      No evidence of occlusion, high-grade stenosis, or dissection of the cervical   internal carotid and vertebral arteries. CTA Head:      Stenosis of the proximal right P2 segment. Focal occlusion versus severe   stenosis of a right P2 superior branch. No evidence of large vessel occlusion. The findings were sent to the Radiology Results [address] at 12:46   pm on 12/13/2021to be communicated to a licensed caregiver. 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  *      Hypertensive emergency  · /88 in ED, only taking lisinopril 20 mg daily at home  · Pro-  · Lipid panel showing total cholesterol 253 and   · HbA1C 5. 9  · Troponin WNL  · Reporting headaches, dizziness, blurry vision, nausea without vomiting and neck pain  · Given Zofran for nausea  · Given Hydralazine for elevated BP (goal decrease SBP < 25%)  · EKG in ED showing sinus bradycardia, new T wave inversions in V2, V3 and V4, flat T waves in lead 1 and V1  · Repeat EKG showing normal sinus rhythm  · Swallow evaluation passed  · Orthostatics negative  · CTA head/neck with contrast 12/13: stenosis of the right posterior cerebral artery in the P1 P2 segment and slow flow in the P3 segment  · MRI brain wo contrast 12/14: No acute intracranial abnormality. No acute infarct. Mild global parenchymal volume loss with moderate chronic microvascular ischemic changes. · Echo pending  · Neurovascular checks  · Increase home dose of atorvastatin to 40 mg  · Started on lisinopril 10 mg daily and chorthalidone 12.5 mg daily  · On telemetry  · Neurology recs: He is not a candidate for intervention.  Recommend dual antiplatelet therapy with ASA 81 mg daily and Plavix 75 mg daily for 90 days  · On telemetry  · Monitor BP     Hx of prostate cancer  · Following at [address]     Epigastrium tenderness (improving)  · On Pepcid and Protonix  · Monitor clinically        PT/OT consulted  DVT prophylaxis: PCDs  GI prophylaxis: Pepcid and Protonix  Diet: Adult regular  On telemetry  Full code      Electronically signed by Chema Monreal MD on 12/14/2021 at 11:37 AM  This case was discussed with attending physician Dr. Xi Westbrook

## 2021-12-14 NOTE — CARE COORDINATION
Patient sent in from the [place] clinic on [address] for high BP and dizziness/off balance. Call placed to the [place] transfer center at [facility] to notify of admission. Detailed VM left at [phone] ext [phone] with number for this CM for call back. Clinical information also faxed to [phone]. Will continue to follow.      Kulwant Shepherd RN.  Trini Dela Cruz  [phone]

## 2021-12-14 NOTE — PLAN OF CARE
Problem: Falls - Risk of:  Goal: Will remain free from falls  Description: Will remain free from falls  12/14/2021 1521 by Renato Estes RN  Outcome: Met This Shift  12/14/2021 0317 by Jacy Del Real RN  Outcome: Met This Shift  Goal: Absence of physical injury  Description: Absence of physical injury  12/14/2021 1521 by Renato Estes RN  Outcome: Met This Shift  12/14/2021 0317 by Jacy Del Real RN  Outcome: Met This Shift     Problem: Musculor/Skeletal Functional Status  Goal: Highest potential functional level  Outcome: Met This Shift  Goal: Absence of falls  Outcome: Met This Shift

## 2021-12-14 NOTE — H&P
[facility]  Family Medicine Residency Program  History and Physical    Patient:  Kristie Clemens 77 y.o. male MRN: [id number]     Date of Service: 12/13/2021    Hospital Day: 1      Chief complaint: Headache and Dizziness    History of Present Illness   The patient is a 77 y.o. male with PMH of HTN, Prostate cancer, HLD and OA of hip/pelvis who presents for acute onset headache, neck pain, blurry vision and dizziness. He states that he went to the bathroom to shower in the morning when the lights became very bright, then shut off, and then turned back to normal again. He states that he developed severe headache, neck pain, and blurry vision afterwards. He went to his doctor at the [facility], and his blood pressure was high, so he was sent to the ED. Also reports mild SOB and nausea without vomiting. Denies fever, chills, chest pain, palpitations, confusion and seizures. Reports smoking cigarettes and drinking alcohol only in few occasions, last time was 1 cigarette and 1 can of beer during last week. Denies other drugs. He had an episode of hypertensive urgency in th past. His home meds for HTN are lisinopril 20 mg daily and chlorthalidone 25 mg daily. He only takes lisinopril, last time was this morning at 5 am. States that the nephrology and the PCP, both, discontinued the chlorthalidone. He is not taking any other medications.     ED:  - Patient is hemodynamically stable  - VS: /86, repeated BP is 194/88  - Labs: glucose 119,  and Pro-  - CXR: no acute process  - CTA head/neck with contrast: stenosis of the proximal right P2 segment and focal occlusion vs severe stenosis of a right P2 superior branch  - EKG: sinus bradycardia, new T wave inversions in V2, V3 and V4, flat T waves in lead 1 and V1  - Medications: Zofran and Hydralazine     Patient admitted for hypertensive urgency and CVA      Medications   acetaminophen (TYLENOL) tablet 650 mg (has no administration in time range)   capsaicin (ZOSTRIX) 0.025 % cream ( Topical Held by provider 12/13/21 2041)   chlorthalidone (HYGROTON) tablet 12.5 mg ( Oral Automatically Held 12/16/21 0900)   lisinopril (PRINIVIL;ZESTRIL) tablet 5 mg ( Oral Automatically Held 12/16/21 0900)   goserelin (ZOLADEX) injection 10.8 mg (has no administration in time range)   sodium chloride flush 0.9 % injection 5-40 mL (has no administration in time range)   sodium chloride flush 0.9 % injection 5-40 mL (has no administration in time range)   0.9 % sodium chloride infusion (has no administration in time range)   polyethylene glycol (GLYCOLAX) packet 17 g (has no administration in time range)   acetaminophen (TYLENOL) tablet 650 mg (has no administration in time range)     Or   acetaminophen (TYLENOL) suppository 650 mg (has no administration in time range)   magnesium sulfate 2000 mg in 50 mL IVPB premix (has no administration in time range)   potassium chloride 10 mEq/100 mL IVPB (Peripheral Line) (has no administration in time range)   famotidine (PEPCID) tablet 20 mg (has no administration in time range)   aspirin chewable tablet 81 mg (has no administration in time range)   clopidogrel (PLAVIX) tablet 75 mg (has no administration in time range)   pantoprazole (PROTONIX) tablet 40 mg (has no administration in time range)   atorvastatin (LIPITOR) tablet 40 mg (has no administration in time range)   hydrALAZINE (APRESOLINE) injection 5 mg (has no administration in time range)   perflutren lipid microspheres (DEFINITY) injection 1.65 mg (has no administration in time range)   iopamidol (ISOVUE-370) 76 % injection 60 mL (60 mLs IntraVENous Given 12/13/21 1434)   ondansetron (ZOFRAN) injection 4 mg (4 mg IntraVENous Given 12/13/21 1450)   acetaminophen (TYLENOL) tablet 650 mg (650 mg Oral Given 12/13/21 2007)   hydrALAZINE (APRESOLINE) injection 10 mg (10 mg IntraVENous Given 12/13/21 2006)         Past Medical History:      Diagnosis Date    Cancer ([facility] PROSTATE    2005, patient reports bone mets    Erectile dysfunction     Hyperlipidemia     Hypertension 2005    Osteoarthritis, hip/pelvic region and thigh 3/29/2013    Prostate cancer ([address] 2/26/2013       Past Surgical History:        Procedure Laterality Date    BACK SURGERY      HIP SURGERY Right     LEG SURGERY      LUMBAR SPINE SURGERY  10/30/2017    PLIF L2 - S1   and laminectomy    PROSTATE BIOPSY         Medications Prior to Admission:    Prior to Admission medications    Medication Sig Start Date End Date Taking? Authorizing Provider   acetaminophen (TYLENOL) 325 MG tablet Take 2 tablets by mouth every 4 hours    Historical Provider, MD   chlorthalidone (HYGROTON) 25 MG tablet Take 0.5 tablets by mouth daily 6/28/21   Master Bailey MD   lisinopril (PRINIVIL;ZESTRIL) 5 MG tablet Take 1 tablet by mouth daily 6/28/21   Master Bailey MD   EPINEPHrine (EPIPEN 2-AGUSTO) 0.3 MG/0.3ML SOAJ injection Inject 0.3 mLs into the muscle once for 1 dose Use as directed for SEVERE allergic reaction 4/19/21 6/28/21  Sophia Maloney PA-C   sildenafil (REVATIO) 20 MG tablet Take 1 tablet by mouth as needed (30 minutes before intercourse) 2/24/21   Master Bailey MD   atorvastatin (LIPITOR) 20 MG tablet Take 1 tablet by mouth daily 2/22/21   Master Bailey MD   capsaicin (ZOSTRIX) 0.025 % cream Apply topically 2 times daily Apply topically 2 times daily. Apply topically 2 times daily Apply topically 2 times daily. 9/22/20   Magui Wade MD   goserelin (ZOLADEX) 10.8 MG injection Inject 10.8 mg into the skin every 3 months    Historical Provider, MD       Allergies:  Ibuprofen and Benzocaine    Social History:   TOBACCO:   reports that he quit smoking about 3 years ago. He has a 2.50 pack-year smoking history. He has never used smokeless tobacco.  ETOH:   reports current alcohol use. Family History:   No family history on file.     REVIEW OF SYSTEMS:    Review of Systems   Constitutional: Negative for chills, fatigue and fever.   HENT: Negative for congestion, rhinorrhea, sneezing and sore throat. Eyes: Positive for visual disturbance. Respiratory: Positive for shortness of breath. Negative for cough and wheezing. Cardiovascular: Negative for chest pain, palpitations and leg swelling. Gastrointestinal: Negative for abdominal pain, blood in stool, constipation, diarrhea, nausea and vomiting. Endocrine: Negative for polydipsia and polyuria. Genitourinary: Negative for difficulty urinating, dysuria, frequency and hematuria. Musculoskeletal: Positive for gait problem and neck pain. Negative for back pain. Neurological: Positive for dizziness and headaches. Negative for tremors, seizures and numbness. Psychiatric/Behavioral: Negative for confusion, hallucinations and suicidal ideas. Physical Exam   Vitals: BP (!) 194/88   Pulse 61   Temp 98.2 °F (36.8 °C) (Oral)   Resp 16   Ht 5' 11\" (1.803 m)   Wt 181 lb (82.1 kg)   SpO2 99%   BMI 25.24 kg/m²     Physical Exam  Vitals reviewed. Constitutional:       General: He is not in acute distress. HENT:      Head: Normocephalic and atraumatic. Nose: No congestion or rhinorrhea. Mouth/Throat:      Mouth: Mucous membranes are moist.      Pharynx: Oropharynx is clear. Eyes:      Pupils: Pupils are equal, round, and reactive to light. Neck:      Vascular: No carotid bruit. Cardiovascular:      Rate and Rhythm: Normal rate and regular rhythm. Pulses: Normal pulses. Heart sounds: Normal heart sounds. No murmur heard. Pulmonary:      Effort: Pulmonary effort is normal. No respiratory distress. Breath sounds: Normal breath sounds. Abdominal:      General: Bowel sounds are normal.      Palpations: Abdomen is soft. Tenderness: There is no guarding or rebound. Comments: Epigastrium tenderness   Musculoskeletal:      Cervical back: Tenderness present. Right lower leg: No edema. Left lower leg: No edema.    Skin: General: Skin is warm and dry. Capillary Refill: Capillary refill takes less than 2 seconds. Findings: No rash. Neurological:      General: No focal deficit present. Mental Status: He is alert and oriented to person, place, and time. Sensory: No sensory deficit. Psychiatric:         Thought Content: Thought content normal.         Judgment: Judgment normal.         Labs and Imaging Studies   Basic Labs  CBC:   Recent Labs     12/13/21  1415   WBC 5.9   RBC 4.92   HGB 14.4   HCT 42.8   MCV 87.0   MCH 29.3   MCHC 33.6   RDW 13.4      MPV 10.7       BMP:    Recent Labs     12/13/21  1415      K 4.0      CO2 25   BUN 22   CREATININE 1.0   GLUCOSE 119*   CALCIUM 9.8   PROT 7.5   LABALBU 4.0   BILITOT 0.3   ALKPHOS 140*   AST 16   ALT 14       LIVER PROFILE:   Recent Labs     12/13/21  1415   AST 16   ALT 14   BILITOT 0.3   ALKPHOS 140*       PT/INR:   No results for input(s): PROTIME, INR in the last 72 hours. APTT:   No results for input(s): APTT in the last 72 hours. Fasting Lipid Panel:    Lab Results   Component Value Date    CHOL 235 01/12/2021    TRIG 145 01/12/2021    HDL 67 01/12/2021       Cardiac Enzymes:    Lab Results   Component Value Date    CKTOTAL 153 10/26/2017    CKTOTAL 150 10/26/2017    CKTOTAL 139 09/08/2017    CKMB 0.6 11/24/2010    TROPONINI <0.01 01/13/2020    TROPONINI <0.01 08/28/2018    TROPONINI <0.01 09/21/2015       Imaging Studies:     XR CHEST PORTABLE  Result Date: 12/13/2021  FINDINGS: The lungs are without acute focal process. There is no effusion or pneumothorax. The cardiomediastinal silhouette is without acute process. The osseous structures are without acute process. No acute process. CTA NECK W CONTRAST  Result Date: 12/13/2021  FINDINGS: CT HEAD: No acute intracranial hemorrhage or mass effect. No CT evidence of large acute infarct. Advanced chronic microvascular ischemic changes. No evidence of hydrocephalus.  No acute calvarial fracture. CTA NECK: Aortic arch: Three-vessel aortic arch. Common carotids: The common carotid arteries are normal in course and caliber. Internal carotids: The internal carotid arteries demonstrate no hemodynamically significant stenosis or evidence of dissection. Vertebrals: The cervical vertebral arteries demonstrate no high-grade stenosis, occlusion, or dissection. Other findings: Subcentimeter thyroid nodules. Degenerative changes of the spine. Aplastic right frontal sinus. Hypoplastic left frontal sinus. CTA HEAD: ANTERIOR CIRCULATION: Intracranial ICA: No high-grade stenosis or occlusion of the intracranial internal carotid arteries. Hypoplastic right A1 segment MCA: No flow-limiting stenosis. MELISSA: No flow-limiting stenosis. POSTERIOR CIRCULATION: Intradural vertebral arteries: No flow-limiting stenosis. Basilar artery: No flow-limiting stenosis. PCA: Small right P1 segment. Mild-to-moderate narrowing of the right P2 segment (series 8, image 260, series 16, image 108, and series 14, image 116). Focal occlusion versus severe stenosis of a right P2 superior branch (series 8 image 266, series 16, image 117, and series 10, image 61). There is partial reconstitution of distal right PCA vasculature in the right occipital lobe. Dural sinuses: The major dural venus sinuses are grossly patent. CT Head: No acute intracranial hemorrhage or mass effect. No CT evidence of large acute infarct. Advanced chronic microvascular ischemic changes. CTA Neck: No evidence of occlusion, high-grade stenosis, or dissection of the cervical internal carotid and vertebral arteries. CTA Head: Stenosis of the proximal right P2 segment. Focal occlusion versus severe stenosis of a right P2 superior branch. No evidence of large vessel occlusion. The findings were sent to the Radiology Results [id number] at 12:46 pm on 12/13/2021to be communicated to a licensed caregiver.      CTA HEAD W CONTRAST  Result Date: 12/13/2021  FINDINGS: CT HEAD: No acute intracranial hemorrhage or mass effect. No CT evidence of large acute infarct. Advanced chronic microvascular ischemic changes. No evidence of hydrocephalus. No acute calvarial fracture. CTA NECK: Aortic arch: Three-vessel aortic arch. Common carotids: The common carotid arteries are normal in course and caliber. Internal carotids: The internal carotid arteries demonstrate no hemodynamically significant stenosis or evidence of dissection. Vertebrals: The cervical vertebral arteries demonstrate no high-grade stenosis, occlusion, or dissection. Other findings: Subcentimeter thyroid nodules. Degenerative changes of the spine. Aplastic right frontal sinus. Hypoplastic left frontal sinus. CTA HEAD: ANTERIOR CIRCULATION: Intracranial ICA: No high-grade stenosis or occlusion of the intracranial internal carotid arteries. Hypoplastic right A1 segment MCA: No flow-limiting stenosis. MELISSA: No flow-limiting stenosis. POSTERIOR CIRCULATION: Intradural vertebral arteries: No flow-limiting stenosis. Basilar artery: No flow-limiting stenosis. PCA: Small right P1 segment. Mild-to-moderate narrowing of the right P2 segment (series 8, image 260, series 16, image 108, and series 14, image 116). Focal occlusion versus severe stenosis of a right P2 superior branch (series 8 image 266, series 16, image 117, and series 10, image 61). There is partial reconstitution of distal right PCA vasculature in the right occipital lobe. Dural sinuses: The major dural venus sinuses are grossly patent. CT Head: No acute intracranial hemorrhage or mass effect. No CT evidence of large acute infarct. Advanced chronic microvascular ischemic changes. CTA Neck: No evidence of occlusion, high-grade stenosis, or dissection of the cervical internal carotid and vertebral arteries. CTA Head: Stenosis of the proximal right P2 segment. Focal occlusion versus severe stenosis of a right P2 superior branch.  No evidence of large vessel occlusion. The findings were sent to the Radiology Results [id number] at 12:46 pm on 12/13/2021to be communicated to a licensed caregiver. EKG: sinus bradycardia, new T wave inversions in V2, V3 and V4, flat T waves in lead 1 and V1      Resident's Assessment and Plan     Collette Mood is a 77 y.o. male    Principal Problem:    Occlusion and stenosis of right posterior cerebral artery  Active Problems:    Essential hypertension    Prostate cancer ([facility]    Acute cerebrovascular accident (CVA) ([facility]    History of gout  Resolved Problems:    * No resolved hospital problems.  *    CVA  · CTA head/neck with contrast showing stenosis of the proximal right P2 segment and focal occlusion vs severe stenosis of a right P2 superior branch  · Start ASA 81 mg and Plavix 75 mg daily for a minimum of 90 days  · Increase home dose of atorvastatin to 40 mg  · MRI brain wo contrast ordered  · Echo ordered  · Lipid panel ordered  · HbA1C ordered  · Neurovascular checks  · Swallow and speech evaluation ordered  · Orthostatics ordered  · On telemetry  · Neurology consulted    HTN urgency  · /86 --> 194/88  · Pro-  · Reporting headaches, dizziness, blurry vision, nausea without vomiting and neck pain  · Given Zofran for nausea  · Given Hydralazine for elevated BP (goal decrease SBP < 25%)  · Continue home med lisinopril 20 mg daily  · Not taking home med chlorthalidone  · On Hydralazine PRN  · Troponin ordered  · EKG ordered  · On telemetry  · Monitor BP    Hx of prostate cancer  · Following at [place]    Epigastrium tenderness  · On Pepcid and Protonix  · Monitor clinically      PT/OT consulted  DVT prophylaxis: PCDs  GI prophylaxis: Pepcid and Protonix  Diet: NPO until swallow evaluation, then okay to start adult regular low fat diet  On telemetry  Full code      Jocelyn Bumpers, MD, PGY-1  Attending physician: Dr. Romulo Villarreal

## 2021-12-14 NOTE — PROGRESS NOTES
[address] [facility]  Family Medicine Attending    S: 77 y.o. male with history of prostate cancer, hld, HTN presented to the ER for 1 day of headache, neck pain, blurry vision/double vision and dysequilibrium. He continues to have headaches and double vision and feels off balance when walking. He reports that he was taking 20mg of lisinopril but that his chlorthalidone had been stopped by the [place].     O: VS- Blood pressure (!) 153/71, pulse 61, temperature 97.8 °F (36.6 °C), temperature source Temporal, resp. rate 17, height 5' 11\" (1.803 m), weight 181 lb (82.1 kg), SpO2 100 %. Exam is as noted by resident with the following changes, additions or corrections:  Gen - lying in bed, NAD  Cardio - RRR, no murmur   Lungs - CTAB, no wheeze. Abd- BS+, soft, NT/ND  Ext- no peripheral edema   Neuro - strength 4/5 bilateral lower extremities , otherwise 5/5 . CN III- XII. 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 *      Plan:   Concern for CVA vs HTN emergency. On ASA and plavix. Started on statin. Titrate up lisinopril and chlorthalidone for bp control. Consult to neuro. MRI showed global parenchymal volume loss with moderate chronic microvascular ischemic changes. Attending Physician Statement  I have reviewed the chart, including any radiology or labs, and seen the patient with the resident(s). I personally reviewed and performed key elements of the history and exam.  I agree with the assessment, plan and orders as documented by the resident. Please refer to the resident note for additional information. [place] Gu[place].  Vicenta Rdz MD

## 2021-12-14 NOTE — CARE COORDINATION
Call received from Justino Juarez at the [place]. Patient is not service connected and does not have travel benefits. He did say the transfer center is closed at this time as they are full and do not have beds. Met with the patient at the bedside to discuss transition of care plan. Patient lives independently in a first floor condo with level entry to the building and three steps down to his condo. Patient has a rollator and quad cane. Patient's PCP is Destin Duarte at the [facility] and he gets his meds there as well. Patient also stated that he has [facility] and will be starting to see someone in our family practice clinic and will be getting some of his blood pressure medications through the clinic pharmacy at that time. Per the patient either his cousin will pick him up for transition to home or he will take the bus. Will continue to follow.      Ben Zhang RN.  [facility]  [phone]

## 2021-12-14 NOTE — PROGRESS NOTES
SPEECH/LANGUAGE PATHOLOGY  SPEECH/LANGUAGE/COGNITIVE EVALUATION   and PLAN OF CARE      PATIENT NAME:  Kristie Clemens  (male)     MRN:  [id number]    :  1955  (77 y.o.)  STATUS:  Inpatient: Room 0932/5038-V    TODAY'S DATE:  2021   Speech language pathology evaluation Start: 21, End: 21, ONE TIME, Standing Count: 1 Occurrences, R    Nato Good MD  REASON FOR REFERRAL: assess speech/language/cog  EVALUATING THERAPIST: Juana Cerna SLP    ADMITTING DIAGNOSIS: Dizziness [R42]  Nonintractable headache, unspecified chronicity pattern, unspecified headache type [R51.9]  Ac[facility] ([facility] ([facility] [I63.9]  Hypertension, unspecified type [I10]    VISIT DIAGNOSIS:   Visit Diagnoses       Codes    Nonintractable headache, unspecified chronicity pattern, unspecified headache type    -  Primary R51.9    Dizziness     R42    Hypertension, unspecified type     I10           SPEECH THERAPY  PLAN OF CARE   The speech therapy  POC is established based on physician order, speech pathology diagnosis and results of clinical assessment     SPEECH PATHOLOGY DIAGNOSIS:    Within function limits    Speech Pathology intervention is not warranted at this time. Conditions Requiring Skilled Therapeutic Intervention for speech, language and/or cognition  Not applicable     Specific Speech Therapy Interventions to Include:   Not applicable    Specific instructions for next treatment:    Not applicable    SHORT/LONG TERM GOALS  Not applicable      Patient goals: Patient/family involved in developing goals and treatment plan:   Treatment goals discussed with Patient    The Patient understand(s) the diagnosis, prognosis and plan of care   The patient/family Agreed with above,     This plan may be re-evaluated and revised as warranted.         Rehabilitation Potential/Prognosis: not applicable                CLINICAL ASSESSMENT:  MOTOR SPEECH       Oral Peripheral Examination   Adequate lingual/labial strength     Parameters of Speech Production  Respiration:  Adequate for speech production  Articulation:  Within functional limits  Resonance:  Within functional limits  Quality:   Within functional limits  Pitch: Within functional limits  Intensity: Within functional limits  Fluency:  Intact  Prosody Intact    RECEPTIVE LANGUAGE    Comprehension of Yes/No Questions: Within functional limits    Process  Simple Verbal Commands:   Within functional limits  Process Intermediate Verbal Commands:   Within functional limits  Process Complex Verbal Commands:     Within functional limits    Comprehension of Conversation:      Within functional limits      EXPRESSIVE LANGUAGE     Serials: Functional    Imitation:  Words   Functional   Sentences Functional    Naming:  (Modality used:  Verbal)  Confrontation Naming  Functional  Functional Description  Functional  Response Naming: Functional    Conversation:      Conversation was within functional limits    COGNITION     Attention/Orientation  Attention: Sustained attention   Orientation:  Oriented to Person, Place, Date, Reason for hospitalization    Memory   Immediate Recall: Repeated 3/3    Delayed Recall:   Recalled  2/3  Long Term Recall:   Recalled Address, Birthdate, Age, and Family    Organization/Problem Solving/Reasoning   Verbal Sequencing:   Functional        Verbal Problem solving:   Functional          CLINICAL OBSERVATIONS NOTED DURING THE EVALUATION  Within functional limits                  EDUCATION:   The Speech Language Pathologist (SLP) completed education regarding results of evaluation and that intervention is not warranted at this time.   Learner: Patient  Education: Reviewed results and recommendations of this evaluation  Evaluation of Education:  Verbalizes understanding    Evaluation Time includes thorough review of current medical information, gathering information on past medical history/social history and prior level of function, completion of standardized testing/informal observation of tasks, assessment of data and education on plan of care and goals. CPT code:    97615  eval speech sound lang comprehension      The admitting diagnosis and active problem list, as listed below have been reviewed prior to initiation of this evaluation.         ACTIVE PROBLEM LIST:   Patient Active Problem List   Diagnosis    Osteoarthritis, hip/pelvic region and thigh    Lytic bone lesion of hip on xray    Synovitis of hip    Degenerative arthritis of hip    Smoking    Intractable low back pain    Cauda equina spinal cord injury ([address]    Spinal stenosis of lumbar region at multiple levels    Essential hypertension    Osteoarthritis of both hips    Prostate cancer ([address]    Lower back injury    Weakness of left hand    Leg weakness, bilateral    Chronic pain syndrome    Lumbar disc disorder    Lumbar spondylosis    Spinal stenosis of lumbar region without neurogenic claudication    Primary osteoarthritis of both hips    Acute cerebrovascular accident (CVA) ([address]    Occlusion and stenosis of right posterior cerebral artery    History of gout       Migdalia Daniels CCC-SLP  Speech-Language Pathologist  [id number]  12/14/2021

## 2021-12-14 NOTE — FLOWSHEET NOTE
12/14/21 0530   Vital Signs   Orthostatic B/P and Pulse?  Yes   Blood Pressure Lying 160/77   Pulse Lying 52 PER MINUTE   Blood Pressure Sitting 163/88   Pulse Sitting 60 PER MINUTE   Blood Pressure Standing 191/94   Pulse Standing 63 PER MINUTE

## 2021-12-14 NOTE — CONSULTS
[address] [address]  Neurology Consult    Date:  12/14/2021  Patient Name:  Kit Orourke  YOB: 1955  MRN: [id number]     PCP:  Gill Stephenson   Referring:  No ref. provider found      Chief Complaint: headache    History obtained from: Patient, EMR    Assessment  Kit Orourke is a 77 y.o. male presenting with symptoms of hypertensive emergency after negative stroke workup, but occlusion of R PCA was found. Patient also has hx of significant spinal disease. Dizziness, headache and neck pain all came on simultaneously after reaching for shower handle. Although hypertensive emergency can better explain symptoms, cervicogenic dizziness or cervicogenic headache can be considered in differential if treatment of hypertensive emergency does not reece symptoms. Plan  · Recommend treatment of hypertensive emergency  · Recommend DAPT for 90 days        History of Present Illness:  Kit Orourke is a 77 y.o. left handed male presenting for evaluation of dizziness, headache and neck pain that began suddenly when patient was reaching for shower handle. Per patient's recollection, neck pain, dizziness and headache came on at the same time and this prompted patient to go to [facility]. Dizziness is characterized as \"feeling drunk\" and \"lightheadedness\". Patient was told there that his BP was very high and that he should go to the ER. In ED CTA showed Stenosis of the proximal right P2 segment and interventional neuro was consulted. They did not recommend surgical management and advised for 90 days of DAPT. MRI the following day showed no evidence of acute intracranial process.            Review of Systems:  Constitutional  · Weight loss: no  · Fever: no    Eyes  · Double Vision: no  · Visions loss: no    Ears, Nose, Mouth, and Throat  · Difficulty swallowing: no    Cardiovascular  · Chest Pain: no    Respiratory  · Shortness of Breath: no    Gastrointestinal  · Abdominal Pain: Louise Neves MD   10 mL at 12/14/21 0843    sodium chloride flush 0.9 % injection 5-40 mL  5-40 mL IntraVENous PRN Louise Neves MD        0.9 % sodium chloride infusion  25 mL IntraVENous PRN Louise Neves MD        polyethylene glycol (GLYCOLAX) packet 17 g  17 g Oral Daily PRN Louise Neves MD        acetaminophen (TYLENOL) tablet 650 mg  650 mg Oral Q6H PRN Louise Neves MD        Or    acetaminophen (TYLENOL) suppository 650 mg  650 mg Rectal Q6H PRN Louise Neves MD        magnesium sulfate 2000 mg in 50 mL IVPB premix  2,000 mg IntraVENous PRN Louise Neves MD        potassium chloride 10 mEq/100 mL IVPB (Peripheral Line)  10 mEq IntraVENous PRN Louise Neves MD        famotidine (PEPCID) tablet 20 mg  20 mg Oral Daily Louise Neves MD   20 mg at 12/14/21 0843    aspirin chewable tablet 81 mg  81 mg Oral Daily Louise Neves MD   81 mg at 12/14/21 0843    clopidogrel (PLAVIX) tablet 75 mg  75 mg Oral Daily Louise Neves MD   75 mg at 12/14/21 0843    pantoprazole (PROTONIX) tablet 40 mg  40 mg Oral QAM AC Louise Neves MD   40 mg at 12/14/21 0512    atorvastatin (LIPITOR) tablet 40 mg  40 mg Oral Nightly Louise Neves MD   40 mg at 12/13/21 2154    hydrALAZINE (APRESOLINE) injection 5 mg  5 mg IntraVENous Q6H PRN Louise Neves MD   5 mg at 12/14/21 1153    perflutren lipid microspheres (DEFINITY) injection 1.65 mg  1.5 mL IntraVENous ONCE PRN Louise Neves MD            Allergies:       Allergies   Allergen Reactions    Ibuprofen Swelling    Benzocaine      EDEMA         Physical Examination  Vitals   Vitals:    12/13/21 2100 12/14/21 0830 12/14/21 1145 12/14/21 1245   BP: (!) 153/90 (!) 181/77 (!) 215/101 (!) 153/71   Pulse: 73 62 61    Resp: 16 18 17    Temp: 97.1 °F (36.2 °C) 96.3 °F (35.7 °C) 97.8 °F (36.6 °C)    TempSrc: Temporal Temporal Temporal    SpO2: 95% 100% 100%    Weight:       Height:            General: Patient appears in no acute distress  HEENT: Normocephalic, atraumatic  Chest: Clear to auscultation bilaterally  Heart: Regular rate and rhythm, no murmurs appreciated  Extremities: No edema or cyanosis noted    Neurologic Examination    Mental Status  Alert, and oriented to person, place and time with normal speech and language. No evidence of aphasia during conversation. No evidence of memory impairment. Attention and concentration appeared normal.     Cranial Nerves  II. Visual fields full to confrontation bilaterally. III, IV, VI: Pupils equally round and reactive to light, 3 to 2 mm bilaterally. EOMs: full, no nystagmus. V. Facial sensation intact to light touch bilaterally  VII: Facial movements symmetric and strong  VIII: Hearing intact to voice  IX,X: Palate elevates symmetrically. No dysarthria  XI: Sternocleidomastoid and trapezius 5/5 bilaterally   XII: Tongue is midline    Motor     Right Left   Right Left   Deltoid 5 5  Hip Flexion 5 4   Biceps      5  5  Knee Extension 5 4   Triceps 5 5  Knee Flexion 5 4   Handgrip 5 5  Ankle Dorsiflexion 5 4       Ankle Plantarflexion 5 4     Tone: Normal in all four limbs    Bulk: Normal in all four limbs with no evidence of atrophy    Pronator drift: absent bilaterally    Sensation  · Light Touch: Intact distally in all four limbs  · Pinprick: Intact distally in all four limbs    Reflexes     Right Left   Biceps 2 2   Brachioradialis 2 2   Triceps - -   Patellar 2 2   Achilles 2 2   ankle clonus none none     Toes down going bilaterally.     Coordination  Rapid alternating movements normal in bilateral upper extremities  Finger to nose testing normal bilaterally      Labs  Recent Labs     12/13/21  1415 12/13/21  1415 12/14/21  0634 12/14/21  1114      < > 134  --    K 4.0   < > 3.7  --       < > 101  --    CO2 25   < > 21*  --    BUN 22   < > 22  --    CREATININE 1.0   < > 1.0  --    GLUCOSE 119*   < > 108*  --    CALCIUM 9.8   < > 9.4  --    PROT 7.5   < > 7.5  --    LABALBU 4.0   < > 3.4*  --    BILITOT 0.3   < > 0.3  --    ALKPHOS 140*   < > 139*  --    AST 16   < > 19  --    ALT 14   < > 15  --    WBC 5.9   < >  --  4.8   RBC 4.92   < >  --  4.56   HGB 14.4   < >  --  13.5   HCT 42.8   < >  --  41.0   MCV 87.0   < >  --  89.9   MCH 29.3   < >  --  29.6   MCHC 33.6   < >  --  32.9   RDW 13.4   < >  --  13.3      < >  --  233   MPV 10.7   < >  --  10.9   HDL 71  --   --   --    LDLCALC 166*  --   --   --    LABA1C 5.9*  --   --   --     < > = values in this interval not displayed. Imaging  MRI BRAIN WO CONTRAST   Final Result   1. No acute intracranial abnormality. No acute infarct. 2. Mild global parenchymal volume loss with moderate chronic microvascular   ischemic changes. XR CHEST PORTABLE   Final Result   No acute process. CTA HEAD W CONTRAST   Final Result   CT Head:      No acute intracranial hemorrhage or mass effect. No CT evidence of large acute infarct. Advanced chronic microvascular ischemic changes. CTA Neck:      No evidence of occlusion, high-grade stenosis, or dissection of the cervical   internal carotid and vertebral arteries. CTA Head:      Stenosis of the proximal right P2 segment. Focal occlusion versus severe   stenosis of a right P2 superior branch. No evidence of large vessel occlusion. The findings were sent to the Radiology Results [address] at 12:46   pm on 12/13/2021to be communicated to a licensed caregiver. CTA NECK W CONTRAST   Final Result   CT Head:      No acute intracranial hemorrhage or mass effect. No CT evidence of large acute infarct. Advanced chronic microvascular ischemic changes. CTA Neck:      No evidence of occlusion, high-grade stenosis, or dissection of the cervical   internal carotid and vertebral arteries. CTA Head:      Stenosis of the proximal right P2 segment. Focal occlusion versus severe   stenosis of a right P2 superior branch. No evidence of large vessel occlusion. The findings were sent to the Radiology Results [address] at 12:46   pm on 12/13/2021to be communicated to a licensed caregiver. Electronically signed by:  Syd Benz MD, 12/14/2021 5:58 PM

## 2021-12-14 NOTE — PROGRESS NOTES
Physical Therapy  Physical Therapy Initial Assessment     Name: Alexander Gaspar  : 1955  MRN: [id number]      Date of Service: 2021    Evaluating PT:  Isabel Cole PT, DPT [id number]     Room #:  3972/5599-G  Diagnosis:  Dizziness [R42]  Nonintractable headache, unspecified chronicity pattern, unspecified headache type [R51.9]  Acute cerebrovascular accident (CVA) ([facility] [address] [I63.9]  Hypertension, unspecified type [I10]  Reason for admission: Peck Fanny., Blurry vision, dizziness  Precautions:  Falls  Procedure/Surgery:  NA  Equipment Recommendations:  FWW vs. Rollator    SUBJECTIVE:  Pt lives alone in a 1st floor apartment with level entry and 3 steps down to apartment with 1 HR. Pt ambulated with rollator in community and QBC at home PTA. OBJECTIVE:   Initial Evaluation  Date:  Treatment   Short Term/ Long Term   Goals   AM-PAC 6 Clicks 50/49     Was pt agreeable to Eval/treatment? yes     Does pt have pain? HA 6-7/10     Bed Mobility  Rolling: SBA  Supine to sit: Thang via HHA  Sit to supine: SBA  Scooting: SBA  Indep   Transfers Sit to stand: Thang  Stand to sit: Thang  Stand pivot: NT  Indep   Ambulation    100 feet with FWW SBA    >400 feet with FWW Mod I   Stair negotiation: ascended and descended  NT  >4 steps with 1 rail Mod I   ROM BUE:  See OT eval   BLE:  WFL     Strength BUE:  See OT eval   BLE:  knee ext 4/5  Ankle DF 4/5  Increase by 1/3 MMT grade    Balance Sitting EOB:  Indep  Dynamic Standing:  SBA with FWW  Sitting EOB:  indep  Dynamic Standing:   Mod I with FWW     -Pt is A & O x 3  -Sensation:  Pt denies numbness and tingling to extremities  -Edema:  unremarkable     Therapeutic Exercises:  Functional activity     Patient education  Pt educated on safety, sequencing of transfers, and role of PT    Patient response to education:   Pt verbalized understanding Pt demonstrated skill Pt requires further education in this area   yes Yes Yes, reinforce     ASSESSMENT:  Conditions Requiring Skilled Therapeutic Intervention:    [x]Decreased strength     [x]Decreased ROM  [x]Decreased functional mobility  [x]Decreased balance   [x]Decreased endurance   [x]Decreased posture  []Decreased sensation  []Decreased coordination   []Decreased vision  [x]Decreased safety awareness   []Increased pain       Comments:  Pt received supine in bed and agreeable to PT session   Pt pleasantly alert, following commands, and able to respond appropriately. Pt not limited within session d/t dizziness, however, states that he has 6-7/10 dizziness. Dizziness improved with single point fixation. During functional evaluation, pt requires therapist close standby to light physical assistance for all activities. While ambulating pt demonstrates reduced gait speed, forward slouched posture, and difficulty making turns. Pt returned to bed and repositioned to [facility] to end the session. Pt educated to get rollator in room replaced or fixed d/t the brakes not working. Pt with all needs met and call light in reach. Pt would benefit from continued PT POC to address functional deficits described above. Treatment:  Patient practiced and was instructed in the following treatment:     Patient education provided continuously throughout session for sequencing, safety maintenance, and improving any deficits found during the evaluation.    Bed mobility training - pt given verbal and tactile cues to facilitate proper sequencing and safety during rolling and supine>sit as well as provided with physical assistance to complete task    Sitting EOB for >5 minutes for upright tolerance, postural awareness and BLE ROM   STS and pivot transfer training - pt educated on proper hand and foot placement, safety and sequencing, and use of proper technique to safely complete sit<>stand and pivot transfers with hands on assistance to complete task safely   Gait training- pt was given verbal and tactile cues to facilitate balance/safety during ambulation as well as provided with physical assistance. Pt's/ family goals   1. Return home    Patient and or family understand(s) diagnosis, prognosis, and plan of care. yes    Prognosis is good for reaching above PT goals. PHYSICAL THERAPY PLAN OF CARE:    PT POC is established based on physician order and patient diagnosis     Referring provider/PT Order:    12/14/21 0800  PT evaluation and treat      Tony Burris MD       Diagnosis:  Dizziness [R42]  Nonintractable headache, unspecified chronicity pattern, unspecified headache type [R51.9]  Acute cerebrovascular accident (CVA) ([facility] [address] [I63.9]  Hypertension, unspecified type [I10]  Specific instructions for next treatment:  Progress ambulation distance, trial stand pivot, and trial stair negotiation. Current Treatment Recommendations:   [x] Strengthening to improve independence with functional mobility   [x] ROM to improve independence with functional mobility   [x] Balance Training to improve static/dynamic balance and to reduce fall risk  [x] Endurance Training to improve activity tolerance during functional mobility   [x] Transfer Training to improve safety and independence with all functional transfers   [x] Gait Training to improve gait mechanics, endurance and asses need for appropriate assistive device  [x] Stair Training in preparation for safe discharge home and/or into the community   [] Positioning to prevent skin breakdown and contractures  [x] Safety and Education Training   [x] Patient/Caregiver Education   [] HEP  [] Other     PT long term treatment goals are located in above grid    Frequency of treatments: 2-5x/week x 1-2 weeks.     Time in  1330  Time out  1405    Total Treatment Time  25 minutes     Evaluation Time includes thorough review of current medical information, gathering information on past medical history/social history and prior level of function, completion of standardized testing/informal observation of tasks, assessment of data and education on plan of care and goals.     CPT codes:  [x] Low Complexity PT evaluation 04312  [] Moderate Complexity PT evaluation 58925  [] High Complexity PT evaluation 25022  [] PT Re-evaluation 84098  [] Gait training 44820 -- minutes  [] Manual therapy [id number] -- minutes  [x] Therapeutic activities 89908 25 minutes  [] Therapeutic exercises 54793 -- minutes  [] Neuromuscular reeducation 80228 -- minutes     Jana Luevano, FER rangel, PT, DPT  [id number]

## 2021-12-14 NOTE — PROGRESS NOTES
[address]       [facility]:                                                  Patient Name: Pierre Rolle  MRN: [id number]  : 1955  Room: [address] [place] [address]    Evaluating OT: Nitish Buchanan OTR/L #[id number]    Referring Provider and Specific Provider Orders/Date:      21  OT eval and treat  Start:  21,   End:  21,   ONE TIME,   Standing Count:  1 Occurrences,   Nanci Mondragon MD       Diagnosis: Dizziness [R42]  Nonintractable headache, unspecified chronicity pattern, unspecified headache type [R51.9]  Acute cerebrovascular accident (CVA) ([facility] [address] [I63.9]  Hypertension, unspecified type [I10]     Surgery: None     Pertinent Medical History:  has a past medical history of Cancer ([facility] [address] Erectile dysfunction, Hyperlipidemia, Hypertension, Osteoarthritis, hip/pelvic region and thigh, and Prostate cancer ([facility] [address] Precautions:  Fall Risk, falls and alarm, double vision.     Assessment of current deficits    [x] Functional mobility  [x]ADLs  [x] Strength               []Cognition   [x] Functional transfers   [x] IADLs         [x] Safety Awareness   []Endurance   [] Fine Coordination              [x] Balance      [x] Vision/perception   []Sensation    []Gross Motor Coordination  [] ROM  [] Delirium                   [] Motor Control     OT PLAN OF CARE   OT POC based on physician orders, patient diagnosis and results of clinical assessment    Frequency/Duration: 2-5 days/wk for 2 weeks PRN   Specific OT Treatment to include:   * Instruction/training on adapted ADL techniques and AE recommendations to increase functional independence within precautions       * Training on energy conservation strategies, correct breathing pattern and techniques to improve independence/tolerance for self-care routine  * Functional transfer/mobility training/DME recommendations for increased independence, safety, and fall prevention  * Patient/Family education to increase follow through with safety techniques and functional independence  * Recommendation of environmental modifications for increased safety with functional transfers/mobility and ADLs  * Therapeutic exercise to improve motor endurance, ROM, and functional strength for ADLs/functional transfers  * Therapeutic activities to facilitate/challenge dynamic balance, stand tolerance for increased safety and independence with ADLs    Modified Krystal Scale (MRS)  Score     Description  0             No symptoms  1             No significant disability despite symptoms  2             Slight disability; able to look after own affairs  3             Moderate disability; able to ambulate without assist/ requires assist with ADLs  4             Moderate/Severe disability;requires assist to ambulate/assist with ADLs  5             Severe disability;bedridden/incontinent   6               Score: 4     Recommended Adaptive Equipment: TBD pending discharge      Home Living: Lives alone, Condo, 1 story, 3 steps down to enter with rail. Bathroom set-up: tub/shower        Equipment owned: rollator, quad cane, elevated commode, shower chair. Prior Level of Function: Mod Indep with ADLs - uses AE for LB dressing , reports being mainly indep with IADLs (walks across street to grocery store), but niece assists with laundry. ; ambulated with use of rollator. Driving: No   Occupation: n/a  Enjoys: n/a      Pain Level: Pt denied pain.       Cognition: A&O: 4/4; Follows 2 step directions   Memory: intact    Sequencing: fair    Problem solving: fair    Judgement/safety: fair     Functional Assessment:  AM-PAC Daily Activity Raw Score: 16/24   Initial Eval Status  Date: 21 Treatment Status  Date: STGs = LTGs  Time frame: 10-14 days   Feeding Supervision     Independent    Grooming Minimal Assist   to maintain standing balance for hand hygiene at sink  Moderate Amite    UB Dressing Minimal Assist   To doff/don gown. Assist tying lace around neck   Moderate Amite    LB Dressing Minimal Assist   To doff/don socks with use of AE sitting at EOB    Moderate Amite    Bathing Moderate Assist     Moderate Amite    Toileting Moderate Assist     Moderate Amite    Bed Mobility  Supine to sit: Minimal Assist   Sit to supine: Stand by Assist   Supine to sit: Independent   Sit to supine: Independent    Functional Transfers Sit to stand: Minimal Assist  Stand to sit: Minimal Assist    Transfer training with verbal cues for hand placement throughout session to improve safety. Moderate Amite with use of wheeled walker    Functional Mobility Minimal Assist with wheeled walker to improve balance to/from bathroom, verbal cues for walker sequence and safety. Moderate Amite with use of wheeled walker    Balance Sitting:     Static: SBA    Dynamic: SBA  Standing: MIN A with walker   Sitting:     Static: IND    Dynamic: IND   Standing: IND with walker    Activity Tolerance fair    Increase standing tolerance >3 minutes for improved engagement with functional transfers and indep in ADLs   Visual/  Perceptual Glasses: Yes     Reports changes in vision since admission: pt reporting double vision       NA      Hand Dominance: right      AROM (PROM) Strength Additional Info:    RUE  WFL 3+/5 good  and wfl FMC/dexterity noted during ADL tasks     LUE WFL 3+/5 good  and wfl FMC/dexterity noted during ADL tasks; slow nancie         Hearing:  WFL   Sensation:   No c/o numbness or tingling  Tone:  WFL   Edema:      Vitals:   HR at rest:  HR with activity:  HR at end of session:    SpO2 at rest:  SpO2 with activity:  SpO2 at end of session:    BP at rest:  BP with activity:  BP at end of session:        Comments: RN cleared patient for OT. Upon arrival patient in supine.  Therapist facilitated and instructed pt on adapted  techniques & compensatory strategies to improve safety and independence with basic ADLs, bed mobility, functional transfers and mobility to allow pt to achieve highest level of independence and safely. Pt demonstrated fair understanding of education & follow through. At end of session, patient was in supine, alarm on, with call light and phone within reach, all lines and tubes intact. Overall, patient demonstrated  decreased independence and safety during completion of ADL tasks. Pt would benefit from continued skilled OT to increase safety and independence with completion of ADL tasks and functional mobility for improved quality of life. Treatment: OT treatment provided this date includes:    Instruction/training on safety and adapted techniques for completion of ADLs; utilized AE for LB dressing. Discussed DME at home for safety with ADLs.  Instruction/training on safe functional mobility/transfer techniques; sit <> stand at commode, EOB. Cues for hand placement.  Instruction/training on energy conservation/work simplification/body mechanics for completion of ADLs    Rehab Potential: Good for established goals. LTG: maximize independence with ADLs to return to PLOF     Patient / Family Goal: not stated       Patient and/or family were instructed on functional diagnosis, prognosis/goals and OT plan of care. Demonstrated fair understanding.      Eval Complexity: Low    Time In: 10:10 AM   Time Out: 10:35 AM    Total Treatment Time: 10       Min Units   OT Eval Low 97165  X  1    OT Eval Medium 00012      OT Eval High 73257      OT Re-Eval [id number]            ADL/Self Care 01349 10 1   Therapeutic Activities 87871       Therapeutic Ex 03054       Orthotic Management 95115       Manual 82761     Neuro Re-Ed 16136       Non-Billable Time        Evaluation Time additionally includes thorough review of current medical information, gathering information on past medical history/social history and prior level of function, interpretation of standardized testing/informal observation of tasks, assessment of data and development of plan of care and goals.         Evaluating OT: Jessica Cervantes OTR/L #[id number]

## 2021-12-14 NOTE — PROGRESS NOTES
SPEECH/LANGUAGE PATHOLOGY  CLINICAL ASSESSMENT OF SWALLOWING FUNCTION   and PLAN OF CARE  PATIENT NAME:  Collette Mood  (male)     MRN:  [id number]    :  1955  (77 y.o.)  STATUS:  Inpatient: Room 8555/6666-M    TODAY'S DATE:  2021  Speech language pathology evaluation Start: 21, End: 21, ONE TIME, Standing Count: 1 Occurrences, R    Rina De Leon MD  REASON FOR REFERRAL: assess swallow function    EVALUATING THERAPIST: ELIZABETH Reis                 ASSESSMENT:    DYSPHAGIA DIAGNOSIS:   Clinical indicators of functional swallow for age/premorbid functioning      DIET RECOMMENDATIONS:  Easy to chew consistency solids (IDDSI level 7, transitional) with  thin liquids (IDDSI level 0)     FEEDING RECOMMENDATIONS:     Assistance level:  No assistance needed      Compensatory strategies recommended: No strategies are recommended at this time      Discussed recommendations with nursing?: No secondary to no diet/liquid change recommended     SPEECH THERAPY  PLAN OF CARE   The dysphagia POC is established based on physician order, dysphagia diagnosis and results of clinical assessment     Dysphagia therapy is not recommended     Conditions Requiring Skilled Therapeutic Intervention for dysphagia:    not applicable    Specific dysphagia interventions to include:     Not applicable    Specific instructions for next treatment:  not applicable   Patient Treatment Goals:    Short Term Goals:  Not applicable no therapy warranted     Long Term Goals:   Not applicable no therapy warranted      Patient/family Goal:    not applicable                    ADMITTING DIAGNOSIS: Dizziness [R42]  Nonintractable headache, unspecified chronicity pattern, unspecified headache type [R51.9]  Acute [facility]vascular accident ([facility]) ([facility] [I63.9]  Hypertension, unspecified type [I10]    VISIT DIAGNOSIS:   Visit Diagnoses       Codes    Nonintractable headache, unspecified chronicity pattern, unspecified headache type    -  Primary R51.9    Dizziness     R42    Hypertension, unspecified type     I10           PATIENT REPORT/COMPLAINT: n/a    RN cleared patient for participation in assessment     yes     PRIOR LEVEL OF SWALLOW FUNCTION:    PAST HISTORY OF DYSPHAGIA?: none reported    Home diet: Regular consistency solids (IDDSI level 7) with  thin liquids (IDDSI level 0)  PROCEDURE:  Consistencies Administered During the Evaluation   Liquids: thin liquid   Solids:  pureed foods and soft solid foods      Method of Intake:   cup, straw, spoon  Self fed      Position:   Seated, upright    CLINICAL ASSESSMENT  Oral Stage: The oral stage of swallowing was within functional limits      Pharyngeal Stage:    No signs of aspiration were noted during this evaluation however, silent aspiration cannot be ruled out at bedside. If silent aspiration is suspected, a Videofluoroscopic Study of Swallowing (MBS) is recommended and requires a physician order. Cognition:   Within functional limits for this exam    Oral Peripheral Examination   Adequate lingual/labial strength     Current Respiratory Status    room air     Parameters of Speech Production  Respiration:  Adequate for speech production  Quality:   Within functional limits  Intensity: Within functional limits    Volitional Swallow: Present    Volitional Cough:    Present    Pain: No pain reported. EDUCATION:   The Speech Language Pathologist (SLP) completed education regarding results of evaluation and that intervention is not warranted at this time. Learner: Patient  Education:  Reviewed results and recommendations of this evaluation  Evaluation of Education: Verbalizes understanding    This plan will be re-evaluated and revised in 1 week  if warranted. CPT code:  51538  bedside swallow eval      [x]The admitting diagnosis and active problem list, have been reviewed prior to initiation of this evaluation.         ACTIVE PROBLEM LIST:   Patient Active Problem List   Diagnosis    Osteoarthritis, hip/pelvic region and thigh    Lytic bone lesion of hip on xray    Synovitis of hip    Degenerative arthritis of hip    Smoking    Intractable low back pain    Cauda equina spinal cord injury ([address]    Spinal stenosis of lumbar region at multiple levels    Essential hypertension    Osteoarthritis of both hips    Prostate cancer ([address]    Lower back injury    Weakness of left hand    Leg weakness, bilateral    Chronic pain syndrome    Lumbar disc disorder    Lumbar spondylosis    Spinal stenosis of lumbar region without neurogenic claudication    Primary osteoarthritis of both hips    Acute cerebrovascular accident (CVA) ([address]    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

## 2021-12-15 VITALS
BODY MASS INDEX: 25.34 KG/M2 | OXYGEN SATURATION: 98 % | WEIGHT: 181 LBS | DIASTOLIC BLOOD PRESSURE: 83 MMHG | HEART RATE: 62 BPM | TEMPERATURE: 98.4 F | SYSTOLIC BLOOD PRESSURE: 176 MMHG | HEIGHT: 71 IN | RESPIRATION RATE: 16 BRPM

## 2021-12-15 LAB
EKG ATRIAL RATE: 63 BPM
EKG P AXIS: 52 DEGREES
EKG P-R INTERVAL: 114 MS
EKG Q-T INTERVAL: 458 MS
EKG QRS DURATION: 96 MS
EKG QTC CALCULATION (BAZETT): 468 MS
EKG R AXIS: 0 DEGREES
EKG T AXIS: 66 DEGREES
EKG VENTRICULAR RATE: 63 BPM

## 2021-12-15 PROCEDURE — 99239 HOSP IP/OBS DSCHRG MGMT >30: CPT | Performed by: FAMILY MEDICINE

## 2021-12-15 PROCEDURE — 6360000002 HC RX W HCPCS: Performed by: STUDENT IN AN ORGANIZED HEALTH CARE EDUCATION/TRAINING PROGRAM

## 2021-12-15 PROCEDURE — 93010 ELECTROCARDIOGRAM REPORT: CPT | Performed by: INTERNAL MEDICINE

## 2021-12-15 PROCEDURE — 6370000000 HC RX 637 (ALT 250 FOR IP): Performed by: STUDENT IN AN ORGANIZED HEALTH CARE EDUCATION/TRAINING PROGRAM

## 2021-12-15 RX ORDER — LISINOPRIL 10 MG/1
20 TABLET ORAL DAILY
Status: DISCONTINUED | OUTPATIENT
Start: 2021-12-15 | End: 2021-12-15

## 2021-12-15 RX ORDER — LISINOPRIL 20 MG/1
20 TABLET ORAL DAILY
Qty: 30 TABLET | Refills: 3 | Status: SHIPPED | OUTPATIENT
Start: 2021-12-15 | End: 2022-02-10 | Stop reason: SDUPTHER

## 2021-12-15 RX ORDER — CLOPIDOGREL BISULFATE 75 MG/1
75 TABLET ORAL DAILY
Qty: 30 TABLET | Refills: 3 | Status: SHIPPED | OUTPATIENT
Start: 2021-12-15 | End: 2022-05-12 | Stop reason: SDUPTHER

## 2021-12-15 RX ORDER — ATORVASTATIN CALCIUM 40 MG/1
40 TABLET, FILM COATED ORAL NIGHTLY
Qty: 30 TABLET | Refills: 3 | Status: SHIPPED | OUTPATIENT
Start: 2021-12-15 | End: 2022-02-10 | Stop reason: SDUPTHER

## 2021-12-15 RX ORDER — LISINOPRIL 10 MG/1
10 TABLET ORAL DAILY
Status: DISCONTINUED | OUTPATIENT
Start: 2021-12-15 | End: 2021-12-15

## 2021-12-15 RX ORDER — FAMOTIDINE 20 MG/1
20 TABLET, FILM COATED ORAL DAILY
Qty: 60 TABLET | Refills: 3 | Status: SHIPPED | OUTPATIENT
Start: 2021-12-15 | End: 2022-02-10 | Stop reason: SDUPTHER

## 2021-12-15 RX ORDER — LISINOPRIL 10 MG/1
20 TABLET ORAL DAILY
Status: DISCONTINUED | OUTPATIENT
Start: 2021-12-15 | End: 2021-12-15 | Stop reason: HOSPADM

## 2021-12-15 RX ORDER — ALLOPURINOL 100 MG/1
100 TABLET ORAL DAILY
Qty: 90 TABLET | Refills: 1 | Status: SHIPPED | OUTPATIENT
Start: 2021-12-15 | End: 2022-05-12 | Stop reason: SDUPTHER

## 2021-12-15 RX ORDER — ASPIRIN 81 MG/1
81 TABLET, CHEWABLE ORAL DAILY
Qty: 30 TABLET | Refills: 3 | Status: SHIPPED | OUTPATIENT
Start: 2021-12-15 | End: 2022-02-10 | Stop reason: SDUPTHER

## 2021-12-15 RX ADMIN — HYDRALAZINE HYDROCHLORIDE 5 MG: 20 INJECTION INTRAMUSCULAR; INTRAVENOUS at 05:55

## 2021-12-15 RX ADMIN — PANTOPRAZOLE SODIUM 40 MG: 40 TABLET, DELAYED RELEASE ORAL at 05:56

## 2021-12-15 RX ADMIN — ACETAMINOPHEN 650 MG: 325 TABLET ORAL at 04:21

## 2021-12-15 ASSESSMENT — PAIN SCALES - GENERAL
PAINLEVEL_OUTOF10: 0
PAINLEVEL_OUTOF10: 4

## 2021-12-15 NOTE — PROGRESS NOTES
[facility] Inpatient   Resident Progress Note    S:  Hospital day: 2    Brief Synopsis: Gita Ruiz is a 77 y.o. male with a PMH of of HTN, Prostate cancer, HLD and OA of hip/pelvis who presents for acute onset headache, neck pain, blurry vision and dizziness. He states that he went to the bathroom to shower in the morning when the lights became very bright, then shut off, and then turned back to normal again. He states that he developed severe headache, neck pain, and blurry vision afterwards. He went to his doctor at the [place], and his blood pressure was high, so he was sent to the ED. He had an episode of hypertensive urgency in th past. His home meds for HTN are lisinopril 20 mg daily and chlorthalidone 25 mg daily. He only takes lisinopril, last time was this morning at 5 am. He is not taking any other medications. No acute events overnight. Patient was seen and examined this morning. Denies headaches, N/V, blurry vision and neck pain. His BP is still elevated, plan to adjust BP medication. Cont meds:    sodium chloride       Scheduled meds:    lisinopril  20 mg Oral Daily    chlorthalidone  12.5 mg Oral Daily    acetaminophen  650 mg Oral Q6H    [START ON 2/9/2022] goserelin  10.8 mg SubCUTAneous Q3 Months    sodium chloride flush  5-40 mL IntraVENous 2 times per day    famotidine  20 mg Oral Daily    aspirin  81 mg Oral Daily    clopidogrel  75 mg Oral Daily    pantoprazole  40 mg Oral QAM AC    atorvastatin  40 mg Oral Nightly     PRN meds: [Held by provider] capsaicin, sodium chloride flush, sodium chloride, polyethylene glycol, acetaminophen **OR** acetaminophen, magnesium sulfate, potassium chloride, hydrALAZINE, perflutren lipid microspheres     I reviewed the patient's Past Medical and Surgical History, Medications and Allergies.     O:  VS: BP (!) 176/83   Pulse 62   Temp 98.4 °F (36.9 °C) (Oral)   Resp 16   Ht 5' 11\" (1.803 m)   Wt 181 lb (82.1 kg)   SpO2 98% BMI 25.24 kg/m²     Physical Exam:  Physical Exam  Vitals reviewed. Constitutional:       General: He is not in acute distress. HENT:      Head: Normocephalic and atraumatic. Mouth/Throat:      Mouth: Mucous membranes are moist.      Pharynx: Oropharynx is clear. Eyes:      Pupils: Pupils are equal, round, and reactive to light. Neck:      Vascular: No carotid bruit. Cardiovascular:      Rate and Rhythm: Normal rate and regular rhythm. Pulses: Normal pulses. Heart sounds: Normal heart sounds. Pulmonary:      Effort: Pulmonary effort is normal. No respiratory distress. Breath sounds: Normal breath sounds. No stridor. Abdominal:      General: Bowel sounds are normal.      Palpations: Abdomen is soft. Musculoskeletal:         General: No swelling. Cervical back: No tenderness. Right lower leg: No edema. Left lower leg: No edema. Skin:     General: Skin is warm and dry. Capillary Refill: Capillary refill takes less than 2 seconds. Findings: No rash. Neurological:      Mental Status: He is alert and oriented to person, place, and time. GCS: GCS eye subscore is 4. GCS verbal subscore is 5. GCS motor subscore is 6. Cranial Nerves: Cranial nerves are intact. Sensory: Sensation is intact. Motor: Motor function is intact. Psychiatric:         Thought Content: Thought content normal.         Judgment: Judgment normal.         Labs:  Na/K/Cl/CO2:  134/3.7/101/21 (12/14 6091)  BUN/Cr/glu/ALT/AST/amyl/lip:  22/1.0/--/15/19/--/-- (12/14 9246)  WBC/Hgb/Hct/Plts:  4.8/13.5/41.0/233 (12/14 1114)  estimated creatinine clearance is 77 mL/min (based on SCr of 1 mg/dL). Other pertinent labs as noted below    New Imaging:  MRI BRAIN WO CONTRAST   Final Result   1. No acute intracranial abnormality. No acute infarct. 2. Mild global parenchymal volume loss with moderate chronic microvascular   ischemic changes.          XR CHEST PORTABLE   Final Result No acute process. CTA HEAD W CONTRAST   Final Result   CT Head:      No acute intracranial hemorrhage or mass effect. No CT evidence of large acute infarct. Advanced chronic microvascular ischemic changes. CTA Neck:      No evidence of occlusion, high-grade stenosis, or dissection of the cervical   internal carotid and vertebral arteries. CTA Head:      Stenosis of the proximal right P2 segment. Focal occlusion versus severe   stenosis of a right P2 superior branch. No evidence of large vessel occlusion. The findings were sent to the Radiology Results [address] at 12:46   pm on 12/13/2021to be communicated to a licensed caregiver. CTA NECK W CONTRAST   Final Result   CT Head:      No acute intracranial hemorrhage or mass effect. No CT evidence of large acute infarct. Advanced chronic microvascular ischemic changes. CTA Neck:      No evidence of occlusion, high-grade stenosis, or dissection of the cervical   internal carotid and vertebral arteries. CTA Head:      Stenosis of the proximal right P2 segment. Focal occlusion versus severe   stenosis of a right P2 superior branch. No evidence of large vessel occlusion. The findings were sent to the Radiology Results [address] at 12:46   pm on 12/13/2021to be communicated to a licensed caregiver.              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    History of gout  Resolved Problems:    Nonintractable headache      Hypertensive emergency  · /88 in ED, only taking lisinopril 20 mg daily at home  · Pro-  · Lipid panel showing total cholesterol 253 and   · HbA1C 5.9  · Troponin WNL  · Reporting headaches, dizziness, blurry vision, nausea without vomiting and neck pain in ED, already resolved  · Given Zofran for nausea  · Given Hydralazine for elevated BP (goal decrease SBP < 25%)  · EKG in ED showing sinus bradycardia, new T wave inversions in V2, V3 and V4, flat T waves in lead 1 and V1  · Repeat EKG showing normal sinus rhythm  · Swallow evaluation passed  · Speech and language evaluation passed  · Orthostatics negative  · CTA head/neck with contrast 12/13: stenosis of the right posterior cerebral artery in the P1 P2 segment and slow flow in the P3 segment  · MRI brain wo contrast 12/14: No acute intracranial abnormality. No acute infarct. Mild global parenchymal volume loss with moderate chronic microvascular ischemic changes. · Neurovascular checks  · Increase home dose of atorvastatin to 40 mg  · Started on lisinopril 10 mg daily and chorthalidone 12.5 mg daily, increase lisinopril dose to 20 mg daily  · On telemetry  · Neurology recs: He is not a candidate for intervention.  Recommend dual antiplatelet therapy with ASA 81 mg daily and Plavix 75 mg daily for 90 days  · Monitor BP     Hx of prostate cancer  · Following at [facility]     Epigastrium tenderness (resolved)  · On Pepcid and Protonix  · Monitor clinically        PT AMPAC score 16/24  DVT prophylaxis: PCDs  GI prophylaxis: Pepcid and Protonix  Diet: Adult regular  On telemetry  Full code  Patient's PCP is Tolu Serrato at the [facility]      Electronically signed by Qian Tobar MD on 12/15/2021 at 10:39 AM  This case was discussed with attending physician Dr. Jannette Bettencourt

## 2021-12-15 NOTE — PROGRESS NOTES
[facility]  Family Medicine Attending    S: 77 y.o. male with history of prostate cancer, hld, HTN presented to the ER for 1 day of headache, neck pain, blurry vision/double vision and dysequilibrium. He continues to have headaches and double vision and feels off balance when walking. He reports that he was taking 20mg of lisinopril but that his chlorthalidone had been stopped by the [facility]. Patient feeling well. Symptoms are resolved. Anxious to go home to make appointment with urologist .     O: VS- Blood pressure (!) 176/83, pulse 62, temperature 98.4 °F (36.9 °C), temperature source Oral, resp. rate 16, height 5' 11\" (1.803 m), weight 181 lb (82.1 kg), SpO2 98 %. Exam is as noted by resident with the following changes, additions or corrections:  Gen - lying in bed, NAD  Cardio - RRR, no murmur   Lungs - CTAB, no wheeze. Abd- BS+, soft, NT/ND  Ext- no peripheral edema   Neuro - strength 4/5 bilateral lower extremities , otherwise 5/5 . CN III- XII. 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 On ASA and plavix. Started on statin. Titrate up lisinopril and chlorthalidone for bp control. [facility] MRI showed global parenchymal volume loss with moderate chronic microvascular ischemic changes. Close outpatient follow up for bp med titration. D/c to home. Attending Physician Statement  I have reviewed the chart, including any radiology or labs, and seen the patient with the resident(s). I personally reviewed and performed key elements of the history and exam.  I agree with the assessment, plan and orders as documented by the resident. Please refer to the resident note for additional information. Gucci Blanc.  Armen Hernandez MD

## 2021-12-15 NOTE — DISCHARGE SUMMARY
Discharge Summary    Patricia Bruner  :  1955  MRN:  [id number]    Admit date:  2021  Discharge date:  12/15/2021    Admitting Physician:  Berenice Hall MD    Discharge Diagnoses:    Patient Active Problem List   Diagnosis    Osteoarthritis, hip/pelvic region and thigh    Lytic bone lesion of hip on xray    Synovitis of hip    Degenerative arthritis of hip    Smoking    Intractable low back pain    [facility]auda equina spinal cord injury ([address]    Spinal stenosis of lumbar region at multiple levels    Essential hypertension    Osteoarthritis of both hips    Prostate cancer ([address]    Lower back injury    Weakness of left hand    Leg weakness, bilateral    Chronic pain syndrome    Lumbar disc disorder    Lumbar spondylosis    Spinal stenosis of lumbar region without neurogenic claudication    Primary osteoarthritis of both hips    Acute cerebrovascular accident (CVA) ([address]    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 male with a PMH of of HTN, Prostate cancer, HLD and OA of hip/pelvis who presents for acute onset headache, neck pain, blurry vision and dizziness. He stated that he went to the bathroom to shower in the  [facility] the lights became very bright, then shut off, and then turned back to normal again. He stated that he developed severe headache, neck pain, and blurry vision afterwards. He went to his doctor at the [place], and his blood pressure was high, so he was sent to the ED. He had an episode of hypertensive urgency in th past. His home meds for HTN are lisinopril 20 mg daily and chlorthalidone 25 mg daily. BP was 194/88 in ED, only taking lisinopril 20 mg daily at home. Given Zofran for nausea and Hydralazine for elevated BP.  Pro-BNP was 719, Lipid panel showing total cholesterol 253 and , HbA1C 5.9 and Troponin WNL. EKG in ED showing sinus bradycardia, new T wave inversions in V2, V3 and V4, flat T waves in lead 1 and V1. Repeated EKG showing normal sinus rhythm. CTA head/neck with contrast showing stenosis of the right posterior cerebral artery in the P1 P2 segment and slow flow in the P3 segment. MRI brain wo contrast showing no acute intracranial abnormality or infarct. Swallow evaluation and Speech / language evaluation passed. Orthostatics were negative. Home dose atorvastatin was increased to 40 mg. Lisinopril 10 mg and Chlorthalidone 12.5 mg started. BP still elevated. Decision to increase Lisinopril to 20 mg daily was made. Reporting headaches, dizziness, blurry vision, nausea without vomiting and neck pain in ED, improving during the next days with the BP therapy and resolved before discharge. Per neurology, he is not a candidate for intervention. Dual antiplatelet therapy with ASA 81 mg daily and Plavix 75 mg daily recommended for 90 days. Plan to go home with PCP follow up appointment in the next 7 days after discharge.       To do at follow up:  - Taking Lisinopril 20 mg daily and Chlorthalidone 12.5 mg daily, check BP and adjust as needed  - Taking ASA 81 mg daily and Plavix 75 mg daily for 90 days per neuro recs      Discharge Medications:         Medication List      START taking these medications    aspirin 81 MG chewable tablet  Take 1 tablet by mouth daily     clopidogrel 75 MG tablet  Commonly known as: PLAVIX  Take 1 tablet by mouth daily     famotidine 20 MG tablet  Commonly known as: PEPCID  Take 1 tablet by mouth daily        CHANGE how you take these medications    atorvastatin 40 MG tablet  Commonly known as: LIPITOR  Take 1 tablet by mouth nightly  What changed:   · medication strength  · how much to take  · when to take this     lisinopril 20 MG tablet  Commonly known as: PRINIVIL;ZESTRIL  Take 1 tablet by mouth daily  What changed:   · medication strength  · how much to take        CONTINUE taking these medications    acetaminophen 325 MG tablet  Commonly known as: TYLENOL     allopurinol 100 MG tablet  Commonly known as: ZYLOPRIM  Take 1 tablet by mouth daily Please start once flare up resolved. capsaicin 0.025 % cream  Commonly known as: ZOSTRIX  Apply topically 2 times daily Apply topically 2 times daily. Apply topically 2 times daily Apply topically 2 times daily. chlorthalidone 25 MG tablet  Commonly known as: HYGROTON  Take 0.5 tablets by mouth daily     EPINEPHrine 0.3 MG/0.3ML Soaj injection  Commonly known as: EpiPen 2-Wily  Inject 0.3 mLs into the muscle once for 1 dose Use as directed for SEVERE allergic reaction     goserelin 10.8 MG injection  Commonly known as: ZOLADEX     sildenafil 20 MG tablet  Commonly known as: REVATIO  Take 1 tablet by mouth as needed (30 minutes before intercourse)           Where to Get Your Medications      These medications were sent to [facility] [address] [address] [address]    Phone: [phone]   · allopurinol 100 MG tablet  · aspirin 81 MG chewable tablet  · atorvastatin 40 MG tablet  · clopidogrel 75 MG tablet  · famotidine 20 MG tablet  · lisinopril 20 MG tablet         Consults:  neurology    Significant Diagnostic Studies:     Labs:  Na/K/Cl/CO2:  134/3.7/101/21 (12/14 2275)  BUN/Cr/glu/ALT/AST/amyl/lip:  22/1.0/--/15/19/--/-- (12/14 5103)  WBC/Hgb/Hct/Plts:  4.8/13.5/41.0/233 (12/14 1114)  [unfilled]  estimated creatinine clearance is 77 mL/min (based on SCr of 1 mg/dL). New Imaging:    XR CHEST PORTABLE  Result Date: 12/13/2021  FINDINGS: The lungs are without acute focal process. There is no effusion or pneumothorax. The cardiomediastinal silhouette is without acute process. The osseous structures are without acute process. No acute process.      CTA NECK W CONTRAST  Result Date: 12/13/2021  FINDINGS: CT HEAD: No acute intracranial hemorrhage or mass effect. No CT evidence of large acute infarct. Advanced chronic microvascular ischemic changes. No evidence of hydrocephalus. No acute calvarial fracture. CTA NECK: Aortic arch: Three-vessel aortic arch. Common carotids: The common carotid arteries are normal in course and caliber. Internal carotids: The internal carotid arteries demonstrate no hemodynamically significant stenosis or evidence of dissection. Vertebrals: The cervical vertebral arteries demonstrate no high-grade stenosis, occlusion, or dissection. Other findings: Subcentimeter thyroid nodules. Degenerative changes of the spine. Aplastic right frontal sinus. Hypoplastic left frontal sinus. CTA HEAD: ANTERIOR CIRCULATION: Intracranial ICA: No high-grade stenosis or occlusion of the intracranial internal carotid arteries. Hypoplastic right A1 segment MCA: No flow-limiting stenosis. MELISSA: No flow-limiting stenosis. POSTERIOR CIRCULATION: Intradural vertebral arteries: No flow-limiting stenosis. Basilar artery: No flow-limiting stenosis. PCA: Small right P1 segment. Mild-to-moderate narrowing of the right P2 segment (series 8, image 260, series 16, image 108, and series 14, image 116). Focal occlusion versus severe stenosis of a right P2 superior branch (series 8 image 266, series 16, image 117, and series 10, image 61). There is partial reconstitution of distal right PCA vasculature in the right occipital lobe. Dural sinuses: The major dural venus sinuses are grossly patent. CT Head: No acute intracranial hemorrhage or mass effect. No CT evidence of large acute infarct. Advanced chronic microvascular ischemic changes. CTA Neck: No evidence of occlusion, high-grade stenosis, or dissection of the cervical internal carotid and vertebral arteries. CTA Head: Stenosis of the proximal right P2 segment. Focal occlusion versus severe stenosis of a right P2 superior branch. No evidence of large vessel occlusion.  The findings were sent to the Radiology Results Communication Center at 12:46 pm on 12/13/2021to be communicated to a licensed caregiver. CTA HEAD W CONTRAST  Result Date: 12/13/2021  FINDINGS: CT HEAD: No acute intracranial hemorrhage or mass effect. No CT evidence of large acute infarct. Advanced chronic microvascular ischemic changes. No evidence of hydrocephalus. No acute calvarial fracture. CTA NECK: Aortic arch: Three-vessel aortic arch. Common carotids: The common carotid arteries are normal in course and caliber. Internal carotids: The internal carotid arteries demonstrate no hemodynamically significant stenosis or evidence of dissection. Vertebrals: The cervical vertebral arteries demonstrate no high-grade stenosis, occlusion, or dissection. Other findings: Subcentimeter thyroid nodules. Degenerative changes of the spine. Aplastic right frontal sinus. Hypoplastic left frontal sinus. CTA HEAD: ANTERIOR CIRCULATION: Intracranial ICA: No high-grade stenosis or occlusion of the intracranial internal carotid arteries. Hypoplastic right A1 segment MCA: No flow-limiting stenosis. MELISSA: No flow-limiting stenosis. POSTERIOR CIRCULATION: Intradural vertebral arteries: No flow-limiting stenosis. Basilar artery: No flow-limiting stenosis. PCA: Small right P1 segment. Mild-to-moderate narrowing of the right P2 segment (series 8, image 260, series 16, image 108, and series 14, image 116). Focal occlusion versus severe stenosis of a right P2 superior branch (series 8 image 266, series 16, image 117, and series 10, image 61). There is partial reconstitution of distal right PCA vasculature in the right occipital lobe. Dural sinuses: The major dural venus sinuses are grossly patent. CT Head: No acute intracranial hemorrhage or mass effect. No CT evidence of large acute infarct. Advanced chronic microvascular ischemic changes. CTA Neck: No evidence of occlusion, high-grade stenosis, or dissection of the cervical internal carotid and vertebral arteries. CTA Head: Stenosis of the proximal right P2 segment. Focal occlusion versus severe stenosis of a right P2 superior branch. No evidence of large vessel occlusion. The findings were sent to the Radiology Results [id number] at 12:46 pm on 12/13/2021to be communicated to a licensed caregiver. MRI BRAIN WO CONTRAST  Result Date: 12/14/2021  FINDINGS: INTRACRANIAL STRUCTURES/VENTRICLES: There is no acute infarct. A chronic lacunar infarct is seen involving the left thalamus. Small chronic infarcts are seen involving the left frontal corona radiata. No mass effect or midline shift. No evidence of an acute intracranial hemorrhage. Areas of T2 FLAIR hyperintensity are seen in the periventricular and subcortical white matter, which are nonspecific, but may represent chronic microvascular ischemic change. There is prominence of the ventricles and sulci due to global parenchymal volume loss. A partial empty sella is noted. The normal signal voids within the major intracranial vessels appear maintained. ORBITS: The visualized portion of the orbits demonstrate no acute abnormality. SINUSES: The visualized paranasal sinuses and mastoid air cells are well aerated. BONES/SOFT TISSUES: The bone marrow signal intensity appears normal. The soft tissues demonstrate no acute abnormality. 1. No acute intracranial abnormality. No acute infarct. 2. Mild global parenchymal volume loss with moderate chronic microvascular ischemic changes. Treatments:  Zofran, Hydralazine, Lisinopril, Chlorthalidone, Protonix, Pepcid    Discharge Exam:  Constitutional:       General: He is not in acute distress. HENT:      Head: Normocephalic and atraumatic. Mouth/Throat:      Mouth: Mucous membranes are moist.      Pharynx: Oropharynx is clear. Eyes:      Pupils: Pupils are equal, round, and reactive to light. Neck:      Vascular: No carotid bruit. Cardiovascular:      Rate and Rhythm: Normal rate and regular rhythm. Pulses: Normal pulses. Heart sounds: Normal heart sounds. Pulmonary:      Effort: Pulmonary effort is normal. No respiratory distress. Breath sounds: Normal breath sounds. No stridor. Abdominal:      General: Bowel sounds are normal.      Palpations: Abdomen is soft. Musculoskeletal:         General: No swelling. Cervical back: No tenderness. Right lower leg: No edema. Left lower leg: No edema. Skin:     General: Skin is warm and dry. Capillary Refill: Capillary refill takes less than 2 seconds. Findings: No rash. Neurological:      Mental Status: He is alert and oriented to person, place, and time. GCS: GCS eye subscore is 4. GCS verbal subscore is 5. GCS motor subscore is 6. Cranial Nerves: Cranial nerves are intact. Sensory: Sensation is intact. Motor: Motor function is intact. Psychiatric:         Thought Content: Thought content normal.         Judgment: Judgment normal.     Disposition:  home    Future Appointments   Date Time Provider Alee Spearsi   12/20/2021  9:00 AM Callie Paredes MD [facility]   1/21/2022 10:00 AM Magui Davila MD [facility]       More than 30 minutes was spent in preparation of this patient's discharge including, but not limited to, examination, preparation of documents, prescription preparation, counseling and coordination. Signed:   Kajal Piedra MD  12/15/2021, 11:32 AM

## 2021-12-15 NOTE — PROGRESS NOTES
Patient discharged. Paper work explained with patient including Meds and follow up appts. Patient verbalized understanding. Was discharged via patients own rollater to outpatient pharmaxy where clinicians confirmed he picked up. Monitor removed cleaned and placed in storage. Iv removed as well. Statement Selected

## 2021-12-15 NOTE — PROGRESS NOTES
CLINICAL PHARMACY NOTE: MEDS TO BEDS    Total # of Prescriptions Filled: 6   The following medications were delivered to the patient:  · Lisinopril 20 mg  · Atorvastatin calcium 40 mg  · Clopidogrel bisulfate 75 mg  · Aspirin 81 chewable  · Allopurinol 100 mg    · Famotidine 20 mg    Additional Documentation:

## 2021-12-20 ENCOUNTER — OFFICE VISIT (OUTPATIENT)
Dept: FAMILY MEDICINE CLINIC | Age: 66
End: 2021-12-20
Payer: COMMERCIAL

## 2021-12-20 VITALS
DIASTOLIC BLOOD PRESSURE: 94 MMHG | OXYGEN SATURATION: 99 % | HEART RATE: 67 BPM | RESPIRATION RATE: 22 BRPM | BODY MASS INDEX: 25.62 KG/M2 | HEIGHT: 71 IN | WEIGHT: 183 LBS | SYSTOLIC BLOOD PRESSURE: 169 MMHG | TEMPERATURE: 98.3 F

## 2021-12-20 DIAGNOSIS — I10 ESSENTIAL HYPERTENSION: Primary | ICD-10-CM

## 2021-12-20 DIAGNOSIS — I63.9 ACUTE CEREBROVASCULAR ACCIDENT (CVA) (HCC): ICD-10-CM

## 2021-12-20 DIAGNOSIS — R29.898 LEG WEAKNESS, BILATERAL: ICD-10-CM

## 2021-12-20 LAB
EKG ATRIAL RATE: 58 BPM
EKG P AXIS: 32 DEGREES
EKG P-R INTERVAL: 130 MS
EKG Q-T INTERVAL: 488 MS
EKG QRS DURATION: 88 MS
EKG QTC CALCULATION (BAZETT): 479 MS
EKG R AXIS: 20 DEGREES
EKG T AXIS: 77 DEGREES
EKG VENTRICULAR RATE: 58 BPM

## 2021-12-20 PROCEDURE — G8484 FLU IMMUNIZE NO ADMIN: HCPCS | Performed by: STUDENT IN AN ORGANIZED HEALTH CARE EDUCATION/TRAINING PROGRAM

## 2021-12-20 PROCEDURE — 4040F PNEUMOC VAC/ADMIN/RCVD: CPT | Performed by: STUDENT IN AN ORGANIZED HEALTH CARE EDUCATION/TRAINING PROGRAM

## 2021-12-20 PROCEDURE — 3017F COLORECTAL CA SCREEN DOC REV: CPT | Performed by: STUDENT IN AN ORGANIZED HEALTH CARE EDUCATION/TRAINING PROGRAM

## 2021-12-20 PROCEDURE — 99212 OFFICE O/P EST SF 10 MIN: CPT | Performed by: STUDENT IN AN ORGANIZED HEALTH CARE EDUCATION/TRAINING PROGRAM

## 2021-12-20 PROCEDURE — G8427 DOCREV CUR MEDS BY ELIG CLIN: HCPCS | Performed by: STUDENT IN AN ORGANIZED HEALTH CARE EDUCATION/TRAINING PROGRAM

## 2021-12-20 PROCEDURE — 99213 OFFICE O/P EST LOW 20 MIN: CPT | Performed by: STUDENT IN AN ORGANIZED HEALTH CARE EDUCATION/TRAINING PROGRAM

## 2021-12-20 PROCEDURE — 1123F ACP DISCUSS/DSCN MKR DOCD: CPT | Performed by: STUDENT IN AN ORGANIZED HEALTH CARE EDUCATION/TRAINING PROGRAM

## 2021-12-20 PROCEDURE — G8417 CALC BMI ABV UP PARAM F/U: HCPCS | Performed by: STUDENT IN AN ORGANIZED HEALTH CARE EDUCATION/TRAINING PROGRAM

## 2021-12-20 PROCEDURE — 1036F TOBACCO NON-USER: CPT | Performed by: STUDENT IN AN ORGANIZED HEALTH CARE EDUCATION/TRAINING PROGRAM

## 2021-12-20 PROCEDURE — 1111F DSCHRG MED/CURRENT MED MERGE: CPT | Performed by: STUDENT IN AN ORGANIZED HEALTH CARE EDUCATION/TRAINING PROGRAM

## 2021-12-20 RX ORDER — AMLODIPINE BESYLATE 10 MG/1
10 TABLET ORAL DAILY
COMMUNITY
Start: 2021-09-01 | End: 2021-12-20 | Stop reason: SDUPTHER

## 2021-12-20 RX ORDER — AMLODIPINE BESYLATE 5 MG/1
5 TABLET ORAL DAILY
Qty: 30 TABLET | Refills: 2 | Status: SHIPPED
Start: 2021-12-20 | End: 2022-01-07

## 2021-12-20 RX ORDER — FAMOTIDINE 20 MG/1
1 TABLET, FILM COATED ORAL NIGHTLY
COMMUNITY
End: 2022-05-12

## 2021-12-20 RX ORDER — ATORVASTATIN CALCIUM 20 MG/1
1 TABLET, FILM COATED ORAL DAILY
COMMUNITY
Start: 2021-02-22 | End: 2021-12-20

## 2021-12-20 RX ORDER — EPINEPHRINE 0.3 MG/.3ML
0.3 INJECTION SUBCUTANEOUS
COMMUNITY
Start: 2021-04-19

## 2021-12-20 RX ORDER — ALLOPURINOL 100 MG/1
100 TABLET ORAL
COMMUNITY
Start: 2021-03-04 | End: 2022-05-12

## 2021-12-20 ASSESSMENT — ENCOUNTER SYMPTOMS
BLOOD IN STOOL: 0
ABDOMINAL PAIN: 0
BACK PAIN: 1
SHORTNESS OF BREATH: 0
COUGH: 0

## 2021-12-20 ASSESSMENT — LIFESTYLE VARIABLES: HOW OFTEN DO YOU HAVE A DRINK CONTAINING ALCOHOL: NEVER

## 2021-12-20 NOTE — PROGRESS NOTES
S: 77 y.o. male recently admitted for HTN urgency. Had CT, MRI; concerns for CVA but no acute event. Vessel stenosis, not surgical candidate. Antiplatelets. No neuro symptoms, baseline bilateral generalized weakness. Lisinopril increased, chlorthalidone. Not taking medications as prescribed. Taking ASA, Plavix, allopurinol, and 20 mg lisinopril. O: VS: BP (!) 169/94 (Site: Left Upper Arm, Position: Sitting, Cuff Size: Medium Adult)   Pulse 67   Temp 98.3 °F (36.8 °C) (Oral)   Resp 22   Ht 5' 11\" (1.803 m)   Wt 183 lb (83 kg)   SpO2 99%   BMI 25.52 kg/m²    General: NAD, appropriate affect and grooming   CV:  RRR, no gallops, rubs, or murmurs   Resp: CTAB   Abd:  Soft, nontender   Ext:  No edema   Neuro:  No deficits; motor and sensation intact   Impression: HTN, not controlled; intracranial arterial stenosis   Plan: With history of gout, will avoid thiazides. Start amlodipine at 5 mg, follow closely. May need to increase lisinopril as well. Refer to PT. Close follow up, 2 weeks or sooner prn. He declined flu vaccine. Has has recent colonoscopy through [place], he states, last year, following up as directed. Attending Physician Statement  I have discussed the case, including pertinent history and exam findings with the resident. I agree with the documented assessment and plan.

## 2021-12-20 NOTE — PROGRESS NOTES
0.3 MG/0.3ML SOAJ injection Inject 0.3 mg into the muscle      aspirin 81 MG chewable tablet Take 1 tablet by mouth daily 30 tablet 3    atorvastatin (LIPITOR) 40 MG tablet Take 1 tablet by mouth nightly 30 tablet 3    lisinopril (PRINIVIL;ZESTRIL) 20 MG tablet Take 1 tablet by mouth daily 30 tablet 3    allopurinol (ZYLOPRIM) 100 MG tablet Take 1 tablet by mouth daily Please start once flare up resolved. 90 tablet 1    clopidogrel (PLAVIX) 75 MG tablet Take 1 tablet by mouth daily 30 tablet 3    famotidine (PEPCID) 20 MG tablet Take 1 tablet by mouth daily 60 tablet 3    acetaminophen (TYLENOL) 325 MG tablet Take 2 tablets by mouth every 4 hours      goserelin (ZOLADEX) 10.8 MG injection Inject 10.8 mg into the skin every 3 months      famotidine (PEPCID) 20 MG tablet Take 1 tablet by mouth nightly      EPINEPHrine (EPIPEN 2-AGUSTO) 0.3 MG/0.3ML SOAJ injection Inject 0.3 mLs into the muscle once for 1 dose Use as directed for SEVERE allergic reaction 2 each 0    capsaicin (ZOSTRIX) 0.025 % cream Apply topically 2 times daily Apply topically 2 times daily. Apply topically 2 times daily Apply topically 2 times daily. (Patient not taking: Reported on 12/20/2021) 45 g 2     No current facility-administered medications for this visit. Review of Systems :  Review of Systems   Constitutional: Negative for diaphoresis and fatigue. HENT: Negative for congestion. Eyes: Negative for visual disturbance. Respiratory: Negative for cough and shortness of breath. Cardiovascular: Negative for chest pain, palpitations and leg swelling. Gastrointestinal: Negative for abdominal pain and blood in stool. Genitourinary: Negative for dysuria. Musculoskeletal: Positive for back pain. Negative for joint swelling. Neurological: Positive for headaches. Negative for seizures, syncope, facial asymmetry and speech difficulty. Psychiatric/Behavioral: Negative for agitation.      Physical Exam :    Vitals: BP (!) 169/94 (Site: Left Upper Arm, Position: Sitting, Cuff Size: Medium Adult)   Pulse 67   Temp 98.3 °F (36.8 °C) (Oral)   Resp 22   Ht 5' 11\" (1.803 m)   Wt 183 lb (83 kg)   SpO2 99%   BMI 25.52 kg/m²   General Appearance: Well developed, awake, alert, oriented, and in NAD  HEENT: NCAT, MMM, no pallor or icterus. Neck: Supple, symmetrical, trachea midline. No JVD or carotid bruits. Chest wall/Lung: CTAB, respirations unlabored. No ronchi/wheezing/rales  Heart[de-identified] RRR, normal S1 and S2, no murmurs  Abdomen: Soft, nontender, nondistended  Extremities: Extremities normal, atraumatic, no cyanosis, clubbing or edema. Skin: Skin color, texture, turgor normal, no rashes or lesions  Neurologic: Alert&Oriented x3. Intact all cranial nerves. Cerebellar signs. No focal neurological deficit on my exam   psychiatric: has a normal mood and affect. Behavior is normal.       Assessment & Plan : Patient seen today for hospital discharge follow-up. Outpatient medication reconciliation done during this visit    1. Essential hypertension  -Blood pressure is not well controlled, improving  -Patient is only on lisinopril 20 mg daily  -Discontinued chlorthalidone due to history of gout  -We will start on amlodipine 5 mg daily and have follow-up in 2 weeks for hypertension    2. Acute cerebrovascular accident (CVA) ([address]  -Physical exam unremarkable  -Patient recently had work-up including CT angio head and MRI.  -  CTA head/neck with contrast showing stenosis of the right posterior cerebral artery in the P1 P2 segment and slow flow in the P3 segment.  MRI brain wo contrast showing no acute intracranial abnormality or infarct  -Neurology consulted well inpatient and recommended for dual antiplatelet aspirin and Plavix at least for 90 days  -Not a candidate for surgery  -Continue aspirin, Plavix, atorvastatin  -Controlled blood pressure  - [facility], [facility]    3. Leg weakness, bilateral  -Chronic leg weakness  -History of chronic back pain  -No red flag sign  -Patient reported he had physical therapy few years ago and did help  -We will refer to physical therapy today      Health maintenance: Plan for flu vaccine.   [facility] said [facility] will [facility] with [facility] because he had previous colonoscopy with them    Follow-up in 2 weeks for hypertension and to assess response to amlodipine    Ervin Collins MD

## 2022-01-07 ENCOUNTER — OFFICE VISIT (OUTPATIENT)
Dept: FAMILY MEDICINE CLINIC | Age: 67
End: 2022-01-07
Payer: OTHER GOVERNMENT

## 2022-01-07 VITALS
RESPIRATION RATE: 18 BRPM | BODY MASS INDEX: 25.62 KG/M2 | SYSTOLIC BLOOD PRESSURE: 173 MMHG | HEART RATE: 64 BPM | WEIGHT: 183 LBS | TEMPERATURE: 97.2 F | DIASTOLIC BLOOD PRESSURE: 93 MMHG | HEIGHT: 71 IN | OXYGEN SATURATION: 99 %

## 2022-01-07 DIAGNOSIS — I10 ESSENTIAL HYPERTENSION: Primary | ICD-10-CM

## 2022-01-07 PROCEDURE — 4040F PNEUMOC VAC/ADMIN/RCVD: CPT | Performed by: STUDENT IN AN ORGANIZED HEALTH CARE EDUCATION/TRAINING PROGRAM

## 2022-01-07 PROCEDURE — 1036F TOBACCO NON-USER: CPT | Performed by: STUDENT IN AN ORGANIZED HEALTH CARE EDUCATION/TRAINING PROGRAM

## 2022-01-07 PROCEDURE — 99213 OFFICE O/P EST LOW 20 MIN: CPT | Performed by: STUDENT IN AN ORGANIZED HEALTH CARE EDUCATION/TRAINING PROGRAM

## 2022-01-07 PROCEDURE — 3017F COLORECTAL CA SCREEN DOC REV: CPT | Performed by: STUDENT IN AN ORGANIZED HEALTH CARE EDUCATION/TRAINING PROGRAM

## 2022-01-07 PROCEDURE — G8417 CALC BMI ABV UP PARAM F/U: HCPCS | Performed by: STUDENT IN AN ORGANIZED HEALTH CARE EDUCATION/TRAINING PROGRAM

## 2022-01-07 PROCEDURE — G8484 FLU IMMUNIZE NO ADMIN: HCPCS | Performed by: STUDENT IN AN ORGANIZED HEALTH CARE EDUCATION/TRAINING PROGRAM

## 2022-01-07 PROCEDURE — 1123F ACP DISCUSS/DSCN MKR DOCD: CPT | Performed by: STUDENT IN AN ORGANIZED HEALTH CARE EDUCATION/TRAINING PROGRAM

## 2022-01-07 PROCEDURE — 99212 OFFICE O/P EST SF 10 MIN: CPT | Performed by: STUDENT IN AN ORGANIZED HEALTH CARE EDUCATION/TRAINING PROGRAM

## 2022-01-07 PROCEDURE — 1111F DSCHRG MED/CURRENT MED MERGE: CPT | Performed by: STUDENT IN AN ORGANIZED HEALTH CARE EDUCATION/TRAINING PROGRAM

## 2022-01-07 PROCEDURE — G8427 DOCREV CUR MEDS BY ELIG CLIN: HCPCS | Performed by: STUDENT IN AN ORGANIZED HEALTH CARE EDUCATION/TRAINING PROGRAM

## 2022-01-07 RX ORDER — AMLODIPINE BESYLATE 10 MG/1
10 TABLET ORAL DAILY
Qty: 30 TABLET | Refills: 2 | Status: SHIPPED
Start: 2022-01-07 | End: 2022-02-10 | Stop reason: SDUPTHER

## 2022-01-07 ASSESSMENT — ENCOUNTER SYMPTOMS
ABDOMINAL PAIN: 0
NAUSEA: 0
BLOOD IN STOOL: 0
COUGH: 0
BACK PAIN: 1
SHORTNESS OF BREATH: 0
VOMITING: 0
COLOR CHANGE: 0

## 2022-01-07 NOTE — PROGRESS NOTES
[facility]  FAMILY MEDICINE RESIDENCY PROGRAM  DATE OF VISIT : 2022    Patient : Alejandro Rodarte   Age : 77 y.o.  : 1955   MRN : [id number]     Chief Complaint :   Chief Complaint   Patient presents with    Hypertension     f/u       HPI : Alejandro Rodarte is 77 y.o. male with history of  Hypertension. He presented today for hypertension follow-up. Today blood pressure is elevated. Patient was started on amlodipine 5 mg on last visit. Currently on amlodipine 5 mg and lisinopril 20 mg daily. Chlorthalidone was discontinued on last hospitalization due to goutPatient denies any headache, dizziness, chest pain, extremity weakness, shortness of breath or blurry vision. Patient states he is taking medication regularly. Past Medical History :  Past Medical History:   Diagnosis Date    Cancer [facility]) PROSTATE    , patient reports bone mets    Erectile dysfunction     Hyperlipidemia     Hypertension     Osteoarthritis, hip/pelvic region and thigh 3/29/2013    [facility]       Medication List :    Current Outpatient Medications   Medication Sig Dispense Refill    amLODIPine (NORVASC) 10 MG tablet Take 1 tablet by mouth daily 30 tablet 2    allopurinol (ZYLOPRIM) 100 MG tablet Take 100 mg by mouth      famotidine (PEPCID) 20 MG tablet Take 1 tablet by mouth nightly      EPINEPHrine (EPIPEN) 0.3 MG/0.3ML SOAJ injection Inject 0.3 mg into the muscle      aspirin 81 MG chewable tablet Take 1 tablet by mouth daily 30 tablet 3    atorvastatin (LIPITOR) 40 MG tablet Take 1 tablet by mouth nightly 30 tablet 3    lisinopril (PRINIVIL;ZESTRIL) 20 MG tablet Take 1 tablet by mouth daily 30 tablet 3    allopurinol (ZYLOPRIM) 100 MG tablet Take 1 tablet by mouth daily Please start once flare up resolved.  90 tablet 1    clopidogrel (PLAVIX) 75 MG tablet Take 1 tablet by mouth daily 30 tablet 3    famotidine (PEPCID) 20 MG tablet Take 1 tablet by mouth daily 60 tablet 3    acetaminophen (TYLENOL) 325 MG tablet Take 2 tablets by mouth every 4 hours      EPINEPHrine (EPIPEN 2-AGUSTO) 0.3 MG/0.3ML SOAJ injection Inject 0.3 mLs into the muscle once for 1 dose Use as directed for SEVERE allergic reaction 2 each 0    goserelin (ZOLADEX) 10.8 MG injection Inject 10.8 mg into the skin every 3 months       No current facility-administered medications for this visit. Review of Systems :  Review of Systems   Constitutional: Negative for activity change and fatigue. HENT: Negative for congestion. Eyes: Negative for visual disturbance. Respiratory: Negative for cough and shortness of breath. Cardiovascular: Negative for chest pain, palpitations and leg swelling. Gastrointestinal: Negative for abdominal pain, blood in stool, nausea and vomiting. Genitourinary: Negative for dysuria. Musculoskeletal: Positive for arthralgias and back pain. Negative for joint swelling. Skin: Negative for color change. Neurological: Negative for dizziness, seizures, syncope, facial asymmetry, weakness and headaches. Psychiatric/Behavioral: Negative for agitation. Physical Exam :    Vitals: BP (!) 173/93 (Site: Right Upper Arm, Position: Sitting, Cuff Size: Medium Adult)   Pulse 64   Temp 97.2 °F (36.2 °C) (Temporal)   Resp 18   Ht 5' 11\" (1.803 m)   Wt 183 lb (83 kg)   SpO2 99%   BMI 25.52 kg/m²     General Appearance: Well developed, awake, alert, oriented, and not in acute distress  HEENT: NCAT, MMM, no pallor or icterus. Neck: Supple, symmetrical, trachea midline. No JVD or carotid bruits. Chest wall/Lung: CTAB, respirations unlabored. No ronchi/wheezing/rales   Heart[de-identified] RRR, normal S1 and S2, no murmurs  Abdomen: Soft, nontender, nondistended extremities: Extremities normal, atraumatic, no cyanosis, clubbing or edema. Skin: Skin color, texture, turgor normal, no rashes or lesions  Neurologic: Alert&Oriented x3. Intact cranial nerve.   5 out of 5 strength in all extremities and sensation grossly intact. Psychiatric: has a normal mood and affect.  Behavior is normal.       Assessment & Plan :    1. Essential hypertension  -Today blood pressure is 173/93, uncontrolled  -Patient is asymptomatic  -We will increase amlodipine to 10 mg and continue lisinopril 20 mg daily  -Follow-up in 1 month for blood pressure    Declined for flu vaccine          Romayne Baumgarten, MD

## 2022-01-07 NOTE — PROGRESS NOTES
Patient is 51-year-old male with history of uncontrolled hypertension and CVA. Today he presented today for hypertension follow-up. Today blood pressure is elevated. Patient denies any headache, dizziness, chest pain, extremity weakness, shortness of breath or blurry vision. Patient was restarted on amlodipine 5 mg on last visit. Currently on amlodipine 5 mg and lisinopril 20 mg daily. Chlorthalidone was discontinue on last hospitalization due to gout. And reports he is compliant with his medication. Blood pressure (!) 173/93, pulse 64, temperature 97.2 °F (36.2 °C), temperature source Temporal, resp. rate 18, height 5' 11\" (1.803 m), weight 183 lb (83 kg), SpO2 99 %. HEENT WNL     Heart regular    Lungs clear    abd non-tender      No edema    Pulses intact   Neurologic exam: Intact cranial nerves, alert and oriented x3, 5 out of 5 strength in all extremities, sensation intact    Assessment and plan:  Uncontrolled hypertension:  -Today blood pressure is elevated but patient is asymptomatic  -Will increase amlodipine to 10 mg today and continue lisinopril 20 mg have follow-up in 1 month for blood pressure checkup    Declined for flu vaccine. Attending Physician Statement  I have discussed the case, including pertinent history and exam findings with the resident. I agree with the documented assessment and plan.

## 2022-02-10 RX ORDER — ASPIRIN 81 MG/1
81 TABLET, CHEWABLE ORAL DAILY
Qty: 30 TABLET | Refills: 3 | Status: ON HOLD
Start: 2022-02-10 | End: 2022-08-15 | Stop reason: SDUPTHER

## 2022-02-10 RX ORDER — AMLODIPINE BESYLATE 10 MG/1
10 TABLET ORAL DAILY
Qty: 30 TABLET | Refills: 2 | Status: SHIPPED
Start: 2022-02-10 | End: 2022-05-12 | Stop reason: SDUPTHER

## 2022-02-10 RX ORDER — FAMOTIDINE 20 MG/1
20 TABLET, FILM COATED ORAL DAILY
Qty: 60 TABLET | Refills: 3 | Status: ON HOLD
Start: 2022-02-10 | End: 2022-08-15 | Stop reason: SDUPTHER

## 2022-02-10 RX ORDER — LISINOPRIL 20 MG/1
20 TABLET ORAL DAILY
Qty: 30 TABLET | Refills: 3 | Status: ON HOLD
Start: 2022-02-10 | End: 2022-08-15 | Stop reason: SDUPTHER

## 2022-02-10 RX ORDER — ATORVASTATIN CALCIUM 40 MG/1
40 TABLET, FILM COATED ORAL NIGHTLY
Qty: 30 TABLET | Refills: 3 | Status: ON HOLD
Start: 2022-02-10 | End: 2022-08-15 | Stop reason: SDUPTHER

## 2022-03-03 ENCOUNTER — OFFICE VISIT (OUTPATIENT)
Dept: FAMILY MEDICINE CLINIC | Age: 67
End: 2022-03-03
Payer: COMMERCIAL

## 2022-03-03 VITALS
TEMPERATURE: 97.3 F | HEART RATE: 62 BPM | WEIGHT: 185 LBS | DIASTOLIC BLOOD PRESSURE: 80 MMHG | BODY MASS INDEX: 25.8 KG/M2 | OXYGEN SATURATION: 94 % | RESPIRATION RATE: 17 BRPM | SYSTOLIC BLOOD PRESSURE: 130 MMHG

## 2022-03-03 DIAGNOSIS — K21.9 GASTROESOPHAGEAL REFLUX DISEASE, UNSPECIFIED WHETHER ESOPHAGITIS PRESENT: ICD-10-CM

## 2022-03-03 DIAGNOSIS — I10 ESSENTIAL HYPERTENSION: Primary | ICD-10-CM

## 2022-03-03 PROCEDURE — G8417 CALC BMI ABV UP PARAM F/U: HCPCS | Performed by: STUDENT IN AN ORGANIZED HEALTH CARE EDUCATION/TRAINING PROGRAM

## 2022-03-03 PROCEDURE — 99213 OFFICE O/P EST LOW 20 MIN: CPT | Performed by: STUDENT IN AN ORGANIZED HEALTH CARE EDUCATION/TRAINING PROGRAM

## 2022-03-03 PROCEDURE — 3017F COLORECTAL CA SCREEN DOC REV: CPT | Performed by: STUDENT IN AN ORGANIZED HEALTH CARE EDUCATION/TRAINING PROGRAM

## 2022-03-03 PROCEDURE — 99212 OFFICE O/P EST SF 10 MIN: CPT | Performed by: STUDENT IN AN ORGANIZED HEALTH CARE EDUCATION/TRAINING PROGRAM

## 2022-03-03 PROCEDURE — 4040F PNEUMOC VAC/ADMIN/RCVD: CPT | Performed by: STUDENT IN AN ORGANIZED HEALTH CARE EDUCATION/TRAINING PROGRAM

## 2022-03-03 PROCEDURE — G8484 FLU IMMUNIZE NO ADMIN: HCPCS | Performed by: STUDENT IN AN ORGANIZED HEALTH CARE EDUCATION/TRAINING PROGRAM

## 2022-03-03 PROCEDURE — G8427 DOCREV CUR MEDS BY ELIG CLIN: HCPCS | Performed by: STUDENT IN AN ORGANIZED HEALTH CARE EDUCATION/TRAINING PROGRAM

## 2022-03-03 PROCEDURE — 1036F TOBACCO NON-USER: CPT | Performed by: STUDENT IN AN ORGANIZED HEALTH CARE EDUCATION/TRAINING PROGRAM

## 2022-03-03 PROCEDURE — 1123F ACP DISCUSS/DSCN MKR DOCD: CPT | Performed by: STUDENT IN AN ORGANIZED HEALTH CARE EDUCATION/TRAINING PROGRAM

## 2022-03-03 ASSESSMENT — PATIENT HEALTH QUESTIONNAIRE - PHQ9
SUM OF ALL RESPONSES TO PHQ9 QUESTIONS 1 & 2: 0
SUM OF ALL RESPONSES TO PHQ QUESTIONS 1-9: 0
1. LITTLE INTEREST OR PLEASURE IN DOING THINGS: 0
2. FEELING DOWN, DEPRESSED OR HOPELESS: 0
SUM OF ALL RESPONSES TO PHQ QUESTIONS 1-9: 0

## 2022-03-03 ASSESSMENT — ENCOUNTER SYMPTOMS
SHORTNESS OF BREATH: 0
ABDOMINAL PAIN: 1
NAUSEA: 1
COUGH: 0
BLOOD IN STOOL: 0
VOMITING: 0

## 2022-03-03 NOTE — PROGRESS NOTES
[facility] RESIDENCY PROGRAM  DATE OF VISIT : 3/3/2022    Patient : Elidia Estrella   Age : 77 y.o.  : 1955   MRN : [id number]   ______________________________________________________________________    Chief Complaint :   Chief Complaint   Patient presents with    Follow-up     routine visit       HPI : Elidia Estrella is 77 y.o. male who presented to the clinic today for depression follow-up. Today blood pressure is within normal limit. And elevated blood increase to 10 mg on last visit. Currently on lisinopril 20 mg and amlodipine 10 mg daily. Patient reports he is doing well. Denies any headache, dizziness, blurry vision, chest pain, shortness of breath, leg swelling orthopnea or cough. Patient is compliance with his medication. GERD: Patient is currently on Prilosec 10 mg daily. Endorses epigastric pain associated with nausea and vomiting. Get worse with spicy food. Denies hematemesis or melena. No dysphagia or weight loss.     Past Medical History :  Past Medical History:   Diagnosis Date    Cancer [facility]) PROSTATE    , patient reports bone mets    Erectile dysfunction     Hyperlipidemia     Hypertension     Osteoarthritis, hip/pelvic region and thigh 3/29/2013    Prostate cancer ([facility] [address] 2013         Medication List :    Current Outpatient Medications   Medication Sig Dispense Refill    amLODIPine (NORVASC) 10 MG tablet Take 1 tablet by mouth daily 30 tablet 2    aspirin 81 MG chewable tablet Take 1 tablet by mouth daily 30 tablet 3    atorvastatin (LIPITOR) 40 MG tablet Take 1 tablet by mouth nightly 30 tablet 3    famotidine (PEPCID) 20 MG tablet Take 1 tablet by mouth daily 60 tablet 3    lisinopril (PRINIVIL;ZESTRIL) 20 MG tablet Take 1 tablet by mouth daily 30 tablet 3    allopurinol (ZYLOPRIM) 100 MG tablet Take 100 mg by mouth      famotidine (PEPCID) 20 MG tablet Take 1 tablet by mouth nightly      EPINEPHrine (EPIPEN) 0.3 MG/0.3ML SOAJ injection Inject 0.3 mg into the muscle      allopurinol (ZYLOPRIM) 100 MG tablet Take 1 tablet by mouth daily Please start once flare up resolved. 90 tablet 1    clopidogrel (PLAVIX) 75 MG tablet Take 1 tablet by mouth daily 30 tablet 3    acetaminophen (TYLENOL) 325 MG tablet Take 2 tablets by mouth every 4 hours      goserelin (ZOLADEX) 10.8 MG injection Inject 10.8 mg into the skin every 3 months      EPINEPHrine (EPIPEN 2-AGUSTO) 0.3 MG/0.3ML SOAJ injection Inject 0.3 mLs into the muscle once for 1 dose Use as directed for SEVERE allergic reaction 2 each 0     No current facility-administered medications for this visit. Review of Systems :  Review of Systems   Constitutional: Negative for fatigue and unexpected weight change. HENT: Negative for congestion. Eyes: Negative for visual disturbance. Respiratory: Negative for cough and shortness of breath. Cardiovascular: Negative for chest pain, palpitations and leg swelling. Gastrointestinal: Positive for abdominal pain and nausea. Negative for blood in stool and vomiting. Genitourinary: Negative for difficulty urinating. Musculoskeletal: Negative for arthralgias. Neurological: Positive for weakness. Negative for seizures, facial asymmetry, speech difficulty and headaches. Residual leg weakness. Hematological: Does not bruise/bleed easily. Psychiatric/Behavioral: Negative for decreased concentration. Physical Exam :    Vitals: /80 (Site: Right Upper Arm, Position: Sitting, Cuff Size: Medium Adult)   Pulse 62   Temp 97.3 °F (36.3 °C) (Temporal)   Resp 17   Wt 185 lb (83.9 kg)   SpO2 94%   BMI 25.80 kg/m²   General Appearance: Well developed, awake, alert, oriented, and in NAD  Neck: Supple, symmetrical, trachea midline. No JVD or carotid bruits. Chest wall/Lung: CTAB, respirations unlabored. No ronchi/wheezing/rales   Heart[de-identified] RRR, normal S1 and S2, no murmurs, rubs or gallops.    Abdomen: SNTND, good bowel sound  Extremities: Extremities normal, atraumatic, no cyanosis, clubbing or edema. Skin: Skin color, texture, turgor normal, no rashes or lesions  Neurologic: Alert&Oriented x3. Sensation grossly intact. Psychiatric: has a normal mood and affect. Behavior is normal.     Assessment & Plan :    1. Essential hypertension  -Well-controlled on medication  -Patient is asymptomatic  -Continue amlodipine 10 mg and lisinopril 20 mg daily    2. Gastroesophageal reflux disease, unspecified whether esophagitis present  -No red flag sign  -We will increase Prilosec to 20 mg daily    Follow-up in 3 months for hypertension. Patient does follow in GI. Up-to-date on health maintenance.       Kathy Samuels MD

## 2022-03-03 NOTE — PROGRESS NOTES
S: 77 y.o. male here for follow up of htn. At home bps, run 140s/90s. O: VS: /80 (Site: Right Upper Arm, Position: Sitting, Cuff Size: Medium Adult)   Pulse 62   Temp 97.3 °F (36.3 °C) (Temporal)   Resp 17   Wt 185 lb (83.9 kg)   SpO2 94%   BMI 25.80 kg/m²    General: NAD   CV:  RRR, no gallops, rubs, or murmurs   Resp: CTAB no R/R/W   Abd:  Soft, nontender, no masses    Ext:  no C/C/E  Impression/Plan:   1. HTN-controlled. Continue current regimen. rto in 3 months      Attending Physician Statement  I have discussed the case, including pertinent history and exam findings with the resident. I agree with the documented assessment and plan.         Chirag Hancock MD

## 2022-05-12 ENCOUNTER — OFFICE VISIT (OUTPATIENT)
Dept: FAMILY MEDICINE CLINIC | Age: 67
End: 2022-05-12
Payer: COMMERCIAL

## 2022-05-12 VITALS
HEART RATE: 60 BPM | RESPIRATION RATE: 20 BRPM | SYSTOLIC BLOOD PRESSURE: 138 MMHG | DIASTOLIC BLOOD PRESSURE: 78 MMHG | WEIGHT: 187 LBS | HEIGHT: 71 IN | BODY MASS INDEX: 26.18 KG/M2 | OXYGEN SATURATION: 98 % | TEMPERATURE: 97.8 F

## 2022-05-12 DIAGNOSIS — I10 ESSENTIAL HYPERTENSION: ICD-10-CM

## 2022-05-12 DIAGNOSIS — M1A.9XX0 CHRONIC GOUT INVOLVING TOE WITHOUT TOPHUS, UNSPECIFIED CAUSE, UNSPECIFIED LATERALITY: Primary | ICD-10-CM

## 2022-05-12 PROCEDURE — 99213 OFFICE O/P EST LOW 20 MIN: CPT | Performed by: STUDENT IN AN ORGANIZED HEALTH CARE EDUCATION/TRAINING PROGRAM

## 2022-05-12 PROCEDURE — G8427 DOCREV CUR MEDS BY ELIG CLIN: HCPCS | Performed by: STUDENT IN AN ORGANIZED HEALTH CARE EDUCATION/TRAINING PROGRAM

## 2022-05-12 PROCEDURE — G8417 CALC BMI ABV UP PARAM F/U: HCPCS | Performed by: STUDENT IN AN ORGANIZED HEALTH CARE EDUCATION/TRAINING PROGRAM

## 2022-05-12 PROCEDURE — 99212 OFFICE O/P EST SF 10 MIN: CPT | Performed by: STUDENT IN AN ORGANIZED HEALTH CARE EDUCATION/TRAINING PROGRAM

## 2022-05-12 PROCEDURE — 1123F ACP DISCUSS/DSCN MKR DOCD: CPT | Performed by: STUDENT IN AN ORGANIZED HEALTH CARE EDUCATION/TRAINING PROGRAM

## 2022-05-12 PROCEDURE — 1036F TOBACCO NON-USER: CPT | Performed by: STUDENT IN AN ORGANIZED HEALTH CARE EDUCATION/TRAINING PROGRAM

## 2022-05-12 PROCEDURE — 3017F COLORECTAL CA SCREEN DOC REV: CPT | Performed by: STUDENT IN AN ORGANIZED HEALTH CARE EDUCATION/TRAINING PROGRAM

## 2022-05-12 PROCEDURE — 4040F PNEUMOC VAC/ADMIN/RCVD: CPT | Performed by: STUDENT IN AN ORGANIZED HEALTH CARE EDUCATION/TRAINING PROGRAM

## 2022-05-12 RX ORDER — ACETAMINOPHEN 500 MG
1000 TABLET ORAL EVERY 6 HOURS PRN
Qty: 120 TABLET | Refills: 0 | COMMUNITY
Start: 2022-05-12 | End: 2022-08-17

## 2022-05-12 RX ORDER — ALLOPURINOL 100 MG/1
100 TABLET ORAL DAILY
Qty: 90 TABLET | Refills: 1 | Status: ON HOLD
Start: 2022-05-12 | End: 2022-08-15 | Stop reason: SDUPTHER

## 2022-05-12 RX ORDER — CLOPIDOGREL BISULFATE 75 MG/1
75 TABLET ORAL DAILY
Qty: 30 TABLET | Refills: 3 | Status: ON HOLD
Start: 2022-05-12 | End: 2022-08-15 | Stop reason: SDUPTHER

## 2022-05-12 RX ORDER — AMLODIPINE BESYLATE 10 MG/1
10 TABLET ORAL DAILY
Qty: 30 TABLET | Refills: 2 | Status: SHIPPED
Start: 2022-05-12 | End: 2022-09-12

## 2022-05-12 SDOH — ECONOMIC STABILITY: FOOD INSECURITY: WITHIN THE PAST 12 MONTHS, THE FOOD YOU BOUGHT JUST DIDN'T LAST AND YOU DIDN'T HAVE MONEY TO GET MORE.: NEVER TRUE

## 2022-05-12 SDOH — ECONOMIC STABILITY: TRANSPORTATION INSECURITY
IN THE PAST 12 MONTHS, HAS LACK OF TRANSPORTATION KEPT YOU FROM MEETINGS, WORK, OR FROM GETTING THINGS NEEDED FOR DAILY LIVING?: NO

## 2022-05-12 SDOH — ECONOMIC STABILITY: FOOD INSECURITY: WITHIN THE PAST 12 MONTHS, YOU WORRIED THAT YOUR FOOD WOULD RUN OUT BEFORE YOU GOT MONEY TO BUY MORE.: NEVER TRUE

## 2022-05-12 SDOH — ECONOMIC STABILITY: TRANSPORTATION INSECURITY
IN THE PAST 12 MONTHS, HAS THE LACK OF TRANSPORTATION KEPT YOU FROM MEDICAL APPOINTMENTS OR FROM GETTING MEDICATIONS?: NO

## 2022-05-12 ASSESSMENT — LIFESTYLE VARIABLES: HOW OFTEN DO YOU HAVE A DRINK CONTAINING ALCOHOL: NEVER

## 2022-05-12 ASSESSMENT — SOCIAL DETERMINANTS OF HEALTH (SDOH): HOW HARD IS IT FOR YOU TO PAY FOR THE VERY BASICS LIKE FOOD, HOUSING, MEDICAL CARE, AND HEATING?: NOT HARD AT ALL

## 2022-05-12 NOTE — PROGRESS NOTES
[facility] RESIDENCY PROGRAM  DATE OF VISIT : 2022    Patient : Elin Mixon   Age : 79 y.o.  : 1955   MRN : [id number]     Chief Complaint :   Chief Complaint   Patient presents with    Check-Up       HPI : Elin Mixon is 79 y.o. male who presented to the clinic today for follow-up on blood pressure. Today patient is complaining of pain on left fourth and fifth toe. Pain is stabbing in nature, moderate to severe in intensity, non radiating. Patient said he went to podiatry few days ago for nail trimming. 1 day ago he woke up and he found his toes are very painful. He thinks it is related to nail trimming. No obvious injury or foreign body penetration. Denies any fever or chills. Patient does have history of gout. Currently on allopurinol 100 Mg daily. Denies any joint swelling, erythema and morning stiffness. Hypertension: Controlled with current regimen. Currently on lisinopril 20 Mg and amlodipine 10 Mg daily. Prediabetic: Last A1c in December was 5.9. Not on any medication. Denies hypo or hyperglycemic symptoms. Past Medical History :  Past Medical History:   Diagnosis Date    Cancer [facility]) PROSTATE    , patient reports bone mets    Erectile dysfunction     Hyperlipidemia     Hypertension     Osteoarthritis, hip/pelvic region and thigh 3/29/2013    Prostate cancer ([facility] [address] 2013     Medication List :    Current Outpatient Medications   Medication Sig Dispense Refill    leuprolide (LUPRON) 11.25 MG injection Inject 11.25 mg into the muscle once      amLODIPine (NORVASC) 10 MG tablet Take 1 tablet by mouth daily 30 tablet 2    allopurinol (ZYLOPRIM) 100 MG tablet Take 1 tablet by mouth daily Please start once flare up resolved.  90 tablet 1    clopidogrel (PLAVIX) 75 MG tablet Take 1 tablet by mouth daily 30 tablet 3    acetaminophen (TYLENOL) 500 MG tablet Take 2 tablets by mouth every 6 hours as needed for Pain Maximum dose- 8 tablets/24 hours. 120 tablet 0    aspirin 81 MG chewable tablet Take 1 tablet by mouth daily 30 tablet 3    atorvastatin (LIPITOR) 40 MG tablet Take 1 tablet by mouth nightly 30 tablet 3    famotidine (PEPCID) 20 MG tablet Take 1 tablet by mouth daily 60 tablet 3    lisinopril (PRINIVIL;ZESTRIL) 20 MG tablet Take 1 tablet by mouth daily 30 tablet 3    EPINEPHrine (EPIPEN) 0.3 MG/0.3ML SOAJ injection Inject 0.3 mg into the muscle      acetaminophen (TYLENOL) 325 MG tablet Take 2 tablets by mouth every 4 hours       No current facility-administered medications for this visit. Review of Systems :  Review of Systems   Constitutional: Negative for activity change, fatigue and fever. Eyes: Negative for visual disturbance. Respiratory: Negative for cough, chest tightness and shortness of breath. Cardiovascular: Negative for chest pain and palpitations. Gastrointestinal: Negative for abdominal pain and blood in stool. Endocrine: Negative for polydipsia, polyphagia and polyuria. Musculoskeletal: Positive for arthralgias. Negative for joint swelling. Skin: Negative for color change. Neurological: Negative for syncope, weakness and headaches. Psychiatric/Behavioral: Negative for agitation. Physical Exam :    Vitals: /78 (Site: Left Upper Arm, Position: Sitting, Cuff Size: Medium Adult)   Pulse 60   Temp 97.8 °F (36.6 °C) (Temporal)   Resp 20   Ht 5' 11\" (1.803 m)   Wt 187 lb (84.8 kg)   SpO2 98%   BMI 26.08 kg/m²   General Appearance: Well developed, awake, alert, oriented, and in NAD. Neck: Supple, symmetrical  Chest wall/Lung: CTAB, respirations unlabored. No ronchi/wheezing/rales   Heart[de-identified] RRR, normal S1 and S2, no murmurs  Abdomen: SNTND, +BS  Extremities: Tender over fourth and fifth left toes. No swelling, erythema or obvious injury. Restricted range of motion due to pain. No signs of infection.   Skin: Skin color, texture, turgor normal, no rashes or lesions  Neurologic: Alert&Oriented x3. Assessment & Plan :    1. Essential hypertension  -Continue current regimen    2. Chronic gout involving toe without tophus, unspecified cause, unspecified laterality  -Report pain over fourth and fifth left toes likely gout versus other etiology  -Advised patient to take naproxen 500 mg twice daily for a week and continue allopurinol  -No signs and symptoms of cellulitis or infection    3. Prediabetes: Last A1c 5.9, trending down.   Continue carb diet control and regular exercise    Follow-up in 4 to 6 weeks if pain persist.      Wisam Ojeda MD

## 2022-05-12 NOTE — PROGRESS NOTES
Attending Physician Statement    S:   Chief Complaint   Patient presents with    Check-Up      Left 4th/5th toe painful. Stabbing pain, no erythema. Recent clipping by podiatry, no problems then  O: Blood pressure 138/78, pulse 60, temperature 97.8 °F (36.6 °C), temperature source Temporal, resp. rate 20, height 5' 11\" (1.803 m), weight 187 lb (84.8 kg), SpO2 98 %. Exam:   Heart - RRR   Lungs - clear   Feet- no erythema, swelling. Very tender over 4th and 5th toe. No tenderness in foot itself  A: Toe pain  P:  Naprosyn, ravindra (hisotry of gout)   Follow-up as ordered    I have discussed the case, including pertinent history and exam findings with the resident. I agree with the documented assessment and plan.

## 2022-05-14 PROBLEM — M10.9 GOUT: Status: ACTIVE | Noted: 2022-05-14

## 2022-05-14 PROBLEM — C61 CARCINOMA OF PROSTATE (HCC): Status: ACTIVE | Noted: 2021-09-01

## 2022-05-14 ASSESSMENT — ENCOUNTER SYMPTOMS
COLOR CHANGE: 0
SHORTNESS OF BREATH: 0
BLOOD IN STOOL: 0
ABDOMINAL PAIN: 0
COUGH: 0
CHEST TIGHTNESS: 0

## 2022-08-12 ENCOUNTER — OFFICE VISIT (OUTPATIENT)
Dept: FAMILY MEDICINE CLINIC | Age: 67
End: 2022-08-12
Payer: COMMERCIAL

## 2022-08-12 VITALS
HEART RATE: 60 BPM | SYSTOLIC BLOOD PRESSURE: 137 MMHG | TEMPERATURE: 97.2 F | DIASTOLIC BLOOD PRESSURE: 72 MMHG | BODY MASS INDEX: 25.77 KG/M2 | OXYGEN SATURATION: 100 % | HEIGHT: 71 IN | RESPIRATION RATE: 18 BRPM | WEIGHT: 184.06 LBS

## 2022-08-12 DIAGNOSIS — Z76.89 ESTABLISHING CARE WITH NEW DOCTOR, ENCOUNTER FOR: Primary | ICD-10-CM

## 2022-08-12 DIAGNOSIS — I10 ESSENTIAL HYPERTENSION: ICD-10-CM

## 2022-08-12 DIAGNOSIS — Z87.39 HISTORY OF GOUT: ICD-10-CM

## 2022-08-12 DIAGNOSIS — I63.9 ACUTE CEREBROVASCULAR ACCIDENT (CVA) (HCC): ICD-10-CM

## 2022-08-12 DIAGNOSIS — K21.9 GASTROESOPHAGEAL REFLUX DISEASE, UNSPECIFIED WHETHER ESOPHAGITIS PRESENT: ICD-10-CM

## 2022-08-12 PROCEDURE — 99213 OFFICE O/P EST LOW 20 MIN: CPT | Performed by: STUDENT IN AN ORGANIZED HEALTH CARE EDUCATION/TRAINING PROGRAM

## 2022-08-12 PROCEDURE — 1124F ACP DISCUSS-NO DSCNMKR DOCD: CPT | Performed by: STUDENT IN AN ORGANIZED HEALTH CARE EDUCATION/TRAINING PROGRAM

## 2022-08-12 PROCEDURE — 1036F TOBACCO NON-USER: CPT | Performed by: STUDENT IN AN ORGANIZED HEALTH CARE EDUCATION/TRAINING PROGRAM

## 2022-08-12 PROCEDURE — G8427 DOCREV CUR MEDS BY ELIG CLIN: HCPCS | Performed by: STUDENT IN AN ORGANIZED HEALTH CARE EDUCATION/TRAINING PROGRAM

## 2022-08-12 PROCEDURE — 99212 OFFICE O/P EST SF 10 MIN: CPT | Performed by: STUDENT IN AN ORGANIZED HEALTH CARE EDUCATION/TRAINING PROGRAM

## 2022-08-12 PROCEDURE — G8417 CALC BMI ABV UP PARAM F/U: HCPCS | Performed by: STUDENT IN AN ORGANIZED HEALTH CARE EDUCATION/TRAINING PROGRAM

## 2022-08-12 PROCEDURE — 3017F COLORECTAL CA SCREEN DOC REV: CPT | Performed by: STUDENT IN AN ORGANIZED HEALTH CARE EDUCATION/TRAINING PROGRAM

## 2022-08-12 ASSESSMENT — ENCOUNTER SYMPTOMS
SHORTNESS OF BREATH: 0
NAUSEA: 0
VOMITING: 0
CONSTIPATION: 0
COUGH: 0
ABDOMINAL PAIN: 0
DIARRHEA: 0

## 2022-08-12 NOTE — PROGRESS NOTES
Patient is 71-year-old male here to transition care to new provider. He has a history of hypertension, hyperlipidemia, history of stroke. Hypertension is well controlled on amlodipine and lisinopril. He denies any side effects. Denies chest pain, shortness of breath, dizziness/lightheadedness. He also follows with the [place]. He states there is an issue with his potassium but he does remember if it was too high or too low. He is taking Plavix and aspirin. No residual symptoms after stroke. Smokes and drinks beer occasionally. He states that he smokes 10 cigarettes and drinks 10 beers a month. Denies any changes in urinary or bladder habits. Blood pressure 137/72, pulse 60, temperature 97.2 °F (36.2 °C), temperature source Temporal, resp. rate 18, height 5' 11\" (1.803 m), weight 184 lb 1 oz (83.5 kg), SpO2 100 %. HEENT WNL     Heart regular    Lungs clear    abd non-tender      No edema    Pulses intact     Impression:  Hypertension, electrolyte derangement    Continue lisinopril and amlodipine. Instructed to bring in lab work from [place]. Attending Physician Statement  I have discussed the case, including pertinent history and exam findings with the resident. I agree with the documented assessment and plan.

## 2022-08-12 NOTE — PROGRESS NOTES
[address] [facility]  FAMILY MEDICINE RESIDENCY PROGRAM  DATE OF VISIT : 2022    Patient : Patricia Bruner   Age : 79 y.o.  : 1955   MRN : [id number]   ______________________________________________________________________    Chief Complaint :   Chief Complaint   Patient presents with    Hypertension    Established New Doctor     Potassium is low       HPI : Patricia Bruner is 79 y.o. male who presented to the clinic today for HTN. HTN- controlled, amlodipine and lisinopril. No side effects. Denies chest pain, shortness of breath, dizziness/lightheadedness. [facility] Chase Rodriguez. He was told his potassium was high or low, cant remember. H/o TIA- occurred 3 months ago, no residual defects. taking Plavix and aspirin    Smokes and drinks beer occasionally. 10 cig and beers in the month. Past Medical History :  Past Medical History:   Diagnosis Date    Cancer ([address] PROSTATE    , patient reports bone mets    Erectile dysfunction     Hyperlipidemia     Hypertension     Osteoarthritis, hip/pelvic region and thigh 3/29/2013    Prostate cancer ([facility] [address] 2013     Past Surgical History:   Procedure Laterality Date    BACK SURGERY      HIP SURGERY Right     LEG SURGERY      LUMBAR SPINE SURGERY  10/30/2017    PLIF L2 - S1   and laminectomy    PROSTATE BIOPSY         Allergies : Allergies   Allergen Reactions    Ibuprofen Swelling    Benzocaine      EDEMA        Medication List :    Current Outpatient Medications   Medication Sig Dispense Refill    leuprolide (LUPRON) 11.25 MG injection Inject 11.25 mg into the muscle once      allopurinol (ZYLOPRIM) 100 MG tablet Take 1 tablet by mouth daily Please start once flare up resolved.  90 tablet 1    clopidogrel (PLAVIX) 75 MG tablet Take 1 tablet by mouth daily 30 tablet 3    aspirin 81 MG chewable tablet Take 1 tablet by mouth daily 30 tablet 3    atorvastatin (LIPITOR) 40 MG tablet Take 1 tablet by mouth nightly 30 tablet 3 famotidine (PEPCID) 20 MG tablet Take 1 tablet by mouth daily 60 tablet 3    lisinopril (PRINIVIL;ZESTRIL) 20 MG tablet Take 1 tablet by mouth daily 30 tablet 3    EPINEPHrine (EPIPEN) 0.3 MG/0.3ML SOAJ injection Inject 0.3 mg into the muscle      amLODIPine (NORVASC) 10 MG tablet Take 1 tablet by mouth daily 30 tablet 2    acetaminophen (TYLENOL) 500 MG tablet Take 2 tablets by mouth every 6 hours as needed for Pain Maximum dose- 8 tablets/24 hours. 120 tablet 0    acetaminophen (TYLENOL) 325 MG tablet Take 2 tablets by mouth every 4 hours       No current facility-administered medications for this visit. Review of Systems :  Review of Systems   Constitutional:  Negative for chills and fever. Respiratory:  Negative for cough and shortness of breath. Cardiovascular:  Negative for chest pain, palpitations and leg swelling. Gastrointestinal:  Negative for abdominal pain, constipation, diarrhea, nausea and vomiting. Genitourinary:  Negative for dysuria. Neurological:  Negative for dizziness, weakness, light-headedness and headaches.   ______________________________________________________________________    Physical Exam :    Vitals: /72 Comment: manual  Pulse 60   Temp 97.2 °F (36.2 °C) (Temporal)   Resp 18   Ht 5' 11\" (1.803 m)   Wt 184 lb 1 oz (83.5 kg)   SpO2 100%   BMI 25.67 kg/m²   General Appearance: Well developed, awake, alert, oriented, and in NAD  HEENT: NCAT, MMM, no pallor or icterus. Neck: Symmetrical, trachea midline. Chest wall/Lung: CTAB, respirations unlabored. No ronchi/wheezing/rales   Heart: RRR, normal S1 and S2, no murmurs, rubs or gallops. Abdomen: SNTND, bowel sounds present   Extremities: Extremities normal, atraumatic, no cyanosis, clubbing or edema. Skin: Skin color, texture, turgor normal, no rashes or lesions  Musculokeletal: ROM grossly normal in all joints of extremities, no obvious joint swelling.   Lymphnodes: No lymph node enlargement appreciated  Neurologic: Alert&Oriented x3. No focal motor deficits detected. Psychiatric: Normal mood. Normal affect. Normal behavior. ______________________________________________________________________    Assessment & Plan :    1. Essential hypertension  - controlled  - Continue lisinopril and norvasc       2. H/o of Acute cerebrovascular accident (CVA)   - no residual defects  - continue Lipitor plavix and asa     4. Possible Electrolyte abnormality   - I reviewed previous labs, last from 2021 which showed normal potassium  - Instructed to bring in lab work from [place]     5. Medication refills   - refilled asa, plavix, pepcid, lisinopril, allopurinol and  lipitor         Return to Office: Return in about 4 weeks (around 9/9/2022).     Herman Calvo MD  Case discussed with Dr. Homero Tyler

## 2022-08-14 ENCOUNTER — HOSPITAL ENCOUNTER (OUTPATIENT)
Age: 67
Setting detail: OBSERVATION
Discharge: HOME | End: 2022-08-17
Attending: EMERGENCY MEDICINE | Admitting: FAMILY MEDICINE
Payer: COMMERCIAL

## 2022-08-14 ENCOUNTER — APPOINTMENT (OUTPATIENT)
Dept: GENERAL RADIOLOGY | Age: 67
End: 2022-08-14
Payer: COMMERCIAL

## 2022-08-14 ENCOUNTER — APPOINTMENT (OUTPATIENT)
Dept: CT IMAGING | Age: 67
End: 2022-08-14
Payer: COMMERCIAL

## 2022-08-14 DIAGNOSIS — H53.2 DIPLOPIA: Primary | ICD-10-CM

## 2022-08-14 DIAGNOSIS — G52.9 CRANIAL NERVE PALSY: ICD-10-CM

## 2022-08-14 LAB
ALBUMIN SERPL-MCNC: 4.1 G/DL (ref 3.5–5.2)
ALP BLD-CCNC: 154 U/L (ref 40–129)
ALT SERPL-CCNC: 18 U/L (ref 0–40)
ANION GAP SERPL CALCULATED.3IONS-SCNC: 17 MMOL/L (ref 7–16)
AST SERPL-CCNC: 22 U/L (ref 0–39)
BACTERIA: ABNORMAL /HPF
BASOPHILS ABSOLUTE: 0.01 E9/L (ref 0–0.2)
BASOPHILS RELATIVE PERCENT: 0.1 % (ref 0–2)
BILIRUB SERPL-MCNC: 0.4 MG/DL (ref 0–1.2)
BILIRUBIN URINE: NEGATIVE
BLOOD, URINE: ABNORMAL
BUN BLDV-MCNC: 15 MG/DL (ref 6–23)
CALCIUM SERPL-MCNC: 9.7 MG/DL (ref 8.6–10.2)
CHLORIDE BLD-SCNC: 105 MMOL/L (ref 98–107)
CLARITY: CLEAR
CO2: 21 MMOL/L (ref 22–29)
COLOR: YELLOW
CREAT SERPL-MCNC: 0.7 MG/DL (ref 0.7–1.2)
EOSINOPHILS ABSOLUTE: 0.01 E9/L (ref 0.05–0.5)
EOSINOPHILS RELATIVE PERCENT: 0.1 % (ref 0–6)
EPITHELIAL CELLS, UA: ABNORMAL /HPF
GFR AFRICAN AMERICAN: >60
GFR NON-AFRICAN AMERICAN: >60 ML/MIN/1.73
GLUCOSE BLD-MCNC: 139 MG/DL (ref 74–99)
GLUCOSE URINE: NEGATIVE MG/DL
HCT VFR BLD CALC: 42.9 % (ref 37–54)
HEMOGLOBIN: 14.6 G/DL (ref 12.5–16.5)
IMMATURE GRANULOCYTES #: 0.04 E9/L
IMMATURE GRANULOCYTES %: 0.5 % (ref 0–5)
KETONES, URINE: NEGATIVE MG/DL
LACTIC ACID: 2.3 MMOL/L (ref 0.5–2.2)
LEUKOCYTE ESTERASE, URINE: NEGATIVE
LYMPHOCYTES ABSOLUTE: 0.8 E9/L (ref 1.5–4)
LYMPHOCYTES RELATIVE PERCENT: 10.5 % (ref 20–42)
MCH RBC QN AUTO: 29.5 PG (ref 26–35)
MCHC RBC AUTO-ENTMCNC: 34 % (ref 32–34.5)
MCV RBC AUTO: 86.7 FL (ref 80–99.9)
METER GLUCOSE: 120 MG/DL (ref 74–99)
MONOCYTES ABSOLUTE: 0.4 E9/L (ref 0.1–0.95)
MONOCYTES RELATIVE PERCENT: 5.3 % (ref 2–12)
NEUTROPHILS ABSOLUTE: 6.33 E9/L (ref 1.8–7.3)
NEUTROPHILS RELATIVE PERCENT: 83.5 % (ref 43–80)
NITRITE, URINE: NEGATIVE
PDW BLD-RTO: 13.1 FL (ref 11.5–15)
PH UA: 6 (ref 5–9)
PLATELET # BLD: 260 E9/L (ref 130–450)
PMV BLD AUTO: 10.2 FL (ref 7–12)
POTASSIUM REFLEX MAGNESIUM: 4.3 MMOL/L (ref 3.5–5)
PROTEIN UA: 100 MG/DL
RBC # BLD: 4.95 E12/L (ref 3.8–5.8)
RBC UA: ABNORMAL /HPF (ref 0–2)
REASON FOR REJECTION: NORMAL
REJECTED TEST: NORMAL
SODIUM BLD-SCNC: 143 MMOL/L (ref 132–146)
SPECIFIC GRAVITY UA: >=1.03 (ref 1–1.03)
TOTAL PROTEIN: 8.3 G/DL (ref 6.4–8.3)
TROPONIN, HIGH SENSITIVITY: 6 NG/L (ref 0–11)
UROBILINOGEN, URINE: 0.2 E.U./DL
WBC # BLD: 7.6 E9/L (ref 4.5–11.5)
WBC UA: ABNORMAL /HPF (ref 0–5)

## 2022-08-14 PROCEDURE — 84484 ASSAY OF TROPONIN QUANT: CPT

## 2022-08-14 PROCEDURE — 99285 EMERGENCY DEPT VISIT HI MDM: CPT

## 2022-08-14 PROCEDURE — G0378 HOSPITAL OBSERVATION PER HR: HCPCS

## 2022-08-14 PROCEDURE — 83605 ASSAY OF LACTIC ACID: CPT

## 2022-08-14 PROCEDURE — 6370000000 HC RX 637 (ALT 250 FOR IP): Performed by: STUDENT IN AN ORGANIZED HEALTH CARE EDUCATION/TRAINING PROGRAM

## 2022-08-14 PROCEDURE — 71045 X-RAY EXAM CHEST 1 VIEW: CPT

## 2022-08-14 PROCEDURE — 82962 GLUCOSE BLOOD TEST: CPT

## 2022-08-14 PROCEDURE — 81001 URINALYSIS AUTO W/SCOPE: CPT

## 2022-08-14 PROCEDURE — 93005 ELECTROCARDIOGRAM TRACING: CPT | Performed by: STUDENT IN AN ORGANIZED HEALTH CARE EDUCATION/TRAINING PROGRAM

## 2022-08-14 PROCEDURE — 80053 COMPREHEN METABOLIC PANEL: CPT

## 2022-08-14 PROCEDURE — 70450 CT HEAD/BRAIN W/O DYE: CPT

## 2022-08-14 PROCEDURE — 85025 COMPLETE CBC W/AUTO DIFF WBC: CPT

## 2022-08-14 RX ORDER — AMLODIPINE BESYLATE 5 MG/1
10 TABLET ORAL ONCE
Status: COMPLETED | OUTPATIENT
Start: 2022-08-14 | End: 2022-08-14

## 2022-08-14 RX ORDER — LISINOPRIL 20 MG/1
20 TABLET ORAL ONCE
Status: COMPLETED | OUTPATIENT
Start: 2022-08-14 | End: 2022-08-14

## 2022-08-14 RX ORDER — ACETAMINOPHEN 500 MG
1000 TABLET ORAL ONCE
Status: COMPLETED | OUTPATIENT
Start: 2022-08-14 | End: 2022-08-14

## 2022-08-14 RX ADMIN — LISINOPRIL 20 MG: 20 TABLET ORAL at 23:45

## 2022-08-14 RX ADMIN — ACETAMINOPHEN 1000 MG: 500 TABLET ORAL at 22:44

## 2022-08-14 RX ADMIN — AMLODIPINE BESYLATE 10 MG: 5 TABLET ORAL at 23:00

## 2022-08-14 ASSESSMENT — ENCOUNTER SYMPTOMS
SHORTNESS OF BREATH: 0
ABDOMINAL PAIN: 0
NAUSEA: 0
EYE PAIN: 0
COUGH: 0
VOICE CHANGE: 0
DIARRHEA: 0
RHINORRHEA: 0
VOMITING: 0
TROUBLE SWALLOWING: 0
PHOTOPHOBIA: 0
CONSTIPATION: 0
EYE DISCHARGE: 0

## 2022-08-14 ASSESSMENT — PAIN DESCRIPTION - FREQUENCY: FREQUENCY: CONTINUOUS

## 2022-08-14 ASSESSMENT — PAIN - FUNCTIONAL ASSESSMENT: PAIN_FUNCTIONAL_ASSESSMENT: 0-10

## 2022-08-14 ASSESSMENT — PAIN SCALES - GENERAL: PAINLEVEL_OUTOF10: 10

## 2022-08-15 ENCOUNTER — APPOINTMENT (OUTPATIENT)
Dept: ULTRASOUND IMAGING | Age: 67
End: 2022-08-15
Payer: COMMERCIAL

## 2022-08-15 LAB
EKG ATRIAL RATE: 60 BPM
EKG P AXIS: 2 DEGREES
EKG P-R INTERVAL: 118 MS
EKG Q-T INTERVAL: 492 MS
EKG QRS DURATION: 100 MS
EKG QTC CALCULATION (BAZETT): 492 MS
EKG R AXIS: 17 DEGREES
EKG T AXIS: 50 DEGREES
EKG VENTRICULAR RATE: 60 BPM
LV EF: 58 %
LVEF MODALITY: NORMAL

## 2022-08-15 PROCEDURE — 6360000002 HC RX W HCPCS: Performed by: STUDENT IN AN ORGANIZED HEALTH CARE EDUCATION/TRAINING PROGRAM

## 2022-08-15 PROCEDURE — 92610 EVALUATE SWALLOWING FUNCTION: CPT

## 2022-08-15 PROCEDURE — 2580000003 HC RX 258: Performed by: STUDENT IN AN ORGANIZED HEALTH CARE EDUCATION/TRAINING PROGRAM

## 2022-08-15 PROCEDURE — G0378 HOSPITAL OBSERVATION PER HR: HCPCS

## 2022-08-15 PROCEDURE — 93880 EXTRACRANIAL BILAT STUDY: CPT

## 2022-08-15 PROCEDURE — 6370000000 HC RX 637 (ALT 250 FOR IP): Performed by: STUDENT IN AN ORGANIZED HEALTH CARE EDUCATION/TRAINING PROGRAM

## 2022-08-15 PROCEDURE — 92523 SPEECH SOUND LANG COMPREHEN: CPT

## 2022-08-15 PROCEDURE — 99232 SBSQ HOSP IP/OBS MODERATE 35: CPT | Performed by: FAMILY MEDICINE

## 2022-08-15 PROCEDURE — 96372 THER/PROPH/DIAG INJ SC/IM: CPT

## 2022-08-15 PROCEDURE — 93306 TTE W/DOPPLER COMPLETE: CPT

## 2022-08-15 RX ORDER — ALLOPURINOL 100 MG/1
100 TABLET ORAL DAILY
Qty: 90 TABLET | Refills: 1 | Status: SHIPPED | OUTPATIENT
Start: 2022-08-15

## 2022-08-15 RX ORDER — SODIUM CHLORIDE 0.9 % (FLUSH) 0.9 %
5-40 SYRINGE (ML) INJECTION PRN
Status: DISCONTINUED | OUTPATIENT
Start: 2022-08-15 | End: 2022-08-17 | Stop reason: HOSPADM

## 2022-08-15 RX ORDER — CLOPIDOGREL BISULFATE 75 MG/1
75 TABLET ORAL DAILY
Qty: 30 TABLET | Refills: 3 | Status: SHIPPED | OUTPATIENT
Start: 2022-08-15

## 2022-08-15 RX ORDER — ONDANSETRON 4 MG/1
4 TABLET, ORALLY DISINTEGRATING ORAL EVERY 8 HOURS PRN
Status: DISCONTINUED | OUTPATIENT
Start: 2022-08-15 | End: 2022-08-17 | Stop reason: HOSPADM

## 2022-08-15 RX ORDER — ASPIRIN 81 MG
TABLET,CHEWABLE ORAL
Qty: 90 TABLET | OUTPATIENT
Start: 2022-08-15

## 2022-08-15 RX ORDER — ATORVASTATIN CALCIUM 40 MG/1
40 TABLET, FILM COATED ORAL NIGHTLY
Qty: 30 TABLET | Refills: 3 | Status: SHIPPED
Start: 2022-08-15 | End: 2022-08-17 | Stop reason: HOSPADM

## 2022-08-15 RX ORDER — ASPIRIN 81 MG/1
81 TABLET, CHEWABLE ORAL DAILY
Status: DISCONTINUED | OUTPATIENT
Start: 2022-08-15 | End: 2022-08-17 | Stop reason: HOSPADM

## 2022-08-15 RX ORDER — ALLOPURINOL 100 MG/1
100 TABLET ORAL DAILY
Status: DISCONTINUED | OUTPATIENT
Start: 2022-08-15 | End: 2022-08-17 | Stop reason: HOSPADM

## 2022-08-15 RX ORDER — ACETAMINOPHEN 325 MG/1
650 TABLET ORAL EVERY 6 HOURS PRN
Status: DISCONTINUED | OUTPATIENT
Start: 2022-08-15 | End: 2022-08-17 | Stop reason: HOSPADM

## 2022-08-15 RX ORDER — SODIUM CHLORIDE 0.9 % (FLUSH) 0.9 %
5-40 SYRINGE (ML) INJECTION EVERY 12 HOURS SCHEDULED
Status: DISCONTINUED | OUTPATIENT
Start: 2022-08-15 | End: 2022-08-17 | Stop reason: HOSPADM

## 2022-08-15 RX ORDER — ENOXAPARIN SODIUM 100 MG/ML
40 INJECTION SUBCUTANEOUS DAILY
Status: DISCONTINUED | OUTPATIENT
Start: 2022-08-15 | End: 2022-08-17 | Stop reason: HOSPADM

## 2022-08-15 RX ORDER — FAMOTIDINE 20 MG/1
20 TABLET, FILM COATED ORAL DAILY
Status: DISCONTINUED | OUTPATIENT
Start: 2022-08-15 | End: 2022-08-17 | Stop reason: HOSPADM

## 2022-08-15 RX ORDER — ATORVASTATIN CALCIUM 40 MG/1
40 TABLET, FILM COATED ORAL NIGHTLY
Qty: 90 TABLET | OUTPATIENT
Start: 2022-08-15

## 2022-08-15 RX ORDER — ONDANSETRON 2 MG/ML
4 INJECTION INTRAMUSCULAR; INTRAVENOUS EVERY 6 HOURS PRN
Status: DISCONTINUED | OUTPATIENT
Start: 2022-08-15 | End: 2022-08-17 | Stop reason: HOSPADM

## 2022-08-15 RX ORDER — CLOPIDOGREL BISULFATE 75 MG/1
75 TABLET ORAL DAILY
Status: DISCONTINUED | OUTPATIENT
Start: 2022-08-15 | End: 2022-08-17 | Stop reason: HOSPADM

## 2022-08-15 RX ORDER — LISINOPRIL 20 MG/1
20 TABLET ORAL DAILY
Qty: 30 TABLET | Refills: 3 | Status: SHIPPED | OUTPATIENT
Start: 2022-08-15

## 2022-08-15 RX ORDER — FAMOTIDINE 20 MG/1
20 TABLET, FILM COATED ORAL DAILY
Qty: 60 TABLET | Refills: 3 | Status: SHIPPED | OUTPATIENT
Start: 2022-08-15

## 2022-08-15 RX ORDER — ASPIRIN 81 MG/1
81 TABLET, CHEWABLE ORAL DAILY
Qty: 30 TABLET | Refills: 3 | Status: SHIPPED | OUTPATIENT
Start: 2022-08-15

## 2022-08-15 RX ORDER — POLYETHYLENE GLYCOL 3350 17 G/17G
17 POWDER, FOR SOLUTION ORAL DAILY PRN
Status: DISCONTINUED | OUTPATIENT
Start: 2022-08-15 | End: 2022-08-17 | Stop reason: HOSPADM

## 2022-08-15 RX ORDER — ACETAMINOPHEN 650 MG/1
650 SUPPOSITORY RECTAL EVERY 6 HOURS PRN
Status: DISCONTINUED | OUTPATIENT
Start: 2022-08-15 | End: 2022-08-17 | Stop reason: HOSPADM

## 2022-08-15 RX ORDER — ATORVASTATIN CALCIUM 40 MG/1
40 TABLET, FILM COATED ORAL NIGHTLY
Status: DISCONTINUED | OUTPATIENT
Start: 2022-08-15 | End: 2022-08-16

## 2022-08-15 RX ORDER — LISINOPRIL 20 MG/1
20 TABLET ORAL DAILY
Status: DISCONTINUED | OUTPATIENT
Start: 2022-08-15 | End: 2022-08-17 | Stop reason: HOSPADM

## 2022-08-15 RX ORDER — AMLODIPINE BESYLATE 10 MG/1
10 TABLET ORAL DAILY
Status: DISCONTINUED | OUTPATIENT
Start: 2022-08-15 | End: 2022-08-17 | Stop reason: HOSPADM

## 2022-08-15 RX ORDER — SODIUM CHLORIDE 9 MG/ML
INJECTION, SOLUTION INTRAVENOUS PRN
Status: DISCONTINUED | OUTPATIENT
Start: 2022-08-15 | End: 2022-08-17 | Stop reason: HOSPADM

## 2022-08-15 RX ADMIN — ALLOPURINOL 100 MG: 100 TABLET ORAL at 09:21

## 2022-08-15 RX ADMIN — ENOXAPARIN SODIUM 40 MG: 100 INJECTION SUBCUTANEOUS at 09:21

## 2022-08-15 RX ADMIN — CLOPIDOGREL BISULFATE 75 MG: 75 TABLET ORAL at 09:20

## 2022-08-15 RX ADMIN — FAMOTIDINE 20 MG: 20 TABLET, FILM COATED ORAL at 09:21

## 2022-08-15 RX ADMIN — Medication 10 ML: at 20:27

## 2022-08-15 RX ADMIN — Medication 10 ML: at 09:26

## 2022-08-15 RX ADMIN — ASPIRIN 81 MG CHEWABLE TABLET 81 MG: 81 TABLET CHEWABLE at 09:20

## 2022-08-15 RX ADMIN — SODIUM CHLORIDE, PRESERVATIVE FREE 10 ML: 5 INJECTION INTRAVENOUS at 03:29

## 2022-08-15 RX ADMIN — ATORVASTATIN CALCIUM 40 MG: 40 TABLET, FILM COATED ORAL at 20:27

## 2022-08-15 NOTE — ED NOTES
Pt states \"It feels like i'm having a stroke\". States he has an \"excrutiating\" headache and he has double vision. Pt states it started last night. Pt states he didn't take any OTC medication for headache. Pt states he couldn't get a hold of any one until today and his doctor told him to call 911 yesterday but his phone kept [phone] and he couldn't get through. Speech is clear. Pt is moving all extremities. Pt then states he's been vomiting. \"I think I had bad mashed potatoes and that's what's wrong\". Pt also states he did have one beer today to try and have a bowel movement.       Deysi Mead RN  08/14/22 2031

## 2022-08-15 NOTE — H&P
spine surgery (10/30/2017); and back surgery. FH: family history is not on file. Social:  reports that he quit smoking about 4 years ago. His smoking use included cigarettes. He has a 2.50 pack-year smoking history. He has never used smokeless tobacco. He reports current alcohol use. He reports that he does not use drugs. Allergies: Allergies   Allergen Reactions    Ibuprofen Swelling    Benzocaine      EDEMA         Home Medications:   No current facility-administered medications on file prior to encounter. Current Outpatient Medications on File Prior to Encounter   Medication Sig Dispense Refill    leuprolide (LUPRON) 11.25 MG injection Inject 11.25 mg into the muscle once      amLODIPine (NORVASC) 10 MG tablet Take 1 tablet by mouth daily 30 tablet 2    allopurinol (ZYLOPRIM) 100 MG tablet Take 1 tablet by mouth daily Please start once flare up resolved. 90 tablet 1    clopidogrel (PLAVIX) 75 MG tablet Take 1 tablet by mouth daily 30 tablet 3    acetaminophen (TYLENOL) 500 MG tablet Take 2 tablets by mouth every 6 hours as needed for Pain Maximum dose- 8 tablets/24 hours. 120 tablet 0    aspirin 81 MG chewable tablet Take 1 tablet by mouth daily 30 tablet 3    atorvastatin (LIPITOR) 40 MG tablet Take 1 tablet by mouth nightly 30 tablet 3    famotidine (PEPCID) 20 MG tablet Take 1 tablet by mouth daily 60 tablet 3    lisinopril (PRINIVIL;ZESTRIL) 20 MG tablet Take 1 tablet by mouth daily 30 tablet 3    EPINEPHrine (EPIPEN) 0.3 MG/0.3ML SOAJ injection Inject 0.3 mg into the muscle      acetaminophen (TYLENOL) 325 MG tablet Take 2 tablets by mouth every 4 hours         ROS:  Relevant ROS as mentioned in HPI    PE:  Blood pressure (!) 188/89, pulse 60, temperature 98 °F (36.7 °C), temperature source Oral, resp. rate 20, height 5' 11\" (1.803 m), weight 184 lb (83.5 kg), SpO2 100 %. General: Alert, cooperative, no acute distress. HEENT: Normocephalic, atraumatic.  PERRLA, conjunctiva/corneas clear, 0. 01 0.00 - 0.20 E9/L   Comprehensive Metabolic Panel w/ Reflex to MG   Result Value Ref Range    Sodium 143 132 - 146 mmol/L    Potassium reflex Magnesium 4.3 3.5 - 5.0 mmol/L    Chloride 105 98 - 107 mmol/L    CO2 21 (L) 22 - 29 mmol/L    Anion Gap 17 (H) 7 - 16 mmol/L    Glucose 139 (H) 74 - 99 mg/dL    BUN 15 6 - 23 mg/dL    Creatinine 0.7 0.7 - 1.2 mg/dL    GFR Non-African American >60 >=60 mL/min/1.73    GFR African American >60     Calcium 9.7 8.6 - 10.2 mg/dL    Total Protein 8.3 6.4 - 8.3 g/dL    Albumin 4.1 3.5 - 5.2 g/dL    Total Bilirubin 0.4 0.0 - 1.2 mg/dL    Alkaline Phosphatase 154 (H) 40 - 129 U/L    ALT 18 0 - 40 U/L    AST 22 0 - 39 U/L   Urinalysis with Microscopic   Result Value Ref Range    Color, UA Yellow Straw/Yellow    Clarity, UA Clear Clear    Glucose, Ur Negative Negative mg/dL    Bilirubin Urine Negative Negative    Ketones, Urine Negative Negative mg/dL    Specific Gravity, UA >=1.030 1.005 - 1.030    Blood, Urine SMALL (A) Negative    pH, UA 6.0 5.0 - 9.0    Protein,  (A) Negative mg/dL    Urobilinogen, Urine 0.2 <2.0 E.U./dL    Nitrite, Urine Negative Negative    Leukocyte Esterase, Urine Negative Negative    WBC, UA NONE 0 - 5 /HPF    RBC, UA 0-1 0 - 2 /HPF    Epithelial Cells, UA RARE /HPF    Bacteria, UA RARE (A) None Seen /HPF   Lactic Acid   Result Value Ref Range    Lactic Acid 2.3 (H) 0.5 - 2.2 mmol/L   SPECIMEN REJECTION   Result Value Ref Range    Rejected Test TRP5     Reason for Rejection see below    Troponin   Result Value Ref Range    Troponin, High Sensitivity 6 0 - 11 ng/L   POCT Glucose   Result Value Ref Range    Meter Glucose 120 (H) 74 - 99 mg/dL       Imaging:  CT Head WO Contrast   Final Result   No acute intracranial abnormality. XR CHEST PORTABLE   Final Result   No acute process. Assessment and Plan  Principal Problem:    Cranial nerve palsy  Resolved Problems:    * No resolved hospital problems.  *      CVA/TIA  -MRI brain, MRA head and neck wo contrast ordered  -Echo w/bubble study ordered  -Neurovascular checks  -SLP evaluation & treat  -Orthostatics ordered  -Neurology consulted  -Continue home ASA 81 mg and Plavix 75 mg daily  -Admit to  telemetry     HTN Urgency  -/99 on admission  -Patient experiencing headache, blurry vision, nausea  -Zofran for nausea  -Consider giving labetalol for elevated BP >= 200   -Held home meds for now  -Urine and Serum drug screening   -Monitor BP    HLD  -Home dose of atorvastatin to 40 mg    Hx of prostate cancer  -[facility]     GERD  -Continue home Pepcid        DVT / GI prophylaxis: lovenox 40mg SC daily and Pepcid    Dispo -       Electronically signed by Darian Shane MD on 8/14/22 at 10:39 PM EDT  This case was discussed with attending physician: Dr. Josias William

## 2022-08-15 NOTE — PROGRESS NOTES
[facility]  Family Medicine Attending    S: 79 y.o. male with PMH of prostate cancer, HLD, prior TIA/stroke, HTN, and frequent falls, presented to the ED with headache, facial numbness, blurry vision and fall. He notes that he ate some questionable mashed potatoes over the weekend which caused him to vomit. Since that time, he has had weakness and fell. He called the ambulance because he was unable to stand up. His ED course was remarkable for negative head CT, EKG with sinus rhythm, and unremarkable labs. Of note, in December of 2021, he was evaluated for stroke. He was placed on aspirin and plavix at that time. He remained on the aspirin and plavix since that time. Today, he continue to complain of some numbness around his lips. His speech is slurred at times; there is persistent blurry vision as well as generalized weakness. He denies any chest pain or shortness of breath. No heart palpitations. O: VS- Blood pressure (!) 143/66, pulse 53, temperature 98.2 °F (36.8 °C), temperature source Oral, resp. rate 16, height 5' 11\" (1.803 m), weight 184 lb (83.5 kg), SpO2 100 %. Exam is as noted by resident with the following changes, additions or corrections:  Gen:  AAOx3, NAD  CVS:  RRR  Lungs:  CTAB  Neuro:  sensation intact bilaterally except at the lips; 4/5 strength in the left UE, 5/5 right UE (patient notes he is left handed); 4/5 strength in the lower extremities (chronic weakness of the right lower ext)  Eye movements abnormal:  there is deviation of the left eye laterally at rest, however it does track appropriately; right eye is unable to track medially    Impressions:   Cranial nerve palsy  2. Acute cerebrovascular accident (CVA)   3. Hx prostate cancer  4. Mixed hyperlipidemia  5.     Essential hypertension      Plan:      MRI/MRA brain, MRA neck  Carotid ultrasound  Neuro consulted  Echocardiogram pending  Continue aspirin and plavix  SLP evaluation-no dysphagia; recs include easy to chew consistency solids and thin liquids     Attending Physician Statement  I have reviewed the chart and seen the patient with the resident(s). I personally reviewed images, EKG's and similar tests, if present. I personally reviewed and performed key elements of the history and exam.  I have reviewed and confirmed student and/or resident history and exam with changes as indicated above. I agree with the assessment, plan and orders as documented by the resident. Please refer to the resident and/or student note for additional information.       Sho Arciniega MD

## 2022-08-15 NOTE — CARE COORDINATION
Care coordination: met with pt at bedside to discuss transition of care upon discharge. Lives alone in a one story home. 6 steps to get into home. Has a walker and 4 prong cane. Follows with family med, uses [employer]. , niece will pickup upon discharge. Hx of stroke this year, with vision problems in eye. No hx of selin or hhc. Does not feel any needs at the moment. Awaiting MRI/MRA, states nurses have completed check list.  DC plan is home.       Jesús Hart

## 2022-08-15 NOTE — PROGRESS NOTES
SPEECH/LANGUAGE PATHOLOGY  SPEECH/LANGUAGE/COGNITIVE EVALUATION   and PLAN OF CARE      PATIENT NAME:  Marc Chun  (male)     MRN:  [id number]    :  1955  (79 y.o.)  STATUS:  Inpatient: Room 6402/6402-B    TODAY'S DATE:  8/15/2022  REFERRING PROVIDER:    Leah Figueroa MD   SPECIFIC PROVIDER ORDER: SLP eval and treat  Date of order:  8/15/2022  REASON FOR REFERRAL: Assess speech, language and cognition  EVALUATING THERAPIST: ELIZABETH Mclaughlin    ADMITTING DIAGNOSIS: Diplopia [H53.2]  Cranial nerve palsy [G52.9]  [facility] ([facility]) [facility] [I63.9]    VISIT DIAGNOSIS:   Visit Diagnoses         Codes    Diplopia    -  Primary H53.2             SPEECH THERAPY  PLAN OF CARE   The speech therapy  POC is established based on physician order, speech pathology diagnosis and results of clinical assessment     SPEECH PATHOLOGY DIAGNOSIS:    Within function limits    Speech Pathology intervention is not warranted at this time. Conditions Requiring Skilled Therapeutic Intervention for speech, language and/or cognition  Not applicable     Specific Speech Therapy Interventions to Include:   Not applicable    Specific instructions for next treatment:    Not applicable    SHORT/LONG TERM GOALS  Not applicable      Patient goals: Patient/family involved in developing goals and treatment plan:   Treatment goals discussed with Patient    The Patient understand(s) the diagnosis, prognosis and plan of care   The patient/family Agreed with above,     This plan may be re-evaluated and revised as warranted. Rehabilitation Potential/Prognosis: not applicable                CLINICAL ASSESSMENT:  MOTOR SPEECH       Oral Peripheral Examination   Adequate lingual/labial strength     Parameters of Speech Production  Respiration:  Adequate for speech production  Articulation:  Within functional limits  Resonance:  Within functional limits  Quality:   Within functional limits  Pitch:     Within functional limits  Intensity: Within functional limits  Fluency:  Intact  Prosody Intact    RECEPTIVE LANGUAGE    Comprehension of Yes/No Questions: Within functional limits    Process  Simple Verbal Commands:   Within functional limits  Process Intermediate Verbal Commands:   Within functional limits  Process Complex Verbal Commands:     Within functional limits    Comprehension of Conversation:      Within functional limits      EXPRESSIVE LANGUAGE     Serials: Functional    Imitation:  Words   Functional   Sentences Functional    Naming:  (Modality used:  Verbal)  Confrontation Naming  Functional  Functional Description  Functional  Response Naming: Functional    Conversation:      Conversation was within functional limits    COGNITION     Attention/Orientation  Attention: Sustained attention   Orientation:  Oriented to Person, Place, Date, Reason for hospitalization    Memory   Immediate Recall: Repeated 3/3    Delayed Recall:   Recalled  2/3 and 3/3 with category cue  Long Term Recall:   Recalled Address, Birthdate, Age, and Family    Organization/Problem Solving/Reasoning   Verbal Sequencing:   Functional        Verbal Problem solving:   Functional          CLINICAL OBSERVATIONS NOTED DURING THE EVALUATION  Within functional limits                  EDUCATION:   The Speech Language Pathologist (SLP) completed education regarding results of evaluation and that intervention is not warranted at this time. Learner: Patient  Education: Reviewed results and recommendations of this evaluation  Evaluation of Education:  Verbalizes understanding    Evaluation Time includes thorough review of current medical information, gathering information on past medical history/social history and prior level of function, completion of standardized testing/informal observation of tasks, assessment of data and education on plan of care and goals.       CPT code:    55929  eval speech sound lang comprehension      The admitting diagnosis and active problem list, as listed below have been reviewed prior to initiation of this evaluation. ACTIVE PROBLEM LIST:   Patient Active Problem List   Diagnosis    Osteoarthritis, hip/pelvic region and thigh    Lytic bone lesion of hip on xray    Synovitis of hip    Degenerative arthritis of hip    Smoking    Intractable low back pain    Cauda equina spinal cord injury ([address]    Spinal stenosis of lumbar region at multiple levels    Essential hypertension    Osteoarthritis of both hips    Prostate cancer ([address]    Lower back injury    Weakness of left hand    Leg weakness, bilateral    Chronic pain syndrome    Lumbar disc disorder    Lumbar spondylosis    Spinal stenosis of lumbar region without neurogenic claudication    Primary osteoarthritis of both hips    Acute cerebrovascular accident (CVA) ([address]    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  Aden SOUZA, [address] [id number]

## 2022-08-15 NOTE — TELEPHONE ENCOUNTER
Last Appointment:  5/12/2022  Future Appointments   Date Time Provider Alee Tapia   11/15/2022 10:00 AM MD Carolee Mccurdy [facility]

## 2022-08-15 NOTE — ED PROVIDER NOTES
Patient is a 58-year-old male with a history of prostate cancer, PCA stroke in 12/2021, hypertension, OA who presents to the emergency department with complaint of headache and double vision. Patient states similar symptoms with stroke in 12/2021. Patient states that h went to bed last night around 1730 hrs. on 8/13/2022 with a headache and some abdominal pain. Patient states he woke up early this morning and had a syncopal episode lying on the floor for unknown amount of time. When patient woke up approximately 0530 hrs. patient with blurred vision and double vision. Patient is concerned as these are similar symptoms to his previous stroke. Patient denies any other paresthesias or numbness, denies chest pain or shortness of breath, denies recent falls or trauma. Patient denies nausea or vomiting. Patient states headache is globalized, nonradiating, nothing makes it better, nothing makes it worse, moderate intensity, constant. Patient has not self treated at home. Review of Systems   Constitutional:  Negative for chills and fever. HENT:  Negative for congestion, rhinorrhea, trouble swallowing and voice change. Eyes:  Positive for visual disturbance. Negative for photophobia, pain and discharge. Respiratory:  Negative for cough and shortness of breath. Cardiovascular:  Negative for chest pain, palpitations and leg swelling. Gastrointestinal:  Negative for abdominal pain, constipation, diarrhea, nausea and vomiting. Endocrine: Negative for polyuria. Genitourinary:  Negative for dysuria and urgency. Musculoskeletal:  Negative for arthralgias and myalgias. Skin:  Negative for rash and wound. Allergic/Immunologic: Negative for immunocompromised state. Neurological:  Positive for syncope and headaches. Negative for dizziness, facial asymmetry, speech difficulty, weakness, light-headedness and numbness. Psychiatric/Behavioral:  Negative for confusion.  The patient is not nervous/anxious. Physical Exam  Vitals and nursing note reviewed. Constitutional:       General: He is awake. He is not in acute distress. Appearance: He is not ill-appearing. HENT:      Head: Normocephalic and atraumatic. Mouth/Throat:      Mouth: Mucous membranes are moist.      Pharynx: Oropharynx is clear. Eyes:      General: No scleral icterus. Extraocular Movements:      Left eye: Abnormal extraocular motion present. Pupils: Pupils are equal, round, and reactive to light. Comments: Left eye noted with lateral gaze, right eye would not past midline on extraocular exam.  Left eye with right eye covered would come to midline without any blurred vision or double vision. Cardiovascular:      Rate and Rhythm: Normal rate and regular rhythm. Pulses: Normal pulses. Heart sounds: Normal heart sounds. Pulmonary:      Effort: Pulmonary effort is normal.      Breath sounds: Normal breath sounds. Abdominal:      General: There is no distension. Palpations: Abdomen is soft. Tenderness: There is no abdominal tenderness. Musculoskeletal:         General: Normal range of motion. Cervical back: Normal range of motion. Skin:     General: Skin is warm. Capillary Refill: Capillary refill takes less than 2 seconds. Neurological:      General: No focal deficit present. Mental Status: He is alert and oriented to person, place, and time. Cranial Nerves: Cranial nerve deficit present. Sensory: No sensory deficit. Motor: No weakness. Comments: Patient with reported paresthesias to bilateral palms. Psychiatric:         Behavior: Behavior is cooperative. MDM  Number of Diagnoses or Management Options  Cranial nerve palsy  Diplopia  Diagnosis management comments: Patient is a 19-year-old male who presents to the emergency department with headache and diplopia. Patient does have a history of a PCA stroke 8 months ago.   Patient had similar symptoms at that time of the stroke. Patient presents awake, alert, following all commands, no apparent distress. Vital signs were noted. Physical exam shows no abnormalities except for cranial nerve palsy, palsy of extraocular movements. Work-up including labs were reviewed, CT scan was normal.  Patient was outside the window for stroke alert, not a candidate for acute intervention. Cranial nerve palsy is likely the cause of patient's diplopia. He has no other neurological deficits. Work-up was discussed with patient as well as need for admission for neurological services. Family medicine was consulted, accepted the patient. She was monitored in the emergency department until time the bed was available. Patient without further change. EKG: This EKG is signed and interpreted by me. Rate: 60  Rhythm: Sinus  Interpretation: sinus rhythm, normal ID interval, normal QRS, normal QT interval, no acute ST or T wave changes  Comparison: stable as compared to patient's most recent EKG     --------------------------------------------- PAST HISTORY ---------------------------------------------  Past Medical History:  has a past medical history of [facility] Erectile dysfunction, Hyperlipidemia, Hypertension, Osteoarthritis, hip/pelvic region and thigh, and Prostate cancer ([facility] Past Surgical History:  has a past surgical history that includes Prostate Biopsy; Leg Surgery; hip surgery (Right); Lumbar spine surgery (10/30/2017); and back surgery. Social History:  reports that he quit smoking about 4 years ago. His smoking use included cigarettes. He has a 2.50 pack-year smoking history. He has never used smokeless tobacco. He reports current alcohol use. He reports that he does not use drugs. Family History: family history is not on file. The patients home medications have been reviewed.     Allergies: Ibuprofen and Benzocaine    -------------------------------------------------- RESULTS -------------------------------------------------    LABS:  Results for orders placed or performed during the hospital encounter of 08/14/22   CBC with Auto Differential   Result Value Ref Range    WBC 7.6 4.5 - 11.5 E9/L    RBC 4.95 3.80 - 5.80 E12/L    Hemoglobin 14.6 12.5 - 16.5 g/dL    Hematocrit 42.9 37.0 - 54.0 %    MCV 86.7 80.0 - 99.9 fL    MCH 29.5 26.0 - 35.0 pg    MCHC 34.0 32.0 - 34.5 %    RDW 13.1 11.5 - 15.0 fL    Platelets 093 553 - 461 E9/L    MPV 10.2 7.0 - 12.0 fL    Neutrophils % 83.5 (H) 43.0 - 80.0 %    Immature Granulocytes % 0.5 0.0 - 5.0 %    Lymphocytes % 10.5 (L) 20.0 - 42.0 %    Monocytes % 5.3 2.0 - 12.0 %    Eosinophils % 0.1 0.0 - 6.0 %    Basophils % 0.1 0.0 - 2.0 %    Neutrophils Absolute 6.33 1.80 - 7.30 E9/L    Immature Granulocytes # 0.04 E9/L    Lymphocytes Absolute 0.80 (L) 1.50 - 4.00 E9/L    Monocytes Absolute 0.40 0.10 - 0.95 E9/L    Eosinophils Absolute 0.01 (L) 0.05 - 0.50 E9/L    Basophils Absolute 0.01 0.00 - 0.20 E9/L   Comprehensive Metabolic Panel w/ Reflex to MG   Result Value Ref Range    Sodium 143 132 - 146 mmol/L    Potassium reflex Magnesium 4.3 3.5 - 5.0 mmol/L    Chloride 105 98 - 107 mmol/L    CO2 21 (L) 22 - 29 mmol/L    Anion Gap 17 (H) 7 - 16 mmol/L    Glucose 139 (H) 74 - 99 mg/dL    BUN 15 6 - 23 mg/dL    Creatinine 0.7 0.7 - 1.2 mg/dL    GFR Non-African American >60 >=60 mL/min/1.73    GFR African American >60     Calcium 9.7 8.6 - 10.2 mg/dL    Total Protein 8.3 6.4 - 8.3 g/dL    Albumin 4.1 3.5 - 5.2 g/dL    Total Bilirubin 0.4 0.0 - 1.2 mg/dL    Alkaline Phosphatase 154 (H) 40 - 129 U/L    ALT 18 0 - 40 U/L    AST 22 0 - 39 U/L   Urinalysis with Microscopic   Result Value Ref Range    Color, UA Yellow Straw/Yellow    Clarity, UA Clear Clear    Glucose, Ur Negative Negative mg/dL    Bilirubin Urine Negative Negative    Ketones, Urine Negative Negative mg/dL    Specific Gravity, UA >=1.030 1.005 - 1.030    Blood, Urine SMALL (A) Negative    pH, UA 6.0 5.0 - 9.0    Protein,  (A) Negative mg/dL    Urobilinogen, Urine 0.2 <2.0 E.U./dL    Nitrite, Urine Negative Negative    Leukocyte Esterase, Urine Negative Negative    WBC, UA NONE 0 - 5 /HPF    RBC, UA 0-1 0 - 2 /HPF    Epithelial Cells, UA RARE /HPF    Bacteria, UA RARE (A) None Seen /HPF   Lactic Acid   Result Value Ref Range    Lactic Acid 2.3 (H) 0.5 - 2.2 mmol/L   SPECIMEN REJECTION   Result Value Ref Range    Rejected Test TRP5     Reason for Rejection see below    Troponin   Result Value Ref Range    Troponin, High Sensitivity 6 0 - 11 ng/L   POCT Glucose   Result Value Ref Range    Meter Glucose 120 (H) 74 - 99 mg/dL       RADIOLOGY:  CT Head WO Contrast   Final Result   No acute intracranial abnormality. XR CHEST PORTABLE   Final Result   No acute process. ------------------------- NURSING NOTES AND VITALS REVIEWED ---------------------------  Date / Time Roomed:  8/14/2022  7:29 PM  ED Bed Assignment:  [id number]    The nursing notes within the ED encounter and vital signs as below have been reviewed. Patient Vitals for the past 24 hrs:   BP Temp Temp src Pulse Resp SpO2 Height Weight   08/14/22 2242 (!) 197/99 -- -- 60 18 100 % -- --   08/14/22 1921 (!) 188/89 98 °F (36.7 °C) Oral 60 20 100 % 5' 11\" (1.803 m) 184 lb (83.5 kg)       Oxygen Saturation Interpretation: Normal    ------------------------------------------ PROGRESS NOTES ------------------------------------------  Re-evaluation(s):  Patients symptoms show no change  Repeat physical examination is not changed    Counseling:  I have spoken with the patient and discussed todays results, in addition to providing specific details for the plan of care and counseling regarding the diagnosis and prognosis.   Their questions are answered at this time and they are agreeable with the plan of admission.    --------------------------------- ADDITIONAL PROVIDER NOTES

## 2022-08-15 NOTE — PROGRESS NOTES
SPEECH/LANGUAGE PATHOLOGY  CLINICAL ASSESSMENT OF SWALLOWING FUNCTION   and PLAN OF CARE  PATIENT NAME:  Alana Dominguez  (male)     MRN:  [id number]    :  1955  (79 y.o.)  STATUS:  Inpatient: Room 6402/6402-B    TODAY'S DATE:  8/15/2022  REFERRING PROVIDER:   Cnidy Lyons MD  SPECIFIC PROVIDER ORDER: SLP eval and treat Date of order:  8/15/2022  REASON FOR REFERRAL: Assess swallow function   EVALUATING THERAPIST: ELIZABETH Bowen                 ASSESSMENT:    DYSPHAGIA DIAGNOSIS:   Clinical indicators of functional swallow for age/premorbid functioning      DIET RECOMMENDATIONS:  Easy to chew consistency solids (IDDSI level 7, transitional) with  thin liquids (IDDSI level 0), MEDICATION ADMINISTRATION, and Per patient choice/nursing judgement     FEEDING RECOMMENDATIONS:     Assistance level:  No assistance needed      Compensatory strategies recommended: No strategies are recommended at this time      Discussed recommendations with nursing?: No secondary to no diet/liquid change recommended     SPEECH THERAPY  PLAN OF CARE   The dysphagia POC is established based on physician order, dysphagia diagnosis and results of clinical assessment     Dysphagia therapy is not recommended     Conditions Requiring Skilled Therapeutic Intervention for dysphagia:    not applicable    Specific dysphagia interventions to include:     Not applicable    Specific instructions for next treatment:  not applicable   Patient Treatment Goals:    Short Term Goals:  Not applicable no therapy warranted     Long Term Goals:   Not applicable no therapy warranted      Patient/family Goal:    not applicable                    ADMITTING DIAGNOSIS: Diplopia [H53.2]  Cranial nerve palsy [G52.9]  [facility] [I63.9]    VISIT DIAGNOSIS:   Visit Diagnoses         Codes    Diplopia    -  Primary H53.2             PATIENT REPORT/COMPLAINT: denies difficulty swallowing  RN cleared patient for participation in assessment     yes     PRIOR LEVEL OF SWALLOW FUNCTION:    PAST HISTORY OF DYSPHAGIA?: none reported    Home diet: Regular consistency solids (IDDSI level 7) with  thin liquids (IDDSI level 0)    Current Diet Order:  Diet NPO    PROCEDURE:  Consistencies Administered During the Evaluation   Liquids: thin liquid   Solids:  pureed foods and soft solid foods      Method of Intake:   cup, straw, spoon  Self fed      Position:   Seated, upright    CLINICAL ASSESSMENT  Oral Stage: The oral stage of swallowing was within functional limits      Pharyngeal Stage:    No signs of aspiration were noted during this evaluation however, silent aspiration cannot be ruled out at bedside. If silent aspiration is suspected, a Videofluoroscopic Study of Swallowing (MBS) is recommended and requires a physician order. Cognition:   Within functional limits for this exam    Oral Peripheral Examination   Adequate lingual/labial strength     Current Respiratory Status    room air     Parameters of Speech Production  Respiration:  Adequate for speech production  Quality:   Within functional limits  Intensity: Within functional limits    Volitional Swallow: Present    Volitional Cough:    Present    Pain: No pain reported. EDUCATION:   The Speech Language Pathologist (SLP) completed education regarding results of evaluation and that intervention is not warranted at this time. Learner: Patient  Education:  Reviewed results and recommendations of this evaluation  Evaluation of Education: Verbalizes understanding    This plan will be re-evaluated and revised in 1 week  if warranted. CPT code:  19462  bedside swallow eval      [x]The admitting diagnosis and active problem list, have been reviewed prior to initiation of this evaluation.         ACTIVE PROBLEM LIST:   Patient Active Problem List   Diagnosis    Osteoarthritis, hip/pelvic region and thigh    Lytic bone lesion of hip on xray    Synovitis of hip    Degenerative arthritis of hip    Smoking    Intractable low back pain    Cauda equina spinal cord injury ([facility]    Spinal stenosis of lumbar region at multiple levels    Essential hypertension    Osteoarthritis of both hips    Prostate cancer ([facility]    Lower back injury    Weakness of left hand    Leg weakness, bilateral    Chronic pain syndrome    Lumbar disc disorder    Lumbar spondylosis    Spinal stenosis of lumbar region without neurogenic claudication    Primary osteoarthritis of both hips    Acute cerebrovascular accident (CVA) ([facility]    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  [place], [facility] [id number]

## 2022-08-16 ENCOUNTER — APPOINTMENT (OUTPATIENT)
Dept: MRI IMAGING | Age: 67
End: 2022-08-16
Payer: COMMERCIAL

## 2022-08-16 LAB
ACETAMINOPHEN LEVEL: <5 MCG/ML (ref 10–30)
ALBUMIN SERPL-MCNC: 3.8 G/DL (ref 3.5–5.2)
ALP BLD-CCNC: 127 U/L (ref 40–129)
ALT SERPL-CCNC: 15 U/L (ref 0–40)
ANION GAP SERPL CALCULATED.3IONS-SCNC: 13 MMOL/L (ref 7–16)
AST SERPL-CCNC: 17 U/L (ref 0–39)
BASOPHILS ABSOLUTE: 0.02 E9/L (ref 0–0.2)
BASOPHILS RELATIVE PERCENT: 0.4 % (ref 0–2)
BILIRUB SERPL-MCNC: 0.3 MG/DL (ref 0–1.2)
BUN BLDV-MCNC: 22 MG/DL (ref 6–23)
CALCIUM SERPL-MCNC: 9.4 MG/DL (ref 8.6–10.2)
CHLORIDE BLD-SCNC: 107 MMOL/L (ref 98–107)
CO2: 23 MMOL/L (ref 22–29)
CREAT SERPL-MCNC: 1 MG/DL (ref 0.7–1.2)
EOSINOPHILS ABSOLUTE: 0.06 E9/L (ref 0.05–0.5)
EOSINOPHILS RELATIVE PERCENT: 1.1 % (ref 0–6)
ETHANOL: <10 MG/DL (ref 0–0.08)
GFR AFRICAN AMERICAN: >60
GFR NON-AFRICAN AMERICAN: >60 ML/MIN/1.73
GLUCOSE BLD-MCNC: 118 MG/DL (ref 74–99)
HBA1C MFR BLD: 6.2 % (ref 4–5.6)
HCT VFR BLD CALC: 41 % (ref 37–54)
HEMOGLOBIN: 13.9 G/DL (ref 12.5–16.5)
IMMATURE GRANULOCYTES #: 0.01 E9/L
IMMATURE GRANULOCYTES %: 0.2 % (ref 0–5)
LYMPHOCYTES ABSOLUTE: 1.29 E9/L (ref 1.5–4)
LYMPHOCYTES RELATIVE PERCENT: 23.3 % (ref 20–42)
MAGNESIUM: 2.2 MG/DL (ref 1.6–2.6)
MCH RBC QN AUTO: 29.9 PG (ref 26–35)
MCHC RBC AUTO-ENTMCNC: 33.9 % (ref 32–34.5)
MCV RBC AUTO: 88.2 FL (ref 80–99.9)
MONOCYTES ABSOLUTE: 0.53 E9/L (ref 0.1–0.95)
MONOCYTES RELATIVE PERCENT: 9.6 % (ref 2–12)
NEUTROPHILS ABSOLUTE: 3.63 E9/L (ref 1.8–7.3)
NEUTROPHILS RELATIVE PERCENT: 65.4 % (ref 43–80)
PDW BLD-RTO: 13.2 FL (ref 11.5–15)
PLATELET # BLD: 250 E9/L (ref 130–450)
PMV BLD AUTO: 10.9 FL (ref 7–12)
POTASSIUM REFLEX MAGNESIUM: 3.2 MMOL/L (ref 3.5–5)
RBC # BLD: 4.65 E12/L (ref 3.8–5.8)
SALICYLATE, SERUM: <0.3 MG/DL (ref 0–30)
SODIUM BLD-SCNC: 143 MMOL/L (ref 132–146)
TOTAL PROTEIN: 7.5 G/DL (ref 6.4–8.3)
TRICYCLIC ANTIDEPRESSANTS SCREEN SERUM: NEGATIVE NG/ML
WBC # BLD: 5.5 E9/L (ref 4.5–11.5)

## 2022-08-16 PROCEDURE — 99225 PR SBSQ OBSERVATION CARE/DAY 25 MINUTES: CPT | Performed by: FAMILY MEDICINE

## 2022-08-16 PROCEDURE — 70547 MR ANGIOGRAPHY NECK W/O DYE: CPT

## 2022-08-16 PROCEDURE — 70544 MR ANGIOGRAPHY HEAD W/O DYE: CPT

## 2022-08-16 PROCEDURE — 80143 DRUG ASSAY ACETAMINOPHEN: CPT

## 2022-08-16 PROCEDURE — 80307 DRUG TEST PRSMV CHEM ANLYZR: CPT

## 2022-08-16 PROCEDURE — 2580000003 HC RX 258: Performed by: STUDENT IN AN ORGANIZED HEALTH CARE EDUCATION/TRAINING PROGRAM

## 2022-08-16 PROCEDURE — 6370000000 HC RX 637 (ALT 250 FOR IP): Performed by: STUDENT IN AN ORGANIZED HEALTH CARE EDUCATION/TRAINING PROGRAM

## 2022-08-16 PROCEDURE — 83036 HEMOGLOBIN GLYCOSYLATED A1C: CPT

## 2022-08-16 PROCEDURE — G0378 HOSPITAL OBSERVATION PER HR: HCPCS

## 2022-08-16 PROCEDURE — 83735 ASSAY OF MAGNESIUM: CPT

## 2022-08-16 PROCEDURE — 85025 COMPLETE CBC W/AUTO DIFF WBC: CPT

## 2022-08-16 PROCEDURE — 80179 DRUG ASSAY SALICYLATE: CPT

## 2022-08-16 PROCEDURE — 36415 COLL VENOUS BLD VENIPUNCTURE: CPT

## 2022-08-16 PROCEDURE — 82077 ASSAY SPEC XCP UR&BREATH IA: CPT

## 2022-08-16 PROCEDURE — 80053 COMPREHEN METABOLIC PANEL: CPT

## 2022-08-16 PROCEDURE — 6370000000 HC RX 637 (ALT 250 FOR IP): Performed by: PHYSICIAN ASSISTANT

## 2022-08-16 PROCEDURE — 70551 MRI BRAIN STEM W/O DYE: CPT

## 2022-08-16 PROCEDURE — 6370000000 HC RX 637 (ALT 250 FOR IP)

## 2022-08-16 RX ORDER — ATORVASTATIN CALCIUM 80 MG/1
80 TABLET, FILM COATED ORAL NIGHTLY
Status: DISCONTINUED | OUTPATIENT
Start: 2022-08-16 | End: 2022-08-17 | Stop reason: HOSPADM

## 2022-08-16 RX ORDER — POTASSIUM CHLORIDE 20 MEQ/1
40 TABLET, EXTENDED RELEASE ORAL ONCE
Status: COMPLETED | OUTPATIENT
Start: 2022-08-16 | End: 2022-08-16

## 2022-08-16 RX ADMIN — ALLOPURINOL 100 MG: 100 TABLET ORAL at 14:41

## 2022-08-16 RX ADMIN — ATORVASTATIN CALCIUM 80 MG: 80 TABLET, FILM COATED ORAL at 21:03

## 2022-08-16 RX ADMIN — POTASSIUM CHLORIDE 40 MEQ: 1500 TABLET, EXTENDED RELEASE ORAL at 14:41

## 2022-08-16 RX ADMIN — CLOPIDOGREL BISULFATE 75 MG: 75 TABLET ORAL at 14:41

## 2022-08-16 RX ADMIN — ASPIRIN 81 MG CHEWABLE TABLET 81 MG: 81 TABLET CHEWABLE at 14:41

## 2022-08-16 RX ADMIN — LISINOPRIL 20 MG: 20 TABLET ORAL at 14:44

## 2022-08-16 RX ADMIN — Medication 5 ML: at 21:05

## 2022-08-16 RX ADMIN — FAMOTIDINE 20 MG: 20 TABLET, FILM COATED ORAL at 14:41

## 2022-08-16 RX ADMIN — Medication 10 ML: at 14:42

## 2022-08-16 ASSESSMENT — PAIN SCALES - GENERAL: PAINLEVEL_OUTOF10: 0

## 2022-08-16 NOTE — PROGRESS NOTES
[facility] Inpatient   Resident Progress Note    S:  Hospital day: 0   Brief Synopsis: Omelia Leyden is a 79 y.o. male past medical history prostate cancer, HLD, prior TIA/CVA, hypertension, and frequent falls who presented to the ED with headache, facial numbness, blurry vision, and fall. He had also experienced significant weakness and fallen. In ED, patient had negative head CT, EKG showing NSR, and unremarkable labs. 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 that he has had since being injured decades ago      Cont meds:    sodium chloride       Scheduled meds:    [Held by provider] amLODIPine  10 mg Oral Daily    aspirin  81 mg Oral Daily    clopidogrel  75 mg Oral Daily    atorvastatin  40 mg Oral Nightly    famotidine  20 mg Oral Daily    [Held by provider] lisinopril  20 mg Oral Daily    allopurinol  100 mg Oral Daily    sodium chloride flush  5-40 mL IntraVENous 2 times per day    enoxaparin  40 mg SubCUTAneous Daily     PRN meds: sodium chloride flush, sodium chloride, ondansetron **OR** ondansetron, polyethylene glycol, acetaminophen **OR** acetaminophen     I reviewed the patient's past medical and surgical history, Medications and Allergies. O:  BP (!) 174/74   Pulse 50   Temp 98.1 °F (36.7 °C) (Oral)   Resp 18   Ht 5' 11\" (1.803 m)   Wt 184 lb (83.5 kg)   SpO2 100%   BMI 25.66 kg/m²   24 hour I&O: I/O last 3 completed shifts: In: 240 [P.O.:240]  Out: 350 [Urine:350]  No intake/output data recorded. Physical Exam  Constitutional:       General: He is not in acute distress. Appearance: Normal appearance. He is normal weight.    HENT: Head: Normocephalic and atraumatic. Nose: Nose normal.      Mouth/Throat:      Mouth: Mucous membranes are moist.      Pharynx: Oropharynx is clear. Eyes:      Conjunctiva/sclera: Conjunctivae normal.      Pupils: Pupils are equal, round, and reactive to light. Comments: Patient's left eye laterally deviated at baseline, but able to adduct across the midline  Patient's right eye able to adduct past the midline, but not fully   Cardiovascular:      Rate and Rhythm: Normal rate and regular rhythm. Pulses: Normal pulses. Heart sounds: Normal heart sounds. Pulmonary:      Effort: Pulmonary effort is normal.      Breath sounds: Normal breath sounds. Abdominal:      General: Abdomen is flat. Bowel sounds are normal.      Palpations: Abdomen is soft. Musculoskeletal:         General: Normal range of motion. Cervical back: Normal range of motion and neck supple. Right lower leg: No edema. Left lower leg: No edema. Skin:     General: Skin is warm and dry. Neurological:      Mental Status: He is alert and oriented to person, place, and time. Cranial Nerves: Cranial nerve deficit and dysarthria (mild, present since 12/2021) present. No facial asymmetry. Sensory: No sensory deficit. Motor: Weakness (right lower extremity weakness- chronic since spinal cord surgery) and pronator drift present. No abnormal muscle tone. Deep Tendon Reflexes: Reflexes normal.   Psychiatric:         Mood and Affect: Mood normal.         Behavior: Behavior normal.         Thought Content: Thought content normal.         Judgment: Judgment normal.     Labs:  Na/K/Cl/CO2:  143/3.2/107/23 (08/16 0413)  BUN/Cr/glu/ALT/AST/amyl/lip:  22/1.0/--/15/17/--/-- (08/16 0413)  WBC/Hgb/Hct/Plts:  5.5/13.9/41.0/250 (08/16 0413)  estimated creatinine clearance is 76 mL/min (based on SCr of 1 mg/dL).   Other pertinent labs as noted below    Radiology:  US CAROTID ARTERY BILATERAL   Final Result   The right internal carotid artery demonstrates 0-50% stenosis. The left internal carotid artery demonstrates 0-50% stenosis. Bilateral vertebral arteries are patent with flow in the normal direction. CT Head WO Contrast   Final Result   No acute intracranial abnormality. XR CHEST PORTABLE   Final Result   No acute process. MRI BRAIN WO CONTRAST    (Results Pending)   MRA NECK WO CONTRAST    (Results Pending)   MRA HEAD WO CONTRAST    (Results Pending)         Resident Assessment and Plan     Possible CVA/TIA  - MRI of the brain and MRA head and neck without contrast of been ordered  - Echo with bubble study was normal and carotid Doppler ultrasound was normal  - Patient passed swallow evaluation from speech therapy, is recommended to have foods that are easy to chew. - Neurology consulted and orthostatics ordered  - Continue home aspirin and Plavix     2. Hypertensive urgency  - BP was 197/99 on admission, today BP is 174/74.  - Have been holding home hypertensive to allow for permissive hypertension. As needed labetalol for SBP >200  -Consider restarting home Norvasc today     3. HLD, Hx prostate cancer, GERD  -Continue home Lipitor and Pepcid.   [facility]       DVT and GI prophylaxis, Lovenox 40 mg subcu daily and Pepcid          Electronically signed by Debbi Fulton MD on 8/16/2022 at 7:27 AM  Attending physician: Dr. Talisha Childress

## 2022-08-16 NOTE — CONSULTS
NEUROLOGY CONSULT NOTE      Requesting Physician:  Fernando Keene MD    Reason for Consult:  Evaluate for strokelike symptoms. History of Present Illness:  Alfreda Lee is a 79 y.o. male  with h/o CVA 3 months ago, HTN, HLD, prostate cancer, and gout who was admitted to [facility] on 8/14/2022 with presentation of facial numbness, blurred vision, headache, and a fall. Patient reports onset of symptoms on 8/13/2022 with 3 episodes of vomiting after eating dinner around 6 PM.  Afterwards, patient indicated that he went and took a nap but when he woke up at approximately 8 PM the fell on the way to the bathroom without any significant injury. He was able to get back up and went to bed. The following morning when he woke up he noticed that he was on the floor the speech was slurred with blurred vision and left greater than right facial numbness. He indicated that he had similar symptoms with his stroke about 3-1/2 months prior. Symptoms included then and now were dizziness with lightheaded sensation when walking or getting up. There was also weakness in both legs numbness in lower face on the left greater than the right side. Speech was slurred with note that current COVID symptoms were worse than with his prior CVA presentation. With previous event his blood pressure was significantly increased but this time it was not high as before. Did indicate that he has a history of injury to his right leg as a child and that has been weak since due to significant bone abnormalities. On ED presentation, blood pressure was 188/89 with pulse of 60. CT scan of the head was negative with MRI brain pending. CTA was not performed but prior CTA of head and neck done 12/13/2021 did show stenosis of the proximal right P2 segment and focal occlusion versus severe stenosis of the right P2 superior branch. There is no report of any extracranial significant atherosclerosis or stenosis.   Patient admitted for further evaluation with report now that his symptoms have improved significantly with only some residual numbness in the lower face. Strength is returned to baseline but he still remains somewhat weak in both lower legs which is part of his baseline. Speech is no longer slurred. Past Medical History:        Diagnosis Date    Cancer [facility]) PROSTATE    , patient reports bone mets    Erectile dysfunction     Hyperlipidemia     Hypertension     Osteoarthritis, hip/pelvic region and thigh 3/29/2013    Prostate cancer ([address] 2013           Procedure Laterality Date    BACK SURGERY      HIP SURGERY Right     LEG SURGERY      LUMBAR SPINE SURGERY  10/30/2017    PLIF L2 - S1   and laminectomy    PROSTATE BIOPSY         Social History:  Social History     Tobacco Use   Smoking Status Former    Packs/day: 0.25    Years: 10.00    Pack years: 2.50    Types: Cigarettes    Quit date: 2017    Years since quittin.6   Smokeless Tobacco Never     Social History     Substance and Sexual Activity   Alcohol Use Yes    Comment: SOCIALLY     Social History     Substance and Sexual Activity   Drug Use No         Family History:   History reviewed. No pertinent family history. Review of Systems:  All systems reviewed are negative except what is mentioned in history of present illness. Allergies:     Allergies   Allergen Reactions    Ibuprofen Swelling    Benzocaine      EDEMA         Current Medications:   potassium chloride (KLOR-CON M) extended release tablet 40 mEq, Once  [Held by provider] amLODIPine (NORVASC) tablet 10 mg, Daily  aspirin chewable tablet 81 mg, Daily  clopidogrel (PLAVIX) tablet 75 mg, Daily  atorvastatin (LIPITOR) tablet 40 mg, Nightly  famotidine (PEPCID) tablet 20 mg, Daily  [Held by provider] lisinopril (PRINIVIL;ZESTRIL) tablet 20 mg, Daily  allopurinol (ZYLOPRIM) tablet 100 mg, Daily  sodium chloride flush 0.9 % injection 5-40 mL, 2 times per day  sodium chloride flush 0.9 % injection 5-40 mL, PRN  0.9 % sodium chloride infusion, PRN  enoxaparin (LOVENOX) injection 40 mg, Daily  ondansetron (ZOFRAN-ODT) disintegrating tablet 4 mg, Q8H PRN   Or  ondansetron (ZOFRAN) injection 4 mg, Q6H PRN  polyethylene glycol (GLYCOLAX) packet 17 g, Daily PRN  acetaminophen (TYLENOL) tablet 650 mg, Q6H PRN   Or  acetaminophen (TYLENOL) suppository 650 mg, Q6H PRN       Physical Exam:  BP (!) 176/78   Pulse (!) 48   Temp 97.7 °F (36.5 °C) (Oral)   Resp 18   Ht 5' 11\" (1.803 m)   Wt 184 lb (83.5 kg)   SpO2 100%   BMI 25.66 kg/m²  I Body mass index is 25.66 kg/m². I   Wt Readings from Last 1 Encounters:   08/14/22 184 lb (83.5 kg)          HEENT: Normocephalic, atraumatic, no lesions or abnormalities noted. Neck:  supple with full ROM; no masses, nodes or bruits; no cervical tenderness on palpation. Lungs:  clear to auscultation  bilaterally; normal respiratory effort     CV: RRR without gallops or murmurs     Extremities: no c/c/e          Neurologic Exam   Mental Status:  Patient was alert, responsive, oriented, appropriate, answering questions, and following commands. Speech was fluent with normal sensorium and cognition. Cranial Nerves: Pupils were equal round and reactive to light and accommodation;    Visual fields were full on confrontation; Extraocular movements were intact; no nystagmus; Intact facial sensation to temp, pinprick, and light touch; Symmetric facial movements with good lip and eye closure bilaterally; Hearing was intact; Cesar was midline;    Normal palatal elevation with midline uvula  Trapezius strength of 5/5. Tongue was midline with no atrophy or fasciculations  Motor Exam: Mild weakness in right leg secondary to pain; strength was otherwise 5/5 throughout; normal tone and bulk; no atrophy or fasiculations noted. Sensory Exam:  Normal sensation to light touch, pin-prick, vibration, and temperature; No sensory extinction.    Cerebella Exam: Intact finger-nose-finger, rapidly alternating movements, fine motor movements, and heel-down-shin maneuvers; No cerebellar rebound or drift; No tremors or abnormal movements. Gait:  Gait was not tested. Reflexes: 1+ bilateral biceps and brachioradialis; 1 in bilateral triceps; 1+ left patella; 0 in right patella and bilateral Achilles; Babinski is negative     Labs:    CBC:   Recent Labs     08/14/22 2010 08/16/22 0413   WBC 7.6 5.5   HGB 14.6 13.9    250   MCV 86.7 88.2   MCH 29.5 29.9   MCHC 34.0 33.9   RDW 13.1 13.2     CMP:  Recent Labs     08/14/22 2010 08/16/22 0413    143   K 4.3 3.2*    107   CO2 21* 23   BUN 15 22   CREATININE 0.7 1.0   GFRAA >60 >60   LABGLOM >60 >60   GLUCOSE 139* 118*   CALCIUM 9.7 9.4     Liver:   Recent Labs     08/16/22 0413   AST 17   ALT 15   ALKPHOS 127   PROT 7.5   LABALBU 3.8   BILITOT 0.3     INR: No results for input(s): PROTIME, INR in the last 72 hours. ToxicologyNo results for input(s): PHENYTOIN, CARBTOT, PHENOBARB, VALPROATE, LAMOTRIG in the last 72 hours. Invalid input(s):  KEPPRA  No results for input(s): AMPMETHURSCR, BARBTQTU, BDZQTU, CANNABQUANT, COCMETQTU, OPIAU, PCPQUANT in the last 72 hours.     Radiology:  Echo Complete    Result Date: 8/15/2022  Transthoracic Echocardiography Report (TTE)  Demographics   Patient Name    Willard Border       Gender            Male                  [address] [facility]  [id number]      Room Number       6402  Number   Account #       [de-identified]     Procedure Date    08/15/2022   Corporate ID                  Ordering                                Physician   Accession       [id number]    Referring  Number                        Physician   Date of Birth   1955    Sonographer       Jeffrey Boyer RDCS   Age             79 year(s)    Interpreting      [address]                                Physician         Physician Cardiology                                                  George Bruner MD                                 Any Other  Procedure Type of Study   TTE procedure:Echo Complete W/Doppler & Color Flow. Procedure Date Date: 08/15/2022 Start: 11:32 AM Study Location: Portable Technical Quality: Adequate visualization Indications:CVA. Patient Status: Routine Contrast Medium: Bubble Study. Height: 71 inches Weight: 184 pounds BSA: 2.04 m^2 BMI: 25.66 kg/m^2 HR: 49 bpm BP: 167/87 mmHg  Findings   Left Ventricle  Normal left ventricular chamber size. Normal left ventricular systolic function. Visually estimated LVEF is 55-60 %. No wall motion abnormalities. Indeterminate diastolic function. Right Ventricle  Normal right ventricle structure and function. Left Atrium  Normal left atrial size. Right Atrium  Normal right atrial size   Mitral Valve  Physiologic and/or trace mitral regurgitation is present. No evidence of hemodynamically significant mitral stenosis. Tricuspid Valve  The tricuspid valve appears structurally normal.  Physiologic and/or trace tricuspid regurgitation. Aortic Valve  Trileaflet aortic valve. Normal leaflet mobility. No aortic stenosis. No aortic regurgitation. Pulmonic Valve  Normal pulmonic valve structure and function. Physiologic and/or trace pulmonic regurgitation present. Pericardial Effusion  No evidence for hemodynamically significant pericardial effusion. Pleural Effusion  No evidence of pleural effusion. Aorta  Normal aortic root and ascending aorta. Miscellaneous  The inferior vena cava diameter is normal with normal respiratory  variation. Unable to estimate PA pressure. Conclusions   Summary  Normal left ventricular chamber size. Normal left ventricular systolic function. Visually estimated LVEF is 55-60 %. No wall motion abnormalities. Indeterminate diastolic function. Normal right ventricle structure and function. Normal left atrial size. Normal right atrial size  Unable to estimate PA pressure.   Agitated saline contrast study negative for ASD/ PFO. No comparison study available.    Signature   ----------------------------------------------------------------  Electronically signed by Melody Culp MD(Interpreting  physician) on 08/15/2022 04:38 PM  ----------------------------------------------------------------  M-Mode/2D Measurements & Calculations   LV Diastolic    LV Systolic Dimension: 2.7   AV Cusp Separation: 2 cmLA  Dimension: 4.1  cm                           Dimension: 3.4 cmAO Root  cm              LV Volume Diastolic: 42.7 ml Dimension: 3.1 cm  LV FS:34.2 %    LV Volume Systolic: 17.0 ml  LV PW           LV EDV/LV EDV Index: 47.2  Diastolic: 1 cm DU/75 ST/Z^0HW ESV/LV ESV  Septum          Index: 26.6 ml/13ml/ m^2     RV Diastolic Dimension: 3.2  Diastolic: 1.2  EF Calculated: 64.9 %        cm  cm              LV Mass Index: 74 l/min*m^2  CO: 3.92 l/min                               Ascending Aorta: 2.9 cm  CI: 1.92                                     LA volume/Index: 49.4 ml  l/m*m^2         LVOT: 2 cm                   /24.21ml/m^2  LV Mass: 151.3                               RA Area: 17.6 cm^2  g  Doppler Measurements & Calculations   MV Peak E-Wave: 0.7  AV Peak Velocity: 1.48 LVOT Peak Velocity: 1.05 m/s  m/s                  m/s                    LVOT Mean Velocity: 0.68 m/s  MV Peak A-Wave: 1.04 AV Peak Gradient: 8.81 LVOT Peak Gradient: 4.4  m/s                  mmHg                   mmHgLVOT Mean Gradient: 2.1  MV E/A Ratio: 0.68   AV Mean Velocity: 0.94 mmHg  MV Peak Gradient: 4  m/s  mmHg                 AV Mean Gradient: 4  MV Mean Gradient:    mmHg  1.2 mmHg             AV VTI: 29.6 cm  MV Mean Velocity:    AV Area  0.47 m/s             (Continuity):2.71 cm^2 PV Peak Velocity: 0.94 m/s  MV Deceleration                             PV Peak Gradient: 3.53 mmHg  Time: 176.7 msec     LVOT VTI: 25.5 cm      PV Mean Velocity: 0.72 m/s  MV P1/2t: 58.9 msec                         PV Mean Gradient: 2.3 mmHg  MVA by PHT:3.74 cm^2  MV Area  (continuity): 2.7  cm^2  MV E' Septal  Velocity: 0.04 m/s  MV E' Lateral  Velocity: 7 m/s  [email or web address] [id number]    CT Head WO Contrast    Result Date: 8/14/2022  EXAMINATION: CT OF THE HEAD WITHOUT CONTRAST  8/14/2022 8:42 pm TECHNIQUE: CT of the head was performed without the administration of intravenous contrast. Automated exposure control, iterative reconstruction, and/or weight based adjustment of the mA/kV was utilized to reduce the radiation dose to as low as reasonably achievable. COMPARISON: Correlation made with MRI from December 14, 2021 HISTORY: ORDERING SYSTEM PROVIDED HISTORY: diplopia, h/o stroke TECHNOLOGIST PROVIDED HISTORY: Reason for exam:->diplopia, h/o stroke Has a \"code stroke\" or \"stroke alert\" been called? ->No Decision Support Exception - unselect if not a suspected or confirmed emergency medical condition->Emergency Medical Condition (MA) What reading provider will be dictating this exam?->CRC FINDINGS: No acute intracranial hemorrhage or edema. Areas of hypoattenuation involving white matter of both hemispheres suggesting areas of chronic microvascular ischemia. No abnormal extra-axial fluid collections. No hydrocephalus. Paranasal sinuses and mastoid air cells are clear. No acute intracranial abnormality. XR CHEST PORTABLE    Result Date: 8/14/2022  EXAMINATION: ONE XRAY VIEW OF THE CHEST 8/14/2022 8:42 pm COMPARISON: 12/13/2021 HISTORY: ORDERING SYSTEM PROVIDED HISTORY: diplopia, h/o stroke TECHNOLOGIST PROVIDED HISTORY: Reason for exam:->diplopia, h/o stroke What reading provider will be dictating this exam?->CRC FINDINGS: The lungs are without acute focal process. There is no effusion or pneumothorax. The cardiomediastinal silhouette is without acute process. The osseous structures are without acute process. No acute process. US CAROTID ARTERY BILATERAL    Result Date: 8/15/2022  EXAMINATION: ULTRASOUND EVALUATION OF THE CAROTID ARTERIES 8/15/2022 TECHNIQUE: Duplex ultrasound using B-mode/gray scaled imaging, Doppler spectral analysis and color flow Doppler was obtained of the carotid arteries. COMPARISON: None. HISTORY: ORDERING SYSTEM PROVIDED HISTORY: Possible CVA/TIA TECHNOLOGIST PROVIDED HISTORY: Reason for exam:->Possible CVA/TIA What reading provider will be dictating this exam?->CRC FINDINGS: RIGHT: The right common carotid artery demonstrates peak systolic velocities of 203 and 74 cm/sec in the proximal and distal segments respectively. The right internal carotid artery demonstrates the systolic velocities of 56, 60 and 85 cm/sec in the proximal, mid and distal segments respectively. The external carotid artery is patent. The vertebral artery demonstrates normal antegrade flow. Mild calcified atherosclerotic plaque at the carotid bulb. ICA/CCA ratio of 1.1 LEFT: The left common carotid artery demonstrates peak systolic velocities of 97 and 50 cm/sec in the proximal and distal segments respectively. The left internal carotid artery demonstrates the systolic velocities of 64, 68 and 76 cm/sec in the proximal, mid and distal segments respectively. The external carotid artery is patent. The vertebral artery demonstrates normal antegrade flow. No evidence of focal atherosclerotic plaque. ICA/CCA ratio of 1.5     The right internal carotid artery demonstrates 0-50% stenosis. The left internal carotid artery demonstrates 0-50% stenosis. Bilateral vertebral arteries are patent with flow in the normal direction. The patient's records from referring provider and available information in the EHR was reviewed. Impression:  TIA  Left-sided sensory deficits  History of CVA in same distribution as current event that was associated with hypertensive crisis.     Dizziness with orthostasis    Presenting with acute sensorimotor deficits similar to prior presentation 3 days month ago when he was diagnosed with a stroke. Patient did have marked elevation of his blood pressure at time raising concerns about a possible hypertensive CVA. No increased blood pressure at this time with similar symptoms as before with consideration of worsening of old stroke deficits with metabolic or vascular event versus new stroke and seen distribution as prior event versus TIA. Patient was not on stroke preventative therapy at time of this presentation and will therefore need reinitiation of dual antiplatelet therapy followed by aspirin monotherapy. We will proceed with MRI to rule out new stroke versus TIA with repeat vascular imaging to include CTA of head and neck to reassess vascular status with question worsening stenosis of as compared to before or new lesions. There was report of orthostasis and dizziness on standing or warrant repeat orthostatic blood pressure checks to see if this is resolved before consideration of further treatment. Principal Problem:    Cranial nerve palsy  Active Problems:    Acute cerebrovascular accident (CVA) ([address]  Resolved Problems:    * No resolved hospital problems. *      Recommendations:                                            MRI of the brain and echocardiogram for completion of stroke work-up. Carotid ultrasounds done that were negative for evidence of extracranial stenosis. We will proceed with CTA of the head to further assess intracranial stenosis. Antiplatelet with aspirin and Plavix for 3 weeks followed by aspirin 81 mg daily for monotherapy  Strongly encouraged initiation of exercise therapy on a routine basis  Orthostatic blood pressure checks every 8 hours times 3 measurements. Further pending results of studies. It was my pleasure to evaluate Kristie Clemens today. Please call with questions.       Electronically signed by Raúl Lynn MD on 8/16/2022 at 10:26 AM

## 2022-08-16 NOTE — PROGRESS NOTES
[facility]  Family Medicine Attending    S: 79 y.o. male with PMH of prostate cancer, HLD, prior TIA/stroke, HTN, and frequent falls, presented to the ED with headache, facial numbness, blurry vision and fall. He notes that he ate some questionable mashed potatoes over the weekend which caused him to vomit. Since that time, he has had weakness and fell. He called the ambulance because he was unable to stand up. His ED course was remarkable for negative head CT, EKG with sinus rhythm, and unremarkable labs. Of note, in December of 2021, he was evaluated for stroke. He was placed on aspirin and plavix at that time. He remained on the aspirin and plavix since that time. Today, he notes improvement of some numbness around his lips. His speech is also improving; there is persistent blurry vision as well as generalized weakness and dizziness. He notes double vision. He denies any chest pain or shortness of breath. No heart palpitations. O: VS- Blood pressure (!) 176/78, pulse (!) 48, temperature 97.7 °F (36.5 °C), temperature source Oral, resp. rate 18, height 5' 11\" (1.803 m), weight 184 lb (83.5 kg), SpO2 100 %. Exam is as noted by resident with the following changes, additions or corrections:  Gen:  AAOx3, NAD  CVS:  RRR  Lungs:  CTAB  Neuro:  sensation intact bilaterally except at the lips; 5/5 strength in the bilateral UE, (patient notes he is left handed); Eye movements abnormal:  there is deviation of the left eye laterally at rest, however it does track appropriately; right eye is unable to completely track medially    Impressions:   Cranial nerve palsy  2. Acute cerebrovascular accident (CVA)   3. Hx prostate cancer  4. Mixed hyperlipidemia  5.     Essential hypertension      Plan:      MRI/MRA brain, MRA neck still pending  Carotid ultrasound-no significant stenosis  Neuro consulted-continue aspirin/plavix x 3 weeks, then aspirin 81 mg daily monotherapy thereafter;

## 2022-08-17 VITALS
HEART RATE: 47 BPM | HEIGHT: 71 IN | SYSTOLIC BLOOD PRESSURE: 189 MMHG | DIASTOLIC BLOOD PRESSURE: 82 MMHG | BODY MASS INDEX: 25.76 KG/M2 | WEIGHT: 184 LBS | TEMPERATURE: 97.9 F | RESPIRATION RATE: 22 BRPM | OXYGEN SATURATION: 97 %

## 2022-08-17 PROBLEM — I63.50 RIGHT PONTINE STROKE (HCC): Status: ACTIVE | Noted: 2021-12-13

## 2022-08-17 PROBLEM — H53.2 DIPLOPIA: Status: ACTIVE | Noted: 2022-08-17

## 2022-08-17 LAB
ANION GAP SERPL CALCULATED.3IONS-SCNC: 13 MMOL/L (ref 7–16)
BUN BLDV-MCNC: 18 MG/DL (ref 6–23)
CALCIUM SERPL-MCNC: 9.4 MG/DL (ref 8.6–10.2)
CHLORIDE BLD-SCNC: 108 MMOL/L (ref 98–107)
CHOLESTEROL, TOTAL: 157 MG/DL (ref 0–199)
CO2: 21 MMOL/L (ref 22–29)
CREAT SERPL-MCNC: 0.9 MG/DL (ref 0.7–1.2)
GFR AFRICAN AMERICAN: >60
GFR NON-AFRICAN AMERICAN: >60 ML/MIN/1.73
GLUCOSE BLD-MCNC: 112 MG/DL (ref 74–99)
HDLC SERPL-MCNC: 51 MG/DL
LDL CHOLESTEROL CALCULATED: 93 MG/DL (ref 0–99)
POTASSIUM SERPL-SCNC: 3.9 MMOL/L (ref 3.5–5)
SODIUM BLD-SCNC: 142 MMOL/L (ref 132–146)
TRIGL SERPL-MCNC: 63 MG/DL (ref 0–149)
VLDLC SERPL CALC-MCNC: 13 MG/DL

## 2022-08-17 PROCEDURE — 97535 SELF CARE MNGMENT TRAINING: CPT

## 2022-08-17 PROCEDURE — 6370000000 HC RX 637 (ALT 250 FOR IP): Performed by: STUDENT IN AN ORGANIZED HEALTH CARE EDUCATION/TRAINING PROGRAM

## 2022-08-17 PROCEDURE — 97165 OT EVAL LOW COMPLEX 30 MIN: CPT

## 2022-08-17 PROCEDURE — G0378 HOSPITAL OBSERVATION PER HR: HCPCS

## 2022-08-17 PROCEDURE — 97530 THERAPEUTIC ACTIVITIES: CPT

## 2022-08-17 PROCEDURE — 36415 COLL VENOUS BLD VENIPUNCTURE: CPT

## 2022-08-17 PROCEDURE — 80048 BASIC METABOLIC PNL TOTAL CA: CPT

## 2022-08-17 PROCEDURE — 96372 THER/PROPH/DIAG INJ SC/IM: CPT

## 2022-08-17 PROCEDURE — 99217 PR OBSERVATION CARE DISCHARGE MANAGEMENT: CPT | Performed by: FAMILY MEDICINE

## 2022-08-17 PROCEDURE — 97161 PT EVAL LOW COMPLEX 20 MIN: CPT

## 2022-08-17 PROCEDURE — 99225 PR SBSQ OBSERVATION CARE/DAY 25 MINUTES: CPT | Performed by: PHYSICIAN ASSISTANT

## 2022-08-17 PROCEDURE — 80061 LIPID PANEL: CPT

## 2022-08-17 PROCEDURE — 6360000002 HC RX W HCPCS: Performed by: STUDENT IN AN ORGANIZED HEALTH CARE EDUCATION/TRAINING PROGRAM

## 2022-08-17 PROCEDURE — 2580000003 HC RX 258: Performed by: STUDENT IN AN ORGANIZED HEALTH CARE EDUCATION/TRAINING PROGRAM

## 2022-08-17 RX ORDER — ATORVASTATIN CALCIUM 80 MG/1
80 TABLET, FILM COATED ORAL NIGHTLY
Qty: 30 TABLET | Refills: 3 | Status: SHIPPED | OUTPATIENT
Start: 2022-08-17

## 2022-08-17 RX ADMIN — CLOPIDOGREL BISULFATE 75 MG: 75 TABLET ORAL at 09:48

## 2022-08-17 RX ADMIN — FAMOTIDINE 20 MG: 20 TABLET, FILM COATED ORAL at 09:48

## 2022-08-17 RX ADMIN — LISINOPRIL 20 MG: 20 TABLET ORAL at 09:48

## 2022-08-17 RX ADMIN — Medication 10 ML: at 09:48

## 2022-08-17 RX ADMIN — ASPIRIN 81 MG CHEWABLE TABLET 81 MG: 81 TABLET CHEWABLE at 09:48

## 2022-08-17 RX ADMIN — ENOXAPARIN SODIUM 40 MG: 100 INJECTION SUBCUTANEOUS at 09:47

## 2022-08-17 RX ADMIN — ALLOPURINOL 100 MG: 100 TABLET ORAL at 09:48

## 2022-08-17 ASSESSMENT — PAIN SCALES - GENERAL: PAINLEVEL_OUTOF10: 0

## 2022-08-17 NOTE — PROGRESS NOTES
Occupational Therapy  OCCUPATIONAL THERAPY INITIAL EVALUATION    [address] [address] [address]      MIOS:                                                Patient Name: Matt Barbosa  MRN: [id number]  : 1955  Room: [address] [place] [address]    Evaluating OT: Salima Colunga OTR/L #[id number]     Referring Provider: Dangelo Valera DO  Specific Provider Orders/Date: OT eval and treat 22    Diagnosis: Diplopia [H53.2]  Cranial nerve palsy [G52.9]  Acute cerebrovascular accident (CVA) ([facility] [address] [I63.9]   Pt admitted to hospital with headache, facial numbness, blurry vision, weakness and fall. Pertinent Medical History:  has a past medical history of Cancer [facility]), Erectile dysfunction, Hyperlipidemia, Hypertension, Osteoarthritis, hip/pelvic region and thigh, and Prostate cancer ([facility] [address]        Precautions:  Fall Risk    Assessment of current deficits    [x] Functional mobility  [x]ADLs  [x] Strength               []Cognition    [x] Functional transfers   [x] IADLs         [x] Safety Awareness   [x]Endurance    [] Fine Coordination              [x] Balance      [] Vision/perception   []Sensation     []Gross Motor Coordination  [] ROM  [] Delirium                   [] Motor Control     OT PLAN OF CARE   OT POC based on physician orders, patient diagnosis and results of clinical assessment    Frequency/Duration 1-3 days/wk for 2 weeks PRN   Specific OT Treatment Interventions to include:   * Instruction/training on adapted ADL techniques and AE recommendations to increase functional independence within precautions       * Training on energy conservation strategies, correct breathing pattern and techniques to improve independence/tolerance for self-care routine  * Functional transfer/mobility training/DME recommendations for increased independence, safety, and fall prevention  * Patient/Family education to increase follow through with safety techniques and Assist     Standing   Modified Mathews    UB Dressing Stand by Assist   Modified Mathews    LB Dressing Moderate Assist   Modified Mathews    Bathing Moderate Assist  Modified Mathews    Toileting Minimal Assist   Modified Mathews    Bed Mobility  Supine to sit: Stand by Assist   Sit to supine: Stand by Assist   Supine to sit: Modified Mathews   Sit to supine: Modified Mathews    Functional Transfers Minimal Assist   Modified Mathews    Functional Mobility Minimal Assist     Ambulated in room including to/from bathroom with w/w; + unsteadiness  Modified Mathews    Balance Sitting:     Static:  SBA    Dynamic:SBA  Standing: min A at w/w     Activity Tolerance F-  F+   Visual/  Perceptual wfl                  Vitals:    Supine: 164/77, 47  Seated: 171/94, 58  Standin/102, 65  Nursing notified      Hand Dominance right   Strength ROM Additional Info:    RUE   4/5 wfl good  and wfl FMC/dexterity noted during ADL tasks     LUE 4/5 wfl good  and wfl FMC/dexterity noted during ADL tasks     Hearing: wfl  Sensation:wfl  Tone: wfl  Edema:none noted     Comments: Upon arrival patient supine in bed and agreeable to OT Session. Therapist educated pt on role of OT. At end of session, patient semi-supine in bed with call light and phone within reach, all lines and tubes intact. Overall patient demonstrated decreased independence and safety during completion of ADL/functional transfer/mobility tasks. Pt would benefit from continued skilled OT to increase safety and independence with completion of ADL/IADL tasks for functional independence and quality of life.     Treatment: OT treatment provided this date includes: Facilitation of bed mobility, unsupported sitting balance (impacting ADLs; addressing posture, weight shifting, dynamic reaching), functional transfers (various surfaces: bed, toilet, chair), standing tolerance tasks (addressing posture, balance and activity tolerance while incorporating light functional reaching; impacting ADLs and functional activity) and functional ambulation tasks with w/w (including to/ from bathroom and in preparation for item retrieval tasks; cuing on posture, w/w management // approximation and safety) - skilled cuing on hand placement, posture, body mechanics, energy conservation techniques and safety. Therapist facilitated self-care retraining: UB/LB self-care tasks (robe, socks), simulated toileting task and standing grooming tasks a sink while educating pt on modified techniques, posture, safety and energy conservation techniques. Skilled monitoring of HR, O2 sats and pts response to treatment. Rehab Potential: Good  for established goals     Patient / Family Goal: return home      Patient and/or family were instructed on functional diagnosis, prognosis/goals and OT plan of care. Demonstrated fair understanding. Eval Complexity: Low    Time In: 750  Time Out: 828  Total Treatment Time: 23 minutes    Min Units   OT Eval Low 97165  x  1   OT Eval Medium 18225      OT Eval High 07760      OT Re-Eval [id number]       Therapeutic Ex 74795       Therapeutic Activities 92154  13  1   ADL/Self Care 52682  10  1   Orthotic Management 28571       Manual [id number]     Neuro Re-Ed [id number]       Non-Billable Time          Evaluation Time additionally includes thorough review of current medical information, gathering information on past medical history/social history and prior level of function, interpretation of standardized testing/informal observation of tasks, assessment of data and development of plan of care and goals.           Silas Oldenburg OTR/L #[id number]

## 2022-08-17 NOTE — PROGRESS NOTES
[facility]  Neurology Follow up     Date:  8/17/2022  Patient Name:  Estefania Zavaleta  YOB: 1955  MRN: [id number]     Assessment  Right pontine stroke   Vessel imaging with severe stenosis of the R PCA and tandem occlusion of the R P2. This was also noted on CTAs in 12/2021   LDL 93, A1C 6.2       Plan  DAPT with ASA and Plavix   Increase Lipitor to 80 mg nightly   Risk factor modification   BP control with goal BP < 130/80  LDL 93, goal < 70   Stroke education   Okay to d/c from neuro   Stroke clinic f/u   Neuro will sign off    Subjective:   Estefania Zavaleta is a 79 y.o. male presenting for evaluation of stroke. He presented with facial numbness, blurred vision, headache. He presented after 3 episodes of vomiting after eating dinner on 8/13. MRI brain showed a right pontine stroke. He had vessel imaging showed severe stenosis versus occlusion in the right PCA and occlusion in the right P2. Echo was unrevealing. LDL 93. A1c 6.2. He was seen by our group in December 2021 when he presented with lightheadedness and noted to have an elevated blood pressure. He had a right PCA occlusion noted at that time. He was placed on dual antiplatelet for 90 days. Denies weakness, numbness, diplopia, dysarthria, dysphagia  ROS otherwise negative     Allergies: Allergies   Allergen Reactions    Ibuprofen Swelling    Benzocaine      EDEMA         Physical Examination  Vitals   Vitals:    08/16/22 0800 08/16/22 1446 08/16/22 1942 08/17/22 0744   BP: (!) 176/78 (!) 175/69 (!) 167/84 (!) 189/82   Pulse: (!) 48 59 52 (!) 47   Resp: 18 20 20    Temp: 97.7 °F (36.5 °C) 97.3 °F (36.3 °C) 98.2 °F (36.8 °C) 97.9 °F (36.6 °C)   TempSrc: Oral Oral Oral Oral   SpO2: 100%  99%    Weight:       Height:            General: Patient appears in no acute distress.  Awake and oriented x 4  HEENT: Normocephalic, atraumatic  Chest: effort normal   Extremities/Peripheral vascular: No edema/swelling noted. No cold limbs noted. Neurologic Examination    Mental Status  Alert, and oriented to person, place and time. Speech is fluent with intact comprehension. No evidence of memory impairment. Attention and concentration appeared normal.     Cranial Nerves  II. Visual fields full to confrontation bilaterally. III, IV, VI: Pupils equally round and reactive to light, 3 to 2 mm bilaterally. EOMs: full, no nystagmus. V. Facial sensation intact to light touch bilaterally  VII: Facial movements symmetric and strong  VIII: Hearing intact to voice  IX,X: Palate elevates symmetrically.  No dysarthria  XI: Sternocleidomastoid and trapezius 5/5 bilaterally   XII: Tongue is midline    Motor     Right Left   Right Left   Deltoid 5 5  Hip Flexion 5 5   Biceps      5  5  Knee Extension 5 5   Triceps 5 5  Knee Flexion 5 5   Handgrip 5 5  Ankle Dorsiflexion 5 5       Ankle Plantarflexion 5 5     Tone: Normal in all four limbs    Bulk: Normal in all four limbs with no evidence of atrophy    Pronator drift: absent bilaterally    Sensation  Light Touch: Intact distally in all four limbs    Reflexes    No babinski     Coordination  Rapid alternating movements normal in bilateral upper extremities  Finger to nose testing normal bilaterally  Gait  Deferred for safety/fall consideration      Labs  Recent Labs     08/14/22 2010 08/16/22  0413 08/17/22  0426    143 142   K 4.3 3.2* 3.9    107 108*   CO2 21* 23 21*   BUN 15 22 18   CREATININE 0.7 1.0 0.9   GLUCOSE 139* 118* 112*   CALCIUM 9.7 9.4 9.4   PROT 8.3 7.5  --    LABALBU 4.1 3.8  --    BILITOT 0.4 0.3  --    ALKPHOS 154* 127  --    AST 22 17  --    ALT 18 15  --    WBC 7.6 5.5  --    RBC 4.95 4.65  --    HGB 14.6 13.9  --    HCT 42.9 41.0  --    MCV 86.7 88.2  --    MCH 29.5 29.9  --    MCHC 34.0 33.9  --    RDW 13.1 13.2  --     250  --    MPV 10.2 10.9  --    LACTA 2.3*  --   --    HDL  --   --  51   LDLCALC  --   --  93   LABA1C  -- 6.2*  --        Imaging  MRA NECK WO CONTRAST   Final Result   1. Acute to subacute lacunar infarct involving dorsal aspect of the right   laurel adjacent to the right superior cerebellar peduncle. 2. No acute intracranial hemorrhage or significant mass effect. 3. Unremarkable MRA neck within the included portions. 4. High-grade stenoses throughout the right proximal PCA again noted with   tandem occlusion of the right P2 segment. MRI BRAIN WO CONTRAST   Final Result   1. Acute to subacute lacunar infarct involving dorsal aspect of the right   laurel adjacent to the right superior cerebellar peduncle. 2. No acute intracranial hemorrhage or significant mass effect. 3. Unremarkable MRA neck within the included portions. 4. High-grade stenoses throughout the right proximal PCA again noted with   tandem occlusion of the right P2 segment. MRA HEAD WO CONTRAST   Final Result   1. Acute to subacute lacunar infarct involving dorsal aspect of the right   laurel adjacent to the right superior cerebellar peduncle. 2. No acute intracranial hemorrhage or significant mass effect. 3. Unremarkable MRA neck within the included portions. 4. High-grade stenoses throughout the right proximal PCA again noted with   tandem occlusion of the right P2 segment. US CAROTID ARTERY BILATERAL   Final Result   The right internal carotid artery demonstrates 0-50% stenosis. The left internal carotid artery demonstrates 0-50% stenosis. Bilateral vertebral arteries are patent with flow in the normal direction. CT Head WO Contrast   Final Result   No acute intracranial abnormality. XR CHEST PORTABLE   Final Result   No acute process. Echo    Normal left ventricular chamber size. Normal left ventricular systolic function. Visually estimated LVEF is 55-60 %. No wall motion abnormalities. Indeterminate diastolic function. Normal right ventricle structure and function.

## 2022-08-17 NOTE — DISCHARGE SUMMARY
Discharge Summary    Kiesha Bowers  :  1955  MRN:  [id number]    Admit date:  2022  Discharge date:  2022    Admitting Physician:  Mackenzie Ascencio DO    Discharge Diagnoses:    Patient Active Problem List   Diagnosis    Osteoarthritis, hip/pelvic region and thigh    Lytic bone lesion of hip on xray    Synovitis of hip    Degenerative arthritis of hip    Smoking    Intractable low back pain    Cauda equina spinal cord injury [facility])    Spinal stenosis of lumbar region at multiple levels    Essential hypertension    Osteoarthritis of both hips    Prostate cancer ([facility] [address]    Lower back injury    Weakness of left hand    Leg weakness, bilateral    Chronic pain syndrome    Lumbar disc disorder    Lumbar spondylosis    Spinal stenosis of lumbar region without neurogenic claudication    Primary osteoarthritis of both hips    Right pontine stroke ([facility] [address]    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 male with PMH of prostate cancer, erectile dysfunction, HLD, HTN, OA, who presented with dizziness, facial numbness, blurry vision, and fall. He was previously admitted in 2021 for evaluation of posterior stroke. Workup has revealed unremarkable CBC, CMP, UA, troponin. CT head showed no acute intracranial abnormality. EKG showed sinus rhythm with PVC. Patient was admitted for CVA-workup. Patient received ECHO with bubble study and carotid doppler that were within normal limits. Patient received MRI brain and MRA head and neck, which showed a lacunar infarct of the right laurel near the right superior cerebellar peduncle and stenosis throughout the right proximal PCA. Neurology was consulted who evaluated patient.  Recommended patient continues home ASA and plavix, and started patient on 80 mg Lipitor. Patient's sxs of facial numbness, blurry vision, and weakness improved throughout his stay. He passed a swallow study and was shown to be able to ambulate independently. He was deemed to be stable for discharge home. Discharge plan: Follow up with PCP  Follow up with stroke clinic  Continue home ASA and plavix  Start taking Lipitor 80 mg      Discharge Medications:         Medication List        START taking these medications      atorvastatin 80 MG tablet  Commonly known as: LIPITOR  Take 1 tablet by mouth nightly            CONTINUE taking these medications      allopurinol 100 MG tablet  Commonly known as: ZYLOPRIM  Take 1 tablet by mouth in the morning. Please start once flare up resolved. Lety Madden amLODIPine 10 MG tablet  Commonly known as: NORVASC  Take 1 tablet by mouth daily     aspirin 81 MG chewable tablet  Take 1 tablet by mouth in the morning. clopidogrel 75 MG tablet  Commonly known as: PLAVIX  Take 1 tablet by mouth in the morning. EPINEPHrine 0.3 MG/0.3ML Soaj injection  Commonly known as: EPIPEN     famotidine 20 MG tablet  Commonly known as: PEPCID  Take 1 tablet by mouth in the morning. leuprolide 11.25 MG injection  Commonly known as: LUPRON     lisinopril 20 MG tablet  Commonly known as: PRINIVIL;ZESTRIL  Take 1 tablet by mouth in the morning.             STOP taking these medications      acetaminophen 325 MG tablet  Commonly known as: TYLENOL     acetaminophen 500 MG tablet  Commonly known as: TYLENOL               Where to Get Your Medications        These medications were sent to [address] [place] [address]  [address] [facility] [address]      Phone: [phone]   allopurinol 100 MG tablet  aspirin 81 MG chewable tablet  atorvastatin 80 MG tablet  clopidogrel 75 MG tablet  famotidine 20 MG tablet  lisinopril 20 MG tablet         Consults: As above    Significant Diagnostic Studies: As above    Labs:  Na/K/Cl/CO2:  142/3.9/108/21 (08/17 0426)  BUN/Cr/glu/ALT/AST/amyl/lip:  18/0.9/--/--/--/--/-- (08/17 0426)  WBC/Hgb/Hct/Plts:  5.5/13.9/41.0/250 (08/16 0413)    estimated creatinine clearance is 85 mL/min (based on SCr of 0.9 mg/dL). New Imaging:  MRA NECK WO CONTRAST   Final Result   1. Acute to subacute lacunar infarct involving dorsal aspect of the right   laurel adjacent to the right superior cerebellar peduncle. 2. No acute intracranial hemorrhage or significant mass effect. 3. Unremarkable MRA neck within the included portions. 4. High-grade stenoses throughout the right proximal PCA again noted with   tandem occlusion of the right P2 segment. MRI BRAIN WO CONTRAST   Final Result   1. Acute to subacute lacunar infarct involving dorsal aspect of the right   laurel adjacent to the right superior cerebellar peduncle. 2. No acute intracranial hemorrhage or significant mass effect. 3. Unremarkable MRA neck within the included portions. 4. High-grade stenoses throughout the right proximal PCA again noted with   tandem occlusion of the right P2 segment. MRA HEAD WO CONTRAST   Final Result   1. Acute to subacute lacunar infarct involving dorsal aspect of the right   laurel adjacent to the right superior cerebellar peduncle. 2. No acute intracranial hemorrhage or significant mass effect. 3. Unremarkable MRA neck within the included portions. 4. High-grade stenoses throughout the right proximal PCA again noted with   tandem occlusion of the right P2 segment. US CAROTID ARTERY BILATERAL   Final Result   The right internal carotid artery demonstrates 0-50% stenosis. The left internal carotid artery demonstrates 0-50% stenosis. Bilateral vertebral arteries are patent with flow in the normal direction. CT Head WO Contrast   Final Result   No acute intracranial abnormality. XR CHEST PORTABLE   Final Result   No acute process. Treatments:   As above    Discharge Exam:  Please see physical exam from progress note from day of discharge    Disposition:   home    Future Appointments   Date Time Provider Alee Tapia   8/23/2022  9:00 AM Herman Calvo MD [facility]   11/15/2022 10:00 AM Herman Calvo MD [facility]       More than 30 minutes was spent in preparation of this patient's discharge including, but not limited to, examination, preparation of documents, prescription preparation, counseling and coordination.     Signed:  Rosalia Putnam MD  8/17/2022, 12:31 PM

## 2022-08-17 NOTE — PROGRESS NOTES
[facility] [place] Inpatient   Resident Progress Note    S:  Hospital day: 3  Brief Synopsis: Collette Mood is a 79 y.o. male past medical history prostate cancer, HLD, prior TIA/CVA, hypertension, and frequent falls who presented to the ED with headache, facial numbness, blurry vision, and fall. He had also experienced significant weakness and fallen. In ED, patient had negative head CT, EKG showing NSR, and unremarkable labs. Patient had been previously evaluated for stroke in December 2021, where he was placed on aspirin and Plavix which he continues to take    Overnight/interim:   Patient reports that his vision has improved significantly and he has no residual feelings of dizziness      Cont meds:    sodium chloride       Scheduled meds:    atorvastatin  80 mg Oral Nightly    [Held by provider] amLODIPine  10 mg Oral Daily    aspirin  81 mg Oral Daily    clopidogrel  75 mg Oral Daily    famotidine  20 mg Oral Daily    lisinopril  20 mg Oral Daily    allopurinol  100 mg Oral Daily    sodium chloride flush  5-40 mL IntraVENous 2 times per day    enoxaparin  40 mg SubCUTAneous Daily     PRN meds: sodium chloride flush, sodium chloride, ondansetron **OR** ondansetron, polyethylene glycol, acetaminophen **OR** acetaminophen     I reviewed the patient's past medical and surgical history, Medications and Allergies. O:  BP (!) 167/84   Pulse 52   Temp 98.2 °F (36.8 °C) (Oral)   Resp 20   Ht 5' 11\" (1.803 m)   Wt 184 lb (83.5 kg)   SpO2 99%   BMI 25.66 kg/m²   24 hour I&O: I/O last 3 completed shifts: In: 240 [P.O.:240]  Out: 750 [Urine:750]  No intake/output data recorded. Physical Exam  Constitutional:       General: He is not in acute distress. Appearance: Normal appearance. He is normal weight. HENT:      Head: Normocephalic and atraumatic. Nose: Nose normal.      Mouth/Throat:      Mouth: Mucous membranes are moist.      Pharynx: Oropharynx is clear.    Eyes: Conjunctiva/sclera: Conjunctivae normal.      Pupils: Pupils are equal, round, and reactive to light. Comments: Patient's left eye laterally deviated at baseline, but able to adduct across the midline     Cardiovascular:      Rate and Rhythm: Normal rate and regular rhythm. Pulses: Normal pulses. Heart sounds: Normal heart sounds. Pulmonary:      Effort: Pulmonary effort is normal.      Breath sounds: Normal breath sounds. Abdominal:      General: Abdomen is flat. Bowel sounds are normal.      Palpations: Abdomen is soft. Musculoskeletal:         General: Normal range of motion. Cervical back: Normal range of motion and neck supple. Right lower leg: No edema. Left lower leg: No edema. Skin:     General: Skin is warm and dry. Neurological:      Mental Status: He is alert and oriented to person, place, and time. Cranial Nerves: Dysarthria (mild, present since 12/2021) present. No facial asymmetry. Sensory: No sensory deficit. Motor: Weakness (right lower extremity weakness- chronic since spinal cord surgery) and pronator drift present. No abnormal muscle tone. Deep Tendon Reflexes: Reflexes normal.   Psychiatric:         Mood and Affect: Mood normal.         Behavior: Behavior normal.         Thought Content: Thought content normal.         Judgment: Judgment normal.     Labs:  Na/K/Cl/CO2:  143/3.2/107/23 (08/16 0413)  BUN/Cr/glu/ALT/AST/amyl/lip:  22/1.0/--/15/17/--/-- (08/16 0413)  WBC/Hgb/Hct/Plts:  5.5/13.9/41.0/250 (08/16 0413)  estimated creatinine clearance is 76 mL/min (based on SCr of 1 mg/dL). Other pertinent labs as noted below    Radiology:  MRA NECK WO CONTRAST   Final Result   1. Acute to subacute lacunar infarct involving dorsal aspect of the right   laurel adjacent to the right superior cerebellar peduncle. 2. No acute intracranial hemorrhage or significant mass effect. 3. Unremarkable MRA neck within the included portions.    4. High-grade stenoses throughout the right proximal PCA again noted with   tandem occlusion of the right P2 segment. MRI BRAIN WO CONTRAST   Final Result   1. Acute to subacute lacunar infarct involving dorsal aspect of the right   laurel adjacent to the right superior cerebellar peduncle. 2. No acute intracranial hemorrhage or significant mass effect. 3. Unremarkable MRA neck within the included portions. 4. High-grade stenoses throughout the right proximal PCA again noted with   tandem occlusion of the right P2 segment. MRA HEAD WO CONTRAST   Final Result   1. Acute to subacute lacunar infarct involving dorsal aspect of the right   laurel adjacent to the right superior cerebellar peduncle. 2. No acute intracranial hemorrhage or significant mass effect. 3. Unremarkable MRA neck within the included portions. 4. High-grade stenoses throughout the right proximal PCA again noted with   tandem occlusion of the right P2 segment. US CAROTID ARTERY BILATERAL   Final Result   The right internal carotid artery demonstrates 0-50% stenosis. The left internal carotid artery demonstrates 0-50% stenosis. Bilateral vertebral arteries are patent with flow in the normal direction. CT Head WO Contrast   Final Result   No acute intracranial abnormality. XR CHEST PORTABLE   Final Result   No acute process. Resident Assessment and Plan     Possible CVA/TIA  - Echo with bubble study was normal and carotid Doppler ultrasound were normal  - MRA showed high-grade stenosis throughout the right proximal PCA, MRI showed acute/subacute lacunar infarct of the right laurel near the right superior cerebellar peduncle  - Patient passed swallow evaluation from speech therapy, is recommended to have foods that are easy to chew. -Patient reports he has ambulated without difficulty  - Neurology is following the patient, appreciate recommendations     2.   Hypertensive urgency  - BP was 197/99 on admission, today BP is 189/82  - As needed labetalol for SBP >200  - Patient's home lisinopril and Norvasc have been restarted. 3.  HLD, Hx prostate cancer, GERD  -Continue Lipitor and Pepcid.   [facility]       DVT and GI prophylaxis, Lovenox 40 mg subcu daily and Pepcid          Electronically signed by Adiel Dasilva MD on 8/17/2022 at 7:16 AM  Attending physician: Dr. Caity Marrero

## 2022-08-17 NOTE — PROGRESS NOTES
Physical Therapy  Physical Therapy Initial Assessment     Name: Marc Chun  : 1955  MRN: [id number]      Date of Service: 2022    Evaluating PT:  Adilene Quintanilla, PT [place] [address] [facility]    Room #:  2321/1909-Q  Diagnosis:  Diplopia [H53.2]  Cranial nerve palsy [G52.9]  Acute cerebrovascular accident (CVA) ([facility] [address] [I63.9]  PMHx/PSHx:   has a past medical history of Cancer [facility]), Erectile dysfunction, Hyperlipidemia, Hypertension, Osteoarthritis, hip/pelvic region and thigh, and Prostate cancer ([facility] [address] Procedure/Surgery:   has a past surgical history that includes Prostate Biopsy; Leg Surgery; hip surgery (Right); Lumbar spine surgery (10/30/2017); and back surgery. Precautions:  Fall risk  Equipment Needs:  wheeled walker    SUBJECTIVE:    Pt lives alone in a 1 story home with 6 stairs to enter and 1 rail. Bed is on 1st floor and bath is on 1st floor. Pt ambulated with rollator and cane PTA. OBJECTIVE:   Initial Evaluation  Date: 22 Treatment Short Term/ Long Term   Goals   AM-PAC 6 Clicks 86/01     Was pt agreeable to Eval/treatment? yes     Does pt have pain? no     Bed Mobility  Rolling: SBA  Supine to sit: SBA  Sit to supine: SBA  Scooting: SBA  Rolling: Ind  Supine to sit: Ind  Sit to supine: Ind  Scooting: Ind   Transfers Sit to stand: Thang  Stand to sit: Thang  Stand pivot: Thang  Sit to stand: Ind  Stand to sit: Ind  Stand pivot: Ind   Ambulation    50 feet with FWW Thang  100+ feet with AAD Jimmie   Stair negotiation: ascended and descended  NT  6 steps with 1 rail Jimmie   ROM BUE:  See OT note. BLE:  L LE WFL  R LE passively WFL     Strength BUE:  See OT note. BLE:  WFL     Balance Sitting EOB:  SBA  Dynamic Standing:  FWW Thang  Sitting EOB:  Ind  Dynamic Standing:  AAD Jimmie     Pt is A & O x 4. Sensation:  Pt denies numbness and tingling to extremities  Edema:  None noted. Vitals: BP supine 164/77, HR 47. BP seated 171/94, HR 58. BP standing 176/102, HR 65.     Patient education  Pt educated on sequencing to facilitate safe and correct functional mobility. Patient response to education:   Pt verbalized understanding Pt demonstrated skill Pt requires further education in this area   yes yes yes     ASSESSMENT:    Conditions Requiring Skilled Therapeutic Intervention:    [x]Decreased strength     [x]Decreased ROM  [x]Decreased functional mobility  [x]Decreased balance   [x]Decreased endurance   []Decreased posture  []Decreased sensation  []Decreased coordination   []Decreased vision  [x]Decreased safety awareness   []Increased pain       Comments:  Patient positioned semi-supine upon entry and agreeable to PT evaluation. Patient demonstrated supine<>sitting on the EOB with no hands on assist, bed linens changed. Patient sat on the EOB for ~5 minutes to increase endurance to upright. Patient reported 7/10 dizziness which didn't improve throughout session. Patients vitals were taken throughout session and recorded above. Patient performed sit<>stand with light lift assist at the trunk with FWW and ambulated to the hallway with same assist. Patient demonstrated no LOB but slight unsteady during ambulation. Patient positioned semi-supine at the end of the session with call light placed in reach and all needs met. Treatment:  Patient practiced and was instructed in the following treatment:    Bed mobility- pt required verbal and tactile cues to facilitate safe and correct functional mobility. Hands on assist not required to complete task. Transfer- pt required verbal and tactile cues to facilitate safe and correct functional mobility. Manual assist required to complete task. Gait- pt required verbal and tactile cues to facilitate safe and correct functional mobility. Manual assist and FWW required to complete task. Pt's/ family goals   1. Return home. Prognosis is good for reaching above PT goals.     Patient and or family understand(s) diagnosis, prognosis, and plan of care.  yes    PHYSICAL THERAPY PLAN OF CARE:    PT POC is established based on physician order and patient diagnosis     Referring provider/PT Order:    08/16/22 1530  PT eval and treat       Abigail De Santiago DO     Diagnosis:  Diplopia [H53.2]  Cranial nerve palsy [G52.9]  Acute cerebrovascular accident (CVA) ([facility] [address] [I63.9]  Specific instructions for next treatment:  Increase endurance during ambulation as tolerated by patient. Current Treatment Recommendations:     [x] Strengthening to improve independence with functional mobility   [x] ROM to improve independence with functional mobility   [x] Balance Training to improve static/dynamic balance and to reduce fall risk  [x] Endurance Training to improve activity tolerance during functional mobility   [x] Transfer Training to improve safety and independence with all functional transfers   [x] Gait Training to improve gait mechanics, endurance and assess need for appropriate assistive device  [x] Stair Training in preparation for safe discharge home and/or into the community   [] Positioning to prevent skin breakdown and contractures  [x] Safety and Education Training   [x] Patient/Caregiver Education   [] HEP  [x] Other     PT long term treatment goals are located in above grid    Frequency of treatments: 2-5x/week x 1-2 weeks. Time in  0800  Time out  0830    Total Treatment Time  20 minutes     Evaluation Time includes thorough review of current medical information, gathering information on past medical history/social history and prior level of function, completion of standardized testing/informal observation of tasks, assessment of data and education on plan of care and goals.     CPT codes:  [x] Low Complexity PT evaluation 99558  [] Moderate Complexity PT evaluation 51693  [] High Complexity PT evaluation 96155  [] PT Re-evaluation 53877  [] Gait training 52929 - minutes  [] Manual therapy 59928 - minutes  [x] Therapeutic activities 08809 20 minutes  [] Therapeutic exercises 25160 - minutes  [] Neuromuscular reeducation 07461 - minutes     Chandu Ricci, [facility] [place], [facility] [id number]

## 2022-08-17 NOTE — PROGRESS NOTES
[facility]  Family Medicine Attending    S: 79 y.o. male with PMH of prostate cancer, HLD, prior TIA/stroke, HTN, and frequent falls, presented to the ED with headache, facial numbness, blurry vision and fall. He notes that he ate some questionable mashed potatoes over the weekend which caused him to vomit. Since that time, he has had weakness and fell. He called the ambulance because he was unable to stand up. His ED course was remarkable for negative head CT, EKG with sinus rhythm, and unremarkable labs. Of note, in December of 2021, he was evaluated for stroke. He was placed on aspirin and plavix at that time. He remained on the aspirin and plavix since that time. Today, he notes significant improvement of his symptoms. His speech is also improved. His eye movements are nearly back to normal.  He denies any chest pain or shortness of breath. No heart palpitations. Was seen by PT this morning. MRi results reveal acute/subacute lacunar infarct of the right laurel near the right superior cerebellar peduncle. There is high grade stenosis of the right proximal PCA. O: VS- Blood pressure (!) 189/82, pulse (!) 47, temperature 97.9 °F (36.6 °C), temperature source Oral, resp. rate 20, height 5' 11\" (1.803 m), weight 184 lb (83.5 kg), SpO2 99 %. Exam is as noted by resident with the following changes, additions or corrections:  Gen:  AAOx3, NAD  CVS:  RRR  Lungs:  CTAB  Neuro:  eye movements significantly improved    Impressions:   Cranial nerve palsy  2. Acute cerebrovascular accident (CVA)   3. Hx prostate cancer  4. Mixed hyperlipidemia  5. Essential hypertension      Plan:      MRI/MRA brain, MRA neck with acute/subacute infarction and high grade stenosis of the PCA  Carotid ultrasound-no significant stenosis  Neuro consulted-continue aspirin/plavix x 3 weeks, then aspirin 81 mg daily monotherapy thereafter; Echocardiogram no significant findings.   Continue aspirin, lipitor and plavix  SLP evaluation-no dysphagia; recs include easy to chew consistency solids and thin liquids  Resume home bp medications  Consider outpatient follow up with [facility] neuro vs neuro-interventional radiology:  unsure if stenting would be an option to improve the stenosis  Likely discharge this afternoon if okay with neurology. Attending Physician Statement  I have reviewed the chart and seen the patient with the resident(s). I personally reviewed images, EKG's and similar tests, if present. I personally reviewed and performed key elements of the history and exam.  I have reviewed and confirmed student and/or resident history and exam with changes as indicated above. I agree with the assessment, plan and orders as documented by the resident. Please refer to the resident and/or student note for additional information.       Elsa Fernandez MD

## 2022-08-19 ENCOUNTER — TELEPHONE (OUTPATIENT)
Dept: FAMILY MEDICINE CLINIC | Age: 67
End: 2022-08-19

## 2022-08-24 ENCOUNTER — TELEPHONE (OUTPATIENT)
Dept: FAMILY MEDICINE CLINIC | Age: 67
End: 2022-08-24

## 2022-08-24 NOTE — TELEPHONE ENCOUNTER
Patient called and stated that he needs the Dr to return his call, he has many questions and concern about his present and future medical care.   Thank You, Jane COLEY

## 2022-09-02 ENCOUNTER — OFFICE VISIT (OUTPATIENT)
Dept: FAMILY MEDICINE CLINIC | Age: 67
End: 2022-09-02
Payer: COMMERCIAL

## 2022-09-02 VITALS
WEIGHT: 183 LBS | RESPIRATION RATE: 17 BRPM | HEIGHT: 71 IN | BODY MASS INDEX: 25.62 KG/M2 | HEART RATE: 70 BPM | OXYGEN SATURATION: 98 % | SYSTOLIC BLOOD PRESSURE: 169 MMHG | TEMPERATURE: 97.3 F | DIASTOLIC BLOOD PRESSURE: 77 MMHG

## 2022-09-02 DIAGNOSIS — I63.9 ACUTE CEREBROVASCULAR ACCIDENT (CVA) (HCC): Primary | ICD-10-CM

## 2022-09-02 DIAGNOSIS — R42 DIZZINESS: ICD-10-CM

## 2022-09-02 PROCEDURE — 99213 OFFICE O/P EST LOW 20 MIN: CPT | Performed by: STUDENT IN AN ORGANIZED HEALTH CARE EDUCATION/TRAINING PROGRAM

## 2022-09-02 PROCEDURE — G8427 DOCREV CUR MEDS BY ELIG CLIN: HCPCS | Performed by: STUDENT IN AN ORGANIZED HEALTH CARE EDUCATION/TRAINING PROGRAM

## 2022-09-02 PROCEDURE — 3017F COLORECTAL CA SCREEN DOC REV: CPT | Performed by: STUDENT IN AN ORGANIZED HEALTH CARE EDUCATION/TRAINING PROGRAM

## 2022-09-02 PROCEDURE — 1036F TOBACCO NON-USER: CPT | Performed by: STUDENT IN AN ORGANIZED HEALTH CARE EDUCATION/TRAINING PROGRAM

## 2022-09-02 PROCEDURE — G8417 CALC BMI ABV UP PARAM F/U: HCPCS | Performed by: STUDENT IN AN ORGANIZED HEALTH CARE EDUCATION/TRAINING PROGRAM

## 2022-09-02 PROCEDURE — 1124F ACP DISCUSS-NO DSCNMKR DOCD: CPT | Performed by: STUDENT IN AN ORGANIZED HEALTH CARE EDUCATION/TRAINING PROGRAM

## 2022-09-02 RX ORDER — POTASSIUM CHLORIDE 20 MEQ/1
20 TABLET, EXTENDED RELEASE ORAL
COMMUNITY
Start: 2022-08-08

## 2022-09-02 ASSESSMENT — ENCOUNTER SYMPTOMS
COUGH: 0
NAUSEA: 0
SHORTNESS OF BREATH: 0
CONSTIPATION: 0
VOMITING: 0
DIARRHEA: 0
ABDOMINAL PAIN: 0

## 2022-09-02 NOTE — PROGRESS NOTES
[facility] MEDICINE RESIDENCY PROGRAM  DATE OF VISIT : 2022    Patient : Papa Hong   Age : 79 y.o.  : 1955   MRN : [id number]   ______________________________________________________________________    Chief Complaint :   Chief Complaint   Patient presents with    Follow-Up from Hospital    Dizziness    Headache         HPI : Papa Hong is 79 y.o. male who presented to the clinic today for dizziness. Was in hospital 22 and was found to have high grade stenoses throughout the R PCA. Has been feeling more dizzy/lightheaded since then. Worsening upon standing. Has been drinking 2 quarts of water per day. Increased salt intake. Endorses  Tingling in all left fingertips. Headache in middle of eyes, weakness in left hand, blurry vision and dysarthria has made some improvement but still bothersome. No photo/phonophobia or tinnitus. No further syncopal episodes. Denies nausea, vomiting, CP, SOB. Is supposed to follow up with Neurology in [address] [place] [address] Road(?), but not until October. Taking amlodipine and lisinorpil for BP. Checks BP, controlled at home. Past Medical History :  Past Medical History:   Diagnosis Date    Cancer ([address] PROSTATE    , patient reports bone mets    Erectile dysfunction     Hyperlipidemia     Hypertension     Osteoarthritis, hip/pelvic region and thigh 3/29/2013    Prostate cancer ([address] 2013     Past Surgical History:   Procedure Laterality Date    BACK SURGERY      HIP SURGERY Right     LEG SURGERY      LUMBAR SPINE SURGERY  10/30/2017    PLIF L2 - S1   and laminectomy    PROSTATE BIOPSY         Allergies :    Allergies   Allergen Reactions    Ibuprofen Swelling    Benzocaine      EDEMA        Medication List :    Current Outpatient Medications   Medication Sig Dispense Refill    potassium chloride (KLOR-CON M) 20 MEQ extended release tablet 20 mEq      atorvastatin (LIPITOR) 80 MG tablet Take 1 tablet by mouth nightly 30 tablet 3 allopurinol (ZYLOPRIM) 100 MG tablet Take 1 tablet by mouth in the morning. Please start once flare up resolved. . 90 tablet 1    clopidogrel (PLAVIX) 75 MG tablet Take 1 tablet by mouth in the morning. 30 tablet 3    aspirin 81 MG chewable tablet Take 1 tablet by mouth in the morning. 30 tablet 3    famotidine (PEPCID) 20 MG tablet Take 1 tablet by mouth in the morning. 60 tablet 3    lisinopril (PRINIVIL;ZESTRIL) 20 MG tablet Take 1 tablet by mouth in the morning. 30 tablet 3    leuprolide (LUPRON) 11.25 MG injection Inject 11.25 mg into the muscle once      amLODIPine (NORVASC) 10 MG tablet Take 1 tablet by mouth daily 30 tablet 2    EPINEPHrine (EPIPEN) 0.3 MG/0.3ML SOAJ injection Inject 0.3 mg into the muscle       No current facility-administered medications for this visit. Review of Systems :  Review of Systems   Constitutional:  Positive for diaphoresis. Negative for chills and fever. HENT:  Negative for tinnitus. Respiratory:  Negative for cough and shortness of breath. Cardiovascular:  Negative for chest pain, palpitations and leg swelling. Gastrointestinal:  Negative for abdominal pain, constipation, diarrhea, nausea and vomiting. Genitourinary:  Negative for dysuria. Neurological:  Positive for speech difficulty, light-headedness, numbness and headaches. Negative for dizziness, syncope and weakness. ______________________________________________________________________    Physical Exam :    Vitals: BP (!) 169/77 (Site: Right Upper Arm, Position: Standing, Cuff Size: Medium Adult)   Pulse 70   Temp 97.3 °F (36.3 °C) (Temporal)   Resp 17   Ht 5' 11\" (1.803 m)   Wt 183 lb (83 kg)   SpO2 98%   BMI 25.52 kg/m²   General Appearance: Well developed, awake, alert, oriented, and in NAD  HEENT: NCAT, MMM, no pallor or icterus. Neck: Symmetrical, trachea midline. Chest wall/Lung: CTAB, respirations unlabored.  No ronchi/wheezing/rales   Heart: RRR, normal S1 and S2, no murmurs, rubs or gallops. Abdomen: SNTND, bowel sounds present  Extremities: Extremities normal, atraumatic, no cyanosis, clubbing or edema. Skin: Skin color, texture, turgor normal, no rashes or lesions  Musculokeletal: ROM grossly normal in all joints of extremities, no obvious joint swelling. Lymphnodes: No lymph node enlargement appreciated  Neurologic: Alert&Oriented x3. No focal motor deficits detected. CN II-XII grossly in tact. Strength 5/5 in BLE and BUE. Decreased sensation in L fingertips. Psychiatric: Normal mood. Normal affect. Normal behavior. ______________________________________________________________________    Assessment & Plan :    1. Acute cerebrovascular accident (CVA)  - Imaging was reviewed; MRI brain 8/16/22 showed acute to subacute lacunar infarct and MRA neck 8/16/22 showed high grade stenoses throughout the R PCA. - Following up with neurology in October   - Will reach Dr. Yoly Conner, [facility] doctor to discuss plan moving forward with neurology   [phone]   - PT/OT to improve strength/dizziness   - Speech therapy for dysarthria  - Continue ASA and plavix     2. Dizziness  - likely 2/2 stenoses vs dehydration   - orthostatic BP in office negative  - Increase fluid intake  - Try Compression stockings   - PT/OT to improve dizziness               Return to Office: Return in about 4 weeks (around 9/30/2022) for dizziness .     Deepti Oh MD  Case discussed with Dr. Sadi Hawk

## 2022-09-02 NOTE — PROGRESS NOTES
Attending Physician Statement    S:   Chief Complaint   Patient presents with    Follow-Up from Hospital    Dizziness    Headache      Patient is a 79year old male was found to have high grade stenosis in right PCA. Has been having increasing lightheadedness and a sensation of spinning . Planning to follow up with neurology in October. Room is not spinning but feels like he will pass out. No nausea or vomiting. Does have blurry vision. Lightheadedness worse when he stands up. Has weakness in his left hand and dysarthria since hospitalization in August. He is not in speech therapy and occupational therapy. Feels like he can't get through his day due to lightheadedness. O: Blood pressure 130/70, pulse 69, temperature 97.3 °F (36.3 °C), temperature source Temporal, resp. rate 17, height 5' 11\" (1.803 m), weight 183 lb (83 kg), SpO2 98 %. Exam:   Heart - RRR   Lungs - clear   Neuro - decreased sensation of left hand . Strength equal 5/5 bilateral upper and lower extremities. A: Lightheadedness, dizziness, weakness, dysarthria  P:  Physical therapy, occupational therapy, speech therapy   [facility]. Increasing hydration and salt intake    Follow-up as ordered    Attending Attestation   I have discussed the case, including pertinent history and exam findings with the resident. I agree with the documented assessment and plan.

## 2022-09-12 RX ORDER — AMLODIPINE BESYLATE 10 MG/1
TABLET ORAL
Qty: 30 TABLET | Refills: 2 | Status: SHIPPED | OUTPATIENT
Start: 2022-09-12

## 2022-10-11 ENCOUNTER — HOSPITAL ENCOUNTER (OUTPATIENT)
Dept: OCCUPATIONAL THERAPY | Age: 67
Setting detail: THERAPIES SERIES
Discharge: HOME | End: 2022-10-11
Payer: COMMERCIAL

## 2022-10-11 PROCEDURE — 97165 OT EVAL LOW COMPLEX 30 MIN: CPT | Performed by: OCCUPATIONAL THERAPIST

## 2022-10-11 NOTE — PROGRESS NOTES
OCCUPATIONAL THERAPY INITIAL EVALUATION    [facility] [address] [facility]. THERAPY  [address] [facility] [address]  Dept: [address]: [phone]   N[facility] OT Fax: [phone]    Date:  10/11/2022  Initial Evaluation Date: 10/11/22   Evaluating Therapist: Lalo Godfrey OT    Patient Name:  Jasson Ding    :  1955    Restrictions/Precautions:  Blurred Vision, Moderate Fall Risk, Dizziness  Diagnosis:  Acute CVA (I63.9), Dizziness (R42)    MRI 22: Lacunar Infarct involving dorsal aspect of the right laurel adjacent to the right superior cerebellar peduncle  Date of Surgery/Injury: 97     Insurance/Certification information:  [employer] [place] Medicaid  Plan of care signed (Y/N): N  Visit# / total visits:     Referring Practitioner:  Clint Peña MD NPI# [id number]; Teresa Dyson MD NPI# [id number]  Specific Practitioner Orders: OT Evaluate and Treatment    Assessment of current deficits   [x] Functional mobility  [x] ADLs  [x] Strength   [x] Cognition   [x] Functional transfers   [x] IADLs  [] Safety Awareness  [x] Motor Endurance   [x] Fine Motor Coordination  [x] Balance  [x] Vision/perception  [x] Sensation    [] Gross Motor Coordination [] ROM  [x] Pain   [] Edema    [] Scar Adhesion/Skin Integrity     OT PLAN OF CARE   OT POC based on physician orders, patient diagnosis and results of clinical assessment    Frequency/Duration: OT 2x/week or 16 visits                  Certification period From: 10/11/22  To: 23  Specific OT Treatment to include: CPT Codes- 87412, 17081, 94111, 04083, 96485    [x] Instruction in HEP                   Modalities:  [x] Therapeutic Exercise        [] Ultrasound               [] Electrical Stimulation/Attended  [x] PROM/Stretching                   [] Fluidotherapy          []  Paraffin                   [x] AAROM  [x] AROM                 [] Iontophoresis:   [] Tendon Glides                                               [x] Neuromuscular Re-Ed           [x] ADL/IADL re-training    [x] Therapeutic Activity       [] Pain Management with/without modalities PRN                 [x] Manual Therapy                     [] Splinting                      [] Scar Management                   []Joint Protection/Training  []Ergonomics                             [] Joint Mobilization        [x] Adaptive Equipment Assessment/Training                             [] Manual Edema Mobilization   [] Myofascial Release                 [x] Energy Conservation/Work Simplification  [x] GM/FM Coordination       [x] Safety retraining/education per  individual diagnosis/goals  [x] Sensory Re-Education      Patient Specific Goal: Improve his mental focus, visual focus and use of left dominant hand/improved sensation                             GOALS (Long term same as Short term): 6-8 weeks  1) Patient will demonstrate good understanding of home program (exercises/activities/diagnosis/prognosis/goals) with good accuracy. 2) Patient will demonstrate increase strength of their L Shoulder to WNLs for ADL/IADL completion. 3) Patient will demonstrate increased /pinch strength of at least 10 / 3 pinch pounds of their Left hand. 4) Patient will demonstrate (+) protective sense in their affected left upper extremity for increase safety w/ ADLs/IADLs  5) Increase in fine motor function as evidenced by decreased time to complete 9-hole peg test  by at least 5-10 seconds. 6) Patient will report ADL/IADL functions as Mod I/I with use of left dominant hand as primary. 7) Patient will demonstrate improved functional activity tolerance/motor endurance from fair to good for ADL/IADL completion. 8) Patient will decrease QuickDASH score to 30% or less for increased participation in daily functional activities.    9) Patient to demonstrate visual attn/focus, saccades, and convergence to [facility] to increase safety and     Past Medical History: CVA - may of 2022, Prostate CA w/ bone mets per patient, HTN, OA, Back Surgery-PLIF L2-S1, Spinal Cord Injury, Hip Surgery    Past Surgical History:     Reason for Referral: Patient is a 78 yo who was admitted to [facility] on 8/14/22 s/p fall w/ c/o blurred vision, slurred speech, left sided numbness face/hand, dizziness/lightheaded. MRI brain showed a right pontine stroke. Vessel imaging showed severe stenosis versus occlusion in the right PCA and occlusion in the right P2. Patient w/ continued c/o dizziness, numbness/decreased strength left UE, and decreased visual focus. He is referred to OP OT for eval and treatment    Home Living: Patient lives alone in a 1st floor apartment w/ 6 steps to enter. Bathroom set-up: Tub - patient takes sitting baths, std commode- he reported that he has an elevated commode, but is not currently using. He uses sink to push-up for sit to stand. Prior Level of Function: Prior to recent stroke (8/14/22), patient was independent with ADLs/household tasks. He is retired and does not drive. Since spine surgery- he used a rollator for community mobility and cane for household    Cognition:   Alert/Oriented x3   Following directions: followed 2 step instruction w/o difficulty  Problem solving: Good  Recall: Fair     IADL STATUS:   Ind Mod I Min A Mod A Max A Dep Other   Homemaking Responsibility     x     Shopping Responsibility:     x     Mode of Transportation:      x    Leisure & Hobbies:       x   Work:       x     Comments:Patient reported that a friend has been assisting w/ cleaning/laundry. He reported that she may no longer be able to assist. He is unable to complete. Patient does not drive - uses transport services. He enjoys music. He is retired - reported that he worked for [employer].      ADL STATUS:   Ind Mod I setup Min A Mod A Max A Dep Other   Feeding:  x         Grooming:  x         Bathing:    x       UE Dressing:    x       LE Dressing:  x Toileting:  x         Transfers:  x           Comments: Patient has difficulty w/ BUE tasks - cutting food. He has been completing ADL tasks w/ increased time and use of right non-dominant UE as primary. Limited by decreased left UE strength/numbness. Patient uses a sock aid to don socks and slips on shoes. Pain Level: mild discomfort LUE, uncomfortable and with paresthesia    UE Assessment: Hand Dominance is Left    AROM  LUE: WFL throughout  RUE: WFL throughout    STRENGTH:   LUE: Shoulder elevation 4/5; otherwise 5/5 throughout    Comment: Compensatory movement pattern left shoulder    Sensation:  LUE: Diminished protective sense -hand/loss of protective sense left finger tips  RUE: Diminished light touch/protective sense intact    Tone: WNL  Vision / Perception: Wears reading glasses. Decreased visual attn/focus, saccades and convergence. Patient reported that his visual focus is worse than before the stroke. He also has increased blurred vision. Visual field/peripheral vision intact. (Per chart -Patient's left eye laterally deviated at baseline, but able to adduct across the midline  Patient's right eye able to adduct past the midline, but not fully)   Edema Description: none noted this date     Dynamometer (setting 2):     Left: 44#, 2nd trial 39# -motor fatigue (Norm for age/sex 76#)     Right: 75#, 2nd trial 68# - motor fatigue (Norm for age/sex 91#)   Pinch Meter:   Lateral: Left= 10#,Right= 16#    Palmar 3 point: Left= 9#, Right= 11#  9 Hole Peg Test:   Left: 27 seconds   Right: 26 seconds    QuickDASH Score: 40.91% disability    Intervention: no intervention provided this date    Eval Complexity: low    Rehab Potential:                                 [x] Good  [] Fair  [] Poor        Suggested Professional Referral:       [] No  [x] Yes: (dietician)  Barriers to Goal Achievement[de-identified]          [x] No  [] Yes:  Domestic Concerns:                           [x] No  [] Yes:       Patient.  Education:  [x]

## 2022-10-19 ENCOUNTER — HOSPITAL ENCOUNTER (OUTPATIENT)
Dept: PHYSICAL THERAPY | Age: 67
Setting detail: THERAPIES SERIES
Discharge: HOME | End: 2022-10-19
Payer: COMMERCIAL

## 2022-10-19 PROCEDURE — 97161 PT EVAL LOW COMPLEX 20 MIN: CPT

## 2022-10-20 NOTE — PROGRESS NOTES
Physical Therapy  Initial Assessment  Date: 10/19/2022  Patient Name: Ruthie Larson  MRN: [id number]  : 1955    Referring Physician: Brianne Magaña MD     PCP: Hien Cotto MD     Medical Diagnosis: Cerebral infarction, unspecified [I63.9]  Dizziness and giddiness [R42] CVA  No data recorded    Insurance: Payor: Bevin Ahumada / Plan: Zaki Barney / Product Type: *No Product type* /   Insurance ID: [id number] - (Medicaid Managed)      Restrictions:       Subjective:   General  Chart Reviewed: Yes  Patient Assessed for Rehabilitation Services: Yes  Additional Pertinent Hx: Patient presents to PT to assess and treat issues related to an acute CVA sufferedin Aug 2022 with hospitalization. Patient reports he has experienced 2 CVA like issues, the first in Dec 2021 and the second in Aug 2022. MRI + for an \"acute Lacunar Infarct\" Per patient he has been seen by both [facility] physicians over the course of the last year and patient expresses that there appears to be limited coordination of the plan of care between the [facility] and [facility] services  History obtained from[de-identified] Patient, Chart Review  Family/Caregiver Present: No  Diagnosis: CVA  Follows Commands: Within Functional Limits  PT Visit Information  Onset Date: 22  PT Insurance Information: [employer]  Subjective  Subjective: Patient reports he has self limited his activity since he was discharged to home. Patient states he will feel \"lightheaded \" and dizzy at times Has trouble with visual focus Able to do basic self care tasks but has assist for activity outside of the home  Prior diagnostic testing[de-identified] MRI, CT Scan       Vision/Hearing:       Orientation:       Social History:       Functional Status:       Objective:                                                                   Outpatient Rehab Objectives (Peds):  NA    Neuro Screen (Peds): Not Assessed     Outcome Measure(s) Completed (Peds):    Not Assessed    Treatments Completed:  N/A - Evaluation Only    Assessment:              Plan:    Physcial Therapy Plan  Plan weeks: 4-5 weeks  Current Treatment Recommendations: Strengthening, Balance training, Functional mobility training, Endurance training, Patient/Caregiver education & training, Home exercise program, Therapeutic activities    G-Code:       Balance and Gait:  Not Assessed    OutComes Score:                                                     AM-PAC Score:             Goals:  Short Term Goals  Time Frame for Short Term Goals: 2 weeks  Short Term Goal 1: Patient will be able to engage in consistent and progressive active exercise while reporting no increase in symptoms of dizziness/light headedness  STG Goal 1 Status[de-identified] New  Short Term Goal 2: Establish HEP  STG Goal 2 Status[de-identified] New  Long Term Goals  Time Frame for Long Term Goals : 5 weeks  Long Term Goal 1: Patient will be able to engage in normal ADL's and functional mobility on a consistent basis  LTG Goal 1 Status[de-identified] New  Long Term Goal 2: Endurance for ADL's and ambulation Fair or better  LTG Goal 2 Status[de-identified] New  Long Term Goal 3: Patient will be able to maintain and self direct an appropriate follow up independent exercise program  Patient Goals   Patient Goals : Improve functional mobility, balance and endurance       Therapy Time:   Individual Concurrent Group Co-treatment   Time In           Time Out           Minutes                   Rafaela Davalos PT       Physical Therapy: Initial Evaluation    Patient: Andrzej Mauricio (23 y.o. male)   Examination Date:   Plan of Care Certification Period: 10/20/2022 to        :  1955 ;    Confirmed: Yes MRN: [id number]  CSN: [id number]   Insurance: Payor: Pita Person / Plan: Susannah Jack / Product Type: *No Product type* /   Insurance ID: [id number] - (Medicaid Managed) Secondary Insurance (if applicable):    Referring Physician:  Marii Mendez MD     PCP: Nehemiah Alvarez MD Visits to Date/Visits Approved:   /      No Show/Cancelled Appts:   /       Medical Diagnosis: Cerebral infarction, unspecified [I63.9]  Dizziness and giddiness [R42] CVA  Treatment Diagnosis:       PERTINENT MEDICAL HISTORY   Patient Assessed for Rehabilitation Services: Yes       Medical History: Chart Reviewed: Yes   Past Medical History:   Diagnosis Date    [facility] PROSTATE    2005, patient reports bone mets    Erectile dysfunction     Hyperlipidemia     Hypertension 2005    Osteoarthritis, hip/pelvic region and thigh 3/29/2013    Prostate cancer ([facility] 2/26/2013     Surgical History:   Past Surgical History:   Procedure Laterality Date    BACK SURGERY      HIP SURGERY Right     LEG SURGERY      LUMBAR SPINE SURGERY  10/30/2017    PLIF L2 - S1   and laminectomy    PROSTATE BIOPSY         Medications:   Current Outpatient Medications:     amLODIPine (NORVASC) 10 MG tablet, take 1 tablet by mouth once daily, Disp: 30 tablet, Rfl: 2    potassium chloride (KLOR-CON M) 20 MEQ extended release tablet, 20 mEq, Disp: , Rfl:     atorvastatin (LIPITOR) 80 MG tablet, Take 1 tablet by mouth nightly, Disp: 30 tablet, Rfl: 3    allopurinol (ZYLOPRIM) 100 MG tablet, Take 1 tablet by mouth in the morning. Please start once flare up resolved. ., Disp: 90 tablet, Rfl: 1    clopidogrel (PLAVIX) 75 MG tablet, Take 1 tablet by mouth in the morning., Disp: 30 tablet, Rfl: 3    aspirin 81 MG chewable tablet, Take 1 tablet by mouth in the morning., Disp: 30 tablet, Rfl: 3    famotidine (PEPCID) 20 MG tablet, Take 1 tablet by mouth in the morning., Disp: 60 tablet, Rfl: 3    lisinopril (PRINIVIL;ZESTRIL) 20 MG tablet, Take 1 tablet by mouth in the morning., Disp: 30 tablet, Rfl: 3    leuprolide (LUPRON) 11.25 MG injection, Inject 11.25 mg into the muscle once, Disp: , Rfl:     EPINEPHrine (EPIPEN) 0.3 MG/0.3ML SOAJ injection, Inject 0.3 mg into the muscle, Disp: , Rfl:   Allergies: Ibuprofen and Benzocaine      SUBJECTIVE EXAMINATION     History obtained from[de-identified] Patient, Chart Review,      Family/Caregiver Present: No    Subjective History: Onset Date: 09/02/22  Subjective: Patient reports he has self limited his activity since he was discharged to home. Patient states he will feel \"lightheaded \" and dizzy at times Has trouble with visual focus Able to do basic self care tasks but has assist for activity outside of the home  Additional Pertinent Hx (if applicable): Patient presents to PT to assess and treat issues related to an acute CVA sufferedin Aug 2022 with hospitalization. Patient reports he has experienced 2 CVA like issues, the first in Dec 2021 and the second in Aug 2022.  MRI + for an \"acute Lacunar Infarct\" Per patient he has been seen by both [facility] physicians over the course of the last year and patient expresses that there appears to be limited coordination of the plan of care between the [facility] and [facility] services   Prior diagnostic testing[de-identified] MRI, CT Scan      Learning/Language: Learning  Does the patient/guardian have any barriers to learning?: No barriers  Will there be a co-learner?: No  What is the preferred language of the patient/guardian?: English  Is an  required?: No  How does the patient/guardian prefer to learn new concepts?: Listening     Pain Screening         Functional Status         Social History:    Social History  Lives With: Alone  Type of Home: Apartment  Home Layout: One level  Home Equipment: Rollator    Occupation/Interests:   Occupation: Retired    Prior Level of Function:     mod I   Bed Mobility: Independent/No Functional Limitation  Transfers (stand to sit): Required Assist/Some Functional Limitations  Walking: Required Assist/Some Functional Limitations    Current Level of Function:       Bed Mobility: Required Assist/Some Functional Limitations  Transfers (sit to stand): Required Assist/Some Functional Limitations  Transfers (stand to sit): Required Assist/Some Functional Limitations  Walking: Required Assist/Some Functional Limitations  Receives Help From: Home health  Homemaking Responsibilities: Yes  Active : No    OBJECTIVE EXAMINATION   Restrictions:              Review of Systems:  Vision: Impaired  Hearing: Within functional limits  Overall Orientation Status: Within Functional Limits  Patient affect[de-identified] Normal  Follows Commands: Within Functional Limits    VBI Screening / Lumbar Screening:         Regional Screen:         Observations:   General Observations  Posture:  Other (comment)  Spine / Pelvis: Decreased lumbar lordosis, Trunk Flexed Posture    Palpation:        Ambulation/Gait (if applicable):   Ambulation  Surface: Level tile  Device: Rollator  Assistance: Modified Independent  Gait Deviations: Slow Daniella, Decreased step length, Decreased step height    Balance Screen:        Neuro Screen:   Not Assessed  Left AROM  Right AROM         AROM LLE (degrees)  LLE General AROM: Limited Hip Mobility    AROM RLE (degrees)  RLE General AROM: Limited hip Mobility     Left PROM  Right PROM                    Left Strength  Right Strength         Strength LLE  Comment: 3+ to 4-/5    Strength RLE  Comment: 3+ tp 4-/5     Cervical Assessment               Thoracic Assessment             Lumbar Assessment             Trunk Strength     Trunk Strength  Trunk Flexion: 3/5  Trunk Extension: 3/5     Muscle Length/Flexibility:      Joint Mobility (if applicable):        Special Tests:        Balance/Gait Assessment(s) Performed:   Not Assessed    Additional Finding(s) (if applicable):    NA       ASSESSMENT     Impression:      Body Structures, Functions, Activity Limitations Requiring Skilled Therapeutic Intervention: Decreased functional mobility , Decreased strength, Decreased endurance, Decreased balance, Decreased posture, Decreased coordination    Statement of Medical Necessity: Physical Therapy is both indicated and medically necessary as outlined in the POC to increase the likelihood of meeting the functionally related goals stated below. Patient's Activity Tolerance: Patient tolerated evaluation without incident      Patient's rehabilitation potential/prognosis is considered to be: Fair    Factors which may impact rehabilitation potential include: Medical co-morbidities        GOALS   Patient Goal(s):    Short Term Goals Completed by 2 weeks Goal Status   Patient will be able to engage in consistent and progressive active exercise while reporting no increase in symptoms of dizziness/light headedness New   Establish HEP New                         Long Term Goals Completed by 5 weeks Goal Status   Patient will be able to engage in normal ADL's and functional mobility on a consistent basis New   Endurance for ADL's and ambulation Fair or better New   Patient will be able to maintain and self direct an appropriate follow up independent exercise program                      TREATMENT PLAN       Requires PT Follow-Up: Yes    Pt. actively involved in establishing Plan of Care and Goals: Yes  Patient/ Caregiver education and instruction:               Treatment may include any combination of the following: Strengthening, Balance training, Functional mobility training, Endurance training, Patient/Caregiver education & training, Home exercise program, Therapeutic activities     Frequency / Duration:  Patient to be seen 1-2 times per week for 4-5 weeks weeks      Eval Complexity: Overall Evaluation : Low  Decision Making: Low Complexity  Examination of body system(s) including body structures and functions, activity limitations, and/or participation restrictions: Low  Clinical Presentation: Low    PT Treatment Completed:  N/A - Evaluation Only    Therapy Time  Individual Time In:         Individual Time Out:    Minutes: Therapist Signature: Cliff Gil PT    Date: 11/68/7350     I certify that the above Therapy Services are being furnished while the patient is under my care.  I agree with the treatment plan and certify that this therapy is necessary. Physician's Signature:  ___________________________   Date:_______                                                                   Princess Stu MD        Physician Comments: _______________________________________________    Please sign and return to [facility] PHYSICAL THERAPY. Please fax to the location listed below.  Justin Chavez for this referral!    [address] [facility] PHYSICAL THERAPY  [facility] [address]  Dept: [phone]  Fax: [phone]       POC NOTE

## 2022-10-20 NOTE — PLAN OF CARE
[address] [facility] PHYSICAL THERAPY  Javed Fuentes [place] [address]  Dept: [phone]  Loc: [phone]    PHYSICAL THERAPY PLAN OF CARE: INITIAL EVALUATION    Patient: Jasen Gross (50 y.o. male)   Examination Date:   Plan of Care Certification Period: 10/20/2022 to        :  1955  MRN: [id number]  CSN: [id number]   Insurance: Payor: Jone Gomez / Plan: Kris Cabot / Product Type: *No Product type* /   Insurance ID: [id number] - (Medicaid Managed) Secondary Insurance (if applicable):    Referring Physician: Jaci Ayala MD     PCP: George Vang MD Visits to Date/Visits Approved:   /      No Show/Cancelled Appts:   /       Medical Diagnosis: Cerebral infarction, unspecified [I63.9]  Dizziness and giddiness [R42] CVA  No data recorded     Subjective History: Onset Date: 22  Subjective: Patient reports he has self limited his activity since he was discharged to home. Patient states he will feel \"lightheaded \" and dizzy at times Has trouble with visual focus Able to do basic self care tasks but has assist for activity outside of the home  Additional Pertinent Hx (if applicable): Patient presents to PT to assess and treat issues related to an acute CVA sufferedin Aug 2022 with hospitalization. Patient reports he has experienced 2 CVA like issues, the first in Dec 2021 and the second in Aug 2022.  MRI + for an \"acute Lacunar Infarct\" Per patient he has been seen by both [facility] physicians over the course of the last year and patient expresses that there appears to be limited coordination of the plan of care between the [facility] and [facility] services   Prior diagnostic testing[de-identified] MRI, CT Scan     ASSESSMENT      Impression:       Body Structures, Functions, Activity Limitations Requiring Skilled Therapeutic Intervention: Decreased functional mobility , Decreased strength, Decreased endurance, Decreased balance, Decreased posture, Decreased coordination Statement of Medical Necessity: Physical Therapy is both indicated and medically necessary as outlined in the POC to increase the likelihood of meeting the functionally related goals stated below. Patient's Activity Tolerance: Patient tolerated evaluation without incident      Patient's rehabilitation potential/prognosis is considered to be:  Fair     Factors which may impact rehabilitation potential include: Medical co-morbidities             Physcial Therapy Plan  Plan weeks: 4-5 weeks  Current Treatment Recommendations: Strengthening, Balance training, Functional mobility training, Endurance training, Patient/Caregiver education & training, Home exercise program, Therapeutic activities    G-Code:       Balance and Gait:  Not Assessed    OutComes Score:                                                     AM-PAC Score:             Goals:  Short Term Goals  Time Frame for Short Term Goals: 2 weeks  Short Term Goal 1: Patient will be able to engage in consistent and progressive active exercise while reporting no increase in symptoms of dizziness/light headedness  STG Goal 1 Status[de-identified] New  Short Term Goal 2: Establish HEP  STG Goal 2 Status[de-identified] New  Long Term Goals  Time Frame for Long Term Goals : 5 weeks  Long Term Goal 1: Patient will be able to engage in normal ADL's and functional mobility on a consistent basis  LTG Goal 1 Status[de-identified] New  Long Term Goal 2: Endurance for ADL's and ambulation Fair or better  LTG Goal 2 Status[de-identified] New  Long Term Goal 3: Patient will be able to maintain and self direct an appropriate follow up independent exercise program  Patient Goals   Patient Goals : Improve functional mobility, balance and endurance      Patient Status: [x] Continue / Initiate Plan of Care   Frequency / Duration:  Patient to be seen 1-2 times per week for 4-5 weeks weeks   Eval Complexity: Overall Evaluation : Low  Decision Making: Low Complexity  Examination of body system(s) including body structures and functions, activity limitations, and/or participation restrictions: Low  Clinical Presentation: Low     PT Treatment Completed:  N/A - Evaluation Only     Therapy Time  Individual Time In:         Individual Time Out:    Minutes: Therapist Signature: Cliff Gil PT    Date: 64/66/8881      I certify that the above Therapy Services are being furnished while the patient is under my care. I agree with the treatment plan and certify that this therapy is necessary. Physician's Signature:  ___________________________   Date:_______                                                                   Jose Carrera MD          Physician Comments: _______________________________________________     Please sign and return to [facility] PHYSICAL THERAPY. Please fax to the location listed below. Justin Chavez for this referral!     [address]  [facility] PHYSICAL THERAPY  [address] [address]  Dept: [phone]  Fax: [phone]         POC NOTE                Signature: Electronically signed by Cliff Gil PT on 10/20/2022 at 1:37 PM.     If you have any questions or concerns, please don't hesitate to call.   Thank you for your referral!

## 2022-11-09 ENCOUNTER — HOSPITAL ENCOUNTER (OUTPATIENT)
Dept: PHYSICAL THERAPY | Age: 67
Setting detail: THERAPIES SERIES
Discharge: HOME | End: 2022-11-09
Payer: COMMERCIAL

## 2022-11-09 ENCOUNTER — HOSPITAL ENCOUNTER (OUTPATIENT)
Dept: OCCUPATIONAL THERAPY | Age: 67
Setting detail: THERAPIES SERIES
Discharge: HOME | End: 2022-11-09
Payer: COMMERCIAL

## 2022-11-09 PROCEDURE — 97112 NEUROMUSCULAR REEDUCATION: CPT

## 2022-11-09 PROCEDURE — 97110 THERAPEUTIC EXERCISES: CPT

## 2022-11-09 PROCEDURE — 97530 THERAPEUTIC ACTIVITIES: CPT

## 2022-11-09 NOTE — PROGRESS NOTES
[facility]  Phone: [phone] Fax: [phone]       Physical Therapy Daily Treatment Note  Date:  2022    Patient Name:  Alvira Boeck    :  1955  MRN: [id number]    Restrictions/Precautions:    Diagnosis:  Cerebral Infarction, Dizziness and Giddiness,CVA  Treatment Diagnosis:    Insurance/Certification information: [employer] (eval+10 thru 22)  Referring Physician: Dr. Monie Corea of care signed (Y/N):    Visit# / total visits:  2/4-5 weeks  Pain level: /10  Time In: 9:00       Time Out: 9:50         Subjective: Pt states he has been performing all self care activities at home without assistance. He reports he does have help with going out as in grocery shopping. Exercises:  Exercise/Equipment Resistance/Repetitions Other comments   Bike  7 min    Hip flex w/knees flexed Alt 10 x B supine   Bridging  10 x    SAQ 10 x B    LAQ 10 x B    Sit to stand from 24\" box   5 x Limited by back pain          Marching    10 x B    Side stepping  nt                                                                               Other Therapeutic Activities: Pt performed bed mobility on mat sit><supine I. Home Exercise Program:      Manual Treatments: Gentle passive stretch to B hip flexors with pt supine. Modalities:      Timed Code Treatment Minutes:  45    Total Treatment Minutes:  50    Treatment/Activity Tolerance:  [x] Patient tolerated treatment well [] Patient limited by fatigue  [] Patient limited by pain  [] Patient limited by other medical complications  [x] Other: Good tolerance for initiation of exercises.      Plan:   [x] Continue per plan of care [] Alter current plan (see comments)  [] Plan of care initiated [] Hold pending MD visit [] Discharge  Plan for Next Session:         Treatment Charges: Mins Units   Initial Evaluation     Re-Evaluation     Ther Exercise         TE 40 3   Manual Therapy     MT 5 --   Ther Activities        TA     Gait Training          GT     Neuro Re-education NR     Modalities     Non-Billable Service Time     Other 5    Total Time/Units 50 3     Electronically signed by:   Rebecca Jordan

## 2022-11-09 NOTE — PROGRESS NOTES
Occupational Therapy    OCCUPATIONAL THERAPY PROGRESS NOTE    Date:  2022  Initial Evaluation Date: 10/11/22                              Evaluating Therapist: Germain Styles OT     Patient Name:  Isabella July                :  1955     Restrictions/Precautions:  Blurred Vision, Moderate Fall Risk, Dizziness  Diagnosis:  Acute CVA (I63.9), Dizziness (R42)                    MRI 22: Lacunar Infarct involving dorsal aspect of the right laurel adjacent to the right superior cerebellar peduncle  Date of Surgery/Injury: 22             Insurance/Certification information:  [employer] [place] Medicaid (approval eval plus 16 visits)  Plan of care signed (Y/N): N  Visit# / total visits:     Referring Practitioner:  Christina Hunt MD NPI# [id number]; Lashonda Vasquez MD NPI# [id number]  Specific Practitioner Orders: OT Evaluate and Treatment     Assessment of current deficits   [x] Functional mobility             [x] ADLs           [x] Strength                  [x] Cognition   [x] Functional transfers           [x] IADLs          [] Safety Awareness  [x] Motor Endurance   [x] Fine Motor Coordination    [x] Balance      [x] Vision/perception    [x] Sensation     [] Gross Motor Coordination [] ROM           [x] Pain                        [] Edema          [] Scar Adhesion/Skin Integrity      OT PLAN OF CARE   OT POC based on physician orders, patient diagnosis and results of clinical assessment     Frequency/Duration: OT 2x/week or 16 visits                  Certification period From: 10/11/22  To: 23  Specific OT Treatment to include: CPT Codes- 56176, 82693, 56751, 80325, 07925     [x] Instruction in HEP                   Modalities:  [x] Therapeutic Exercise              [] Ultrasound               [] Electrical Stimulation/Attended  [x] PROM/Stretching                   [] Fluidotherapy          []  Paraffin                   [x] AAROM  [x] AROM                 [] Iontophoresis:   [] Tendon Grzegorz                                               [x] Neuromuscular Re-Ed           [x] ADL/IADL re-training    [x] Therapeutic Activity               [] Pain Management with/without modalities PRN                 [x] Manual Therapy                     [] Splinting                                   [] Scar Management                   []Joint Protection/Training  []Ergonomics                             [] Joint Mobilization                      [x] Adaptive Equipment Assessment/Training                             [] Manual Edema Mobilization   [] Myofascial Release                 [x] Energy Conservation/Work Simplification  [x] GM/FM Coordination              [x] Safety retraining/education per  individual diagnosis/goals  [x] Sensory Re-Education     Patient Specific Goal: Improve his mental focus, visual focus and use of left dominant hand/improved sensation                             GOALS (Long term same as Short term): 6-8 weeks  1) Patient will demonstrate good understanding of home program (exercises/activities/diagnosis/prognosis/goals) with good accuracy. 2) Patient will demonstrate increase strength of their L Shoulder to WNLs for ADL/IADL completion. 3) Patient will demonstrate increased /pinch strength of at least 10 / 3 pinch pounds of their Left hand. 4) Patient will demonstrate (+) protective sense in their affected left upper extremity for increase safety w/ ADLs/IADLs  5) Increase in fine motor function as evidenced by decreased time to complete 9-hole peg test  by at least 5-10 seconds. 6) Patient will report ADL/IADL functions as Mod I/I with use of left dominant hand as primary. 7) Patient will demonstrate improved functional activity tolerance/motor endurance from fair to good for ADL/IADL completion. 8) Patient will decrease QuickDASH score to 30% or less for increased participation in daily functional activities.    9) Patient to demonstrate visual attn/focus, saccades, and convergence to [facility] to increase safety and      Pain Level: mild discomfort LUE, uncomfortable and with paresthesia    Subjective:  Pt states, \" These fingertips (left hand) are all numb & tingly. I don't like that feeling. I wish it would go away. \"      Objective:  Updated POC to be completed by 10th visit/ 30 days. INTERVENTION: COMPLETED: SPECIFICS/COMMENTS:   Modality:               A/AROM:     Left hand/wrist/forearm/UE X All planes        Fine Motor: In hand manipulation X      X Theraputty ex's: in hand manipulation, tip pinches, tip pinches & pulls     Tiny peg tasks. Pt able to manipulate up to 10 pegs at a time in and out of hand individually         Neuro Melo:     Attn to task- visual & focus X Tiny peg tasks-L/R/Middle. Pt needs mod verbal cueing to stay attentive to task        Scar Mass/Edema Control:     Desensitization X Theraputty rolling ex's wrist & palm/digits        Strengthening:      X Yellow theraputty ex's: Gripping, pinching, pinch/pulls, weight bearing thru palm/kneading   Left UE shoulder thru hand X Red Therabar: Wringing, Up Bends, Down Bends, Passes [place], Cap Turns, NEWS bends. Verbal cues to stay attentive to task. Proprioception good. Other:     HEP X Theraputty desensitization & light strengthening ex's          Assessment/Comments: Pt is making Fair + progress toward stated plan of care. Pt arrives to therapy in good spirits and motivated for tasks. Pt tolerates therabar ex's without complaint displaying fairly good endurance. No c/o's shoulder thru digital discomfort. Proprioception good with [place] passes. Verbal cues needed to stay attentive to task. Pt tolerates added theraputty ex's with good understanding & motivation for exercise. Therapist issued & instructed pt with theraputty for home use 1-2 x a day 5-10 minutes for improving strength & decreasing hypersensitivity left fingertips.  Pt displays good understanding of assigned theraputty ex's & precautions to put putty away when done using. Will cont to progress pt toward established POC goals. -Rehab Potential: Good  -Requires OT Follow Up: Yes  Time In:10am            Time Out: 11am             Visit #: 1/16  CPT Codes- 63033 Man Ther, 34661 TherEx, 57290 TherAct, 35802 SelfCare, 84851 NeuroReEd    Treatment Charges: Mins Units   Modalities     Ther Exercise 15 1   Manual Therapy     Thera Activities 30 2   ADL/Home Mgt      Neuro Re-education 15 1   Gait Training     Group Therapy     Non-Billable Service Time     Other     Total Time/Units 60 4       -Response to Treatment: Good. Pt liked added theraputty ex's and is excited to have putty to use at home      Plan:   [x]  Continues Plan of care: Treatment covered based on POC and graduated to patient's progress. Pt education continues at each visit to obtain maximum benefits from skilled OT intervention.   []  Alter Plan of care:   []  Discharge:      Niles PEACE, [id number]

## 2022-11-15 ENCOUNTER — OFFICE VISIT (OUTPATIENT)
Dept: FAMILY MEDICINE CLINIC | Age: 67
End: 2022-11-15
Payer: COMMERCIAL

## 2022-11-15 VITALS
SYSTOLIC BLOOD PRESSURE: 161 MMHG | OXYGEN SATURATION: 99 % | BODY MASS INDEX: 28.24 KG/M2 | TEMPERATURE: 97.5 F | DIASTOLIC BLOOD PRESSURE: 81 MMHG | WEIGHT: 202.5 LBS | HEART RATE: 61 BPM

## 2022-11-15 DIAGNOSIS — I10 ESSENTIAL HYPERTENSION: ICD-10-CM

## 2022-11-15 DIAGNOSIS — Z12.11 SCREENING FOR COLON CANCER: ICD-10-CM

## 2022-11-15 DIAGNOSIS — I63.9 ACUTE CEREBROVASCULAR ACCIDENT (CVA) (HCC): Primary | ICD-10-CM

## 2022-11-15 PROCEDURE — 99213 OFFICE O/P EST LOW 20 MIN: CPT | Performed by: STUDENT IN AN ORGANIZED HEALTH CARE EDUCATION/TRAINING PROGRAM

## 2022-11-15 PROCEDURE — G8484 FLU IMMUNIZE NO ADMIN: HCPCS | Performed by: STUDENT IN AN ORGANIZED HEALTH CARE EDUCATION/TRAINING PROGRAM

## 2022-11-15 PROCEDURE — 1036F TOBACCO NON-USER: CPT | Performed by: STUDENT IN AN ORGANIZED HEALTH CARE EDUCATION/TRAINING PROGRAM

## 2022-11-15 PROCEDURE — 3017F COLORECTAL CA SCREEN DOC REV: CPT | Performed by: STUDENT IN AN ORGANIZED HEALTH CARE EDUCATION/TRAINING PROGRAM

## 2022-11-15 PROCEDURE — G8417 CALC BMI ABV UP PARAM F/U: HCPCS | Performed by: STUDENT IN AN ORGANIZED HEALTH CARE EDUCATION/TRAINING PROGRAM

## 2022-11-15 PROCEDURE — 1124F ACP DISCUSS-NO DSCNMKR DOCD: CPT | Performed by: STUDENT IN AN ORGANIZED HEALTH CARE EDUCATION/TRAINING PROGRAM

## 2022-11-15 PROCEDURE — G8427 DOCREV CUR MEDS BY ELIG CLIN: HCPCS | Performed by: STUDENT IN AN ORGANIZED HEALTH CARE EDUCATION/TRAINING PROGRAM

## 2022-11-15 PROCEDURE — 3078F DIAST BP <80 MM HG: CPT | Performed by: STUDENT IN AN ORGANIZED HEALTH CARE EDUCATION/TRAINING PROGRAM

## 2022-11-15 PROCEDURE — 3074F SYST BP LT 130 MM HG: CPT | Performed by: STUDENT IN AN ORGANIZED HEALTH CARE EDUCATION/TRAINING PROGRAM

## 2022-11-15 ASSESSMENT — ENCOUNTER SYMPTOMS
ABDOMINAL PAIN: 0
CONSTIPATION: 0
SHORTNESS OF BREATH: 0
VOMITING: 0
DIARRHEA: 0
COUGH: 0
NAUSEA: 0

## 2022-11-15 NOTE — PROGRESS NOTES
Attending Physician Statement    S:   Chief Complaint   Patient presents with    Follow-up      F/u lightheadedness. Previous CVA in August - neurology appointment postponed. Most symptoms improved with therapy. Slight left hand weakness, intermittent dysarthria  O: Blood pressure (!) 161/81, pulse 61, temperature 97.5 °F (36.4 °C), temperature source Temporal, weight 202 lb 8 oz (91.9 kg), SpO2 99 %. Exam:   Heart - RRR   Lungs - clear   Uses walker   CN's intact, MICHAEL. Strength 4/5 left UE, 5/5 in LE's  A: As above  P:  Keep appointment with neurology   Continue DAPT until then   Increase lisinopril to 40/day   Refuses flu shot   Refer for colonoscopy screening   Follow-up as ordered    I have discussed the case, including pertinent history and exam findings with the resident. I agree with the documented assessment and plan.     Yinka Marrero MD

## 2022-11-15 NOTE — PROGRESS NOTES
[address] [facility]  FAMILY MEDICINE RESIDENCY PROGRAM  DATE OF VISIT : 11/15/2022    Patient : Ruthie Larson   Age : 79 y.o.  : 1955   MRN : [id number]   ______________________________________________________________________    Chief Complaint :   Chief Complaint   Patient presents with    Follow-up         HPI : Ruthie Larson is 79 y.o. male who presented to the clinic today for lightheadedness follow up. Lightheadedness somewhat improved. Increasing water intake. Tingling in all left fingertips, weakness in left hand, blurry vision and dysarthria has made some improvement. Is working [facility], OT and speech therapy. Denies syncope, HA, nausea, vomiting, CP, SOB. Is supposed to follow up with Neurology in [address] [place] [address], but was rescheduled to 2023. Taking amlodipine and lisinorpil for BP. Has not been recently taking BP because recently moved. Past Medical History :  Past Medical History:   Diagnosis Date    Cancer ([address] PROSTATE    , patient reports bone mets    Erectile dysfunction     Hyperlipidemia     Hypertension     Osteoarthritis, hip/pelvic region and thigh 3/29/2013    Prostate cancer ([facility] [address] 2013     Past Surgical History:   Procedure Laterality Date    BACK SURGERY      HIP SURGERY Right     LEG SURGERY      LUMBAR SPINE SURGERY  10/30/2017    PLIF L2 - S1   and laminectomy    PROSTATE BIOPSY         Allergies : Allergies   Allergen Reactions    Ibuprofen Swelling    Benzocaine      EDEMA        Medication List :    Current Outpatient Medications   Medication Sig Dispense Refill    amLODIPine (NORVASC) 10 MG tablet take 1 tablet by mouth once daily 30 tablet 2    potassium chloride (KLOR-CON M) 20 MEQ extended release tablet 20 mEq      atorvastatin (LIPITOR) 80 MG tablet Take 1 tablet by mouth nightly 30 tablet 3    allopurinol (ZYLOPRIM) 100 MG tablet Take 1 tablet by mouth in the morning. Please start once flare up resolved. . 90 tablet 1    clopidogrel (PLAVIX) 75 MG tablet Take 1 tablet by mouth in the morning. 30 tablet 3    aspirin 81 MG chewable tablet Take 1 tablet by mouth in the morning. 30 tablet 3    lisinopril (PRINIVIL;ZESTRIL) 20 MG tablet Take 1 tablet by mouth in the morning. 30 tablet 3    leuprolide (LUPRON) 11.25 MG injection Inject 11.25 mg into the muscle once      EPINEPHrine (EPIPEN) 0.3 MG/0.3ML SOAJ injection Inject 0.3 mg into the muscle      famotidine (PEPCID) 20 MG tablet Take 1 tablet by mouth in the morning. (Patient not taking: Reported on 11/15/2022) 60 tablet 3     No current facility-administered medications for this visit. Review of Systems :  Review of Systems   Constitutional:  Positive for diaphoresis. Negative for chills and fever. HENT:  Negative for tinnitus. Respiratory:  Negative for cough and shortness of breath. Cardiovascular:  Negative for chest pain, palpitations and leg swelling. Gastrointestinal:  Negative for abdominal pain, constipation, diarrhea, nausea and vomiting. Genitourinary:  Negative for dysuria. Neurological:  Positive for speech difficulty, light-headedness, numbness and headaches. Negative for dizziness, syncope and weakness. ______________________________________________________________________    Physical Exam :    Vitals: BP (!) 161/81   Pulse 61   Temp 97.5 °F (36.4 °C) (Temporal)   Wt 202 lb 8 oz (91.9 kg)   SpO2 99%   BMI 28.24 kg/m²   General Appearance: Well developed, awake, alert, oriented, and in NAD  HEENT: NCAT, MMM, no pallor or icterus. PERRLA  Neck: Symmetrical, trachea midline. Chest wall/Lung: CTAB, respirations unlabored. No ronchi/wheezing/rales   Heart: RRR, normal S1 and S2, no murmurs, rubs or gallops. Abdomen: SNTND, bowel sounds present  Extremities: Extremities normal, atraumatic, no cyanosis, clubbing or edema.   Skin: Skin color, texture, turgor normal, no rashes or lesions  Musculokeletal: ROM grossly normal in all joints of extremities, no obvious joint swelling. Lymphnodes: No lymph node enlargement appreciated  Neurologic: Alert&Oriented x3. No focal motor deficits detected. CN II-XII grossly in tact. Strength 5/5 in BLE, RUE.  4 /5 strength in LUE. Decreased sensation in L fingertips. Psychiatric: Normal mood. Normal affect. Normal behavior. ______________________________________________________________________    Assessment & Plan :    1. Acute cerebrovascular accident (CVA)  - Imaging was reviewed; MRI brain 8/16/22 showed acute to subacute lacunar infarct and MRA neck 8/16/22 showed high grade stenoses throughout the R PCA. - Following up with neurology in January 2023  - PT/OT to improve strength/dizziness   - Speech therapy for dysarthria  - Continue ASA,  plavix and Lipitor    2. Dizziness  - likely 2/2 stenoses vs dehydration   - Increase fluid intake  - Try Compression stockings   - PT/OT to improve dizziness     3. Hypertension  - Uncontrolled  - Increase lisinopril to 40 mg daily. Continue amlodipine 10 mg. If symptoms continue to be uncontrolled, consider adding HCTZ    4. HM   - Declines flu shot.  -Colonoscopy referral          Return to Office: Return in about 1 month (around 12/15/2022) for HTN.     Christina Hunt MD  Case discussed with Dr. Merlinda Santiago

## 2022-11-16 ENCOUNTER — HOSPITAL ENCOUNTER (OUTPATIENT)
Dept: OCCUPATIONAL THERAPY | Age: 67
Setting detail: THERAPIES SERIES
Discharge: HOME | End: 2022-11-16
Payer: COMMERCIAL

## 2022-11-16 ENCOUNTER — HOSPITAL ENCOUNTER (OUTPATIENT)
Dept: PHYSICAL THERAPY | Age: 67
Setting detail: THERAPIES SERIES
Discharge: HOME | End: 2022-11-16
Payer: COMMERCIAL

## 2022-11-16 PROCEDURE — 97112 NEUROMUSCULAR REEDUCATION: CPT

## 2022-11-16 PROCEDURE — 97110 THERAPEUTIC EXERCISES: CPT

## 2022-11-16 PROCEDURE — 97530 THERAPEUTIC ACTIVITIES: CPT

## 2022-11-16 NOTE — PROGRESS NOTES
Occupational Therapy    OCCUPATIONAL THERAPY DAILY NOTE    Date:  2022  Initial Evaluation Date: 10/11/22                              Evaluating Therapist: Narcisa Hood OT     Patient Name:  Tonya Ayoub                :  1955     Restrictions/Precautions:  Blurred Vision, Moderate Fall Risk, Dizziness  Diagnosis:  Acute CVA (I63.9), Dizziness (R42)                    MRI 22: Lacunar Infarct involving dorsal aspect of the right laurel adjacent to the right superior cerebellar peduncle  Date of Surgery/Injury: 22             Insurance/Certification information:  [employer] [place] Medicaid (approval eval plus 16 visits)  Plan of care signed (Y/N): N  Visit# / total visits:     Referring Practitioner:  Brianna Torres MD NPI# [id number]; Maren Smith MD NPI# [id number]  Specific Practitioner Orders: OT Evaluate and Treatment     Assessment of current deficits   [x] Functional mobility             [x] ADLs           [x] Strength                  [x] Cognition   [x] Functional transfers           [x] IADLs          [] Safety Awareness  [x] Motor Endurance   [x] Fine Motor Coordination    [x] Balance      [x] Vision/perception    [x] Sensation     [] Gross Motor Coordination [] ROM           [x] Pain                        [] Edema          [] Scar Adhesion/Skin Integrity      OT PLAN OF CARE   OT POC based on physician orders, patient diagnosis and results of clinical assessment     Frequency/Duration: OT 2x/week or 16 visits                  Certification period From: 10/11/22  To: 23  Specific OT Treatment to include: CPT Codes- 28675, 50648, 51424, 22731, 87136     [x] Instruction in HEP                   Modalities:  [x] Therapeutic Exercise              [] Ultrasound               [] Electrical Stimulation/Attended  [x] PROM/Stretching                   [] Fluidotherapy          []  Paraffin                   [x] AAROM  [x] AROM                 [] Iontophoresis:   [] Tendon Grzegorz                                               [x] Neuromuscular Re-Ed           [x] ADL/IADL re-training    [x] Therapeutic Activity               [] Pain Management with/without modalities PRN                 [x] Manual Therapy                     [] Splinting                                   [] Scar Management                   []Joint Protection/Training  []Ergonomics                             [] Joint Mobilization                      [x] Adaptive Equipment Assessment/Training                             [] Manual Edema Mobilization   [] Myofascial Release                 [x] Energy Conservation/Work Simplification  [x] GM/FM Coordination              [x] Safety retraining/education per  individual diagnosis/goals  [x] Sensory Re-Education     Patient Specific Goal: Improve his mental focus, visual focus and use of left dominant hand/improved sensation                             GOALS (Long term same as Short term): 6-8 weeks  1) Patient will demonstrate good understanding of home program (exercises/activities/diagnosis/prognosis/goals) with good accuracy. 2) Patient will demonstrate increase strength of their L Shoulder to WNLs for ADL/IADL completion. 3) Patient will demonstrate increased /pinch strength of at least 10 / 3 pinch pounds of their Left hand. 4) Patient will demonstrate (+) protective sense in their affected left upper extremity for increase safety w/ ADLs/IADLs  5) Increase in fine motor function as evidenced by decreased time to complete 9-hole peg test  by at least 5-10 seconds. 6) Patient will report ADL/IADL functions as Mod I/I with use of left dominant hand as primary. 7) Patient will demonstrate improved functional activity tolerance/motor endurance from fair to good for ADL/IADL completion. 8) Patient will decrease QuickDASH score to 30% or less for increased participation in daily functional activities.    9) Patient to demonstrate visual attn/focus, saccades, and convergence to [facility] to increase safety and      Pain Level: mild discomfort LUE, uncomfortable and with paresthesia    Subjective:  Pt states, \" I saw one of my doctors and he thought I was making good progress. \"     Objective:  Updated POC to be completed by 10th visit/ 30 days. INTERVENTION: COMPLETED: SPECIFICS/COMMENTS:   Modality:               A/AROM:     Left hand/wrist/forearm/UE X All planes        Fine Motor: In hand manipulation X      X        X   Theraputty ex's: in hand manipulation, tip pinches, tip pinches & pulls     Tiny peg tasks. Pt able to manipulate up to 10 pegs at a time in and out of hand individually      Card tasks          Neuro Melo:     Attn to task- visual & focus X      X      X Tiny peg tasks-L/R/Middle. Pt needs mod verbal cueing to stay attentive to task  Card tasks: following directions also    Pinch clip task: Making pattern w/ 3 colors: L/C/R & crossing midline- verbal cues to correct mistakes & stay attentive to task        Scar Mass/Edema Control:     Desensitization X Theraputty rolling ex's wrist & palm/digits        Strengthening:      X Yellow theraputty ex's: Gripping, pinching, pinch/pulls, weight bearing thru palm/kneading   Left UE shoulder thru hand X      X      X Hands on resistive ex's shoulder all planes  Red Therabar: Wringing, Up Bends, Down Bends, Passes OH, Cap Turns, NEWS bends. Verbal cues to stay attentive to task. Proprioception good. Pinch clip tasks:reaching forward shoulder flex/ADD/ABD to place ^ingly resistive green, blue & black clips    Other:     HEP X Theraputty desensitization & light strengthening ex's          Assessment/Comments: Pt is making Fair + progress toward stated plan of care. Pt arrives to therapy with good motivation for tasks. Verbal cues needed to stay attentive to tasks. Pt makes mistakes 30% of time when creating pattern with 3 colors of pinch clips. Pt needs verbally cued to fix mistakes.  Pts endurance with resistive tasks including pinch clip task improving. Pt reports minimal compliance with home theraputty ex's. Therapist encourages with use for improving strength & decreasing hypersensitivity left fingertips. Will cont to progress pt toward established POC goals. -Rehab Potential: Good  -Requires OT Follow Up: Yes  Time In:10am            Time Out: 11am             Visit #: 2/16  CPT Codes- 69438 Man Ther, 15020 TherEx, 60630 TherAct, 82791 SelfCare, 35610 NeuroReEd    Treatment Charges: Mins Units   Modalities     Ther Exercise 15 1   Manual Therapy     Thera Activities 30 2   ADL/Home Mgt      Neuro Re-education 15 1   Gait Training     Group Therapy     Non-Billable Service Time     Other     Total Time/Units 60 4       -Response to Treatment: Good. Plan:   [x]  Continues Plan of care: Treatment covered based on POC and graduated to patient's progress. Pt education continues at each visit to obtain maximum benefits from skilled OT intervention.   []  Alter Plan of care:   []  Discharge:      Niles REYNOLDS/DIANA, [id number]

## 2022-11-16 NOTE — PROGRESS NOTES
[facility]  Phone: [phone] Fax: [phone]       Physical Therapy Daily Treatment Note  Date:  2022    Patient Name:  Sarah Wilson    :  1955  MRN: [id number]    Restrictions/Precautions:    Diagnosis:  Cerebral Infarction, Dizziness and Giddiness,CVA  Treatment Diagnosis:    Insurance/Certification information: [employer] (eval+10 thru 22)  Referring Physician: Dr. Reid Bold of care signed (Y/N):    Visit# / total visits:  3/4-5 weeks  Pain level: /10  Time In: 9:05       Time Out: 10:00         Subjective: Pt states he saw his PCP and is being referred to a local neuro specialist.        Exercises:  Exercise/Equipment Resistance/Repetitions Other comments   Bike  10 min    Hip flex w/knees flexed Alt 10 x B supine   Bridging  10 x    SAQ 10 x 2 B    LAQ 10 x 2 B    Sit to stand from 24\" box   5 x 2 Limited by back pain          Marching    10 x B    Side stepping  2 laps                                                                               Other Therapeutic Activities:     Home Exercise Program:      Manual Treatments:      Modalities:      Timed Code Treatment Minutes:  45    Total Treatment Minutes:  55    Treatment/Activity Tolerance:  [x] Patient tolerated treatment well [] Patient limited by fatigue  [] Patient limited by pain  [] Patient limited by other medical complications  [x] Other: Pt with intermittent report of left hip pain which he states is not new. Advised for all ex to tolerance.      Plan:   [x] Continue per plan of care [] Alter current plan (see comments)  [] Plan of care initiated [] Hold pending MD visit [] Discharge  Plan for Next Session:         Treatment Charges: Mins Units   Initial Evaluation     Re-Evaluation     Ther Exercise         TE 40 3   Manual Therapy     MT     Ther Activities        TA 5 --   Gait Training          GT     Neuro Re-education NR     Modalities     Non-Billable Service Time     Other 10 Total Time/Units 55 3     Electronically signed by:   Rebecca Jordan

## 2022-11-18 ENCOUNTER — TELEPHONE (OUTPATIENT)
Dept: SURGERY | Age: 67
End: 2022-11-18

## 2022-11-18 NOTE — TELEPHONE ENCOUNTER
Third attempt, left messages three times. Unable to contact patient. We would be more than happy to schedule this patient with one of our Providers when they call us back. At this time we are forwarding the referral back to referring provider to inform you that we were unable to schedule the patient. Thanks for the referral.  Any questions or concerns call [phone].     Electronically signed by Stephanie Silver MA on 11/18/2022 at 10:49 AM

## 2022-11-21 ENCOUNTER — APPOINTMENT (OUTPATIENT)
Dept: PHYSICAL THERAPY | Age: 67
End: 2022-11-21
Payer: COMMERCIAL

## 2022-11-23 ENCOUNTER — HOSPITAL ENCOUNTER (OUTPATIENT)
Dept: PHYSICAL THERAPY | Age: 67
Setting detail: THERAPIES SERIES
Discharge: HOME | End: 2022-11-23
Payer: COMMERCIAL

## 2022-11-23 ENCOUNTER — APPOINTMENT (OUTPATIENT)
Dept: PHYSICAL THERAPY | Age: 67
End: 2022-11-23
Payer: COMMERCIAL

## 2022-11-23 ENCOUNTER — HOSPITAL ENCOUNTER (OUTPATIENT)
Dept: OCCUPATIONAL THERAPY | Age: 67
Setting detail: THERAPIES SERIES
Discharge: HOME | End: 2022-11-23
Payer: COMMERCIAL

## 2022-11-23 PROCEDURE — 97530 THERAPEUTIC ACTIVITIES: CPT

## 2022-11-23 PROCEDURE — 97110 THERAPEUTIC EXERCISES: CPT

## 2022-11-23 NOTE — PROGRESS NOTES
[facility]  Phone: [phone] Fax: [phone]       Physical Therapy Daily Treatment Note  Date:  2022    Patient Name:  Thierno Turner    :  1955  MRN: [id number]    Restrictions/Precautions:    Diagnosis:  Cerebral Infarction, Dizziness and Giddiness,CVA  Treatment Diagnosis:    Insurance/Certification information: [employer] (eval+10 thru 22)  Referring Physician: Dr. Luan Du of [facility] signed (Y/N):    Visit# / total visits:  4/4-5 weeks  Pain level: /10  Time In: 8:55       Time Out: 9:55         Subjective: Pt states he continues to experience dizziness noting he gets dizzy after eating while he is watching TV. Pt states he has reported this to physician and called the office yesterday to again report to them. States he is scheduled to see a surgeon on . Exercises:  Exercise/Equipment Resistance/Repetitions Other comments   Bike  10 min    Hip flex w/knees flexed Alt 10 x B supine   Bridging  10 x    SAQ 10 x 2 B    LAQ 10 x 2 B    Sit to stand from 24\" box   5 x 2 Limited by back pain          Marching    10 x B    Side stepping  2 laps nt                                                                              Other Therapeutic Activities: Frequent rest breaks between activities. Home Exercise Program:      Manual Treatments:      Modalities:      Timed Code Treatment Minutes:  50    Total Treatment Minutes:  60    Treatment/Activity Tolerance:  [x] Patient tolerated treatment well [] Patient limited by fatigue  [] Patient limited by pain  [] Patient limited by other medical complications  [x] Other: Pt reported slight lightheadedness after standing marching ex. No other c/o during session.      Plan:   [x] Continue per plan of care [] Alter current plan (see comments)  [] Plan of care initiated [] Hold pending MD visit [] Discharge  Plan for Next Session:         Treatment Charges: Mins Units   Initial Evaluation     Re-Evaluation Ther Exercise         TE 45 3   Manual Therapy     MT     Ther Activities        TA 5 --   Gait Training          GT     Neuro Re-education NR     Modalities     Non-Billable Service Time     Other 10    Total Time/Units 60 3     Electronically signed by:   Rebecca Jordan

## 2022-11-23 NOTE — PROGRESS NOTES
Occupational Therapy    OCCUPATIONAL THERAPY PROGRESS UPDATE    Date:  2022  Initial Evaluation Date: 10/11/22                              Evaluating Therapist: Vj Richards OT     Patient Name:  Ijeoma Paredes                :  1955     Restrictions/Precautions:  Blurred Vision, Moderate Fall Risk, Dizziness  Diagnosis:  Acute CVA (I63.9), Dizziness (R42)                    MRI 22: Lacunar Infarct involving dorsal aspect of the right laurel adjacent to the right superior cerebellar peduncle  Date of Surgery/Injury: 22             Insurance/Certification information:  [employer] [place] Medicaid (approval eval plus 16 visits)  Plan of care signed (Y/N): N  Visit# / total visits: 3/16    Referring Practitioner:  Sun Vigil MD NPI# [id number]; Venice Bai MD NPI# [id number]  Specific Practitioner Orders: OT Evaluate and Treatment     Assessment of current deficits   [x] Functional mobility             [x] ADLs           [x] Strength                  [x] Cognition   [x] Functional transfers           [x] IADLs          [] Safety Awareness  [x] Motor Endurance   [x] Fine Motor Coordination    [x] Balance      [x] Vision/perception    [x] Sensation     [] Gross Motor Coordination [] ROM           [x] Pain                        [] Edema          [] Scar Adhesion/Skin Integrity      OT PLAN OF CARE   OT POC based on physician orders, patient diagnosis and results of clinical assessment     Frequency/Duration: OT 2x/week or 16 visits                  Certification period From: 10/11/22  To: 23  Specific OT Treatment to include: CPT Codes- 14159, 39987, 18161, 83844, 74627     [x] Instruction in HEP                   Modalities:  [x] Therapeutic Exercise              [] Ultrasound               [] Electrical Stimulation/Attended  [x] PROM/Stretching                   [] Fluidotherapy          []  Paraffin                   [x] AAROM  [x] AROM                 [] Iontophoresis:   [] Tendon Glides                                               [x] Neuromuscular Re-Ed           [x] ADL/IADL re-training    [x] Therapeutic Activity               [] Pain Management with/without modalities PRN                 [x] Manual Therapy                     [] Splinting                                   [] Scar Management                   []Joint Protection/Training  []Ergonomics                             [] Joint Mobilization                      [x] Adaptive Equipment Assessment/Training                             [] Manual Edema Mobilization   [] Myofascial Release                 [x] Energy Conservation/Work Simplification  [x] GM/FM Coordination              [x] Safety retraining/education per  individual diagnosis/goals  [x] Sensory Re-Education     Patient Specific Goal: Improve his mental focus, visual focus and use of left dominant hand/improved sensation                             GOALS (Long term same as Short term): 6-8 weeks Updated on 11/23/2022  1) Patient will demonstrate good understanding of home program (exercises/activities/diagnosis/prognosis/goals) with good accuracy. Progressing - pt reports use of putty every 3 days. 2) Patient will demonstrate increase strength of their L Shoulder to WNLs for ADL/IADL completion. NT this date due to complaints of severe pain. 3) Patient will demonstrate increased /pinch strength of at least 10 / 3 pinch pounds of their Left hand. Progressing - see measurements below for good increase in  strength, maintenance of 3-pt palmar pinch, and slight decrease in lateral key pinch. 4) Patient will demonstrate (+) protective sense in their affected left upper extremity for increase safety w/ ADLs/IADLs  Progressing slowly - sensation status maintained since initial evaluation. 5) Increase in fine motor function as evidenced by decreased time to complete 9-hole peg test  by at least 5-10 seconds.    Progressing - see slight decrease in measurements below. Continue to target. 6) Patient will report ADL/IADL functions as Mod I/I with use of left dominant hand as primary. Progressing - pt reports that he does not avoid use of L hand during functional tasks but constant tingling interferes with activities. 7) Patient will demonstrate improved functional activity tolerance/motor endurance from fair to good for ADL/IADL completion. Progressing - pt maintains fair to fair+ motor endurance. 8) Patient will decrease QuickDASH score to 30% or less for increased participation in daily functional activities. Progressing slowly- see notable increase below due to to recent onset of pain in L shoulder due to shot administered for cancer. 9) Patient to demonstrate visual attn/focus, saccades, and convergence to [facility] to increase safety and   Progressing - see notes below as deficits persist with visual attention/focus with improvement in saccades and convergence. Pain Level: 7/10 pain in L shoulder    Subjective:  Pt states that his L shoulder is aching and swollen due to shot received for cancer. Objective:  POC updated on 11/23/2022. Updated POC to be completed by 12/23/2022. INTERVENTION: COMPLETED: SPECIFICS/COMMENTS:   Modality:               A/AROM:     Left hand/wrist/forearm/UE  All planes        Fine Motor: In hand manipulation                  Theraputty ex's: in hand manipulation, tip pinches, tip pinches & pulls     Tiny peg tasks. Pt able to manipulate up to 10 pegs at a time in and out of hand individually      Card tasks          Neuro Melo:     Attn to task- visual & focus              Tiny peg tasks-L/R/Middle.  Pt needs mod verbal cueing to stay attentive to task  Card tasks: following directions also    Pinch clip task: Making pattern w/ 3 colors: L/C/R & crossing midline- verbal cues to correct mistakes & stay attentive to task        Scar Mass/Edema Control/Desensitization     Desensitization     X    X Theraputty rolling ex's wrist & palm/digits  Median N glides x6 positions x10 reps with 5-7 second holds + HEP  Towel task x10 reps        Strengthening:       Yellow theraputty ex's: Gripping, pinching, pinch/pulls, weight bearing thru palm/kneading   Left UE shoulder thru hand              Hands on resistive ex's shoulder all planes  Red Therabar: Wringing, Up Bends, Down Bends, Passes OH, Cap Turns, NEWS bends. Verbal cues to stay attentive to task. Proprioception good. Pinch clip tasks:reaching forward shoulder flex/ADD/ABD to place ^ingly resistive green, blue & black clips    Other:     HEP  Theraputty desensitization & light strengthening ex's   Progress Update X Take measurements, update goals, and discuss progress     Assessment/Comments: Pt is making Fair + progress toward stated plan of care. Therapist monitored pt report of dizziness throughout session and provided water to patient. Progress update measurements taken today. New onset of pain in L shoulder due to recent shot led to increased functional disability level as measured by QuickDASH, onset of mild edema, and was a barrier to measuring L shoulder strength this date. Pt declined manual edema mobilization this session due to supine positioning required. Pt demonstrates good increase in  strength and slight increase in dexterity. Blurred vision, severe paresthesias in L hand, decreased visual attention, and weakness of L UE persist as deficits. Median N glides and towel task initiated this date to address possible tactile or structural sources of paresthesias + HEP. Continue skilled OT to diminish sensation, strength, visual/attention deficits.     STRENGTH:   LUE: Shoulder elevation 4/5 --> NT this date due to report of moderate to severe pain     Comment: Compensatory movement pattern left shoulder --> continues     Edema: mild edema in proximal humerus due to shot from cancer    Sensation:  LUE: Diminished protective sense -hand/loss of protective sense left finger tips  RUE: Diminished light touch/protective sense intact     --> L hand: continues (4.31)   -->  R hand: continues (3.61)     Vision / Perception: Wears reading glasses. Decreased visual attn/focus, saccades and convergence. Patient reported that his visual focus is worse than before the stroke. He also has increased blurred vision. Visual field/peripheral vision intact. (Per chart -Patient's left eye laterally deviated at baseline, but able to adduct across the midline  Patient's right eye able to adduct past the midline, but not fully)    -->  Blurry vision continues  Diminished visual attention continues  Improvement with saccades and convergence                Dynamometer (setting 2):                              Left: 44#, 2nd trial 39# -motor fatigue    -->  64#     (Norm for age/sex 76#)                                     Right: 75#, 2nd trial 68# - motor fatigue       (Norm for age/sex 91#)             Pinch Meter:              Lateral: Left= 10#  --> 10#  Right= 16#               Palmar 3 point: Left= 9#  --> 7# Right= 11#  9 Hole Peg Test:              Left: 27 seconds  -->  26 sec              Right: 26 seconds  --> 25 sec     QuickDASH Score: 40.91% disability  --> 72.7% disability    -Rehab Potential: Good  -Requires OT Follow Up: Yes    Time In:10:05 am            Time Out: 11:00am             Visit #: 3/16   CPT Codes- 56926 Man Ther, 90348 TherEx, 64184 TherAct, 61183 SelfCare, 60810 NeuroReEd    Treatment Charges: Mins Units   Modalities     Ther Exercise     Manual Therapy     Thera Activities 55 4   ADL/Home Mgt      Neuro Re-education     Gait Training     Group Therapy     Non-Billable Service Time     Other     Total Time/Units 55 4       -Response to Treatment: Pt demonstrated interest in performing exercises at home. Plan:   [x]  Continues Plan of care: Treatment covered based on POC and graduated to patient's progress.  Pt education continues at each visit to obtain maximum benefits from skilled OT intervention.   []  Alter Plan of care:   []  Discharge:    Suellen Eisenmenger, OTR/L #[id number]

## 2022-11-30 ENCOUNTER — HOSPITAL ENCOUNTER (OUTPATIENT)
Dept: OCCUPATIONAL THERAPY | Age: 67
Setting detail: THERAPIES SERIES
Discharge: HOME | End: 2022-11-30
Payer: COMMERCIAL

## 2022-11-30 ENCOUNTER — HOSPITAL ENCOUNTER (OUTPATIENT)
Dept: PHYSICAL THERAPY | Age: 67
Setting detail: THERAPIES SERIES
Discharge: HOME | End: 2022-11-30
Payer: COMMERCIAL

## 2022-11-30 PROCEDURE — 97110 THERAPEUTIC EXERCISES: CPT

## 2022-11-30 PROCEDURE — 97530 THERAPEUTIC ACTIVITIES: CPT

## 2022-11-30 NOTE — PROGRESS NOTES
[facility]  Phone: [phone] Fax: [phone]       Physical Therapy Daily Treatment Note  Date:  2022    Patient Name:  Alvira Boeck    :  1955  MRN: [id number]    Restrictions/Precautions:    Diagnosis:  Cerebral Infarction, Dizziness and Giddiness,CVA  Treatment Diagnosis:    Insurance/Certification information: [employer] (eval+10 thru 22)  Referring Physician: Dr. Monie Corea of care signed (Y/N):    Visit# / total visits:  5/4-5 weeks  Pain level: /10  Time In: 8:45       Time Out: 9:55         Subjective: Pt has no new c/o this morning. Exercises:  Exercise/Equipment Resistance/Repetitions Other comments   Bike  8 min **   Hip flex w/knees flexed Alt 10 x B supine   Bridging  10 x    SAQ 10 x 2 B    LAQ 10 x 2 B    Sit to stand from 24\" box   5 x 2 Limited by back pain          Marching    10 x B nt   Side stepping  3 laps                                                                               Other Therapeutic Activities: Frequent rest breaks between activities. **Time limited by pt needing to use the restroom. Home Exercise Program: (22) Pt states he is unable to perform supine ex d/t the bed he has. States he performs sit to stand ex at home but is limited in performing HEP by living alone. Manual Treatments: Gentle passive stretch to L hip flexors with pt supine. Modalities:      Timed Code Treatment Minutes:  55    Total Treatment Minutes:  70    Treatment/Activity Tolerance:  [x] Patient tolerated treatment well [] Patient limited by fatigue  [] Patient limited by pain  [] Patient limited by other medical complications  [x] Other: Good tolerance for exercises today. Tightness in left hip flexors limiting his ability to achieve extension to neutral. Pt attributes this to having an \"artificial spine\". He tolerated gradual stretching and demonstrated more upright posture during sit to stands.       Plan:   [x] Continue per plan of care [] Alter current plan (see comments)  [] Plan of care initiated [] Hold pending MD visit [] Discharge  Plan for Next Session:         Treatment Charges: Mins Units   Initial Evaluation     Re-Evaluation     Ther Exercise         TE 50 3   Manual Therapy     MT 5 --   Ther Activities        TA     Gait Training          GT     Neuro Re-education NR     Modalities     Non-Billable Service Time     Other 15    Total Time/Units 70 3     Electronically signed by:   Rebecca Jordan

## 2022-11-30 NOTE — PROGRESS NOTES
Occupational Therapy    OCCUPATIONAL THERAPY DAILY NOTE    Date:  2022  Initial Evaluation Date: 10/11/22                              Evaluating Therapist: Evelia Hair OT     Patient Name:  Johnie Martinez                :  1955     Restrictions/Precautions:  Blurred Vision, Moderate Fall Risk, Dizziness  Diagnosis:  Acute CVA (I63.9), Dizziness (R42)                    MRI 22: Lacunar Infarct involving dorsal aspect of the right laurel adjacent to the right superior cerebellar peduncle  Date of Surgery/Injury: 22             Insurance/Certification information:  [employer] [place] Medicaid (approval eval plus 16 visits)  Plan of care signed (Y/N): N  Visit# / total visits:     Referring Practitioner:  Magali Garsia MD NPI# [id number]; Florentino Robert MD NPI# [id number]  Specific Practitioner Orders: OT Evaluate and Treatment     Assessment of current deficits   [x] Functional mobility             [x] ADLs           [x] Strength                  [x] Cognition   [x] Functional transfers           [x] IADLs          [] Safety Awareness  [x] Motor Endurance   [x] Fine Motor Coordination    [x] Balance      [x] Vision/perception    [x] Sensation     [] Gross Motor Coordination [] ROM           [x] Pain                        [] Edema          [] Scar Adhesion/Skin Integrity      OT PLAN OF CARE   OT POC based on physician orders, patient diagnosis and results of clinical assessment     Frequency/Duration: OT 2x/week or 16 visits                  Certification period From: 10/11/22  To: 23  Specific OT Treatment to include: CPT Codes- 36091, 91172, 94636, 13209, 17749     [x] Instruction in HEP                   Modalities:  [x] Therapeutic Exercise              [] Ultrasound               [] Electrical Stimulation/Attended  [x] PROM/Stretching                   [] Fluidotherapy          []  Paraffin                   [x] AAROM  [x] AROM                 [] Iontophoresis:   [] Tendon Glides                                               [x] Neuromuscular Re-Ed           [x] ADL/IADL re-training    [x] Therapeutic Activity               [] Pain Management with/without modalities PRN                 [x] Manual Therapy                     [] Splinting                                   [] Scar Management                   []Joint Protection/Training  []Ergonomics                             [] Joint Mobilization                      [x] Adaptive Equipment Assessment/Training                             [] Manual Edema Mobilization   [] Myofascial Release                 [x] Energy Conservation/Work Simplification  [x] GM/FM Coordination              [x] Safety retraining/education per  individual diagnosis/goals  [x] Sensory Re-Education     Patient Specific Goal: Improve his mental focus, visual focus and use of left dominant hand/improved sensation                             GOALS (Long term same as Short term): 6-8 weeks Updated on 11/23/2022  1) Patient will demonstrate good understanding of home program (exercises/activities/diagnosis/prognosis/goals) with good accuracy. Progressing - pt reports use of putty every 3 days. 2) Patient will demonstrate increase strength of their L Shoulder to WNLs for ADL/IADL completion. NT this date due to complaints of severe pain. 3) Patient will demonstrate increased /pinch strength of at least 10 / 3 pinch pounds of their Left hand. Progressing - see measurements below for good increase in  strength, maintenance of 3-pt palmar pinch, and slight decrease in lateral key pinch. 4) Patient will demonstrate (+) protective sense in their affected left upper extremity for increase safety w/ ADLs/IADLs  Progressing slowly - sensation status maintained since initial evaluation. 5) Increase in fine motor function as evidenced by decreased time to complete 9-hole peg test  by at least 5-10 seconds.    Progressing - see slight decrease in measurements below. Continue to target. 6) Patient will report ADL/IADL functions as Mod I/I with use of left dominant hand as primary. Progressing - pt reports that he does not avoid use of L hand during functional tasks but constant tingling interferes with activities. 7) Patient will demonstrate improved functional activity tolerance/motor endurance from fair to good for ADL/IADL completion. Progressing - pt maintains fair to fair+ motor endurance. 8) Patient will decrease QuickDASH score to 30% or less for increased participation in daily functional activities. Progressing slowly- see notable increase below due to to recent onset of pain in L shoulder due to shot administered for cancer. 9) Patient to demonstrate visual attn/focus, saccades, and convergence to [facility] to increase safety and   Progressing - see notes below as deficits persist with visual attention/focus with improvement in saccades and convergence. Pain Level: 4/10 pain in L shoulder- decreased from previous visit    Subjective:  Pt states, \" My shoulder (left) is feeling better now. 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 The swelling has gone away too. \"     Objective:  POC updated on 11/23/2022. Updated POC to be completed by 12/23/2022. INTERVENTION: COMPLETED: SPECIFICS/COMMENTS:   Modality:               A/AROM:     Left hand/wrist/forearm/UE X All planes        Fine Motor: In hand manipulation X                 Theraputty ex's: in hand manipulation, tip pinches, tip pinches & pulls,tiny pegs incorporated     Tiny peg tasks. Pt able to manipulate up to 10 pegs at a time in and out of hand individually      Card tasks          Neuro Melo:     Attn to task- visual & focus             X Tiny peg tasks-L/R/Middle.  Pt needs mod verbal cueing to stay attentive to task  Card tasks: following directions also    Pinch clip task: Making pattern w/ 3 colors: L/C/R & crossing midline- verbal cues to correct mistakes & stay attentive to task        Scar Mass/Edema Control/Desensitization     Desensitization     X        X Theraputty rolling ex's wrist & palm/digits  Median N glides x 6 positions x10 reps with 5-7 second holds + reviewed for HEP  Composite flexor stretches added- fingers up followed by fingers down with straight arm- 30 second holds. .. instructed pt to add to HEP intermittently thru day hours        Strengthening:      home Yellow theraputty ex's: Gripping, pinching, pinch/pulls, weight bearing thru palm/kneading   Left UE shoulder thru hand X 12 reps all            X      X 2# dowel joseph resistive ex's shoulder & elbow all planes  Red Therabar: Wringing, Up Bends, Down Bends, Passes [place], Cap Turns, NEWS bends. Verbal cues to stay attentive to task. Proprioception good. Pinch clip tasks:reaching forward shoulder flex/ADD/ABD to place ^ingly resistive green, blue & black clips    Red [employer] Web grasps & pulls, pinch & pulls   Other:     HEP  Theraputty desensitization & light strengthening ex's   Progress Update  Take measurements, update goals, and discuss progress     Assessment/Comments: Pt is making Fair + progress toward stated plan of care. Pt arrives to therapy reporting his shoulder discomfort has decreased from last visit. Pt reports semi compliance with median nerve glides at home. Therapist added composite flexor stretches today. Therapist encourages pt with both activities for home to decrease numbness & tingling in his hand. Strength & dexterity cont to improve. Endurance with 2# dowel joseph strengthening ^ing. Pt displays improving attentiveness to visual tasks this date. Continue skilled OT to diminish sensation, strength, visual/attention deficits.     STRENGTH:   LUE: Shoulder elevation 4/5 --> NT this date due to report of moderate to severe pain     Comment: Compensatory movement pattern left shoulder --> continues     Edema: mild edema in proximal humerus due to shot from cancer    Sensation:  LUE: Diminished protective sense -hand/loss of protective sense left finger tips  RUE: Diminished light touch/protective sense intact     --> L hand: continues (4.31)   -->  R hand: continues (3.61)     Vision / Perception: Wears reading glasses. Decreased visual attn/focus, saccades and convergence. Patient reported that his visual focus is worse than before the stroke. He also has increased blurred vision. Visual field/peripheral vision intact. (Per chart -Patient's left eye laterally deviated at baseline, but able to adduct across the midline  Patient's right eye able to adduct past the midline, but not fully)    -->  Blurry vision continues  Diminished visual attention continues  Improvement with saccades and convergence                Dynamometer (setting 2):                              Left: 44#, 2nd trial 39# -motor fatigue    -->  64#     (Norm for age/sex 76#)                                     Right: 75#, 2nd trial 68# - motor fatigue       (Norm for age/sex 91#)             Pinch Meter:              Lateral: Left= 10#  --> 10#  Right= 16#               Palmar 3 point: Left= 9#  --> 7# Right= 11#  9 Hole Peg Test:              Left: 27 seconds  -->  26 sec              Right: 26 seconds  --> 25 sec     QuickDASH Score: 40.91% disability  --> 72.7% disability    -Rehab Potential: Good  -Requires OT Follow Up: Yes    Time In:10:00 am            Time Out: 11:00am             Visit #: 4/16   CPT Codes- 79595 Man Ther, 44113 TherEx, 30469 TherAct, 98503 SelfCare, 66174 NeuroReEd    Treatment Charges: Mins Units   Modalities     Ther Exercise 15 1   Manual Therapy     Thera Activities 45 3   ADL/Home Mgt      Neuro Re-education     Gait Training     Group Therapy     Non-Billable Service Time     Other     Total Time/Units 60 4       -Response to Treatment: Good.  Pt reports overall his LUE is feeling & working better      Plan:   [x]  Continues Plan of care: Treatment covered based on POC

## 2022-12-07 ENCOUNTER — HOSPITAL ENCOUNTER (OUTPATIENT)
Dept: OCCUPATIONAL THERAPY | Age: 67
Setting detail: THERAPIES SERIES
Discharge: HOME | End: 2022-12-07
Payer: COMMERCIAL

## 2022-12-07 ENCOUNTER — HOSPITAL ENCOUNTER (OUTPATIENT)
Dept: PHYSICAL THERAPY | Age: 67
Setting detail: THERAPIES SERIES
Discharge: HOME | End: 2022-12-07
Payer: COMMERCIAL

## 2022-12-07 PROCEDURE — 97530 THERAPEUTIC ACTIVITIES: CPT

## 2022-12-07 PROCEDURE — 97110 THERAPEUTIC EXERCISES: CPT

## 2022-12-07 NOTE — PROGRESS NOTES
Occupational Therapy    OCCUPATIONAL THERAPY DAILY NOTE    Date:  2022  Initial Evaluation Date: 10/11/22                              Evaluating Therapist: Bertrand Ontiveros OT     Patient Name:  Aron Haddad                :  1955     Restrictions/Precautions:  Blurred Vision, Moderate Fall Risk, Dizziness  Diagnosis:  Acute CVA (I63.9), Dizziness (R42)                    MRI 22: Lacunar Infarct involving dorsal aspect of the right laurel adjacent to the right superior cerebellar peduncle  Date of Surgery/Injury: 22             Insurance/Certification information:  [employer] [place] Medicaid (approval eval plus 16 visits)  Plan of care signed (Y/N): N  Visit# / total visits:     Referring Practitioner:  Royer Collins MD NPI# [id number]; Joe Bullard MD NPI# [id number]  Specific Practitioner Orders: OT Evaluate and Treatment     Assessment of current deficits   [x] Functional mobility             [x] ADLs           [x] Strength                  [x] Cognition   [x] Functional transfers           [x] IADLs          [] Safety Awareness  [x] Motor Endurance   [x] Fine Motor Coordination    [x] Balance      [x] Vision/perception    [x] Sensation     [] Gross Motor Coordination [] ROM           [x] Pain                        [] Edema          [] Scar Adhesion/Skin Integrity      OT PLAN OF CARE   OT POC based on physician orders, patient diagnosis and results of clinical assessment     Frequency/Duration: OT 2x/week or 16 visits                  Certification period From: 10/11/22  To: 23  Specific OT Treatment to include: CPT Codes- 13413, 35729, 11924, 72890, 14171     [x] Instruction in HEP                   Modalities:  [x] Therapeutic Exercise              [] Ultrasound               [] Electrical Stimulation/Attended  [x] PROM/Stretching                   [] Fluidotherapy          []  Paraffin                   [x] AAROM  [x] AROM                 [] Iontophoresis:   [] Tendon Glides                                               [x] Neuromuscular Re-Ed           [x] ADL/IADL re-training    [x] Therapeutic Activity               [] Pain Management with/without modalities PRN                 [x] Manual Therapy                     [] Splinting                                   [] Scar Management                   []Joint Protection/Training  []Ergonomics                             [] Joint Mobilization                      [x] Adaptive Equipment Assessment/Training                             [] Manual Edema Mobilization   [] Myofascial Release                 [x] Energy Conservation/Work Simplification  [x] GM/FM Coordination              [x] Safety retraining/education per  individual diagnosis/goals  [x] Sensory Re-Education     Patient Specific Goal: Improve his mental focus, visual focus and use of left dominant hand/improved sensation                             GOALS (Long term same as Short term): 6-8 weeks Updated on 11/23/2022  1) Patient will demonstrate good understanding of home program (exercises/activities/diagnosis/prognosis/goals) with good accuracy. Progressing - pt reports use of putty every 3 days. 2) Patient will demonstrate increase strength of their L Shoulder to WNLs for ADL/IADL completion. NT this date due to complaints of severe pain. 3) Patient will demonstrate increased /pinch strength of at least 10 / 3 pinch pounds of their Left hand. Progressing - see measurements below for good increase in  strength, maintenance of 3-pt palmar pinch, and slight decrease in lateral key pinch. 4) Patient will demonstrate (+) protective sense in their affected left upper extremity for increase safety w/ ADLs/IADLs  Progressing slowly - sensation status maintained since initial evaluation. 5) Increase in fine motor function as evidenced by decreased time to complete 9-hole peg test  by at least 5-10 seconds.    Progressing - see slight decrease in measurements below. Continue to target. 6) Patient will report ADL/IADL functions as Mod I/I with use of left dominant hand as primary. Progressing - pt reports that he does not avoid use of L hand during functional tasks but constant tingling interferes with activities. 7) Patient will demonstrate improved functional activity tolerance/motor endurance from fair to good for ADL/IADL completion. Progressing - pt maintains fair to fair+ motor endurance. 8) Patient will decrease QuickDASH score to 30% or less for increased participation in daily functional activities. Progressing slowly- see notable increase below due to to recent onset of pain in L shoulder due to shot administered for cancer. 9) Patient to demonstrate visual attn/focus, saccades, and convergence to [facility] to increase safety and   Progressing - see notes below as deficits persist with visual attention/focus with improvement in saccades and convergence. Pain Level: 4/10 pain in L shoulder- decreased from previous visit    Subjective:  Pt states, \" I am going to have to miss next Wednesday because of other scheduled dr appts. I also see the carotid artery dr on 12/18 the following week. They were talking about doing sx. \"   Therapist & pt discuss pt calling in after he sees dr about carotid artery sx and giving therapy clinic update to see if he will cont or hold therapy. Objective:  POC updated on 11/23/2022. Updated POC to be completed by 12/23/2022. INTERVENTION: COMPLETED: SPECIFICS/COMMENTS:   Modality:               A/AROM:     Left hand/wrist/forearm/UE X All planes        Fine Motor: In hand manipulation Reviewed for home                    X Theraputty ex's: in hand manipulation, tip pinches, tip pinches & pulls,tiny pegs incorporated     Tiny peg tasks. Pt able to manipulate up to 10 pegs at a time in and out of hand individually      Card tasks    Fine motor manipulation board. . very minimally difficult due to mild numbness in fingertips          Neuro Melo:     Attn to task- visual & focus             X Tiny peg tasks-L/R/Middle. Pt needs mod verbal cueing to stay attentive to task  Card tasks: following directions also    Pinch clip task: Making pattern w/ ^d 5 colors: L/C/R & crossing midline- Pt attentiveness to task has significantly improved and pt able to complete ^d 5 color pattern well today        Scar Mass/Edema Control/Desensitization     Desensitization X      X        X Therabar rolling ex's wrist & palm/digits    Median N glides x 6 positions x 12 reps with 5-7 second holds + reviewed for HEP    Composite flexor stretches- fingers up followed by fingers down with straight arm- 30 second holds. .. HEP intermittently thru day hours        Strengthening:      home Yellow theraputty ex's: Gripping, pinching, pinch/pulls, weight bearing thru palm/kneading   Left UE shoulder thru hand X 12 reps all      X          X        X ^ 3# dowel joseph resistive ex's shoulder & elbow all planes    Red Therabar: Wringing, Up Bends, Down Bends, Passes OH, Cap Turns, NEWS bends. Verbal cues to stay attentive to task. Proprioception good. Pinch clip tasks:reaching forward shoulder flex/ADD/ABD to place ^ingly resistive green, blue & black clips      Red Power Web grasps & pulls, pinch & pulls   Other:     HEP  Theraputty desensitization & light strengthening ex's   Progress Update  Take measurements, update goals, and discuss progress     Assessment/Comments: Pt is making good progress toward stated plan of care. Pt arrives to therapy reporting his digital/fingertip numbness is decreasing. Pt reports compliance with median nerve glides/flexor stretches and theraputty ex's at home. ^d 3# dowel joseph utilized with good result for strengthening today. Pt displays improving attentiveness to pinch clip task this date and is able to recreate 5 color/shape pattern 9/10 with perfect result.  Continue skilled OT to improve digital sensation, UE strength and visual/attention deficits per pt next dr appt. STRENGTH:   LUE: Shoulder elevation 4/5 --> NT this date due to report of moderate to severe pain     Comment: Compensatory movement pattern left shoulder --> continues     Edema: mild edema in proximal humerus due to shot from cancer    Sensation:  LUE: Diminished protective sense -hand/loss of protective sense left finger tips  RUE: Diminished light touch/protective sense intact     --> L hand: continues (4.31)   -->  R hand: continues (3.61)     Vision / Perception: Wears reading glasses. Decreased visual attn/focus, saccades and convergence. Patient reported that his visual focus is worse than before the stroke. He also has increased blurred vision. Visual field/peripheral vision intact.  (Per chart -Patient's left eye laterally deviated at baseline, but able to adduct across the midline  Patient's right eye able to adduct past the midline, but not fully)    -->  Blurry vision continues  Diminished visual attention continues  Improvement with saccades and convergence                Dynamometer (setting 2):                              Left: 44#, 2nd trial 39# -motor fatigue    -->  64#     (Norm for age/sex 76#)                                     Right: 75#, 2nd trial 68# - motor fatigue       (Norm for age/sex 91#)             Pinch Meter:              Lateral: Left= 10#  --> 10#  Right= 16#               Palmar 3 point: Left= 9#  --> 7# Right= 11#  9 Hole Peg Test:              Left: 27 seconds  -->  26 sec              Right: 26 seconds  --> 25 sec     QuickDASH Score: 40.91% disability  --> 72.7% disability    -Rehab Potential: Good  -Requires OT Follow Up: Yes    Time In:10:00 am            Time Out: 11:00am             Visit #: 5/16   CPT Codes- 73494 Man Ther, 03147 TherEx, 18803 TherAct, 70342 SelfCare, 47932 NeuroReEd    Treatment Charges: Mins Units   Modalities     Ther Exercise 15 1   Manual Therapy     Thera Activities 45 3 ADL/Home Mgt      Neuro Re-education     Gait Training     Group Therapy     Non-Billable Service Time     Other     Total Time/Units 60 4       -Response to Treatment: Good. Pt reports his left hand is less numb and this makes him happy      Plan:   [x]  Continues Plan of care: Treatment covered based on POC and graduated to patient's progress. Pt education continues at each visit to obtain maximum benefits from skilled OT intervention.   []  Alter Plan of care:   []  Discharge:    Temo PEACE, [id number]

## 2022-12-07 NOTE — PROGRESS NOTES
[facility]  Phone: [phone] Fax: [phone]       Physical Therapy Daily Treatment Note  Date:  2022    Patient Name:  Isabella Santos    :  1955  MRN: [id number]    Restrictions/Precautions:    Diagnosis:  Cerebral Infarction, Dizziness and Giddiness,CVA  Treatment Diagnosis:    [facility]  Referring Physician: Dr. Dina Thorne of care signed (Y/N):    Visit# / total visits:  6/4-5 weeks  Pain level: /10  Time In: 8:35      Time Out: 9:40         Subjective: Pt has no new c/o this morning. Exercises:  Exercise/Equipment Resistance/Repetitions Other comments   Bike  10 min    Hip flex w/knees flexed Alt 10 x 2 B supine   Bridging  10 x    Heel slides w/slider 15 x B    SAQ 10 x 2 B    LAQ 10 x 2 B    Sit to stand from 24\" box   10 x Limited by back pain          Marching    10 x B nt   Side stepping  3 laps                                                                               Other Therapeutic Activities: Frequent rest breaks between activities. Home Exercise Program: (22) Pt states he is unable to perform supine ex d/t the bed he has. States he performs sit to stand ex at home but is limited in performing HEP by living alone. Manual Treatments:      Modalities:      Timed Code Treatment Minutes:  60    Total Treatment Minutes:  65    Treatment/Activity Tolerance:  [x] Patient tolerated treatment well [] Patient limited by fatigue  [] Patient limited by pain  [] Patient limited by other medical complications  [x] Other: Good tolerance for addition of heel slides w/pt noting decreased pain in left hip after. More upright posture in standing as well. Plan:   [] Continue per plan of care [] Alter current plan (see comments)  [] Plan of care initiated [x] Hold pending MD visit [] Discharge  Plan for Next Session: Pt is unable to attend therapy next Wednesday.  He has an appointment for consult with surgeon on 12/19. Advised pt to call after that appointment. Treatment Charges: Mins Units   Initial Evaluation     Re-Evaluation     Ther Exercise         TE 55 3   Manual Therapy     MT     Ther Activities        TA     Gait Training          GT     Neuro Re-education NR     Modalities     Non-Billable Service Time     Other 10    Total Time/Units 65 3     Electronically signed by:   Rebecca Jordan

## 2022-12-13 RX ORDER — AMLODIPINE BESYLATE 10 MG/1
TABLET ORAL
Qty: 90 TABLET | Refills: 2 | Status: SHIPPED | OUTPATIENT
Start: 2022-12-13

## 2022-12-13 NOTE — TELEPHONE ENCOUNTER
Last Appointment:  11/15/2022  Future Appointments  12/19/2022 8:45 AM    Markell Thornton MD       [facility]        [facility]  1/3/2023   1:40 PM    Kit Richard MD           [facility]

## 2022-12-14 ENCOUNTER — HOSPITAL ENCOUNTER (OUTPATIENT)
Dept: OCCUPATIONAL THERAPY | Age: 67
Setting detail: THERAPIES SERIES
End: 2022-12-14
Payer: COMMERCIAL

## 2022-12-14 ENCOUNTER — TELEPHONE (OUTPATIENT)
Dept: FAMILY MEDICINE CLINIC | Age: 67
End: 2022-12-14

## 2022-12-14 NOTE — TELEPHONE ENCOUNTER
Spoke to nurse santana. She wanted to inform me that Larry's blood pressure was 150/78. There were no other concerns. I she also stated that he canceled his neurology appointment, but she was unsure why.       Electronically signed by Eusebio Moran MD on 12/14/2022 at 1:49 PM

## 2022-12-19 ENCOUNTER — APPOINTMENT (OUTPATIENT)
Dept: CT IMAGING | Age: 67
End: 2022-12-19
Payer: COMMERCIAL

## 2022-12-19 ENCOUNTER — APPOINTMENT (OUTPATIENT)
Dept: GENERAL RADIOLOGY | Age: 67
End: 2022-12-19
Payer: COMMERCIAL

## 2022-12-19 ENCOUNTER — OFFICE VISIT (OUTPATIENT)
Dept: SURGERY | Age: 67
End: 2022-12-19

## 2022-12-19 ENCOUNTER — TELEPHONE (OUTPATIENT)
Dept: FAMILY MEDICINE CLINIC | Age: 67
End: 2022-12-19

## 2022-12-19 ENCOUNTER — HOSPITAL ENCOUNTER (EMERGENCY)
Age: 67
Discharge: HOME | End: 2022-12-19
Attending: EMERGENCY MEDICINE
Payer: COMMERCIAL

## 2022-12-19 ENCOUNTER — TELEPHONE (OUTPATIENT)
Dept: SURGERY | Age: 67
End: 2022-12-19

## 2022-12-19 VITALS
BODY MASS INDEX: 28.14 KG/M2 | HEART RATE: 89 BPM | WEIGHT: 201 LBS | DIASTOLIC BLOOD PRESSURE: 93 MMHG | HEIGHT: 71 IN | RESPIRATION RATE: 16 BRPM | TEMPERATURE: 97.3 F | SYSTOLIC BLOOD PRESSURE: 153 MMHG | OXYGEN SATURATION: 97 %

## 2022-12-19 VITALS
RESPIRATION RATE: 16 BRPM | SYSTOLIC BLOOD PRESSURE: 138 MMHG | TEMPERATURE: 97.2 F | BODY MASS INDEX: 28.03 KG/M2 | DIASTOLIC BLOOD PRESSURE: 72 MMHG | HEART RATE: 66 BPM | WEIGHT: 201 LBS | OXYGEN SATURATION: 99 %

## 2022-12-19 DIAGNOSIS — Z12.11 ENCOUNTER FOR SCREENING COLONOSCOPY: Primary | ICD-10-CM

## 2022-12-19 DIAGNOSIS — R42 DIZZINESS: Primary | ICD-10-CM

## 2022-12-19 LAB
ALBUMIN SERPL-MCNC: 4.1 G/DL (ref 3.5–5.2)
ALP BLD-CCNC: 190 U/L (ref 40–129)
ALT SERPL-CCNC: 29 U/L (ref 0–40)
ANION GAP SERPL CALCULATED.3IONS-SCNC: 15 MMOL/L (ref 7–16)
AST SERPL-CCNC: 23 U/L (ref 0–39)
BASOPHILS ABSOLUTE: 0.04 E9/L (ref 0–0.2)
BASOPHILS RELATIVE PERCENT: 0.7 % (ref 0–2)
BILIRUB SERPL-MCNC: 0.4 MG/DL (ref 0–1.2)
BUN BLDV-MCNC: 18 MG/DL (ref 6–23)
CALCIUM SERPL-MCNC: 10.4 MG/DL (ref 8.6–10.2)
CHLORIDE BLD-SCNC: 103 MMOL/L (ref 98–107)
CO2: 23 MMOL/L (ref 22–29)
CREAT SERPL-MCNC: 1 MG/DL (ref 0.7–1.2)
EKG ATRIAL RATE: 65 BPM
EKG P AXIS: 15 DEGREES
EKG P-R INTERVAL: 122 MS
EKG Q-T INTERVAL: 444 MS
EKG QRS DURATION: 90 MS
EKG QTC CALCULATION (BAZETT): 461 MS
EKG R AXIS: 13 DEGREES
EKG T AXIS: 67 DEGREES
EKG VENTRICULAR RATE: 65 BPM
EOSINOPHILS ABSOLUTE: 0.41 E9/L (ref 0.05–0.5)
EOSINOPHILS RELATIVE PERCENT: 6.7 % (ref 0–6)
GFR SERPL CREATININE-BSD FRML MDRD: >60 ML/MIN/1.73
GLUCOSE BLD-MCNC: 146 MG/DL (ref 74–99)
HCT VFR BLD CALC: 38.4 % (ref 37–54)
HEMOGLOBIN: 13.1 G/DL (ref 12.5–16.5)
IMMATURE GRANULOCYTES #: 0.02 E9/L
IMMATURE GRANULOCYTES %: 0.3 % (ref 0–5)
INR BLD: 1.1
LYMPHOCYTES ABSOLUTE: 1.07 E9/L (ref 1.5–4)
LYMPHOCYTES RELATIVE PERCENT: 17.4 % (ref 20–42)
MAGNESIUM: 2 MG/DL (ref 1.6–2.6)
MCH RBC QN AUTO: 27.7 PG (ref 26–35)
MCHC RBC AUTO-ENTMCNC: 34.1 % (ref 32–34.5)
MCV RBC AUTO: 81.2 FL (ref 80–99.9)
MONOCYTES ABSOLUTE: 0.54 E9/L (ref 0.1–0.95)
MONOCYTES RELATIVE PERCENT: 8.8 % (ref 2–12)
NEUTROPHILS ABSOLUTE: 4.07 E9/L (ref 1.8–7.3)
NEUTROPHILS RELATIVE PERCENT: 66.1 % (ref 43–80)
PDW BLD-RTO: 13.4 FL (ref 11.5–15)
PLATELET # BLD: 283 E9/L (ref 130–450)
PMV BLD AUTO: 10.9 FL (ref 7–12)
POTASSIUM SERPL-SCNC: 3.7 MMOL/L (ref 3.5–5)
PROTHROMBIN TIME: 11.8 SEC (ref 9.3–12.4)
RBC # BLD: 4.73 E12/L (ref 3.8–5.8)
SARS-COV-2, NAAT: NOT DETECTED
SODIUM BLD-SCNC: 141 MMOL/L (ref 132–146)
TOTAL PROTEIN: 8 G/DL (ref 6.4–8.3)
TROPONIN, HIGH SENSITIVITY: 9 NG/L (ref 0–11)
WBC # BLD: 6.2 E9/L (ref 4.5–11.5)

## 2022-12-19 PROCEDURE — 84484 ASSAY OF TROPONIN QUANT: CPT

## 2022-12-19 PROCEDURE — 71045 X-RAY EXAM CHEST 1 VIEW: CPT

## 2022-12-19 PROCEDURE — 85610 PROTHROMBIN TIME: CPT

## 2022-12-19 PROCEDURE — 85025 COMPLETE CBC W/AUTO DIFF WBC: CPT

## 2022-12-19 PROCEDURE — 93010 ELECTROCARDIOGRAM REPORT: CPT | Performed by: INTERNAL MEDICINE

## 2022-12-19 PROCEDURE — 70450 CT HEAD/BRAIN W/O DYE: CPT

## 2022-12-19 PROCEDURE — 83735 ASSAY OF MAGNESIUM: CPT

## 2022-12-19 PROCEDURE — 93005 ELECTROCARDIOGRAM TRACING: CPT | Performed by: PHYSICIAN ASSISTANT

## 2022-12-19 PROCEDURE — 80053 COMPREHEN METABOLIC PANEL: CPT

## 2022-12-19 PROCEDURE — 87635 SARS-COV-2 COVID-19 AMP PRB: CPT

## 2022-12-19 PROCEDURE — 99285 EMERGENCY DEPT VISIT HI MDM: CPT

## 2022-12-19 NOTE — TELEPHONE ENCOUNTER
Patient checked-in for his appointment with Dr. Aleksandra Velázquez for a colonoscopy consult. Frida Ibarra MA roomed patient, vitals stable BP: 153/93, P: 89, SpO2: 97%. Patient contacted his PCP, [facility], and informed them he was dizzy, lightheaded and was at our office in the exam room. Frida Ibarra MA went back in the room and checked on the patient. Patient states he didn't feel good and wanted to go to the ER. Patient refused to be seen for his colonoscopy consult, Олег Maguire walked patient to ER. Appointment with Dr. Aleksandra Velázquez marked left without being seen.   Electronically signed by Phyllis Champion MA on 12/19/22 at 9:31 AM EST

## 2022-12-19 NOTE — ED PROVIDER NOTES
Department of Emergency Medicine   ED Provider Note  Admit Date/RoomTime: 12/19/2022 11:41 AM  ED Room: HU PRAKASH/HERNAN          History of Present Illness:  12/19/22, Time: 12:50 PM EMILY aLdd is a 79 y.o. male presenting to the ED for dizziness, which has been chronic since his stroke last 8/2022. He describes his dizziness as \"constant blur\", but seems like his mind is foggy \", \" thinking slow \", with on and off confusion. The complaint has been persistent, mild in severity, and worsened and alleviated by nothing. He also complains of intermittent diplopia since his stroke. No diplopia today. He has been working with PT and his left upper extremity strength has improved. He saw Dr. Shira Kaplan in the clinic today complaining of dizziness thus was sent to the ED. He denies fever, cough, colds, chills, night sweats, unintentional weight loss, decreased appetite, chest pain, palpitation, lightheadedness, fall, syncope, abdominal pain, nausea, vomiting, reflux, dysuria, frequency, diarrhea, new weakness, numbness or tingling. He had CVA 8/2022, acute to subacute lacunar infarct involving dorsal aspect of R laurel adjacent to the R superior cerebellar peduncle, high grade stenosis R proximal PCA, unremarkable MRA. He is currently on aspirin and clopidogrel. States that he was supposed to to be referred to neurology but has not had any appointments set up. Additional history obtained from EHR, patient. Review of Systems:     Pertinent positives and negatives are stated within HPI. 10 point ROS otherwise negative.      --------------------------------------------- PAST HISTORY ---------------------------------------------  Past Medical History:  has a past medical history of Cancer ([facility] [address] Erectile dysfunction, Hyperlipidemia, Hypertension, Osteoarthritis, hip/pelvic region and thigh, and Prostate cancer ([facility] [address]     Past Surgical History:  has a past surgical history that includes Prostate Biopsy; Leg Surgery; hip surgery (Right); Lumbar spine surgery (10/30/2017); and back surgery. Social History:  reports that he quit smoking about 5 years ago. His smoking use included cigarettes. He has a 2.50 pack-year smoking history. He has never used smokeless tobacco. He reports current alcohol use. He reports that he does not use drugs. Family History: family history is not on file. The patients home medications have been reviewed. Allergies: Ibuprofen and Benzocaine      ---------------------------------------------------PHYSICAL EXAM--------------------------------------    Constitutional/General: Awake and alert, NAD, no labored breathing  Head: Normocephalic and atraumatic  Eyes: EOMI, conjunctiva normal, sclera non icteric  Ears: Normal external ears  Nose: Normal external nose  Mouth: Moist mucous membranes, uvula midline  Neck: Supple, no stridor, no meningeal signs  Respiratory: Lungs clear to auscultation bilaterally, no wheezes, rales, or rhonchi. Not in respiratory distress  Cardiovascular:  Regular rate. Regular rhythm. No murmurs, no gallops, or rubs. 2+ distal pulses. Equal extremity pulses. Chest: No chest wall tenderness or deformity  GI:  Abdomen Soft, Non tender, Non distended. No rebound, guarding, or rigidity. No pulsatile masses. Musculoskeletal: Moves all extremities x 4. Warm and well perfused, no clubbing, cyanosis, or edema. Capillary refill <3 seconds  Integument: skin warm and dry. Neurologic: GCS 15, no focal deficits, alert and responsive, symmetric strength 5/5 in the major muscle groups of upper and lower extremities bilaterally, 4/5 plantarflexion right foot. Light sensation and vibration sense intact. Positive for head impulse, positive for test of skew. No vertical or bidirectional nystagmus noted.   Psychiatric: Normal Affect      -----------------------------------------PROCEDURES----------------------------------------------------    -------------------------------------------------- RESULTS -------------------------------------------------  I have personally reviewed and interpreted all laboratory and imaging results for this patient. Results are listed below.      LABS:  Results for orders placed or performed during the hospital encounter of 12/19/22   COVID-19, Rapid    Specimen: Nasopharyngeal Swab   Result Value Ref Range    SARS-CoV-2, NAAT Not Detected Not Detected   CBC with Auto Differential   Result Value Ref Range    WBC 6.2 4.5 - 11.5 E9/L    RBC 4.73 3.80 - 5.80 E12/L    Hemoglobin 13.1 12.5 - 16.5 g/dL    Hematocrit 38.4 37.0 - 54.0 %    MCV 81.2 80.0 - 99.9 fL    MCH 27.7 26.0 - 35.0 pg    MCHC 34.1 32.0 - 34.5 %    RDW 13.4 11.5 - 15.0 fL    Platelets 602 117 - 826 E9/L    MPV 10.9 7.0 - 12.0 fL    Neutrophils % 66.1 43.0 - 80.0 %    Immature Granulocytes % 0.3 0.0 - 5.0 %    Lymphocytes % 17.4 (L) 20.0 - 42.0 %    Monocytes % 8.8 2.0 - 12.0 %    Eosinophils % 6.7 (H) 0.0 - 6.0 %    Basophils % 0.7 0.0 - 2.0 %    Neutrophils Absolute 4.07 1.80 - 7.30 E9/L    Immature Granulocytes # 0.02 E9/L    Lymphocytes Absolute 1.07 (L) 1.50 - 4.00 E9/L    Monocytes Absolute 0.54 0.10 - 0.95 E9/L    Eosinophils Absolute 0.41 0.05 - 0.50 E9/L    Basophils Absolute 0.04 0.00 - 0.20 E9/L   Comprehensive Metabolic Panel   Result Value Ref Range    Sodium 141 132 - 146 mmol/L    Potassium 3.7 3.5 - 5.0 mmol/L    Chloride 103 98 - 107 mmol/L    CO2 23 22 - 29 mmol/L    Anion Gap 15 7 - 16 mmol/L    Glucose 146 (H) 74 - 99 mg/dL    BUN 18 6 - 23 mg/dL    Creatinine 1.0 0.7 - 1.2 mg/dL    Est, Glom Filt Rate >60 >=60 mL/min/1.73    Calcium 10.4 (H) 8.6 - 10.2 mg/dL    Total Protein 8.0 6.4 - 8.3 g/dL    Albumin 4.1 3.5 - 5.2 g/dL    Total Bilirubin 0.4 0.0 - 1.2 mg/dL    Alkaline Phosphatase 190 (H) 40 - 129 U/L    ALT 29 0 - 40 U/L AST 23 0 - 39 U/L   Troponin   Result Value Ref Range    Troponin, High Sensitivity 9 0 - 11 ng/L   Magnesium   Result Value Ref Range    Magnesium 2.0 1.6 - 2.6 mg/dL   Protime-INR   Result Value Ref Range    Protime 11.8 9.3 - 12.4 sec    INR 1.1    EKG 12 Lead   Result Value Ref Range    Ventricular Rate 65 BPM    Atrial Rate 65 BPM    P-R Interval 122 ms    QRS Duration 90 ms    Q-T Interval 444 ms    QTc Calculation (Bazett) 461 ms    P Axis 15 degrees    R Axis 13 degrees    T Axis 67 degrees       RADIOLOGY:  Imaging Interpreted by Radiologist, initial wet read of imaging interpreted by myself  CT HEAD WO CONTRAST   Final Result   No acute intracranial abnormality. Ventricular white matter changes consistent chronic microvascular disease. XR CHEST PORTABLE   Final Result   No acute process. EKG: NSR, HR 65 bpm QTc 461 ms    See ED Course for EKG documentation        ------------------------- NURSING NOTES AND VITALS REVIEWED ---------------------------   The nursing notes within the ED encounter and vital signs as below have been reviewed by myself. /72   Pulse 66   Temp 97.2 °F (36.2 °C) (Temporal)   Resp 16   Wt 201 lb (91.2 kg)   SpO2 99%   BMI 28.03 kg/m²   Oxygen Saturation Interpretation: Normal    The patients available past medical records and past encounters were reviewed, see MDM for details. ------------------------------ ED COURSE/MEDICAL DECISION MAKING----------------------  Medications - No data to display         Medical Decision Making:        Patient came in for chronic dizziness, with intermittent diplopia since his last stroke on 8/2022. He has never had a consult with neurology since then. Orthostatics were negative. Labs are within normal limits. EKG did not show any ST-T wave changes, troponin 9. CT of the head and chest x-ray showed no acute abnormality. He does not have any new focal weakness numbness or tingling.   Labs and imaging discussed with the patient. He is amenable to going home. Neurology appointment was set up for the patient and this will be on 12/2027/22 at 8:30 AM at [facility] neurology. Patient verbalized understanding. Transportation was called for the patient and he will be picked up at the front of the ED. Diagnoses considered include:   Dizziness, chronic, likely 2/2 previous CVA 8/2022    See ED COURSE for additional MDM. Labs & imaging reviewed and interpreted, see RESULTS above. Re-Evaluations:           See ED Course above. This patient's ED course included: a personal history and physicial examination, re-evaluation prior to disposition, and multiple bedside re-evaluations    This patient has remained hemodynamically stable during their ED course. Consultations:  See ED Course    Counseling: The emergency physician has spoken with the patient and discussed todays results, in addition to providing specific details for the plan of care and counseling regarding the diagnosis and prognosis. Questions are answered at this time and they are agreeable with the plan.       --------------------------------- IMPRESSION AND DISPOSITION ---------------------------------    IMPRESSION  1. Dizziness        DISPOSITION  Disposition: Discharge to home  Patient condition is stable      NOTE: This report was transcribed using voice recognition software. Every effort was made to ensure accuracy; however, inadvertent computerized transcription errors may be present. Also please note that patient was seen and examined by attending physician. Plan of care and disposition discussed with attending physician and they were immediately available or present for all procedures performed.        -- Adelita HOWARD  PGY-2     Internal Medicine      12/19/2022 3:44 PM         Shaq Peraza MD  Resident  12/19/22 [id number]

## 2022-12-19 NOTE — ED NOTES
Department of Emergency Medicine  FIRST PROVIDER TRIAGE NOTE             Independent MLP           12/19/22  10:03 AM EST    Date of Encounter: 12/19/22   MRN: [id number]      HPI: Brittny Garcia is a 79 y.o. male who presents to the ED for Dizziness (States that dr Lazarus Churches pushed him here because he was dizzy and fog headed. )     Patient is a 70-year-old that states that since August he has been foggy. Patient was seen and evaluated in August and had an MRA/MRI of the brain as well as the neck. Patient was found to have a subacute and acute lacunar infarct. Patient was educated that he did at some point had a stroke and was placed on Plavix. Patient states since then he still felt the exact same way and would like to get that evaluated. Patient states is worse going from sitting to standing. Patient states that he was seen at Dr. Lisset Hill office today for consult for colonoscopy but decided he did not want to be seen for colonoscopy he wanted to strokelike symptoms evaluated again because he did not want to have another stroke    ROS: Negative for cp, sob, abd pain, or back pain. PE: Gen Appearance/Constitutional: alert  HEENT: NC/NT. PERRLA,  Airway patent. Neck: supple     Initial Plan of Care: All treatment areas with department are currently occupied. Plan to order/Initiate the following while awaiting opening in ED: labs, EKG, and imaging studies.   Initiate Treatment-Testing, Proceed toTreatment Area When Bed Available for ED Attending/MLP to Continue Care    Electronically signed by Jesse Ardon PA-C   DD: 12/19/22       Jesse Ardon PA-C  12/19/22 4729

## 2022-12-19 NOTE — DISCHARGE INSTRUCTIONS
Please take your medications regularly. Please follow-up with your PCP in one week. We scheduled your follow-up with Neurology on December 27, 2022 at 8:30 AM.  If your symptoms get worse, please go to the nearest ED.

## 2022-12-21 ENCOUNTER — APPOINTMENT (OUTPATIENT)
Dept: OCCUPATIONAL THERAPY | Age: 67
End: 2022-12-21
Payer: COMMERCIAL

## 2022-12-27 ENCOUNTER — OFFICE VISIT (OUTPATIENT)
Dept: NEUROLOGY | Age: 67
End: 2022-12-27
Payer: COMMERCIAL

## 2022-12-27 VITALS
TEMPERATURE: 98 F | HEART RATE: 73 BPM | DIASTOLIC BLOOD PRESSURE: 75 MMHG | WEIGHT: 200 LBS | BODY MASS INDEX: 27.89 KG/M2 | OXYGEN SATURATION: 97 % | SYSTOLIC BLOOD PRESSURE: 146 MMHG

## 2022-12-27 DIAGNOSIS — I63.50 RIGHT PONTINE STROKE (HCC): Primary | ICD-10-CM

## 2022-12-27 PROCEDURE — 99214 OFFICE O/P EST MOD 30 MIN: CPT | Performed by: PHYSICIAN ASSISTANT

## 2022-12-27 PROCEDURE — 3074F SYST BP LT 130 MM HG: CPT | Performed by: PHYSICIAN ASSISTANT

## 2022-12-27 PROCEDURE — G8417 CALC BMI ABV UP PARAM F/U: HCPCS | Performed by: PHYSICIAN ASSISTANT

## 2022-12-27 PROCEDURE — 3017F COLORECTAL CA SCREEN DOC REV: CPT | Performed by: PHYSICIAN ASSISTANT

## 2022-12-27 PROCEDURE — 1124F ACP DISCUSS-NO DSCNMKR DOCD: CPT | Performed by: PHYSICIAN ASSISTANT

## 2022-12-27 PROCEDURE — 1036F TOBACCO NON-USER: CPT | Performed by: PHYSICIAN ASSISTANT

## 2022-12-27 PROCEDURE — 3078F DIAST BP <80 MM HG: CPT | Performed by: PHYSICIAN ASSISTANT

## 2022-12-27 PROCEDURE — G8427 DOCREV CUR MEDS BY ELIG CLIN: HCPCS | Performed by: PHYSICIAN ASSISTANT

## 2022-12-27 PROCEDURE — G8484 FLU IMMUNIZE NO ADMIN: HCPCS | Performed by: PHYSICIAN ASSISTANT

## 2022-12-27 NOTE — PROGRESS NOTES
[facility]  Neurology Follow up     Date:  12/27/2022  Patient Name:  Thierno Turner  YOB: 1955  MRN: [id number]     Assessment  Right pontine stroke 8/2022  Vessel imaging with severe stenosis of the R PCA and tandem occlusion of the R P2. This was also noted on CTAs in 12/2021   LDL 93, A1C 6.2       Plan  Continue DAPT with ASA and Plavix  Increase Lipitor to 80 mg nightly   Risk factor modification   BP control with goal BP < 130/80  LDL 93, goal < 70   RTO 6 months or sooner prn     Subjective:   Thierno Turner is a 79 y.o. male presenting for evaluation of stroke. Interval history   Since leaving the hospital he continues to feel lightheaded and having memory difficulties as well as a little L hand numbness. He continues to work with PT/OT    He is on DAPT and statin therapy. Extensive time spent counseling patient on stroke signs and symptoms, recovery, prevention and risk factor modification. Hospital course   He presented with facial numbness, blurred vision, headache. He presented after 3 episodes of vomiting after eating dinner on 8/13. MRI brain showed a right pontine stroke. He had vessel imaging showed severe stenosis versus occlusion in the right PCA and occlusion in the right P2. Echo was unrevealing. LDL 93. A1c 6.2. He was seen by our group in December 2021 when he presented with lightheadedness and noted to have an elevated blood pressure. He had a right PCA occlusion noted at that time. He was placed on dual antiplatelet for 90 days. Denies weakness, numbness, diplopia, dysarthria, dysphagia  ROS otherwise negative     Allergies:       Allergies   Allergen Reactions    Ibuprofen Swelling    Benzocaine      EDEMA         Physical Examination  Vitals   Vitals:    12/27/22 0844   BP: (!) 146/75   Site: Left Upper Arm   Position: Sitting   Pulse: 73   Temp: 98 °F (36.7 °C)   SpO2: 97%   Weight: 200 lb (90.7 kg)        General: Patient appears in no acute distress. Awake and oriented x 4  HEENT: Normocephalic, atraumatic  Chest: effort normal   Extremities/Peripheral vascular: No edema/swelling noted. No cold limbs noted. Neurologic Examination    Mental Status  Alert, and oriented to person, place and time. Speech is fluent with intact comprehension. No evidence of memory impairment. Attention and concentration appeared normal.     Cranial Nerves  II. Visual fields full to confrontation bilaterally. III, IV, VI: Pupils equally round and reactive to light, 3 to 2 mm bilaterally. EOMs: full, no nystagmus. V. Facial sensation intact to light touch bilaterally  VII: Facial movements symmetric and strong  VIII: Hearing intact to voice  IX,X: Palate elevates symmetrically.  No dysarthria  XI: Sternocleidomastoid and trapezius 5/5 bilaterally   XII: Tongue is midline    Motor     Right Left   Right Left   Deltoid 5 5  Hip Flexion 5 5   Biceps      5  5  Knee Extension 5 5   Triceps 5 5  Knee Flexion 5 5   Handgrip 5 5  Ankle Dorsiflexion 5 5       Ankle Plantarflexion 5 5     Tone: Normal in all four limbs    Bulk: Normal in all four limbs with no evidence of atrophy    Sensation  Light Touch: Intact distally in all four limbs  Vibration: Normal     Reflexes    No babinski     Coordination  Rapid alternating movements normal in bilateral upper extremities  Finger to nose testing normal bilaterally    Gait  Normal with walker     Labs    Lab Results   Component Value Date    LDLCALC 93 08/17/2022     Hemoglobin A1C   Date Value Ref Range Status   08/16/2022 6.2 (H) 4.0 - 5.6 % Final     Lab Results   Component Value Date    WBC 6.2 12/19/2022    HGB 13.1 12/19/2022    HCT 38.4 12/19/2022    MCV 81.2 12/19/2022     12/19/2022     Lab Results   Component Value Date     12/19/2022    K 3.7 12/19/2022     12/19/2022    CO2 23 12/19/2022    BUN 18 12/19/2022    CREATININE 1.0 12/19/2022    GLUCOSE 146 (H) 12/19/2022 CALCIUM 10.4 (H) 12/19/2022    PROT 8.0 12/19/2022    LABALBU 4.1 12/19/2022    BILITOT 0.4 12/19/2022    ALKPHOS 190 (H) 12/19/2022    AST 23 12/19/2022    ALT 29 12/19/2022    LABGLOM >60 12/19/2022    GFRAA >60 08/17/2022       Imaging    MRI brain - R pontine infarct    MRAs-   1. Acute to subacute lacunar infarct involving dorsal aspect of the right  laurel adjacent to the right superior cerebellar peduncle. 2. No acute intracranial hemorrhage or significant mass effect. 3. Unremarkable MRA neck within the included portions. 4. High-grade stenoses throughout the right proximal PCA again noted with  tandem occlusion of the right P2 segment. Echo    Normal left ventricular chamber size. Normal left ventricular systolic function. Visually estimated LVEF is 55-60 %. No wall motion abnormalities. Indeterminate diastolic function. Normal right ventricle structure and function. Normal left atrial size. Normal right atrial size   Unable to estimate PA pressure. Agitated saline contrast study negative for ASD/ PFO. No comparison study available.     I have personally reviewed the following images: MRI brain     Electronically signed by CARLOS Sykes on 12/27/2022 at 8:55 AM

## 2022-12-29 ENCOUNTER — TELEPHONE (OUTPATIENT)
Dept: FAMILY MEDICINE CLINIC | Age: 67
End: 2022-12-29

## 2022-12-29 NOTE — TELEPHONE ENCOUNTER
----- Message from Missy Park sent at 12/29/2022  8:10 AM EST -----  Subject: Message to Provider    QUESTIONS  Information for Provider? Patient went to Neurology and they said to call   and get back on physical therapy and Occupational Therapy they want him to   start over on that process. So patient is needing another order for it.   ---------------------------------------------------------------------------  --------------  Jocelyne CARR  [phone]; OK to leave message on voicemail  ---------------------------------------------------------------------------  --------------  SCRIPT ANSWERS  Relationship to Patient?  Self

## 2022-12-30 DIAGNOSIS — I63.9 ACUTE CEREBROVASCULAR ACCIDENT (CVA) (HCC): Primary | ICD-10-CM

## 2023-01-03 ENCOUNTER — OFFICE VISIT (OUTPATIENT)
Dept: FAMILY MEDICINE CLINIC | Age: 68
End: 2023-01-03
Payer: COMMERCIAL

## 2023-01-03 VITALS
HEIGHT: 71 IN | HEART RATE: 74 BPM | WEIGHT: 195 LBS | RESPIRATION RATE: 16 BRPM | SYSTOLIC BLOOD PRESSURE: 165 MMHG | TEMPERATURE: 97 F | BODY MASS INDEX: 27.3 KG/M2 | OXYGEN SATURATION: 93 % | DIASTOLIC BLOOD PRESSURE: 73 MMHG

## 2023-01-03 DIAGNOSIS — K21.9 GASTROESOPHAGEAL REFLUX DISEASE, UNSPECIFIED WHETHER ESOPHAGITIS PRESENT: ICD-10-CM

## 2023-01-03 DIAGNOSIS — Z87.39 HISTORY OF GOUT: ICD-10-CM

## 2023-01-03 DIAGNOSIS — I63.9 ACUTE CEREBROVASCULAR ACCIDENT (CVA) (HCC): ICD-10-CM

## 2023-01-03 DIAGNOSIS — I10 ESSENTIAL HYPERTENSION: ICD-10-CM

## 2023-01-03 DIAGNOSIS — R05.9 COUGH, UNSPECIFIED TYPE: ICD-10-CM

## 2023-01-03 DIAGNOSIS — U07.1 COVID-19: Primary | ICD-10-CM

## 2023-01-03 LAB
INFLUENZA A ANTIBODY: NEGATIVE
INFLUENZA B ANTIBODY: NEGATIVE
Lab: ABNORMAL
QC PASS/FAIL: ABNORMAL
SARS-COV-2 RDRP RESP QL NAA+PROBE: POSITIVE

## 2023-01-03 PROCEDURE — G8427 DOCREV CUR MEDS BY ELIG CLIN: HCPCS | Performed by: STUDENT IN AN ORGANIZED HEALTH CARE EDUCATION/TRAINING PROGRAM

## 2023-01-03 PROCEDURE — 1036F TOBACCO NON-USER: CPT | Performed by: STUDENT IN AN ORGANIZED HEALTH CARE EDUCATION/TRAINING PROGRAM

## 2023-01-03 PROCEDURE — 3078F DIAST BP <80 MM HG: CPT | Performed by: STUDENT IN AN ORGANIZED HEALTH CARE EDUCATION/TRAINING PROGRAM

## 2023-01-03 PROCEDURE — G8417 CALC BMI ABV UP PARAM F/U: HCPCS | Performed by: STUDENT IN AN ORGANIZED HEALTH CARE EDUCATION/TRAINING PROGRAM

## 2023-01-03 PROCEDURE — 3017F COLORECTAL CA SCREEN DOC REV: CPT | Performed by: STUDENT IN AN ORGANIZED HEALTH CARE EDUCATION/TRAINING PROGRAM

## 2023-01-03 PROCEDURE — 99213 OFFICE O/P EST LOW 20 MIN: CPT | Performed by: STUDENT IN AN ORGANIZED HEALTH CARE EDUCATION/TRAINING PROGRAM

## 2023-01-03 PROCEDURE — 1124F ACP DISCUSS-NO DSCNMKR DOCD: CPT | Performed by: STUDENT IN AN ORGANIZED HEALTH CARE EDUCATION/TRAINING PROGRAM

## 2023-01-03 PROCEDURE — 87804 INFLUENZA ASSAY W/OPTIC: CPT | Performed by: STUDENT IN AN ORGANIZED HEALTH CARE EDUCATION/TRAINING PROGRAM

## 2023-01-03 PROCEDURE — 3077F SYST BP >= 140 MM HG: CPT | Performed by: STUDENT IN AN ORGANIZED HEALTH CARE EDUCATION/TRAINING PROGRAM

## 2023-01-03 PROCEDURE — 87635 SARS-COV-2 COVID-19 AMP PRB: CPT | Performed by: STUDENT IN AN ORGANIZED HEALTH CARE EDUCATION/TRAINING PROGRAM

## 2023-01-03 PROCEDURE — G8484 FLU IMMUNIZE NO ADMIN: HCPCS | Performed by: STUDENT IN AN ORGANIZED HEALTH CARE EDUCATION/TRAINING PROGRAM

## 2023-01-03 RX ORDER — ASPIRIN 81 MG/1
81 TABLET, CHEWABLE ORAL DAILY
Qty: 30 TABLET | Refills: 3 | Status: SHIPPED | OUTPATIENT
Start: 2023-01-03

## 2023-01-03 RX ORDER — ATORVASTATIN CALCIUM 80 MG/1
80 TABLET, FILM COATED ORAL NIGHTLY
Qty: 30 TABLET | Refills: 3 | Status: SHIPPED | OUTPATIENT
Start: 2023-01-03

## 2023-01-03 RX ORDER — LISINOPRIL 20 MG/1
40 TABLET ORAL DAILY
Qty: 60 TABLET | Refills: 3 | Status: SHIPPED | OUTPATIENT
Start: 2023-01-03

## 2023-01-03 RX ORDER — CLOPIDOGREL BISULFATE 75 MG/1
75 TABLET ORAL DAILY
Qty: 30 TABLET | Refills: 3 | Status: SHIPPED | OUTPATIENT
Start: 2023-01-03

## 2023-01-03 RX ORDER — GUAIFENESIN 600 MG/1
600 TABLET, EXTENDED RELEASE ORAL 2 TIMES DAILY
Qty: 30 TABLET | Refills: 0 | Status: SHIPPED | OUTPATIENT
Start: 2023-01-03 | End: 2023-01-18

## 2023-01-03 RX ORDER — FAMOTIDINE 20 MG/1
20 TABLET, FILM COATED ORAL DAILY
Qty: 60 TABLET | Refills: 3 | Status: SHIPPED | OUTPATIENT
Start: 2023-01-03

## 2023-01-03 RX ORDER — ALLOPURINOL 100 MG/1
100 TABLET ORAL DAILY
Qty: 90 TABLET | Refills: 1 | Status: SHIPPED | OUTPATIENT
Start: 2023-01-03

## 2023-01-03 ASSESSMENT — ENCOUNTER SYMPTOMS
SINUS PRESSURE: 0
CONSTIPATION: 0
SORE THROAT: 1
VOMITING: 1
ABDOMINAL PAIN: 0
NAUSEA: 1
SHORTNESS OF BREATH: 0
COUGH: 0
DIARRHEA: 1

## 2023-01-03 NOTE — PROGRESS NOTES
[facility]  FAMILY MEDICINE RESIDENCY PROGRAM  DATE OF VISIT : 1/3/2023    Patient : Dewayne Mcdermott   Age : 79 y.o.  : 1955   MRN : [id number]   ______________________________________________________________________    Chief Complaint :   Chief Complaint   Patient presents with    Congestion    Nasal Congestion    Diarrhea       HPI : Dewayne Mcdermott is 79 y.o. male who presented to the clinic today for congestion and diarrhea. Symptoms started  4 days. Other symptoms he endorses is nausea, vomiting, nonproductive cough, sob (chronic). Denies fever ,chills, cp, abdominal pain, hematochezia. Has not taken anything for the symptoms. No sick contacts. Vaccinated against covid. Did not relief flu vaccine. Patient's medications, allergies, past medical, surgical, social and family histories were reviewed and updated as appropriate. Past Medical History :  Past Medical History:   Diagnosis Date    Cancer ([address] PROSTATE    , patient reports bone mets    Erectile dysfunction     Hyperlipidemia     Hypertension     Osteoarthritis, hip/pelvic region and thigh 3/29/2013    Prostate cancer ([facility] [address] 2013     Past Surgical History:   Procedure Laterality Date    BACK SURGERY      HIP SURGERY Right     LEG SURGERY      LUMBAR SPINE SURGERY  10/30/2017    PLIF L2 - S1   and laminectomy    PROSTATE BIOPSY         Allergies : Allergies   Allergen Reactions    Ibuprofen Swelling    Benzocaine      EDEMA        Medication List :    Current Outpatient Medications   Medication Sig Dispense Refill    amLODIPine (NORVASC) 10 MG tablet take 1 tablet by mouth once daily 90 tablet 2    potassium chloride (KLOR-CON M) 20 MEQ extended release tablet 20 mEq      atorvastatin (LIPITOR) 80 MG tablet Take 1 tablet by mouth nightly 30 tablet 3    allopurinol (ZYLOPRIM) 100 MG tablet Take 1 tablet by mouth in the morning. Please start once flare up resolved. . 90 tablet 1    clopidogrel (PLAVIX) 75 MG tablet Take 1 tablet by mouth in the morning. 30 tablet 3    aspirin 81 MG chewable tablet Take 1 tablet by mouth in the morning. 30 tablet 3    famotidine (PEPCID) 20 MG tablet Take 1 tablet by mouth in the morning. 60 tablet 3    lisinopril (PRINIVIL;ZESTRIL) 20 MG tablet Take 1 tablet by mouth in the morning. 30 tablet 3    leuprolide (LUPRON) 11.25 MG injection Inject 11.25 mg into the muscle once      EPINEPHrine (EPIPEN) 0.3 MG/0.3ML SOAJ injection Inject 0.3 mg into the muscle       No current facility-administered medications for this visit. Review of Systems :  Review of Systems   Constitutional:  Negative for chills and fever. HENT:  Positive for congestion and sore throat. Negative for sinus pressure. Respiratory:  Negative for cough and shortness of breath. Cardiovascular:  Negative for chest pain, palpitations and leg swelling. Gastrointestinal:  Positive for diarrhea, nausea and vomiting. Negative for abdominal pain and constipation. ______________________________________________________________________    Physical Exam :    Vitals: BP (!) 165/73 (Site: Right Upper Arm, Position: Sitting)   Pulse 74   Temp 97 °F (36.1 °C) (Temporal)   Resp 16   Ht 5' 11\" (1.803 m)   Wt 195 lb (88.5 kg)   SpO2 93%   BMI 27.20 kg/m²   General Appearance: Well developed, awake, alert, oriented, and in NAD  HEENT: NCAT, MMM, no pallor or icterus. Neck: Symmetrical, trachea midline. No LAD  Chest wall/Lung: CTAB, respirations unlabored. No ronchi/wheezing/rales   Heart: RRR, normal S1 and S2, no murmurs, rubs or gallops. Abdomen: SNTND, bowel sounds present  Extremities: Extremities normal, atraumatic, no cyanosis, clubbing or  edema. Skin: Skin color, texture, turgor normal, no rashes or lesions  Musculokeletal: ROM grossly normal in all joints of extremities, no obvious joint swelling. Lymphnodes: No lymph node enlargement appreciated  Neurologic: Alert&Oriented x3.  No focal motor deficits detected. Psychiatric: Normal mood. Normal affect. Normal behavior. ______________________________________________________________________    Assessment & Plan :    1. COVID-19  - POCT Influenza A/B negative   - POCT COVID-19 Rapid, NAAT positive in office today   - Patient is out of the window for paxlovid. Instructed to quarantine x 1 week  - Symptomatic treatment, hydration   - guaiFENesin (MUCINEX) 600 MG extended release tablet; Take 1 tablet by mouth 2 times daily for 15 days  Dispense: 30 tablet; Refill: 0    2. Essential hypertension  - Uncontrolled   - Denies cp, sob   - Increase lisinopril to 40 mg daily     4. Medication refill  - Refill pepcid, allopurinol, lipitor, plavix, and asa    5. HM  - Patient would like flu vaccine. Instructed to hold off until acute illness has resolved           Return to Office: Return in about 4 weeks (around 1/31/2023) for HTN.     Herman Calvo MD  Case discussed with Dr. Homero Tyler

## 2023-01-03 NOTE — PROGRESS NOTES
Maya Paul is 79 y.o. male who presented to the clinic today for congestion and diarrhea. Symptoms started  4 days. Other symptoms he endorses is nausea, vomiting, nonproductive cough, sob (chronic). Denies fever ,chills, cp, abdominal pain, hematochezia. Has not taken anything for the symptoms. No sick contacts. Vaccinated against covid. Did not relief flu vaccine. Denies any change in foods. He typically eats hamburgers daily. Blood pressure (!) 165/73, pulse 74, temperature 97 °F (36.1 °C), temperature source Temporal, resp. rate 16, height 5' 11\" (1.803 m), weight 195 lb (88.5 kg), SpO2 93 %. HEENT WNL     Heart regular    Lungs clear    abd non-tender      No edema    Pulses intact       Impression:  Cough, GI symptoms, HTN      POC COVID positive in office today. not eligible for paxlovid. Symptomatic treatment. Stay hydrated. Quarantine x 1 week. Increase lisinopril to 40 mg daily       Attending Physician Statement  I have discussed the case, including pertinent history and exam findings with the resident. I agree with the documented assessment and plan.

## 2023-01-24 NOTE — PROGRESS NOTES
[address] [facility]                Phone: [phone]  Fax: [phone]     Occupational Therapy  Out Patient Discharge Summary     Date:  2023    Patient Name:  Papa Hong   :  1955 MRN: [id number]    DIAGNOSIS:  Acute CVA (I63.9), Dizziness (R42)      REFERRING PHYSICIAN:  Herman Calvo MD NPI# [id number]; Bandar Issa MD NPI# [id number]    ATTENDANCE:  Pt has attended 6 of 6 scheduled sessions. TREATMENTS RECEIVED:  Therapeutic exercise, therapeutic activity, neuro re-ed    INITIAL STATUS:  Impaired ADLs/IADLs (homemaking tasks, bathing, and LE dressing)  Decreased L UE strength ( strength and gross strength)  Decreased sensation  Decreased visual attention/focus/convergence    FINAL STATUS:  As of progress update on 2022, pt was making fair/slow progress on all goals  Last treatment session since update on 2022    GOALS:  0 out of 0 Goals were obtained/or reached maximum potential at this time. REASON FOR DISCHARGE: Pt did not present to clinic for further treatment  PATIENT EDUCATION/INSTRUCTIONS: Pt had been issued HEP consisting of N glides and putty exercises for hand strengthening. RECOMMENDATIONS: Return for skilled OT at soonest opportunity pt is able for further progress. Thank you for the opportunity to work with your patient. If you have questions or comments, please feel free to contact us by phone or fax.     Electronically Signed by: Radha Light, OT, MOT, OTR/L [id number]  2023

## 2023-01-25 ENCOUNTER — TELEPHONE (OUTPATIENT)
Dept: FAMILY MEDICINE CLINIC | Age: 68
End: 2023-01-25

## 2023-01-25 ENCOUNTER — HOSPITAL ENCOUNTER (OUTPATIENT)
Dept: OCCUPATIONAL THERAPY | Age: 68
Setting detail: THERAPIES SERIES
Discharge: HOME | End: 2023-01-25
Payer: COMMERCIAL

## 2023-01-25 ENCOUNTER — HOSPITAL ENCOUNTER (OUTPATIENT)
Dept: PHYSICAL THERAPY | Age: 68
Setting detail: THERAPIES SERIES
Discharge: HOME | End: 2023-01-25
Payer: COMMERCIAL

## 2023-01-25 DIAGNOSIS — M65.319 TRIGGER FINGER OF THUMB, UNSPECIFIED LATERALITY: ICD-10-CM

## 2023-01-25 DIAGNOSIS — I63.9 ACUTE CEREBROVASCULAR ACCIDENT (CVA) (HCC): Primary | ICD-10-CM

## 2023-01-25 PROCEDURE — 97165 OT EVAL LOW COMPLEX 30 MIN: CPT

## 2023-01-25 PROCEDURE — 97161 PT EVAL LOW COMPLEX 20 MIN: CPT

## 2023-01-25 NOTE — PROGRESS NOTES
OCCUPATIONAL THERAPY INITIAL EVALUATION    [facility] [address] [facility]. THERAPY  [address] [place] [address]  Dept: [address]: [phone]   [facility] Fax: [phone]    Date:  2023  Initial Evaluation Date: 2023   Evaluating Therapist: Berkley Bumpers, OT    Patient Name:  Gita Ruiz    :  1955    Restrictions/Precautions:  HTN; moderate fall risk  Diagnosis:  I63.9 (ICD-10-CM) - Acute cerebrovascular accident (CVA) ([facility] [address] M65.319 (ICD-10-CM) - Trigger finger of thumb, unspecified laterality       Date of Surgery/Injury:     Insurance/Certification information:  [employer] [place] Medicaid  Plan of care signed (Y/N): N  Visit# / total visits: --/ 16    Referring Practitioner:  Erick Vasquez MD  Specific Practitioner Orders: Evaluate and Treat    Assessment of current deficits   [x] Functional mobility  [x] ADLs  [x] Strength   [x] Cognition   [x] Functional transfers   [x] IADLs  [x] Safety Awareness  [x] Endurance   [x] Fine Motor Coordination  [x] Balance  [x] Vision/perception  [x] Sensation    [] Gross Motor Coordination [x] ROM  [x] Pain   [x] Edema    [] Scar Adhesion/Skin Integrity     OT PLAN OF CARE   OT POC based on physician orders, patient diagnosis and results of clinical assessment    Frequency/Duration: 2x/week for 16 visits                             Certification period From: 2023  To: 2023  Specific OT Treatment to include: CPT Codes: 31100, 25867,74766, 16556, 38605, 57677    [x] Instruction in HEP                   Modalities:  [x] Therapeutic Exercise        [x] Ultrasound               [] Electrical Stimulation/Attended  [x] PROM/Stretching                    [x] Fluidotherapy          [x]  Paraffin                   [x] AAROM  [x] AROM                 [x] Iontophoresis:   [x] Tendon Glides                                               [] Neuromuscular Re-Ed            [x] ADL/IADL re-training    [x] Therapeutic Activity      [x] Pain Management with/without modalities PRN                 [x] Manual Therapy                      [x] Splinting                      [] Scar Management                   []Joint Protection/Training  []Ergonomics                             [x] Joint Mobilization        [x] Adaptive Equipment Assessment/Training                             [x] Manual Edema Mobilization   [] Myofascial Release                 [x] Energy Conservation/Work Simplification  [x] GM/FM Coordination       [x] Safety retraining/education per  individual diagnosis/goals  [x] Desensitization/Sensory reeducation       Patient Specific Goal: To regain more functional use of L hand and occupational performance for tasks                             GOALS (Long term same as Short term):  1) Patient will demonstrate good understanding of home program (exercises/activities/diagnosis/prognosis/goals) with good accuracy.   2) Patient will demonstrate increased active/passive range of motion of their L UE to WF for ADL/IADL completion.  3) Patient will demonstrate increased trigger-free  and pinch of L hand.  4) Patient will demonstrate increased pinch strength of at least 5-10 / 5 pinch pounds of their L hand when triggering has resolved.   5) Patient to report decreased pain in their affected L upper extremity from 10/10 to 3/10 or less with resistive functional use.   6) Increase in fine motor function as evidenced by decreased time to complete 9-hole peg test and/or MRMT test by at least 3-10 seconds.   7) Patient to report 100% compliance with their splint wear, care, and precautions if needed.   8) Patient to demonstrate an increase in sensation by one level of [place] monofilaments throughout L hand.   9) Patient to demonstrate decreased guarding of their affected extremity from 75% to 20% or less.   10) Patient will be knowledgeable of edema control techniques as evident with decreases from moderate to mild/none.  11) Patient will demonstrate improved functional activity tolerance from fair to good for ADL/IADL completion. 12) Patient will decrease QuickDASH score to 25% or less for increased participation in daily functional activities. 13) Patient/caregiver to demonstrate proper follow through of adaptive recommendations/compensatory techniques to increase safe functional ADLs as needed. 14) Patient will maintain visual attention 3/5 trials when tracking visual stimulus from upper to lower left visual quadrant to improve safety for functional mobility. 15. Patient will recall 4/5 items accurately with 5-minute delay to improve working memory for successful task completion. 16) Patient will maintain balance 4/5 trials when reaching for mod challenge outside base of support to improve dynamic sitting balance for ADL completion. 17) Patient will verbalize at least two techniques to maximize respiration to improve endurance for functional activities. Past Medical History:   Past Medical History:   Diagnosis Date    Cancer ([address] PROSTATE    2005, patient reports bone mets    Erectile dysfunction     Hyperlipidemia     Hypertension 2005    Osteoarthritis, hip/pelvic region and thigh 3/29/2013    Prostate cancer ([address] 2/26/2013     Past Surgical History:   Past Surgical History:   Procedure Laterality Date    BACK SURGERY      HIP SURGERY Right     LEG SURGERY      LUMBAR SPINE SURGERY  10/30/2017    PLIF L2 - S1   and laminectomy    PROSTATE BIOPSY         Reason for Referral: Pt is a pleasant 79year-old male who presents s/p fall that occurred on 08/14/2022 with c/o blurred vision, slurred speech, left-sided numbness of face/hand, and dizziness/lightheadedness. MRI imaging revealed a right pontine stroke with severe stenosis in right PCA and right P2. Pt had previously been evaluated on 10/11/2022 with initial complaints of dizziness, numbness/decreased strength in left UE, and decreased visual focus.  Pt attended 4/4 scheduled sessions. He did not return for the remainder of the year d/t reported sickness. He had presented to the ED on 12/19 with c/o dizziness and left without being treated. In early January, he was treated for COVID. CC's include SOB, and pain in L thumb. Pt reports that lightheadedness has resolved and that he developed triggering in thumb after squeezing putty nearly continuously throughout the day. Putty had been issued previously in therapy with specific instructions regarding technique, repetitions, and frequency. Pt demonstrates tangential speech, distractibility, and perseveration on 1-2 topics throughout session. He presents again today for OT evaluation and treatment to initiate a new course of therapy. Home Living: Pt lives on his own in a 1 bedroom apartment with six steps to enter. Prior Level of Function: Pt reports that prior to CVA, he was IND with all ADLs/IADLs    Cognition:   Alert/Oriented x3     IADL STATUS:   Ind Mod I Min A Mod A Max A Dep Other   Homemaking Responsibility  X        Shopping Responsibility:  X        Mode of Transportation:       X   Leisure & Hobbies: X         Work:       X     Comments: Pt performs cooking, cleaning, laundry, and grocery shopping on his own. He reports decreased strength and therefore completes grocery shopping in multiple trips. Pt uses the bus and reports having to take frequent breaks during each trip due to decreased strength and endurance. He does not currently drive and uses a device for functional mobility (rollator for community and cane for inside the household). He is retired from working for the [employer]. As a hobby, he enjoys watching TV and has returned to this.     ADL STATUS:   Ind Mod I Min A Mod A Max A Dep Other   Feeding:  X        Grooming:  X        Bathing:  X        UE Dressing:  X        LE Dressing:  X        Toileting:  X        Transfers:  X          Comments: Pt reports that he is able to care for himself with the primary challenge of triggering of L thumb that causes him pain and delays completion of tasks. Pt reports that numbness throughout L UE is dissipating. Pain Level: 10/10 aching pain in thumb that escalates with triggering    UE Assessment:  AROM for ARI Ues WFLs with extension of thumb IP flexion deficit due to edema secondary to trigger thumb  Additional exception of L hand MF slight flexion deficit due to prior injury  5/5 strength ARI with exception of L shoulder is 4+/5  Palpation of triggering of left thumb IP joint indicates trigger thumb of A2 pulley    Comment: Hand Dominance is Left    Sensation: Tingling throughout ulnar aspect of thumb; overall pt reports that tingling sensation has decreased since initial CVA    Able To Sense (Y) / Unable to Sense (N)  SEMMES-CARLI Thumb 2nd Digit 3rd Digit  4th Digit  5th Digit    Normal Touch  Size: 2.83        Diminished Light Touch   Size: 3.61        Diminished Protective Sense  Size: 4.31 Y Y Y Y Y   Loss of Protective Sense   Size: 4.56        Loss of Sensation  Size: 6.65          Vision / Perception: Pt wears reading glasses. Pt reports continued blurred vision. Tracking: able to track visual stimulus through all fields of vision on second try. On first try, lost stimulus moving downward from upper left quadrant  Convergence:  WNL  Saccades: Could only maintain visual attention on stimulus 3/6 trials    Edema Description/Circumferential Measurements:   Circumferential around thumb IP joint   Right: 16.9 cm   Left: 17.1 cm     Moderate edema palpated throughout MP/IP of L thumb    Dynamometer (setting 2):     Left: 36# - triggering with L thumb      Right: 65#      Pinch Meter: Deferred this date due to triggering of L thumb   Lateral: Left= TBD,Right= TBD,    Palmar 3 point: Left= TBD,, Right= TBD,    9 Hole Peg Test:   Left: 31 sec   Right: 27 sec    Balance: Dynamic sitting balance is fair+ as pt maintained balance 4/4 trials for min challenge and 3/4 trials for mod challenge    Cognition:  Pt was only able to recall 1 of 3 word items after 5-minute delay  Pt did not recall reps and frequency of previously issued putty exercises that led to development of triggering in thumb    Endurance: Pt reports fair endurance for completion of ADLs    QuickDASH Score: 47.7% disability    Pt would benefit from skilled OT services to reduce pain/edema/triggering of L thumb, strengthen L shoulder, improve dexterity, visual attention, functional cognition, dynamic sitting balance, ADL/IADL retraining, increase endurance, issue HEP/splinting, provide AE/compensatory techniques as needed, and strategies to optimize respiration to improve safe and functional occupational performance. Intervention: Pt was thoroughly educated on diagnosis and prognosis. Pt educated on pain/edema reduction. Pt is to rest L thumb to decrease inflammation at A2 pulley. Oval 8 splint (size 11) issued for wear during the day and extension tube splint (size L) issued for wear at night. Latter splint trimmed down for pt to view tip of thumb and educated to doff if discoloration of tip occurs, indicating reduced circulation. Pt educated on ice massage cup use with handout provided. Pt is to ice volar aspect of thumb 2-3x/day for 5-10 minutes while cycling through coldness, burning, aching, numbness (CBAN). Important instructions highlighted on handout, including splint instructions. Pt verbalized understanding throughout. HEP to expanded in future sessions.      Eval Complexity: low  Profile and History- Chart reviewed  Assessment of Occupational Performance and Identification of Deficits- 15   Clinical Decision Making- 1 additional modifications required (triggering of thumb)    Rehab Potential:                                 [x] Good  [] Fair  [] Poor        Suggested Professional Referral:       [x] No  [] Yes:  Barriers to Goal Achievement[de-identified]          [] No  [x] Yes: Impaired short term memory  Domestic Concerns:                           [x] No  [] Yes:       Patient. Education:  [x] Plans/Goals, Risks/Benefits discussed  [x] Home exercise program  Method of Education: [x] Verbal  [x] Demo  [x] Written  Comprehension of Education:  [x] Verbalizes understanding. [x] Demonstrates understanding. [x] Needs Review. [] Demonstrates/verbalizes understanding of HEP/Ed previously given. Patient understands diagnosis/prognosis and consents to treatment, plan and goals: [x] Yes    [] No     Goal Formulation: Patient  Time In: 8:05 am            Time Out: 9:05 am                      Timed Code Treatment Minutes: -- minutes      CODE  Minutes  Units   15475 OT Eval Low 40 1   87411 OT Eval Medium     14608 OT Eval High     24859 Fluidotherapy     05012 Manual     10579 Therapeutic Ex     66815 Therapeutic Activity 20 --   31299 ADL/COMP Tech Train     93643 Neuromuscular Re-Ed     49898 OrthoManagementTraining     [id number] Paraffin     98675 Electrical Stim - Attended     [id number] Iontophoresis     37999 Ultrasound      Other       60 1        Electronically signed by: KOSTAS Gonsales/DIANA #[id number]     [de-identified] Certification / Comments      Frequency/Duration 2x / week for 16 visits. Certification period From: 01/25/2023  To: 04/25/2023     I have reviewed the Plan of Care established for skilled therapy services and certify that the services are required and that they will be provided while the patient is under my care. Physician's Comments/Revisions:                   Physicians's Printed Name:  Arsen Ocampo MD                                   Physician's Signature:                                                               Date:      Please review Patient's OT evaluation and if you agree sign/date and fax back to us at our [address] [facility] OT Fax: [phone].  Thank you for your referral!

## 2023-02-03 ENCOUNTER — OFFICE VISIT (OUTPATIENT)
Dept: FAMILY MEDICINE CLINIC | Age: 68
End: 2023-02-03
Payer: COMMERCIAL

## 2023-02-03 VITALS
HEIGHT: 71 IN | TEMPERATURE: 98.9 F | SYSTOLIC BLOOD PRESSURE: 161 MMHG | OXYGEN SATURATION: 99 % | DIASTOLIC BLOOD PRESSURE: 74 MMHG | HEART RATE: 60 BPM | BODY MASS INDEX: 27.02 KG/M2 | WEIGHT: 193 LBS | RESPIRATION RATE: 22 BRPM

## 2023-02-03 DIAGNOSIS — U07.1 COVID-19: ICD-10-CM

## 2023-02-03 DIAGNOSIS — I10 ESSENTIAL HYPERTENSION: Primary | ICD-10-CM

## 2023-02-03 DIAGNOSIS — R05.9 COUGH, UNSPECIFIED TYPE: ICD-10-CM

## 2023-02-03 PROCEDURE — 99212 OFFICE O/P EST SF 10 MIN: CPT | Performed by: STUDENT IN AN ORGANIZED HEALTH CARE EDUCATION/TRAINING PROGRAM

## 2023-02-03 RX ORDER — GUAIFENESIN 600 MG/1
TABLET, EXTENDED RELEASE ORAL
Qty: 30 TABLET | Refills: 2 | Status: ON HOLD
Start: 2023-02-03 | End: 2023-03-31 | Stop reason: HOSPADM

## 2023-02-03 SDOH — ECONOMIC STABILITY: FOOD INSECURITY: WITHIN THE PAST 12 MONTHS, THE FOOD YOU BOUGHT JUST DIDN'T LAST AND YOU DIDN'T HAVE MONEY TO GET MORE.: NEVER TRUE

## 2023-02-03 SDOH — ECONOMIC STABILITY: HOUSING INSECURITY
IN THE LAST 12 MONTHS, WAS THERE A TIME WHEN YOU DID NOT HAVE A STEADY PLACE TO SLEEP OR SLEPT IN A SHELTER (INCLUDING NOW)?: NO

## 2023-02-03 SDOH — ECONOMIC STABILITY: FOOD INSECURITY: WITHIN THE PAST 12 MONTHS, YOU WORRIED THAT YOUR FOOD WOULD RUN OUT BEFORE YOU GOT MONEY TO BUY MORE.: NEVER TRUE

## 2023-02-03 SDOH — ECONOMIC STABILITY: INCOME INSECURITY: IN THE LAST 12 MONTHS, WAS THERE A TIME WHEN YOU WERE NOT ABLE TO PAY THE MORTGAGE OR RENT ON TIME?: NO

## 2023-02-03 ASSESSMENT — PATIENT HEALTH QUESTIONNAIRE - PHQ9
SUM OF ALL RESPONSES TO PHQ QUESTIONS 1-9: 0
2. FEELING DOWN, DEPRESSED OR HOPELESS: 0
SUM OF ALL RESPONSES TO PHQ9 QUESTIONS 1 & 2: 0
SUM OF ALL RESPONSES TO PHQ QUESTIONS 1-9: 0
1. LITTLE INTEREST OR PLEASURE IN DOING THINGS: 0

## 2023-02-03 ASSESSMENT — LIFESTYLE VARIABLES: HOW OFTEN DO YOU HAVE A DRINK CONTAINING ALCOHOL: NEVER

## 2023-02-03 ASSESSMENT — SOCIAL DETERMINANTS OF HEALTH (SDOH): HOW HARD IS IT FOR YOU TO PAY FOR THE VERY BASICS LIKE FOOD, HOUSING, MEDICAL CARE, AND HEATING?: NOT HARD AT ALL

## 2023-02-03 NOTE — PROGRESS NOTES
Attending Physician Statement    S:   Chief Complaint   Patient presents with    Check-Up      Patient is a 79year old male here for follow up of hypertension. He takes all his medications. Had a different appointment today and blood pressure was controlled there. Blood pressures at home are systolic 928-189Q. No chest pain, shortness of breath, palpitations. O: Blood pressure (!) 161/74, pulse 60, temperature 98.9 °F (37.2 °C), temperature source Temporal, resp. rate 22, height 5' 11\" (1.803 m), weight 193 lb (87.5 kg), SpO2 99 %. Exam:   Heart - RRR   Lungs - clear     A: Hypertension   P:  To keep bp log    Add hctz as needed. Follow-up as ordered    Attending Attestation   I have discussed the case, including pertinent history and exam findings with the resident. I agree with the documented assessment and plan.

## 2023-02-03 NOTE — PROGRESS NOTES
[facility]  FAMILY MEDICINE RESIDENCY PROGRAM  DATE OF VISIT : 2/3/2023    Patient : Leydi Cooper   Age : 79 y.o.  : 1955   MRN : [id number]   ______________________________________________________________________    Chief Complaint :   Chief Complaint   Patient presents with    Check-Up         HPI : Leydi Cooper is 79 y.o. male who presented to the clinic today for HTN. On Lisinopril 40 mg daily and amlodipine 10 mg. Uncontrolled in the office, but he states that he was at a previous appointment today and . Compliance with treatment has beenexcellent. He is not exercising and is not adherent to a low-salt diet. Blood pressure is well controlled at home. Patient denies: chest pain, dyspnea, and syncope. Cardiovascular risk factors: none. History of target organ damage: stroke. Patient's medications, allergies, past medical, surgical, social and family histories were reviewed and updated as appropriate. Past Medical History :  Past Medical History:   Diagnosis Date    Cancer ([address] PROSTATE    , patient reports bone mets    Erectile dysfunction     Hyperlipidemia     Hypertension     Osteoarthritis, hip/pelvic region and thigh 3/29/2013    Prostate cancer ([facility] [address] 2013     Past Surgical History:   Procedure Laterality Date    BACK SURGERY      HIP SURGERY Right     LEG SURGERY      LUMBAR SPINE SURGERY  10/30/2017    PLIF L2 - S1   and laminectomy    PROSTATE BIOPSY         Allergies :    Allergies   Allergen Reactions    Ibuprofen Swelling    Benzocaine      EDEMA        Medication List :    Current Outpatient Medications   Medication Sig Dispense Refill    MUCUS RELIEF 600 MG extended release tablet take 1 tablet by mouth twice a day 30 tablet 2    famotidine (PEPCID) 20 MG tablet Take 1 tablet by mouth daily 60 tablet 3    atorvastatin (LIPITOR) 80 MG tablet Take 1 tablet by mouth nightly 30 tablet 3    allopurinol (ZYLOPRIM) 100 MG tablet Take 1 tablet by mouth daily Please start once flare up resolved. 90 tablet 1    clopidogrel (PLAVIX) 75 MG tablet Take 1 tablet by mouth daily 30 tablet 3    aspirin 81 MG chewable tablet Take 1 tablet by mouth daily 30 tablet 3    lisinopril (PRINIVIL;ZESTRIL) 20 MG tablet Take 2 tablets by mouth daily 60 tablet 3    amLODIPine (NORVASC) 10 MG tablet take 1 tablet by mouth once daily 90 tablet 2    potassium chloride (KLOR-CON M) 20 MEQ extended release tablet 20 mEq      leuprolide (LUPRON) 11.25 MG injection Inject 11.25 mg into the muscle once      EPINEPHrine (EPIPEN) 0.3 MG/0.3ML SOAJ injection Inject 0.3 mg into the muscle       No current facility-administered medications for this visit. Review of Systems :  Review of Systems  ______________________________________________________________________    Physical Exam :    Vitals: BP (!) 161/74   Pulse 60   Temp 98.9 °F (37.2 °C) (Temporal)   Resp 22   Ht 5' 11\" (1.803 m)   Wt 193 lb (87.5 kg)   SpO2 99%   BMI 26.92 kg/m²   General Appearance: Well developed, awake, alert, oriented, and in NAD  HEENT: NCAT, MMM, no pallor or icterus. Neck: Symmetrical, trachea midline. Chest wall/Lung: CTAB, respirations unlabored. No ronchi/wheezing/rales   Heart: RRR, normal S1 and S2, no murmurs, rubs or gallops. Abdomen: SNTND  Extremities: Extremities normal, atraumatic, no cyanosis, clubbing or  edema. Skin: Skin color, texture, turgor normal, no rashes or lesions  Musculokeletal: ROM grossly normal in all joints of extremities, no obvious joint swelling. Lymphnodes: No lymph node enlargement appreciated  Neurologic: Alert&Oriented x3. No focal motor deficits detected. Psychiatric: Normal mood. Normal affect. Normal behavior.    ______________________________________________________________________    Assessment & Plan :    1. Essential hypertension  - Continue lisinopril 40 mg daily and amlodipine 10 mg daily   - Ambulatory bp checks  - If continues to be high, start HCTZ 12.5 mg     2. HM   - Previously referred to [address] [place] [address] for colonoscopy referral but he did not return their call. He states that he liliana follow up with theme when he completes OT. Return to Office: Return in about 4 weeks (around 3/3/2023) for HTN.     Renee Doyle MD  Case discussed with Dr. Yasir Smith

## 2023-02-03 NOTE — TELEPHONE ENCOUNTER
Last Appointment:  1/3/2023  Future Appointments  2/3/2023   1:20 PM    MD Kaylee London [facility]  7/11/2023  9:00 AM    CARLOS Broussard         Neurology -

## 2023-02-09 ENCOUNTER — HOSPITAL ENCOUNTER (OUTPATIENT)
Dept: OCCUPATIONAL THERAPY | Age: 68
Setting detail: THERAPIES SERIES
Discharge: HOME | End: 2023-02-09
Payer: COMMERCIAL

## 2023-02-09 PROCEDURE — 97530 THERAPEUTIC ACTIVITIES: CPT | Performed by: OCCUPATIONAL THERAPIST

## 2023-02-09 PROCEDURE — 97110 THERAPEUTIC EXERCISES: CPT | Performed by: OCCUPATIONAL THERAPIST

## 2023-02-09 PROCEDURE — 97140 MANUAL THERAPY 1/> REGIONS: CPT | Performed by: OCCUPATIONAL THERAPIST

## 2023-02-09 NOTE — PROGRESS NOTES
OCCUPATIONAL THERAPY PROGRESS NOTE    Date:  2023  Initial Evaluation Date: 23      Patient Name:  Katarina Biswas    :  1955    Restrictions/Precautions:  HTN; moderate fall risk  Diagnosis:  I63.9 (ICD-10-CM) - Acute cerebrovascular accident (CVA) ([facility] [address] M65.319 (ICD-10-CM) - Trigger finger of thumb, unspecified laterality                                                 Date of Surgery/Injury:      Insurance/Certification information:  [employer] [place] Medicaid - REF #[id number] 64 units approved (16 visits) for CPT codes [id number], [id number], [address], , [id number], [id number], [phone], [id number] until 2023  Plan of care signed (Y/N): N  Visit# / total visits:      Referring Practitioner:  Rissa Castorena MD  Specific Practitioner Orders: Evaluate and Treat     Assessment of current deficits   [x] Functional mobility             [x] ADLs           [x] Strength                  [x] Cognition   [x] Functional transfers           [x] IADLs          [x] Safety Awareness  [x] Endurance   [x] Fine Motor Coordination    [x] Balance      [x] Vision/perception    [x] Sensation     [] Gross Motor Coordination [x] ROM           [x] Pain                        [x] Edema          [] Scar Adhesion/Skin Integrity      OT PLAN OF CARE   OT POC based on physician orders, patient diagnosis and results of clinical assessment     Frequency/Duration: 2x/week for 16 visits                             Certification period From: 2023  To: 2023  Specific OT Treatment to include: CPT Codes: 66097, 68293,67222, 25048, 25109, 65040     [x] Instruction in HEP                   Modalities:  [x] Therapeutic Exercise                 [x] Ultrasound               [] Electrical Stimulation/Attended  [x] PROM/Stretching                    [x] Fluidotherapy          [x]  Paraffin                   [x] AAROM  [x] AROM                 [x] Iontophoresis:   [x] Tendon Glides                                               [] Neuromuscular Re-Ed            [x] ADL/IADL re-training    [x] Therapeutic Activity                  [x] Pain Management with/without modalities PRN                 [x] Manual Therapy                      [x] Splinting                                   [] Scar Management                   []Joint Protection/Training  []Ergonomics                             [x] Joint Mobilization                      [x] Adaptive Equipment Assessment/Training                             [x] Manual Edema Mobilization   [] Myofascial Release                 [x] Energy Conservation/Work Simplification  [x] GM/FM Coordination                [x] Safety retraining/education per  individual diagnosis/goals  [x] Desensitization/Sensory reeducation        Patient Specific Goal: To regain more functional use of L hand and occupational performance for tasks                             GOALS (Long term same as Short term):  1) Patient will demonstrate good understanding of home program (exercises/activities/diagnosis/prognosis/goals) with good accuracy. 2) Patient will demonstrate increased active/passive range of motion of their L UE to [facility] for ADL/IADL completion. 3) Patient will demonstrate increased trigger-free  and pinch of L hand. 4) Patient will demonstrate increased pinch strength of at least 5-10 / 5 pinch pounds of their L hand when triggering has resolved. 5) Patient to report decreased pain in their affected L upper extremity from 10/10 to 3/10 or less with resistive functional use. 6) Increase in fine motor function as evidenced by decreased time to complete 9-hole peg test and/or MRMT test by at least 3-10 seconds. 7) Patient to report 100% compliance with their splint wear, care, and precautions if needed. 8) Patient to demonstrate an increase in sensation by one level of [place] monofilaments throughout L hand.    9) Patient to demonstrate decreased guarding of their affected extremity from 75% to 20% or less. 10) Patient will be knowledgeable of edema control techniques as evident with decreases from moderate to mild/none. 11) Patient will demonstrate improved functional activity tolerance from fair to good for ADL/IADL completion. 12) Patient will decrease QuickDASH score to 25% or less for increased participation in daily functional activities. 13) Patient/caregiver to demonstrate proper follow through of adaptive recommendations/compensatory techniques to increase safe functional ADLs as needed. 14) Patient will maintain visual attention 3/5 trials when tracking visual stimulus from upper to lower left visual quadrant to improve safety for functional mobility. 15. Patient will recall 4/5 items accurately with 5-minute delay to improve working memory for successful task completion. 16) Patient will maintain balance 4/5 trials when reaching for mod challenge outside base of support to improve dynamic sitting balance for ADL completion. 17) Patient will verbalize at least two techniques to maximize respiration to improve endurance for functional activities. Pain Level: mild pain and soreness over R trigger finger    Subjective: \"I notice that since my stroke my reaction time is not as good. \"     Objective:  Updated POC to be completed by 10th visit.     INTERVENTION: COMPLETED: SPECIFICS/COMMENTS:   Modality:     Ice x -applies to L hand to decrease inflammation around the trigger finger intermittently throughout the session        AROM:               Cognition:     Memory X      x -visual memory-peg patterns-75% accuracy with 5 pegs, 4 colors    -auditory memory-peg patterns-90% accuracy with 4 pegs, 4 colors     Reaction time X      x -sorting deck of cards by suit- 6:41 mins to complete    -sorting cards in numerical order     Directions x -giving positional directions of a pattern in small peg board-moderate difficulty    Recall X      X    x -common places-grocery stores, fast food restaurants    -orientation-4/4    -recall of places based on location given (pharmacy on corner [address])     PROM/Stretching:     L CMC thumb x -all planes with stabilization of the MP/IP        Scar Mass/Edema Control:     Soft tissue massage x -over trigger finger to decrease triggering and break up tissue causing triggering        Strengthening:               Other:     HEP x -reviewed HEP and splinting instructions          Assessment/Comments: Pt is making Fair + progress toward stated plan of care. Pt presents this date with trigger finger splint donned incorrectly, triggering continues with splint donned improperly. Pt reeducated on splint wearing and educated on why the splint should be worn this way to prevent triggering. Pt seemed to understand instructions and reasoning. Pt tolerated session well with soreness reported with massage over trigger finger of the thumb. Ice applied intermittently throughout the session to the R hand while LUE participated in cognitive tasks. Pt demonstrates difficulty with recall of common places where he lives and the location of them-will continue to increase recall activities in order to promote safety and independence with using public transportation.     -Rehab Potential: Good  -Requires OT Follow Up: Yes    Time In: 9:00a            Time Out: 10:00a             Visit #: 1     *Codes below are the only approved codes-64 units, through 5/25/23*    CODE   Minutes  Units   34457 Fluidotherapy-       03725 Manual- 20  1   11701 Therapeutic Ex-  10  1   49612 Therapeutic Activity- 30 2   17740 Neuromuscular Re-Ed-       90017 Paraffin-       69648 Iontophoresis-       35532 Ultrasound-           60 4          -Response to Treatment: tolerated session well  Goals: Goals for pt can be see on initial eval    Plan:   [x]  Continues Plan of care: Treatment covered based on POC and graduated to patient's progress.  Pt education continues at each visit to obtain maximum benefits from skilled OT intervention. []  Alter Plan of care:   []  Discharge:       [facility], OTR/L #[id number]

## 2023-02-14 ENCOUNTER — TELEPHONE (OUTPATIENT)
Dept: FAMILY MEDICINE CLINIC | Age: 68
End: 2023-02-14

## 2023-02-14 ENCOUNTER — HOSPITAL ENCOUNTER (OUTPATIENT)
Dept: OCCUPATIONAL THERAPY | Age: 68
Setting detail: THERAPIES SERIES
Discharge: HOME | End: 2023-02-14
Payer: COMMERCIAL

## 2023-02-14 ENCOUNTER — HOSPITAL ENCOUNTER (OUTPATIENT)
Dept: PHYSICAL THERAPY | Age: 68
Setting detail: THERAPIES SERIES
Discharge: HOME | End: 2023-02-14
Payer: COMMERCIAL

## 2023-02-14 PROCEDURE — 97110 THERAPEUTIC EXERCISES: CPT

## 2023-02-14 PROCEDURE — 97530 THERAPEUTIC ACTIVITIES: CPT

## 2023-02-14 PROCEDURE — 97033 APP MDLTY 1+IONTPHRSIS EA 15: CPT

## 2023-02-14 PROCEDURE — 97140 MANUAL THERAPY 1/> REGIONS: CPT

## 2023-02-14 NOTE — PROGRESS NOTES
[facility]  Phone: [phone] Fax: [phone]       Physical Therapy Daily Treatment Note  Date:  2023    Patient Name:  Jamal Rodriguez    :  1955  MRN: [id number]    Restrictions/Precautions:    Diagnosis: Acute CVA, Mobility Issues Related to CVA  Treatment Diagnosis:    Insurance/Certification information: [employer] (eval + 10 thru 23)  Referring Physician: Shaila Young of [facility] signed (Y/N):    Visit# / total visits:    Pain level: /10  Time In: 13:05       Time Out: 14:00         Subjective:  Pt states he has not performed HEP since he was here a few months ago d/t being busy every day with doctor appointments. Exercises:  Exercise/Equipment Resistance/Repetitions Other comments   Bike   5 min    Hip/knee ext to neutral passive    Heel slides  10 x B    LTR 10 x B    Hip flex w/knees flexed 19 x B alt    SAQ 10 x B           LAQ 10 x B    Sit to stand from 24\" box  10 x                                                                               Other Therapeutic Activities:      Home Exercise Program:      Manual Treatments:      Modalities:      Timed Code Treatment Minutes:  45    Total Treatment Minutes:  55    Treatment/Activity Tolerance:  [x] Patient tolerated treatment well [] Patient limited by fatigue  [] Patient limited by pain  [] Patient limited by other medical complications  [x] Other: Good tolerance for LE stretching and light strengthening exercises.      Plan:   [x] Continue per plan of care [] Alter current plan (see comments)  [] Plan of care initiated [] Hold pending MD visit [] Discharge  Plan for Next Session:         Treatment Charges: Mins Units   Initial Evaluation     Re-Evaluation     Ther Exercise         TE 45 3   Manual Therapy     MT     Ther Activities        TA     Gait Training          GT     Neuro Re-education NR     Modalities     Non-Billable Service Time     Other 10    Total Time/Units 55 3 Electronically signed by:   Rebecca Jordan

## 2023-02-14 NOTE — TELEPHONE ENCOUNTER
The nurse at the [facility] office called to see if we can get information on the Plavix  She wants to know how long the patient will be on Plavix  If it is indefinite time frame , can you write on a script pad,   Patient is on Plavix for an indefinite time    And [facility]  [phone]    [facility], and they need to know the duration of this medication so they know how long to authorize this non formulary drug. If its indefinite, they need to ask for that on the prior auth.     Any questions, Richelle Course number is [phone] ext [phone]    Thank you

## 2023-02-14 NOTE — TELEPHONE ENCOUNTER
Spoke with patient , advised him to stop Plavix , he verbalized understanding. Called Yasir Bacrlay Message left on patient's voice mail requesting return call.

## 2023-02-14 NOTE — PROGRESS NOTES
OCCUPATIONAL THERAPY PROGRESS NOTE    Date:  2023  Initial Evaluation Date: 23      Patient Name:  Gita Ruiz    :  1955    Restrictions/Precautions:  HTN; moderate fall risk  Diagnosis:  I63.9 (ICD-10-CM) - Acute cerebrovascular accident (CVA) ([facility] [address] M65.319 (ICD-10-CM) - Trigger finger of thumb, unspecified laterality                                                 Date of Surgery/Injury:      Insurance/Certification information:  [employer] [place] Medicaid - REF #[id number] 64 units approved (16 visits) for CPT codes 51-16-34-25, [address], [address] [place] [address], [id number], [id number], [phone], [id number] until 2023  Plan of care signed (Y/N): N  Visit# / total visits:      Referring Practitioner:  Erick Vasquez MD  Specific Practitioner Orders: Evaluate and Treat     Assessment of current deficits   [x] Functional mobility             [x] ADLs           [x] Strength                  [x] Cognition   [x] Functional transfers           [x] IADLs          [x] Safety Awareness  [x] Endurance   [x] Fine Motor Coordination    [x] Balance      [x] Vision/perception    [x] Sensation     [] Gross Motor Coordination [x] ROM           [x] Pain                        [x] Edema          [] Scar Adhesion/Skin Integrity      OT PLAN OF CARE   OT POC based on physician orders, patient diagnosis and results of clinical assessment     Frequency/Duration: 2x/week for 16 visits                             Certification period From: 2023  To: 2023  Specific OT Treatment to include: CPT Codes: 28731, 95033,20517, 49203, 88077, 29402     [x] Instruction in HEP                   Modalities:  [x] Therapeutic Exercise                 [x] Ultrasound               [] Electrical Stimulation/Attended  [x] PROM/Stretching                    [x] Fluidotherapy          [x]  Paraffin                   [x] AAROM  [x] AROM                 [x] Iontophoresis:   [x] Tendon Glides                                               [] Neuromuscular Re-Ed            [x] ADL/IADL re-training    [x] Therapeutic Activity                  [x] Pain Management with/without modalities PRN                 [x] Manual Therapy                      [x] Splinting                                   [] Scar Management                   []Joint Protection/Training  []Ergonomics                             [x] Joint Mobilization                      [x] Adaptive Equipment Assessment/Training                             [x] Manual Edema Mobilization   [] Myofascial Release                 [x] Energy Conservation/Work Simplification  [x] GM/FM Coordination                [x] Safety retraining/education per  individual diagnosis/goals  [x] Desensitization/Sensory reeducation        Patient Specific Goal: To regain more functional use of L hand and occupational performance for tasks                             GOALS (Long term same as Short term):  1) Patient will demonstrate good understanding of home program (exercises/activities/diagnosis/prognosis/goals) with good accuracy. 2) Patient will demonstrate increased active/passive range of motion of their L UE to [facility] for ADL/IADL completion. 3) Patient will demonstrate increased trigger-free  and pinch of L hand. 4) Patient will demonstrate increased pinch strength of at least 5-10 / 5 pinch pounds of their L hand when triggering has resolved. 5) Patient to report decreased pain in their affected L upper extremity from 10/10 to 3/10 or less with resistive functional use. 6) Increase in fine motor function as evidenced by decreased time to complete 9-hole peg test and/or MRMT test by at least 3-10 seconds. 7) Patient to report 100% compliance with their splint wear, care, and precautions if needed. 8) Patient to demonstrate an increase in sensation by one level of [place] monofilaments throughout L hand.    9) Patient to demonstrate decreased guarding of their affected extremity from 75% to 20% or less. 10) Patient will be knowledgeable of edema control techniques as evident with decreases from moderate to mild/none. 11) Patient will demonstrate improved functional activity tolerance from fair to good for ADL/IADL completion. 12) Patient will decrease QuickDASH score to 25% or less for increased participation in daily functional activities. 13) Patient/caregiver to demonstrate proper follow through of adaptive recommendations/compensatory techniques to increase safe functional ADLs as needed. 14) Patient will maintain visual attention 3/5 trials when tracking visual stimulus from upper to lower left visual quadrant to improve safety for functional mobility. 15. Patient will recall 4/5 items accurately with 5-minute delay to improve working memory for successful task completion. 16) Patient will maintain balance 4/5 trials when reaching for mod challenge outside base of support to improve dynamic sitting balance for ADL completion. 17) Patient will verbalize at least two techniques to maximize respiration to improve endurance for functional activities. Pain Level: 8/10 pain and soreness over R trigger finger    Subjective: \"My thumb hurts so bad today. If I were to take this splint off, I would have 10/10 pain. The base of my thumb hurts also. \"     Objective:  Updated POC to be completed by 10th visit.     INTERVENTION: COMPLETED: SPECIFICS/COMMENTS:   Modality:     Ice  -applies to L hand to decrease inflammation around the trigger finger intermittently throughout the session   Iontophoresis X To L hand thumb A2 pulley - diluted acetic acid to reduce inflammation/pain  Dosage: 60  Intensity: 2.5  Time: 23 min   AROM:     L Hand X    X -CMC radial/palmar abduction AROM x10 reps each x3 sec holds  -With splint donned: palmar/radial adduction to grasp/release functional objects x10        Cognition/Visual:     Visual Attention X -Word search: 15 min x5 words   Memory        -visual memory-peg patterns-75% accuracy with 5 pegs, 4 colors  -auditory memory-peg patterns-90% accuracy with 4 pegs, 4 colors   Reaction time      -sorting deck of cards by suit- 6:41 mins to complete  -sorting cards in numerical order   Directions  -giving positional directions of a pattern in small peg board-moderate difficulty    Recall            -common places-grocery stores, fast food restaurants  -orientation-4/4  -recall of places based on location given (pharmacy on [address]   PROM/Stretching:     L thumb X    X -CMC all planes with stabilization of the MP/IP  -MP/IP flex/extension        Scar Mass/Edema Control:     Soft tissue massage  -over trigger finger to decrease triggering and break up tissue causing triggering   Manual edema mobilization X To L UE through digits with mobilization to elbow   Strengthening:               Other:     HEP x -reviewed HEP and splinting instructions   Maximize Respiration X -Return demonstration for 3 exercises to maximize respiration: diaphragmatic breathing, pursed lip breathing, lazy 8 breathing     Assessment/Comments: Pt is making Fair + progress toward stated plan of care. Today's session addressed pain/triggering of L hand trigger thumb. Pt had good tolerance to iontophoresis. AROM of CMC joint incorporated to address reported soreness. Pt demonstrated fair performance for visual attention challenge presented by word search. Two strategies provided to aid search by therapist resulted in discovery of one additional word.  Next session, continue to challenge cognitive/visual challenges while addressing trigger thumb symptoms.    -Rehab Potential: Good  -Requires OT Follow Up: Yes    Time In: 2:00 pm            Time Out: 3:00 pm             Visit #: 2     *Codes below are the only approved codes-64 units, through 5/25/23*    CODE   Minutes  Units   49940 Fluidotherapy-       48729 Manual- 10 1    26129 Therapeutic Ex- 10 1    70918 Therapeutic Activity- 17 1 69710 Neuromuscular Re-Ed-       06228 Paraffin-       31557 Iontophoresis-  23 1    58958 Ultrasound-           60 4       -Response to Treatment: Pt verbalized reduction in pain in L hand thumb. Goals: Goals for pt can be see on initial eval    Plan:   [x]  Continues Plan of care: Treatment covered based on POC and graduated to patient's progress. Pt education continues at each visit to obtain maximum benefits from skilled OT intervention.   []  Alter Plan of care:   []  Discharge:      Tracy Champion, OTR/L #[id number]

## 2023-02-16 ENCOUNTER — HOSPITAL ENCOUNTER (OUTPATIENT)
Dept: OCCUPATIONAL THERAPY | Age: 68
Setting detail: THERAPIES SERIES
Discharge: HOME | End: 2023-02-16
Payer: COMMERCIAL

## 2023-02-16 ENCOUNTER — HOSPITAL ENCOUNTER (OUTPATIENT)
Dept: PHYSICAL THERAPY | Age: 68
Setting detail: THERAPIES SERIES
Discharge: HOME | End: 2023-02-16
Payer: COMMERCIAL

## 2023-02-16 PROCEDURE — 97033 APP MDLTY 1+IONTPHRSIS EA 15: CPT

## 2023-02-16 PROCEDURE — 97110 THERAPEUTIC EXERCISES: CPT

## 2023-02-16 PROCEDURE — 97530 THERAPEUTIC ACTIVITIES: CPT

## 2023-02-16 NOTE — PROGRESS NOTES
OCCUPATIONAL THERAPY PROGRESS NOTE    Date:  2023  Initial Evaluation Date: 23      Patient Name:  Kiesha Bowers    :  1955    Restrictions/Precautions:  HTN; moderate fall risk  Diagnosis:  I63.9 (ICD-10-CM) - Acute cerebrovascular accident (CVA) ([facility] [address] M65.319 (ICD-10-CM) - Trigger finger of thumb, unspecified laterality                                                 Date of Surgery/Injury:      Insurance/Certification information:  [facility] Medicaid - REF #[id number] 64 units approved (16 visits) for CPT codes 51-16-34-25, [address], [address] [place] [address], [id number], [id number], [phone], [phone] until 2023  Plan of care signed (Y/N): N  Visit# / total visits: 3/ 16     Referring Practitioner:  Raad Wong MD  Specific Practitioner Orders: Evaluate and Treat     Assessment of current deficits   [x] Functional mobility             [x] ADLs           [x] Strength                  [x] Cognition   [x] Functional transfers           [x] IADLs          [x] Safety Awareness  [x] Endurance   [x] Fine Motor Coordination    [x] Balance      [x] Vision/perception    [x] Sensation     [] Gross Motor Coordination [x] ROM           [x] Pain                        [x] Edema          [] Scar Adhesion/Skin Integrity      OT PLAN OF CARE   OT POC based on physician orders, patient diagnosis and results of clinical assessment     Frequency/Duration: 2x/week for 16 visits                             Certification period From: 2023  To: 2023  Specific OT Treatment to include: CPT Codes: 91186, 18790,81515, 38946, 65839, 83781     [x] Instruction in HEP                   Modalities:  [x] Therapeutic Exercise                 [x] Ultrasound               [] Electrical Stimulation/Attended  [x] PROM/Stretching                    [x] Fluidotherapy          [x]  Paraffin                   [x] AAROM  [x] AROM                 [x] Iontophoresis:   [x] Tendon Glides                                               [] Neuromuscular Re-Ed            [x] ADL/IADL re-training    [x] Therapeutic Activity                  [x] Pain Management with/without modalities PRN                 [x] Manual Therapy                      [x] Splinting                                   [] Scar Management                   []Joint Protection/Training  []Ergonomics                             [x] Joint Mobilization                      [x] Adaptive Equipment Assessment/Training                             [x] Manual Edema Mobilization   [] Myofascial Release                 [x] Energy Conservation/Work Simplification  [x] GM/FM Coordination                [x] Safety retraining/education per  individual diagnosis/goals  [x] Desensitization/Sensory reeducation        Patient Specific Goal: To regain more functional use of L hand and occupational performance for tasks                             GOALS (Long term same as Short term):  1) Patient will demonstrate good understanding of home program (exercises/activities/diagnosis/prognosis/goals) with good accuracy. 2) Patient will demonstrate increased active/passive range of motion of their L UE to [facility] for ADL/IADL completion. 3) Patient will demonstrate increased trigger-free  and pinch of L hand. 4) Patient will demonstrate increased pinch strength of at least 5-10 / 5 pinch pounds of their L hand when triggering has resolved. 5) Patient to report decreased pain in their affected L upper extremity from 10/10 to 3/10 or less with resistive functional use. 6) Increase in fine motor function as evidenced by decreased time to complete 9-hole peg test and/or MRMT test by at least 3-10 seconds. 7) Patient to report 100% compliance with their splint wear, care, and precautions if needed. 8) Patient to demonstrate an increase in sensation by one level of [place] monofilaments throughout L hand.    9) Patient to demonstrate decreased guarding of their affected extremity from 75% to 20% or less. 10) Patient will be knowledgeable of edema control techniques as evident with decreases from moderate to mild/none. 11) Patient will demonstrate improved functional activity tolerance from fair to good for ADL/IADL completion. 12) Patient will decrease QuickDASH score to 25% or less for increased participation in daily functional activities. 13) Patient/caregiver to demonstrate proper follow through of adaptive recommendations/compensatory techniques to increase safe functional ADLs as needed. 14) Patient will maintain visual attention 3/5 trials when tracking visual stimulus from upper to lower left visual quadrant to improve safety for functional mobility. 15. Patient will recall 4/5 items accurately with 5-minute delay to improve working memory for successful task completion. 16) Patient will maintain balance 4/5 trials when reaching for mod challenge outside base of support to improve dynamic sitting balance for ADL completion. 17) Patient will verbalize at least two techniques to maximize respiration to improve endurance for functional activities. Pain Level: 8/10 pain and soreness over L trigger finger    Subjective: Pt stated, \"My thumb is starting to trigger down further now. \" Pt also reported that he has bumps under his R eye that itch. \"     Objective:  Updated POC to be completed by 10th visit.     INTERVENTION: COMPLETED: SPECIFICS/COMMENTS:   Modality:     Ice  -applies to L hand to decrease inflammation around the trigger finger intermittently throughout the session   Iontophoresis X To L hand thumb superior to A1 and A2 pulley - diluted acetic acid to reduce inflammation/pain  Dosage: 60  Intensity: 2.5 --> 2.1  Time: 23 min --> 26 min   AROM:     L Hand           X    X -CMC radial/palmar abduction AROM x10 reps each x3 sec holds  -With splint donned: palmar/radial adduction to grasp/release functional objects x10  -Digital ABD/ADD x5 min with poms + HEP  -Digital extension for each digit x10 reps each + HEP        Cognition/Visual:     Visual Attention X -Word search: 15 min x5 words   Memory        -visual memory-peg patterns-75% accuracy with 5 pegs, 4 colors  -auditory memory-peg patterns-90% accuracy with 4 pegs, 4 colors   Reaction time      -sorting deck of cards by suit- 6:41 mins to complete  -sorting cards in numerical order   Directions  -giving positional directions of a pattern in small peg board-moderate difficulty    Recall            -common places-grocery stores, fast food restaurants  -orientation-4/4  -recall of places based on location given (pharmacy on corner of [address])   PROM/Stretching:     L thumb      -CMC all planes with stabilization of the MP/IP  -MP/IP flex/extension        Scar Mass/Edema Control:     Soft tissue massage  -over trigger finger to decrease triggering and break up tissue causing triggering   Manual edema mobilization  To L UE through digits with mobilization to elbow   Strengthening:               Other:     Splints X -Issued size L comfort cool brace for L hand to be worn AAT except for when using water   HEP x -reviewed HEP and splinting instructions   Maximize Respiration X -Return demonstration for 3 exercises to maximize respiration: diaphragmatic breathing, pursed lip breathing, lazy 8 breathing     Assessment/Comments: Pt is making Fair + progress toward stated plan of care. Therapist counseled pt to seek doctor input on management of rash and that consideration could be made to hydrocortisone cream to reduce irritation. Pt presented to session reporting and demonstrating new triggering of A1 pulley at L thumb. Iontophoresis addressed both sites this date. Comfort Cool brace issued to rest thumb MP and IP joint to decrease inflammation. Use of ice cup for massage at both sites reviewed and encouraged. HEP expanded to included ROM activities to exercise hand that will not provoke triggering of thumb.  Pt completed word search with greater IND this date but no increased ease with use of strategies. Next session continue to progress in all areas as tolerated. -Rehab Potential: Good  -Requires OT Follow Up: Yes    Time In: 2:10 pm            Time Out: 3:03 pm             Visit #: 3     *Codes below are the only approved codes-64 units, through 5/25/23*    CODE   Minutes  Units   25927 Fluidotherapy-       52996 Manual-     76679 Therapeutic Ex- 12 1   66022 Therapeutic Activity- 15 1   68317 Neuromuscular Re-Ed-       77241 Paraffin-       57526 Iontophoresis-  26 2   14723 Ultrasound-           53 4       -Response to Treatment: Pt found the word search challenging. Goals: Goals for pt can be see on initial eval    Plan:   [x]  Continues Plan of care: Treatment covered based on POC and graduated to patient's progress. Pt education continues at each visit to obtain maximum benefits from skilled OT intervention.   []  Alter Plan of care:   []  Discharge:      Tracy Champion OTR/L #[id number]

## 2023-02-16 NOTE — PROGRESS NOTES
[facility]  Phone: [phone] Fax: [phone]       Physical Therapy Daily Treatment Note  Date:  2023    Patient Name:  Alfreda Lee    :  1955  MRN: [id number]    Restrictions/Precautions:    Diagnosis: Acute CVA, Mobility Issues Related to CVA  Treatment Diagnosis:    Insurance/Certification information: [employer] (eval + 10 thru 23)  Referring Physician: Chapo Khalil of care signed (Y/N):    Visit# / total visits:  3/11  Pain level: /10  Time In: 13:00      Time Out: 14:00         Subjective:  Pt c/o pain in LLE today noting his leg \"buckled\" when he was walking to the bus. States no fall as he was using his rollator walker. Advised pt for all ex to tolerance. Exercises:  Exercise/Equipment Resistance/Repetitions Other comments   Bike   10 min    Hip/knee ext to neutral passive    Heel slides  15 x B    LTR 10 x B    Hip flex w/knees flexed 10 x B alt    SAQ 10 x 2 B           LAQ 2# 10 x 2 B    Sit to stand from 24\" box  10 x             Side stepping    3 laps    Step ups fwd nt                                                       Other Therapeutic Activities:      Home Exercise Program:      Manual Treatments:      Modalities:      Timed Code Treatment Minutes:  55    Total Treatment Minutes:  60    Treatment/Activity Tolerance:  [x] Patient tolerated treatment well [] Patient limited by fatigue  [] Patient limited by pain  [] Patient limited by other medical complications  [x] Other: Good tolerance progression of exercises. Pt demonstrates more upright posture in standing.      Plan:   [x] Continue per plan of care [] Alter current plan (see comments)  [] Plan of care initiated [] Hold pending MD visit [] Discharge  Plan for Next Session:         Treatment Charges: Mins Units   Initial Evaluation     Re-Evaluation     Ther Exercise         TE 50 3   Manual Therapy     MT     Ther Activities        TA 5    Gait Training          GT     Neuro Re-education NR     Modalities     Non-Billable Service Time     Other 5    Total Time/Units 60 3     Electronically signed by:   Rebecca Jordan

## 2023-02-21 ENCOUNTER — HOSPITAL ENCOUNTER (OUTPATIENT)
Dept: PHYSICAL THERAPY | Age: 68
Setting detail: THERAPIES SERIES
Discharge: HOME | End: 2023-02-21
Payer: COMMERCIAL

## 2023-02-21 ENCOUNTER — HOSPITAL ENCOUNTER (OUTPATIENT)
Dept: OCCUPATIONAL THERAPY | Age: 68
Setting detail: THERAPIES SERIES
Discharge: HOME | End: 2023-02-21
Payer: COMMERCIAL

## 2023-02-21 PROCEDURE — 97530 THERAPEUTIC ACTIVITIES: CPT | Performed by: OCCUPATIONAL THERAPIST

## 2023-02-21 PROCEDURE — 97110 THERAPEUTIC EXERCISES: CPT | Performed by: OCCUPATIONAL THERAPIST

## 2023-02-21 PROCEDURE — 97110 THERAPEUTIC EXERCISES: CPT

## 2023-02-21 PROCEDURE — 97033 APP MDLTY 1+IONTPHRSIS EA 15: CPT | Performed by: OCCUPATIONAL THERAPIST

## 2023-02-21 NOTE — PROGRESS NOTES
OCCUPATIONAL THERAPY PROGRESS NOTE    Date:  2023  Initial Evaluation Date: 23      Patient Name:  Andrzej Mauricio    :  1955    Restrictions/Precautions:  HTN; moderate fall risk  Diagnosis:  I63.9 (ICD-10-CM) - Acute cerebrovascular accident (CVA) ([facility] [address] M65.319 (ICD-10-CM) - Trigger finger of thumb, unspecified laterality                                                 Date of Surgery/Injury:      Insurance/Certification information:  [facility] Medicaid - REF #[id number] 64 units approved (16 visits) for CPT codes 51-16-34-25, [address], [address] [place] [address], [id number], [id number], [phone], [id number] until 2023  Plan of care signed (Y/N): N  Visit# / total visits:      Referring Practitioner:  Nehemiah Alvarez MD  Specific Practitioner Orders: Evaluate and Treat     Assessment of current deficits   [x] Functional mobility             [x] ADLs           [x] Strength                  [x] Cognition   [x] Functional transfers           [x] IADLs          [x] Safety Awareness  [x] Endurance   [x] Fine Motor Coordination    [x] Balance      [x] Vision/perception    [x] Sensation     [] Gross Motor Coordination [x] ROM           [x] Pain                        [x] Edema          [] Scar Adhesion/Skin Integrity      OT PLAN OF CARE   OT POC based on physician orders, patient diagnosis and results of clinical assessment     Frequency/Duration: 2x/week for 16 visits                             Certification period From: 2023  To: 2023  Specific OT Treatment to include: CPT Codes: 79707, 22836,17644, 38379, 59393, 86345     [x] Instruction in HEP                   Modalities:  [x] Therapeutic Exercise                 [x] Ultrasound               [] Electrical Stimulation/Attended  [x] PROM/Stretching                    [x] Fluidotherapy          [x]  Paraffin                   [x] AAROM  [x] AROM                 [x] Iontophoresis:   [x] Tendon Glides                                               [] Neuromuscular Re-Ed            [x] ADL/IADL re-training    [x] Therapeutic Activity                  [x] Pain Management with/without modalities PRN                 [x] Manual Therapy                      [x] Splinting                                   [] Scar Management                   []Joint Protection/Training  []Ergonomics                             [x] Joint Mobilization                      [x] Adaptive Equipment Assessment/Training                             [x] Manual Edema Mobilization   [] Myofascial Release                 [x] Energy Conservation/Work Simplification  [x] GM/FM Coordination                [x] Safety retraining/education per  individual diagnosis/goals  [x] Desensitization/Sensory reeducation        Patient Specific Goal: To regain more functional use of L hand and occupational performance for tasks                             GOALS (Long term same as Short term):  1) Patient will demonstrate good understanding of home program (exercises/activities/diagnosis/prognosis/goals) with good accuracy. 2) Patient will demonstrate increased active/passive range of motion of their L UE to [facility] for ADL/IADL completion. 3) Patient will demonstrate increased trigger-free  and pinch of L hand. 4) Patient will demonstrate increased pinch strength of at least 5-10 / 5 pinch pounds of their L hand when triggering has resolved. 5) Patient to report decreased pain in their affected L upper extremity from 10/10 to 3/10 or less with resistive functional use. 6) Increase in fine motor function as evidenced by decreased time to complete 9-hole peg test and/or MRMT test by at least 3-10 seconds. 7) Patient to report 100% compliance with their splint wear, care, and precautions if needed. 8) Patient to demonstrate an increase in sensation by one level of [place] monofilaments throughout L hand.    9) Patient to demonstrate decreased guarding of their affected extremity from 75% to 20% or less. 10) Patient will be knowledgeable of edema control techniques as evident with decreases from moderate to mild/none. 11) Patient will demonstrate improved functional activity tolerance from fair to good for ADL/IADL completion. 12) Patient will decrease QuickDASH score to 25% or less for increased participation in daily functional activities. 13) Patient/caregiver to demonstrate proper follow through of adaptive recommendations/compensatory techniques to increase safe functional ADLs as needed. 14) Patient will maintain visual attention 3/5 trials when tracking visual stimulus from upper to lower left visual quadrant to improve safety for functional mobility. 15. Patient will recall 4/5 items accurately with 5-minute delay to improve working memory for successful task completion. 16) Patient will maintain balance 4/5 trials when reaching for mod challenge outside base of support to improve dynamic sitting balance for ADL completion. 17) Patient will verbalize at least two techniques to maximize respiration to improve endurance for functional activities. Pain Level: 8/10 pain and soreness over L trigger finger    Subjective: Pt stated, \"I guess I didn't bring my brain today, these brain games are hard\"     Objective:  Updated POC to be completed by 10th visit.     INTERVENTION: COMPLETED: SPECIFICS/COMMENTS:   Modality:     Ice  -applies to L hand to decrease inflammation around the trigger finger intermittently throughout the session   Iontophoresis X To L hand thumb superior to A1 and A2 pulley - diluted acetic acid to reduce inflammation/pain  Dosage: 60  Intensity: 2.5 --> 2.1  Time: 23 min --> 26 min   AROM:     L Hand        -CMC radial/palmar abduction AROM x10 reps each x3 sec holds  -With splint donned: palmar/radial adduction to grasp/release functional objects x10  -Digital ABD/ADD x5 min with poms + HEP  -Digital extension for each digit x10 reps each + HEP Cognition/Visual:     Visual Attention   x -Word search: 15 min x5 words  -IQ fit game for visual perception   Memory x       -visual memory-peg patterns-75% accuracy with 5 pegs, 4 colors  -auditory memory-peg patterns-90% accuracy with 4 pegs, 4 colors   Reaction time      -sorting deck of cards by suit- 6:41 mins to complete  -sorting cards in numerical order   Directions  -giving positional directions of a pattern in small peg board-moderate difficulty    Recall x    x       -common places-grocery stores, fast food restaurants  -orientation-4/4  -recall of places based on location given (pharmacy on corner [email or web address])   PROM/Stretching:     L thumb      -CMC all planes with stabilization of the MP/IP  -MP/IP flex/extension        Scar Mass/Edema Control:     Soft tissue massage  -over trigger finger to decrease triggering and break up tissue causing triggering   Manual edema mobilization  To L UE through digits with mobilization to elbow   Strengthening:               Other:     Splints X -Issued size L comfort cool brace for L hand to be worn AAT except for when using water   HEP x -reviewed HEP and splinting instructions   Maximize Respiration X -Return demonstration for 3 exercises to maximize respiration: diaphragmatic breathing, pursed lip breathing, lazy 8 breathing     Assessment/Comments: Pt is making Fair + progress toward stated plan of care. Pt presents to therapy this date highly motivated for tasks, moderate confusion this date at start of session-confused about instructions for the hydrocortisone cream (thought it was for his thumb, not the rash on his face). Brace was donned correctly upon arrival. Pt tolerated iontophoresis as well as cognition tasks well with increased orientation and awareness at the end of the session. Will increase as tolerated.        -Rehab Potential: Good  -Requires OT Follow Up: Yes    Time In: 2:00 pm            Time Out: 3:10 pm             Visit #: 4     *Codes below are the only approved codes-64 units, through 5/25/23*    CODE   Minutes  Units   81021 Fluidotherapy-       42583 Manual-     80501 Therapeutic Ex- 12 1   76976 Therapeutic Activity- 30 2   85216 Neuromuscular Re-Ed-       58961 Paraffin-       60547 Iontophoresis-  20 1   71526 Ultrasound-           62 4       -Response to Treatment: Pt found the word search challenging. Goals: Goals for pt can be see on initial eval    Plan:   [x]  Continues Plan of care: Treatment covered based on POC and graduated to patient's progress. Pt education continues at each visit to obtain maximum benefits from skilled OT intervention. []  Alter Plan of care:   []  Discharge:       [address] [facility], OTR/L #[id number]

## 2023-02-21 NOTE — PROGRESS NOTES
[facility]  Phone: [phone] Fax: [phone]       Physical Therapy Daily Treatment Note  Date:  2023    Patient Name:  Mauro Malcolm    :  1955  MRN: [id number]    Restrictions/Precautions:    Diagnosis: Acute CVA, Mobility Issues Related to CVA  Treatment Diagnosis:    Insurance/Certification information: [employer] (eval + 10 thru 23)  Referring Physician: Tamara Santos of [facility] signed (Y/N):    Visit# / total visits:    Pain level: /10  Time In: 13:00      Time Out: 14:00         Subjective:  Pt states he has been sleeping better at night. Exercises:  Exercise/Equipment Resistance/Repetitions Other comments   Bike   10 min    Hip/knee ext to neutral Passive 1 min B    Heel slides  15 x B    LTR 10 x B    Hip flex w/knees flexed 10 x B alt    SAQ 2# 15 x 2 B           LAQ 2# 15 x 2 B    Sit to stand from 24\" box  10 x Facilitate posture            Side stepping    3 laps    Step ups fwd 6\" 5 x B                                                       Other Therapeutic Activities:      Home Exercise Program:      Manual Treatments:      Modalities:      Timed Code Treatment Minutes:  55    Total Treatment Minutes:  60    Treatment/Activity Tolerance:  [x] Patient tolerated treatment well [] Patient limited by fatigue  [] Patient limited by pain  [] Patient limited by other medical complications  [x] Other: Good tolerance for progression of exercises. Pt continues to report the same left hip/LE discomfort however not limiting activity today. Reports he has had problems with his legs all his life. Again encouraged all ex to tolerance.      Plan:   [x] Continue per plan of care [] Alter current plan (see comments)  [] Plan of care initiated [] Hold pending MD visit [] Discharge  Plan for Next Session:         Treatment Charges: Mins Units   Initial Evaluation     Re-Evaluation     Ther Exercise         TE 50 3   Manual Therapy     MT     Ther Activities TA 5 --   Gait Training          GT     Neuro Re-education NR     Modalities     Non-Billable Service Time     Other 5    Total Time/Units 60 3     Electronically signed by:   Rebecca Jordan

## 2023-02-23 ENCOUNTER — HOSPITAL ENCOUNTER (OUTPATIENT)
Dept: PHYSICAL THERAPY | Age: 68
Setting detail: THERAPIES SERIES
Discharge: HOME | End: 2023-02-23
Payer: COMMERCIAL

## 2023-02-23 ENCOUNTER — HOSPITAL ENCOUNTER (OUTPATIENT)
Dept: OCCUPATIONAL THERAPY | Age: 68
Setting detail: THERAPIES SERIES
Discharge: HOME | End: 2023-02-23
Payer: COMMERCIAL

## 2023-02-23 PROCEDURE — 97110 THERAPEUTIC EXERCISES: CPT

## 2023-02-23 PROCEDURE — 97033 APP MDLTY 1+IONTPHRSIS EA 15: CPT

## 2023-02-23 PROCEDURE — 97530 THERAPEUTIC ACTIVITIES: CPT

## 2023-02-23 NOTE — PROGRESS NOTES
OCCUPATIONAL THERAPY PROGRESS NOTE    Date:  2023  Initial Evaluation Date: 23      Patient Name:  Bhupendra Rodriguez    :  1955    Restrictions/Precautions:  HTN; moderate fall risk  Diagnosis:  I63.9 (ICD-10-CM) - Acute cerebrovascular accident (CVA) ([facility] [address] M65.319 (ICD-10-CM) - Trigger finger of thumb, unspecified laterality                                                 Date of Surgery/Injury:      Insurance/Certification information:  [facility] Medicaid - REF #[id number] 64 units approved (16 visits) for CPT codes 51-16-34-25, [address], [address] [place] [address], [id number], [id number], [phone], [phone] until 2023  Plan of care signed (Y/N): N  Visit# / total visits:      Referring Practitioner:  Ashley Raphael MD  Specific Practitioner Orders: Evaluate and Treat     Assessment of current deficits   [x] Functional mobility             [x] ADLs           [x] Strength                  [x] Cognition   [x] Functional transfers           [x] IADLs          [x] Safety Awareness  [x] Endurance   [x] Fine Motor Coordination    [x] Balance      [x] Vision/perception    [x] Sensation     [] Gross Motor Coordination [x] ROM           [x] Pain                        [x] Edema          [] Scar Adhesion/Skin Integrity      OT PLAN OF CARE   OT POC based on physician orders, patient diagnosis and results of clinical assessment     Frequency/Duration: 2x/week for 16 visits                             Certification period From: 2023  To: 2023  Specific OT Treatment to include: CPT Codes: 69190, 87005,62442, 30377, 66843, 28304     [x] Instruction in HEP                   Modalities:  [x] Therapeutic Exercise                 [x] Ultrasound               [] Electrical Stimulation/Attended  [x] PROM/Stretching                    [x] Fluidotherapy          [x]  Paraffin                   [x] AAROM  [x] AROM                 [x] Iontophoresis:   [x] Tendon Glides                                               [] Neuromuscular Re-Ed            [x] ADL/IADL re-training    [x] Therapeutic Activity                  [x] Pain Management with/without modalities PRN                 [x] Manual Therapy                      [x] Splinting                                   [] Scar Management                   []Joint Protection/Training  []Ergonomics                             [x] Joint Mobilization                      [x] Adaptive Equipment Assessment/Training                             [x] Manual Edema Mobilization   [] Myofascial Release                 [x] Energy Conservation/Work Simplification  [x] GM/FM Coordination                [x] Safety retraining/education per  individual diagnosis/goals  [x] Desensitization/Sensory reeducation        Patient Specific Goal: To regain more functional use of L hand and occupational performance for tasks                             GOALS (Long term same as Short term):  1) Patient will demonstrate good understanding of home program (exercises/activities/diagnosis/prognosis/goals) with good accuracy. 2) Patient will demonstrate increased active/passive range of motion of their L UE to [facility] for ADL/IADL completion. 3) Patient will demonstrate increased trigger-free  and pinch of L hand. 4) Patient will demonstrate increased pinch strength of at least 5-10 / 5 pinch pounds of their L hand when triggering has resolved. 5) Patient to report decreased pain in their affected L upper extremity from 10/10 to 3/10 or less with resistive functional use. 6) Increase in fine motor function as evidenced by decreased time to complete 9-hole peg test and/or MRMT test by at least 3-10 seconds. 7) Patient to report 100% compliance with their splint wear, care, and precautions if needed. 8) Patient to demonstrate an increase in sensation by one level of [place]-Florentin monofilaments throughout L hand.    9) Patient to demonstrate decreased guarding of their affected extremity from 75% to 20% or less. 10) Patient will be knowledgeable of edema control techniques as evident with decreases from moderate to mild/none. 11) Patient will demonstrate improved functional activity tolerance from fair to good for ADL/IADL completion. 12) Patient will decrease QuickDASH score to 25% or less for increased participation in daily functional activities. 13) Patient/caregiver to demonstrate proper follow through of adaptive recommendations/compensatory techniques to increase safe functional ADLs as needed. 14) Patient will maintain visual attention 3/5 trials when tracking visual stimulus from upper to lower left visual quadrant to improve safety for functional mobility. 15. Patient will recall 4/5 items accurately with 5-minute delay to improve working memory for successful task completion. 16) Patient will maintain balance 4/5 trials when reaching for mod challenge outside base of support to improve dynamic sitting balance for ADL completion. 17) Patient will verbalize at least two techniques to maximize respiration to improve endurance for functional activities. Pain Level: 8/10 pain and soreness over L trigger finger    Subjective: Pt stated, \"I have a hard time getting my brace tight enough. My thumb still triggers. \"     Objective:  Updated POC to be completed by 10th visit.     INTERVENTION: COMPLETED: SPECIFICS/COMMENTS:   Modality:     Ice  -applies to L hand to decrease inflammation around the trigger finger intermittently throughout the session   Iontophoresis X To L hand thumb superior to A1 and A2 pulley - diluted acetic acid to reduce inflammation/pain  Dosage: 60  Intensity: 2.5   Time: 23 min    AROM:     L Hand                     X -CMC radial/palmar abduction AROM x10 reps each x3 sec holds  -With splint donned: palmar/radial adduction to grasp/release functional objects x10  -Digital ABD/ADD x5 min with poms + HEP  -Digital extension for each digit x10 reps each + HEP  -Tendon glides x5 reps each x6 positions  -Small peg activity with splint donned x15 reps        Cognition/Visual:     Visual Attention     X -Word search: 15 min x5 words  -IQ fit game for visual perception  -Hidden pictures in a picture x 15 min x12 images   Memory        -visual memory-peg patterns-75% accuracy with 5 pegs, 4 colors  -auditory memory-peg patterns-90% accuracy with 4 pegs, 4 colors   Reaction time      -sorting deck of cards by suit- 6:41 mins to complete  -sorting cards in numerical order   Directions  -giving positional directions of a pattern in small peg board-moderate difficulty    Recall            -common places-grocery stores, fast food restaurants  -orientation-4/4  -recall of places based on location given (pharmacy on corner of [address])   PROM/Stretching:     L thumb     X -CMC all planes with stabilization of the MP/IP  -MP/IP flex/extension        Scar Mass/Edema Control:     Soft tissue massage  -over trigger finger to decrease triggering and break up tissue causing triggering   Manual edema mobilization  To L UE through digits with mobilization to elbow   Strengthening:               Other:     Splints       X    X -Issued size L comfort cool brace for L hand to be worn AAT except for when using water  -Pt instructed to wear Oval 8 brace beneath Comfort Cool brace  -Teach and return demonstration of donning/doffing brace x3 with max Vcs and min tactile cues   HEP x -reviewed HEP and splinting instructions   Maximize Respiration X -Return demonstration for 3 exercises to maximize respiration: diaphragmatic breathing, pursed lip breathing, lazy 8 breathing     Assessment/Comments: Pt is making Fair + progress toward stated plan of care. Therapeutic activity to challenge visual attention was downgraded to for greater ease with image divided into quadrants. Pt had no further discoveries. Thorough demonstrations and return demonstrations provided to nilson brace more tightly.  Splinting program revised to include Comfort cool brace and Oval 8 splint on thumb to better prevent triggering at IP joint. Tendon glides incorporated to maintain mobility of L hand digits due to decreased use. Next session, consider fabrication of custom splint to more effectively block CMC and IP flexion of thumb.    -Rehab Potential: Good  -Requires OT Follow Up: Yes    Time In: 2:07 pm            Time Out: 3:01 pm             Visit #: 5     *Codes below are the only approved codes-64 units, through 5/25/23*    CODE   Minutes  Units   57747 Fluidotherapy-       86505 Manual-     60743 Therapeutic Ex- 17 1   14757 Therapeutic Activity- 20 1   42455 Neuromuscular Re-Ed-       94852 Paraffin-       22440 Iontophoresis-  23 2   26799 Ultrasound-           60 4       -Response to Treatment: Pt felt more confident donning splint. Goals: Goals for pt can be see on initial eval    Plan:   [x]  Continues Plan of care: Treatment covered based on POC and graduated to patient's progress. Pt education continues at each visit to obtain maximum benefits from skilled OT intervention.   []  Alter Plan of care:   []  Discharge:      [place] OTR/L #[id number]

## 2023-02-23 NOTE — PROGRESS NOTES
[facility]  Phone: [phone] Fax: [phone]       Physical Therapy Daily Treatment Note  Date:  2023    Patient Name:  Marcelina Clark    :  1955  MRN: [id number]    Restrictions/Precautions:    Diagnosis: Acute CVA, Mobility Issues Related to CVA  Treatment Diagnosis:    Insurance/Certification information: [employer] (eval + 10 thru 23)  Referring Physician: Leslee Bowen of care signed (Y/N):    Visit# / total visits:    Pain level: /10  Time In: 13:30      Time Out: 14:05         Subjective:  Pt arrives late today d/t transportation issue. Exercises:  Exercise/Equipment Resistance/Repetitions Other comments   Bike   10 min    Hip/knee ext to neutral Passive 1 min B nt   Heel slides  15 x B    LTR 10 x B    Hip flex w/knees flexed 10 x B alt    SAQ 2# 15 x  B           LAQ 2# 15 x 2 B    Sit to stand from 24\" box  10 x Facilitate posture            Side stepping    3 laps nt   Step ups fwd 6\" 5 x B nt                                                      Other Therapeutic Activities:      Home Exercise Program:      Manual Treatments:      Modalities:      Timed Code Treatment Minutes:  35    Total Treatment Minutes:  35    Treatment/Activity Tolerance:  [x] Patient tolerated treatment well [] Patient limited by fatigue  [] Patient limited by pain  [] Patient limited by other medical complications  [x] Other: Modified exercise today d/t pt time. Plan:   [x] Continue per plan of care [] Alter current plan (see comments)  [] Plan of care initiated [] Hold pending MD visit [] Discharge  Plan for Next Session:         Treatment Charges: Mins Units   Initial Evaluation     Re-Evaluation     Ther Exercise         TE 35 2   Manual Therapy     MT     Ther Activities        TA     Gait Training          GT     Neuro Re-education NR     Modalities     Non-Billable Service Time     Other     Total Time/Units 35 2     Electronically signed by:   Marisa Amezcua Lacey,LPTA  149

## 2023-02-28 ENCOUNTER — HOSPITAL ENCOUNTER (OUTPATIENT)
Dept: PHYSICAL THERAPY | Age: 68
Setting detail: THERAPIES SERIES
Discharge: HOME | End: 2023-02-28
Payer: COMMERCIAL

## 2023-02-28 ENCOUNTER — HOSPITAL ENCOUNTER (OUTPATIENT)
Dept: OCCUPATIONAL THERAPY | Age: 68
Setting detail: THERAPIES SERIES
Discharge: HOME | End: 2023-02-28
Payer: COMMERCIAL

## 2023-02-28 PROCEDURE — 97033 APP MDLTY 1+IONTPHRSIS EA 15: CPT | Performed by: OCCUPATIONAL THERAPIST

## 2023-02-28 PROCEDURE — 97530 THERAPEUTIC ACTIVITIES: CPT | Performed by: OCCUPATIONAL THERAPIST

## 2023-02-28 PROCEDURE — 97110 THERAPEUTIC EXERCISES: CPT

## 2023-02-28 PROCEDURE — 97110 THERAPEUTIC EXERCISES: CPT | Performed by: OCCUPATIONAL THERAPIST

## 2023-02-28 PROCEDURE — 97530 THERAPEUTIC ACTIVITIES: CPT

## 2023-02-28 NOTE — PROGRESS NOTES
OCCUPATIONAL THERAPY PROGRESS NOTE    Date:  2023  Initial Evaluation Date: 23      Patient Name:  Nicolette Tobin    :  1955    Restrictions/Precautions:  HTN; moderate fall risk  Diagnosis:  I63.9 (ICD-10-CM) - Acute cerebrovascular accident (CVA) ([facility] [address] M65.319 (ICD-10-CM) - Trigger finger of thumb, unspecified laterality                                                 Date of Surgery/Injury:      Insurance/Certification information:  [facility] Medicaid - REF #[id number] 64 units approved (16 visits) for CPT codes 51-16-34-25, [address], [address] [place] [address], [id number], [id number], [id number], [id number] until 2023  Plan of care signed (Y/N): N  Visit# / total visits:      Referring Practitioner:  Tim Carter MD  Specific Practitioner Orders: Evaluate and Treat     Assessment of current deficits   [x] Functional mobility             [x] ADLs           [x] Strength                  [x] Cognition   [x] Functional transfers           [x] IADLs          [x] Safety Awareness  [x] Endurance   [x] Fine Motor Coordination    [x] Balance      [x] Vision/perception    [x] Sensation     [] Gross Motor Coordination [x] ROM           [x] Pain                        [x] Edema          [] Scar Adhesion/Skin Integrity      OT PLAN OF CARE   OT POC based on physician orders, patient diagnosis and results of clinical assessment     Frequency/Duration: 2x/week for 16 visits                             Certification period From: 2023  To: 2023  Specific OT Treatment to include: CPT Codes: 31354, 88969,73404, 27030, 86042, 41811     [x] Instruction in HEP                   Modalities:  [x] Therapeutic Exercise                 [x] Ultrasound               [] Electrical Stimulation/Attended  [x] PROM/Stretching                    [x] Fluidotherapy          [x]  Paraffin                   [x] AAROM  [x] AROM                 [x] Iontophoresis:   [x] Tendon Glides                                               [] Neuromuscular Re-Ed            [x] ADL/IADL re-training    [x] Therapeutic Activity                  [x] Pain Management with/without modalities PRN                 [x] Manual Therapy                      [x] Splinting                                   [] Scar Management                   []Joint Protection/Training  []Ergonomics                             [x] Joint Mobilization                      [x] Adaptive Equipment Assessment/Training                             [x] Manual Edema Mobilization   [] Myofascial Release                 [x] Energy Conservation/Work Simplification  [x] GM/FM Coordination                [x] Safety retraining/education per  individual diagnosis/goals  [x] Desensitization/Sensory reeducation        Patient Specific Goal: To regain more functional use of L hand and occupational performance for tasks                             GOALS (Long term same as Short term):  1) Patient will demonstrate good understanding of home program (exercises/activities/diagnosis/prognosis/goals) with good accuracy. 2) Patient will demonstrate increased active/passive range of motion of their L UE to [facility] for ADL/IADL completion. 3) Patient will demonstrate increased trigger-free  and pinch of L hand. 4) Patient will demonstrate increased pinch strength of at least 5-10 / 5 pinch pounds of their L hand when triggering has resolved. 5) Patient to report decreased pain in their affected L upper extremity from 10/10 to 3/10 or less with resistive functional use. 6) Increase in fine motor function as evidenced by decreased time to complete 9-hole peg test and/or MRMT test by at least 3-10 seconds. 7) Patient to report 100% compliance with their splint wear, care, and precautions if needed. 8) Patient to demonstrate an increase in sensation by one level of [place]-Florentin monofilaments throughout L hand.    9) Patient to demonstrate decreased guarding of their affected extremity from 75% to 20% or less. 10) Patient will be knowledgeable of edema control techniques as evident with decreases from moderate to mild/none. 11) Patient will demonstrate improved functional activity tolerance from fair to good for ADL/IADL completion. 12) Patient will decrease QuickDASH score to 25% or less for increased participation in daily functional activities. 13) Patient/caregiver to demonstrate proper follow through of adaptive recommendations/compensatory techniques to increase safe functional ADLs as needed. 14) Patient will maintain visual attention 3/5 trials when tracking visual stimulus from upper to lower left visual quadrant to improve safety for functional mobility. 15. Patient will recall 4/5 items accurately with 5-minute delay to improve working memory for successful task completion. 16) Patient will maintain balance 4/5 trials when reaching for mod challenge outside base of support to improve dynamic sitting balance for ADL completion. 17) Patient will verbalize at least two techniques to maximize respiration to improve endurance for functional activities. Pain Level: 8/10 pain and soreness over L trigger finger    Subjective: Pt stated, \"I dont wear my brace when I am eating or when I am doing dishes around the house. \"     Objective:  Updated POC to be completed by 10th visit.     INTERVENTION: COMPLETED: SPECIFICS/COMMENTS:   Modality:     Ice  -applies to L hand to decrease inflammation around the trigger finger intermittently throughout the session   Iontophoresis X To L hand thumb superior to A1 and A2 pulley - diluted acetic acid to reduce inflammation/pain  Dosage: 60  Intensity: 2.5   Time: 23 min    AROM:     L Hand                     X -CMC radial/palmar abduction AROM x10 reps each x3 sec holds  -With splint donned: palmar/radial adduction to grasp/release functional objects x10  -Digital ABD/ADD x5 min with poms + HEP  -Digital extension for each digit x10 reps each + HEP  -Tendon glides x5 reps each x6 positions  -Small peg activity with splint donned x15 reps        Cognition/Visual:     Visual Attention   x -Word search: 15 min x5 words  -IQ fit game for visual perception  -Hidden pictures in a picture x 15 min x12 images   Memory        -visual memory-peg patterns-75% accuracy with 5 pegs, 4 colors  -auditory memory-peg patterns-90% accuracy with 4 pegs, 4 colors   Reaction time      -sorting deck of cards by suit- 6:41 mins to complete  -sorting cards in numerical order   Directions x -giving positional directions of a pattern in small peg board-moderate difficulty    Recall x      x     -common places-grocery stores, fast food restaurants  -orientation-4/4  -recall of places based on location given (pharmacy on [address])   PROM/Stretching:     L thumb     X -CMC all planes with stabilization of the MP/IP  -MP/IP flex/extension        Scar Mass/Edema Control:     Soft tissue massage  -over trigger finger to decrease triggering and break up tissue causing triggering   Manual edema mobilization  To L UE through digits with mobilization to elbow   Strengthening:               Other:     Splints       X    X -Issued size L comfort cool brace for L hand to be worn AAT except for when using water  -Pt instructed to wear Oval 8 brace beneath Comfort Cool brace  -Teach and return demonstration of donning/doffing brace x3 with max Vcs and min tactile cues   HEP x -reviewed HEP and splinting instructions   Maximize Respiration X -Return demonstration for 3 exercises to maximize respiration: diaphragmatic breathing, pursed lip breathing, lazy 8 breathing     Assessment/Comments: Pt is making Fair + progress toward stated plan of care. Pt arrives this date highly distracted, difficulty with recall of directions and splinting instructions. Will attempt fabrication of splint next session for better carry over to decrease triggering of the L thumb.  Next session, consider fabrication of custom splint to more effectively block CMC and IP flexion of thumb.    -Rehab Potential: Good  -Requires OT Follow Up: Yes    Time In: 2:00 pm            Time Out: 3:10 pm             Visit #: 6     *Codes below are the only approved codes-64 units, through 5/25/23*    CODE   Minutes  Units   26961 Fluidotherapy-       83493 Manual-     59388 Therapeutic Ex- 10 1   42611 Therapeutic Activity- 30 2   19208 Neuromuscular Re-Ed-       55198 Paraffin-       96872 Iontophoresis-  23 1   23555 Ultrasound-           63 4       -Response to Treatment: Pt felt more confident donning splint. Goals: Goals for pt can be see on initial eval    Plan:   [x]  Continues Plan of care: Treatment covered based on POC and graduated to patient's progress. Pt education continues at each visit to obtain maximum benefits from skilled OT intervention. []  Alter Plan of care:   []  Discharge:       [facility], OTR/L #[id number]

## 2023-02-28 NOTE — PROGRESS NOTES
[facility]  Phone: [phone] Fax: [phone]       Physical Therapy Daily Treatment Note  Date:  2023    Patient Name:  Michael Woody    :  1955  MRN: [id number]    Restrictions/Precautions:    Diagnosis: Acute CVA, Mobility Issues Related to CVA  Treatment Diagnosis:    Insurance/Certification information: [employer] (eval + 10 thru 23)  Referring Physician: Hunter Fear of care signed (Y/N):    Visit# / total visits:    Pain level: /10  Time In: 13:00      Time Out: 14:00         Subjective:  Pt has no new c/o this afternoon. Exercises:  Exercise/Equipment Resistance/Repetitions Other comments   Bike   10 min    Hip/knee ext to neutral Passive 2 min B    Heel slides  15 x B    LTR 15 x B    Hip flex w/knees flexed 10 x B alt    SAQ 2.5# 15 x 2 B           LAQ 2.5# 15 x 2 B    Sit to stand from 24\" box  10 x Facilitate posture            Side stepping    3 laps    Step ups fwd 6\" 10 x B                                                       Other Therapeutic Activities: Pt ambulated 120' using SBQC and SBA. States he uses a SBQC at home. Home Exercise Program:      Manual Treatments:      Modalities:      Timed Code Treatment Minutes:  55    Total Treatment Minutes:  60    Treatment/Activity Tolerance:  [x] Patient tolerated treatment well [] Patient limited by fatigue  [] Patient limited by pain  [] Patient limited by other medical complications  [x] Other:Pt tolerated exercise progression well. He is demonstrating increase in B LE ROM and strength/endurance. Pt is able to stand with more upright posture.       Plan:   [x] Continue per plan of care [] Alter current plan (see comments)  [] Plan of care initiated [] Hold pending MD visit [] Discharge  Plan for Next Session:         Treatment Charges: Mins Units   Initial Evaluation     Re-Evaluation     Ther Exercise         TE 45 3   Manual Therapy     MT     Ther Activities        TA 10 1 Gait Training          GT     Neuro Re-education NR     Modalities     Non-Billable Service Time     Other 5    Total Time/Units 60 4     Electronically signed by:   Rebecca Jordan

## 2023-03-02 ENCOUNTER — HOSPITAL ENCOUNTER (OUTPATIENT)
Dept: OCCUPATIONAL THERAPY | Age: 68
Setting detail: THERAPIES SERIES
Discharge: HOME | End: 2023-03-02
Payer: COMMERCIAL

## 2023-03-02 ENCOUNTER — HOSPITAL ENCOUNTER (OUTPATIENT)
Dept: PHYSICAL THERAPY | Age: 68
Setting detail: THERAPIES SERIES
Discharge: HOME | End: 2023-03-02
Payer: COMMERCIAL

## 2023-03-02 PROCEDURE — 97110 THERAPEUTIC EXERCISES: CPT | Performed by: PHYSICAL THERAPIST

## 2023-03-02 PROCEDURE — 97530 THERAPEUTIC ACTIVITIES: CPT | Performed by: OCCUPATIONAL THERAPIST

## 2023-03-02 PROCEDURE — 97035 APP MDLTY 1+ULTRASOUND EA 15: CPT | Performed by: OCCUPATIONAL THERAPIST

## 2023-03-02 PROCEDURE — 97140 MANUAL THERAPY 1/> REGIONS: CPT | Performed by: OCCUPATIONAL THERAPIST

## 2023-03-02 NOTE — PROGRESS NOTES
[facility]  Phone: [phone] Fax: [phone]       Physical Therapy Daily Treatment Note  Date:  3/2/2023    Patient Name:  Gee Mckeon    :  1955  MRN: [id number]    Restrictions/Precautions:    Diagnosis: Acute CVA, Mobility Issues Related to CVA  Treatment Diagnosis:    Insurance/Certification information: [employer] (eval + 10 thru 23)  Referring Physician: Hosea Duggan of care signed (Y/N):    Visit# / total visits:    Pain level: /10  Time In: 1500     Time Out: 1600         Subjective:  Pt reports no new issues. Exercises:  Exercise/Equipment Resistance/Repetitions Other comments   Bike   10 min    Hip/knee ext to neutral Passive 2 min B    Heel slides  15 x B    LTR 15 x B    Hip flex w/knees flexed 10 x B alt    SAQ 2.5# 15 x 2 B           LAQ 2.5# 15 x 2 B    Sit to stand from 24\" box  10 x Facilitate posture                                                                     Other Therapeutic Activities: Pt ambulated 120' using SBQC and SBA. States he uses a SBQC at home. Home Exercise Program:      Manual Treatments:      Modalities:      Timed Code Treatment Minutes:  45    Total Treatment Minutes:  60    Treatment/Activity Tolerance:  [x] Patient tolerated treatment well [] Patient limited by fatigue  [] Patient limited by pain  [] Patient limited by other medical complications  [x] Other:Pt tolerated exercise. Pt cont to ambulate with rollator.  VCs given for erect sit/stand posturing       Plan:   [x] Continue per plan of care [] Alter current plan (see comments)  [] Plan of care initiated [] Hold pending MD visit [] Discharge  Plan for Next Session:         Treatment Charges: Mins Units   Initial Evaluation     Re-Evaluation     Ther Exercise         TE 45 3   Manual Therapy     MT     Ther Activities        TA     Gait Training          GT     Neuro Re-education NR     Modalities     Non-Billable Service Time 15    Other     Total Time/Units 60 3     Electronically signed by:  Zamzam Masker, PT, DPT

## 2023-03-02 NOTE — PROGRESS NOTES
OCCUPATIONAL THERAPY PROGRESS NOTE    Date:  3/2/2023  Initial Evaluation Date: 23      Patient Name:  Marshal Trevino    :  1955    Restrictions/Precautions:  HTN; moderate fall risk  Diagnosis:  I63.9 (ICD-10-CM) - Acute cerebrovascular accident (CVA) ([facility] [address] M65.319 (ICD-10-CM) - Trigger finger of thumb, unspecified laterality                                                 Date of Surgery/Injury:      Insurance/Certification information:  [facility] Medicaid - REF #[id number] 64 units approved (16 visits) for CPT codes 51-16-34-25, [address], [id number], [address] [place] [address], [id number], [id number], [id number], [id number] until 2023  Plan of care signed (Y/N): N  Visit# / total visits:      Referring Practitioner:  Haylee Lara MD  Specific Practitioner Orders: Evaluate and Treat     Assessment of current deficits   [x] Functional mobility             [x] ADLs           [x] Strength                  [x] Cognition   [x] Functional transfers           [x] IADLs          [x] Safety Awareness  [x] Endurance   [x] Fine Motor Coordination    [x] Balance      [x] Vision/perception    [x] Sensation     [] Gross Motor Coordination [x] ROM           [x] Pain                        [x] Edema          [] Scar Adhesion/Skin Integrity      OT PLAN OF CARE   OT POC based on physician orders, patient diagnosis and results of clinical assessment     Frequency/Duration: 2x/week for 16 visits                             Certification period From: 2023  To: 2023  Specific OT Treatment to include: CPT Codes: 62007, 20253,69221, 41402, 25674, 30927     [x] Instruction in HEP                   Modalities:  [x] Therapeutic Exercise                 [x] Ultrasound               [] Electrical Stimulation/Attended  [x] PROM/Stretching                    [x] Fluidotherapy          [x]  Paraffin                   [x] AAROM  [x] AROM                 [x] Iontophoresis:   [x] Tendon Glides                                               [] Neuromuscular Re-Ed            [x] ADL/IADL re-training    [x] Therapeutic Activity                  [x] Pain Management with/without modalities PRN                 [x] Manual Therapy                      [x] Splinting                                   [] Scar Management                   []Joint Protection/Training  []Ergonomics                             [x] Joint Mobilization                      [x] Adaptive Equipment Assessment/Training                             [x] Manual Edema Mobilization   [] Myofascial Release                 [x] Energy Conservation/Work Simplification  [x] GM/FM Coordination                [x] Safety retraining/education per  individual diagnosis/goals  [x] Desensitization/Sensory reeducation        Patient Specific Goal: To regain more functional use of L hand and occupational performance for tasks                             GOALS (Long term same as Short term):  1) Patient will demonstrate good understanding of home program (exercises/activities/diagnosis/prognosis/goals) with good accuracy.   2) Patient will demonstrate increased active/passive range of motion of their L UE to WFLs for ADL/IADL completion.  3) Patient will demonstrate increased trigger-free  and pinch of L hand.  4) Patient will demonstrate increased pinch strength of at least 5-10 / 5 pinch pounds of their L hand when triggering has resolved.   5) Patient to report decreased pain in their affected L upper extremity from 10/10 to 3/10 or less with resistive functional use.   6) Increase in fine motor function as evidenced by decreased time to complete 9-hole peg test and/or MRMT test by at least 3-10 seconds.   7) Patient to report 100% compliance with their splint wear, care, and precautions if needed.   8) Patient to demonstrate an increase in sensation by one level of [place] monofilaments throughout L hand.   9) Patient to demonstrate decreased guarding of their affected extremity from 75% to  20% or less. 10) Patient will be knowledgeable of edema control techniques as evident with decreases from moderate to mild/none. 11) Patient will demonstrate improved functional activity tolerance from fair to good for ADL/IADL completion. 12) Patient will decrease QuickDASH score to 25% or less for increased participation in daily functional activities. 13) Patient/caregiver to demonstrate proper follow through of adaptive recommendations/compensatory techniques to increase safe functional ADLs as needed. 14) Patient will maintain visual attention 3/5 trials when tracking visual stimulus from upper to lower left visual quadrant to improve safety for functional mobility. 15. Patient will recall 4/5 items accurately with 5-minute delay to improve working memory for successful task completion. 16) Patient will maintain balance 4/5 trials when reaching for mod challenge outside base of support to improve dynamic sitting balance for ADL completion. 17) Patient will verbalize at least two techniques to maximize respiration to improve endurance for functional activities. Pain Level: 8/10 pain and soreness over L trigger finger    Subjective: Pt stated, \"both of my knuckles pop but they are getting better\"     Objective:  Updated POC to be completed by 10th visit.     INTERVENTION: COMPLETED: SPECIFICS/COMMENTS:   Modality:     Ice  -applies to L hand to decrease inflammation around the trigger finger intermittently throughout the session   ultrasound x -for break down scar tissue to decrease triggering at the thumb   Iontophoresis  To L hand thumb superior to A1 and A2 pulley - diluted acetic acid to reduce inflammation/pain  Dosage: 60  Intensity: 2.5   Time: 23 min    AROM:     L Hand x                    X -CMC radial/palmar abduction AROM x10 reps each x3 sec holds  -With splint donned: palmar/radial adduction to grasp/release functional objects x10  -Digital ABD/ADD x5 min with poms + HEP  -Digital extension for each digit x10 reps each + HEP  -Tendon glides x5 reps each x6 positions  -Small peg activity with splint donned x15 reps        Cognition/Visual:     Visual Attention    -Word search: 15 min x5 words  -[email or web address] fit game for visual perception  -Hidden pictures in a picture x 15 min x12 images   Memory        -visual memory-peg patterns-75% accuracy with 5 pegs, 4 colors  -auditory memory-peg patterns-90% accuracy with 4 pegs, 4 colors   Reaction time      -sorting deck of cards by suit- 6:41 mins to complete  -sorting cards in numerical order   Directions  -giving positional directions of a pattern in small peg board-moderate difficulty    Recall x      x     -common places-grocery stores, fast food restaurants  -orientation-4/4  -recall of places based on location given (pharmacy on [address])   PROM/Stretching:     L thumb x    X -CMC all planes with stabilization of the MP/IP  -MP/IP flex/extension        Scar Mass/Edema Control:     Soft tissue massage x -over trigger finger to decrease triggering and break up tissue causing triggering   Manual edema mobilization x To L UE through digits with mobilization to elbow   Strengthening:               Other:     Splints       X    X -Issued size L comfort cool brace for L hand to be worn AAT except for when using water  -Pt instructed to wear Oval 8 brace beneath Comfort Cool brace  -Teach and return demonstration of donning/doffing brace x3 with max Vcs and min tactile cues   HEP x -reviewed HEP and splinting instructions   Maximize Respiration X -Return demonstration for 3 exercises to maximize respiration: diaphragmatic breathing, pursed lip breathing, lazy 8 breathing     Assessment/Comments: Pt is making Fair + progress toward stated plan of care. Increased triggering this date but patient reports decreased pain. Primary focus of session this date to decrease scar tissue and triggering along the thumb at the IP and MP joints.  Pt continues to wear CMC comfort cool only for bracing and states that it helps the IP from triggering but his IP continues to trigger. Inconsistent wear of the bracing as well based on activities he is completing at home.      -Rehab Potential: Good  -Requires OT Follow Up: Yes    Time In: 4:00 pm            Time Out: 5:00 pm             Visit #: 7     *Codes below are the only approved codes-64 units, through 5/25/23*    CODE   Minutes  Units   30972 Fluidotherapy-       64875 Manual- 20 1   79253 Therapeutic Ex-     65469 Therapeutic Activity- 22 1   07635 Neuromuscular Re-Ed-       46096 Paraffin-       77053 Iontophoresis-     66201 Ultrasound-  8  1       50 3       -Response to Treatment: Pt felt more confident donning splint. Goals: Goals for pt can be see on initial eval    Plan:   [x]  Continues Plan of care: Treatment covered based on POC and graduated to patient's progress. Pt education continues at each visit to obtain maximum benefits from skilled OT intervention. []  Alter Plan of care:   []  Discharge:       [facility], OTR/L #[id number]

## 2023-03-03 ENCOUNTER — OFFICE VISIT (OUTPATIENT)
Dept: FAMILY MEDICINE CLINIC | Age: 68
End: 2023-03-03
Payer: COMMERCIAL

## 2023-03-03 VITALS
WEIGHT: 198 LBS | OXYGEN SATURATION: 97 % | HEART RATE: 64 BPM | SYSTOLIC BLOOD PRESSURE: 158 MMHG | TEMPERATURE: 97.3 F | RESPIRATION RATE: 18 BRPM | BODY MASS INDEX: 27.72 KG/M2 | HEIGHT: 71 IN | DIASTOLIC BLOOD PRESSURE: 75 MMHG

## 2023-03-03 DIAGNOSIS — I10 ESSENTIAL HYPERTENSION: Primary | ICD-10-CM

## 2023-03-03 DIAGNOSIS — R25.2 LEG CRAMPING: ICD-10-CM

## 2023-03-03 PROCEDURE — 3074F SYST BP LT 130 MM HG: CPT | Performed by: STUDENT IN AN ORGANIZED HEALTH CARE EDUCATION/TRAINING PROGRAM

## 2023-03-03 PROCEDURE — 99214 OFFICE O/P EST MOD 30 MIN: CPT | Performed by: STUDENT IN AN ORGANIZED HEALTH CARE EDUCATION/TRAINING PROGRAM

## 2023-03-03 PROCEDURE — G8428 CUR MEDS NOT DOCUMENT: HCPCS | Performed by: STUDENT IN AN ORGANIZED HEALTH CARE EDUCATION/TRAINING PROGRAM

## 2023-03-03 PROCEDURE — G8417 CALC BMI ABV UP PARAM F/U: HCPCS | Performed by: STUDENT IN AN ORGANIZED HEALTH CARE EDUCATION/TRAINING PROGRAM

## 2023-03-03 PROCEDURE — G8484 FLU IMMUNIZE NO ADMIN: HCPCS | Performed by: STUDENT IN AN ORGANIZED HEALTH CARE EDUCATION/TRAINING PROGRAM

## 2023-03-03 PROCEDURE — 1036F TOBACCO NON-USER: CPT | Performed by: STUDENT IN AN ORGANIZED HEALTH CARE EDUCATION/TRAINING PROGRAM

## 2023-03-03 PROCEDURE — 3017F COLORECTAL CA SCREEN DOC REV: CPT | Performed by: STUDENT IN AN ORGANIZED HEALTH CARE EDUCATION/TRAINING PROGRAM

## 2023-03-03 PROCEDURE — 3078F DIAST BP <80 MM HG: CPT | Performed by: STUDENT IN AN ORGANIZED HEALTH CARE EDUCATION/TRAINING PROGRAM

## 2023-03-03 PROCEDURE — 1124F ACP DISCUSS-NO DSCNMKR DOCD: CPT | Performed by: STUDENT IN AN ORGANIZED HEALTH CARE EDUCATION/TRAINING PROGRAM

## 2023-03-03 NOTE — PROGRESS NOTES
S: 79 y.o. male presents today for: HTN f/u    Reports being controlled @ home however systolics 247-872C, diastolic 17A. Takes med as described. Rare missed doses. No CP/SOB/ATKINS. +Leg Cramping w/ Exertion relived by rest. No change with back position changes. O: VS: BP (!) 158/75 (Site: Right Upper Arm, Position: Sitting, Cuff Size: Medium Adult)   Pulse 64   Temp 97.3 °F (36.3 °C) (Temporal)   Resp 18   Ht 5' 11\" (1.803 m)   Wt 198 lb (89.8 kg)   SpO2 97%   BMI 27.62 kg/m²     AAO/NAD, appropriate affect for mood  CV:  RRR, no murmur  Resp: CTAB  No LE edema,no calf ttp. 1+ pt/dp pulses bilat. Brisk cap refill. Impression/Plan:   1) HTN - cont lisinopril amlodipine. Add hctz 12.5 po qday. 2) Leg cramping / ? PVD - order abis given hx. Attending Physician Statement  I have discussed the case, including pertinent history and exam findings with the resident. I also have seen the patient and performed key portions of the examination. I agree with the documented assessment and plan.       Christianne Pop MD

## 2023-03-03 NOTE — PROGRESS NOTES
[facility] MEDICINE RESIDENCY PROGRAM  DATE OF VISIT : 3/3/2023    Patient : Isabelle Leon   Age : 79 y.o.  : 1955   MRN : [id number]   ______________________________________________________________________    Chief Complaint :   Chief Complaint   Patient presents with    Hypertension     1 Month Follow Up    Discuss Medications         HPI : Isabelle Leon is 79 y.o. male who presented to the clinic today for HTN. On Lisinopril 40 mg daily and amlodipine 10 mg. Uncontrolled in office. BP logged at home 159/71, 167/75. Compliance with treatment has beenexcellent. He is not exercising and is not adherent to a low-salt diet. Blood pressure is well controlled at home. Patient denies: chest pain, dyspnea, and syncope. Cardiovascular risk factors: none. History of target organ damage: stroke. Bl calf cramping and tightening while walking, relieved with rest. Last lipid panel 2022 cholesterol 157, HDL 51, LDL 93, TG 63. On lipitor 80 mg daily. Denies nicotine usage. Patient's medications, allergies, past medical, surgical, social and family histories were reviewed and updated as appropriate. Past Medical History :  Past Medical History:   Diagnosis Date    Cancer ([address] PROSTATE    , patient reports bone mets    Erectile dysfunction     Hyperlipidemia     Hypertension     Osteoarthritis, hip/pelvic region and thigh 3/29/2013    Prostate cancer ([address] 2013     Past Surgical History:   Procedure Laterality Date    BACK SURGERY      HIP SURGERY Right     LEG SURGERY      LUMBAR SPINE SURGERY  10/30/2017    PLIF L2 - S1   and laminectomy    PROSTATE BIOPSY         Allergies :    Allergies   Allergen Reactions    Ibuprofen Swelling    Benzocaine      EDEMA        Medication List :    Current Outpatient Medications   Medication Sig Dispense Refill    MUCUS RELIEF 600 MG extended release tablet take 1 tablet by mouth twice a day 30 tablet 2    famotidine (PEPCID) 20 MG tablet Take 1 tablet by mouth daily 60 tablet 3    atorvastatin (LIPITOR) 80 MG tablet Take 1 tablet by mouth nightly 30 tablet 3    allopurinol (ZYLOPRIM) 100 MG tablet Take 1 tablet by mouth daily Please start once flare up resolved. 90 tablet 1    clopidogrel (PLAVIX) 75 MG tablet Take 1 tablet by mouth daily 30 tablet 3    aspirin 81 MG chewable tablet Take 1 tablet by mouth daily 30 tablet 3    lisinopril (PRINIVIL;ZESTRIL) 20 MG tablet Take 2 tablets by mouth daily 60 tablet 3    amLODIPine (NORVASC) 10 MG tablet take 1 tablet by mouth once daily 90 tablet 2    potassium chloride (KLOR-CON M) 20 MEQ extended release tablet 20 mEq      leuprolide (LUPRON) 11.25 MG injection Inject 11.25 mg into the muscle once      EPINEPHrine (EPIPEN) 0.3 MG/0.3ML SOAJ injection Inject 0.3 mg into the muscle       No current facility-administered medications for this visit. Review of Systems :  Review of Systems   Constitutional:  Negative for chills and fever. Respiratory:  Negative for cough and shortness of breath. Cardiovascular:  Negative for chest pain, palpitations and leg swelling. Gastrointestinal:  Negative for abdominal pain, constipation, diarrhea, nausea and vomiting. Genitourinary:  Negative for dysuria. Neurological:  Negative for dizziness, weakness, light-headedness and headaches.   ______________________________________________________________________    Physical Exam :    Vitals: BP (!) 173/84 (Site: Right Upper Arm, Position: Sitting, Cuff Size: Medium Adult)   Pulse 64   Temp 97.3 °F (36.3 °C) (Temporal)   Resp 18   Ht 5' 11\" (1.803 m)   Wt 198 lb (89.8 kg)   SpO2 97%   BMI 27.62 kg/m²   General Appearance: Well developed, awake, alert, oriented, and in NAD  HEENT: NCAT, MMM, no pallor or icterus. Neck: Symmetrical, trachea midline. Chest wall/Lung: CTAB, respirations unlabored. No ronchi/wheezing/rales   Heart: RRR, normal S1 and S2, no murmurs, rubs or gallops.    Abdomen: SNTND  Extremities: Extremities normal, atraumatic, no cyanosis, clubbing or  edema. No calf tenderness   Skin: Skin color, texture, turgor normal, no rashes or lesions  Musculokeletal: ROM grossly normal in all joints of extremities, no obvious joint swelling. Lymphnodes: No lymph node enlargement appreciated  Neurologic: Alert&Oriented x3. No focal motor deficits detected. Psychiatric: Normal mood. Normal affect. Normal behavior. ______________________________________________________________________    Assessment & Plan :    1. Essential hypertension  - Uncontrolled   - Continue lisinopril 40 mg daily and amlodipine 10 mg daily   - Start HCTZ 12.5 mg     2. Leg cramping    - may be PVD with extensive cardiac/stroke history  - Lipids controlled on high intensity statin, continue   - Obtain SHIRLEY         Return to Office: Return in about 4 weeks (around 3/31/2023) for HTN.     Leslee Whelan MD  Case discussed with Dr. Mary Lucas

## 2023-03-05 RX ORDER — HYDROCHLOROTHIAZIDE 12.5 MG/1
12.5 TABLET ORAL EVERY MORNING
Qty: 30 TABLET | Refills: 2 | Status: SHIPPED | OUTPATIENT
Start: 2023-03-05

## 2023-03-05 ASSESSMENT — ENCOUNTER SYMPTOMS
VOMITING: 0
DIARRHEA: 0
ABDOMINAL PAIN: 0
SHORTNESS OF BREATH: 0
COUGH: 0
NAUSEA: 0
CONSTIPATION: 0

## 2023-03-07 ENCOUNTER — HOSPITAL ENCOUNTER (OUTPATIENT)
Dept: PHYSICAL THERAPY | Age: 68
Setting detail: THERAPIES SERIES
Discharge: HOME | End: 2023-03-07
Payer: COMMERCIAL

## 2023-03-07 ENCOUNTER — HOSPITAL ENCOUNTER (OUTPATIENT)
Dept: OCCUPATIONAL THERAPY | Age: 68
Setting detail: THERAPIES SERIES
Discharge: HOME | End: 2023-03-07
Payer: COMMERCIAL

## 2023-03-07 PROCEDURE — 97530 THERAPEUTIC ACTIVITIES: CPT

## 2023-03-07 PROCEDURE — 97110 THERAPEUTIC EXERCISES: CPT

## 2023-03-07 NOTE — PROGRESS NOTES
[facility]  Phone: [phone] Fax: [phone]       Physical Therapy Daily Treatment Note  Date:  3/7/2023    Patient Name:  Patricia Bruner    :  1955  MRN: [id number]    Restrictions/Precautions:    Diagnosis: Acute CVA, Mobility Issues Related to CVA  Treatment Diagnosis:    Insurance/Certification information: [employer] (eval + 10 thru 23)  Referring Physician: Tushar Packer of care signed (Y/N):    Visit# / total visits:    Pain level: /10  Time In: 7:45  Time Out: 8:45         Subjective:  Pt states he has an appointment this month to address his left LE pain. Describes pain location in left lateral hip joint. Exercises:  Exercise/Equipment Resistance/Repetitions Other comments   Bike   10 min    Hip/knee ext to neutral Passive 3 min B    Heel slides  15 x B    LTR 15 x B    Hip flex w/knees flexed 15 x B alt    SAQ 2.5# 15 x 2 B           LAQ 2.5# 15 x 2 B    Sit to stand from 24\" box  15 x Facilitate posture            Side stepping    4 laps    Step ups fwd 6\" 10 x B                                                       Other Therapeutic Activities:     Home Exercise Program:  (3/7/23) Instructed pt in LE stretch in supine. Encouraged consistent stretching for carry over. Manual Treatments: Passive stretch B LE's to facilitate hip/knee extension to neutral. (3 min each)      Modalities:      Timed Code Treatment Minutes: 55    Total Treatment Minutes:  60    Treatment/Activity Tolerance:  [x] Patient tolerated treatment well [] Patient limited by fatigue  [] Patient limited by pain  [] Patient limited by other medical complications  [x] Other: Good tolerance for exercise today with rest breaks.      Plan:   [x] Continue per plan of care [] Alter current plan (see comments)  [] Plan of care initiated [] Hold pending MD visit [] Discharge  Plan for Next Session:         Treatment Charges: Mins Units   Initial Evaluation     Re-Evaluation Ther Exercise         TE 50 3   Manual Therapy     MT 5 --   Ther Activities        TA     Gait Training          GT     Neuro Re-education NR     Modalities     Non-Billable Service Time     Other 5    Total Time/Units 60 3     Electronically signed by:   Rebecca Jordan

## 2023-03-07 NOTE — PROGRESS NOTES
OCCUPATIONAL THERAPY PROGRESS NOTE    Date:  3/7/2023  Initial Evaluation Date: 23      Patient Name:  Dewayne Mcdermott    :  1955    Restrictions/Precautions:  HTN; moderate fall risk  Diagnosis:  I63.9 (ICD-10-CM) - Acute cerebrovascular accident (CVA) ([facility] [address] M65.319 (ICD-10-CM) - Trigger finger of thumb, unspecified laterality                                                 Date of Surgery/Injury:      Insurance/Certification information:  [facility] Medicaid - REF #[id number] 64 units approved (16 visits) for CPT codes 51-16-34-25, [address], [address] [place] [address], [id number], [id number], [phone], [id number] until 2023  Plan of care signed (Y/N): N  Visit# / total visits:      Referring Practitioner:  Lisa Coello MD  Specific Practitioner Orders: Evaluate and Treat     Assessment of current deficits   [x] Functional mobility             [x] ADLs           [x] Strength                  [x] Cognition   [x] Functional transfers           [x] IADLs          [x] Safety Awareness  [x] Endurance   [x] Fine Motor Coordination    [x] Balance      [x] Vision/perception    [x] Sensation     [] Gross Motor Coordination [x] ROM           [x] Pain                        [x] Edema          [] Scar Adhesion/Skin Integrity      OT PLAN OF CARE   OT POC based on physician orders, patient diagnosis and results of clinical assessment     Frequency/Duration: 2x/week for 16 visits                             Certification period From: 2023  To: 2023  Specific OT Treatment to include: CPT Codes: 27112, 83966,38374, 68438, 06057, 13755     [x] Instruction in HEP                   Modalities:  [x] Therapeutic Exercise                 [x] Ultrasound               [] Electrical Stimulation/Attended  [x] PROM/Stretching                    [x] Fluidotherapy          [x]  Paraffin                   [x] AAROM  [x] AROM                 [x] Iontophoresis:   [x] Tendon Glides                                               [] Neuromuscular Re-Ed            [x] ADL/IADL re-training    [x] Therapeutic Activity                  [x] Pain Management with/without modalities PRN                 [x] Manual Therapy                      [x] Splinting                                   [] Scar Management                   []Joint Protection/Training  []Ergonomics                             [x] Joint Mobilization                      [x] Adaptive Equipment Assessment/Training                             [x] Manual Edema Mobilization   [] Myofascial Release                 [x] Energy Conservation/Work Simplification  [x] GM/FM Coordination                [x] Safety retraining/education per  individual diagnosis/goals  [x] Desensitization/Sensory reeducation        Patient Specific Goal: To regain more functional use of L hand and occupational performance for tasks                             GOALS (Long term same as Short term):  1) Patient will demonstrate good understanding of home program (exercises/activities/diagnosis/prognosis/goals) with good accuracy. 2) Patient will demonstrate increased active/passive range of motion of their L UE to [facility] for ADL/IADL completion. 3) Patient will demonstrate increased trigger-free  and pinch of L hand. 4) Patient will demonstrate increased pinch strength of at least 5-10 / 5 pinch pounds of their L hand when triggering has resolved. 5) Patient to report decreased pain in their affected L upper extremity from 10/10 to 3/10 or less with resistive functional use. 6) Increase in fine motor function as evidenced by decreased time to complete 9-hole peg test and/or MRMT test by at least 3-10 seconds. 7) Patient to report 100% compliance with their splint wear, care, and precautions if needed. 8) Patient to demonstrate an increase in sensation by one level of [place] monofilaments throughout L hand.    9) Patient to demonstrate decreased guarding of their affected extremity from 75% to 20% or less. 10) Patient will be knowledgeable of edema control techniques as evident with decreases from moderate to mild/none. 11) Patient will demonstrate improved functional activity tolerance from fair to good for ADL/IADL completion. 12) Patient will decrease QuickDASH score to 25% or less for increased participation in daily functional activities. 13) Patient/caregiver to demonstrate proper follow through of adaptive recommendations/compensatory techniques to increase safe functional ADLs as needed. 14) Patient will maintain visual attention 3/5 trials when tracking visual stimulus from upper to lower left visual quadrant to improve safety for functional mobility. 15. Patient will recall 4/5 items accurately with 5-minute delay to improve working memory for successful task completion. 16) Patient will maintain balance 4/5 trials when reaching for mod challenge outside base of support to improve dynamic sitting balance for ADL completion. 17) Patient will verbalize at least two techniques to maximize respiration to improve endurance for functional activities. Pain Level: 8/10 pain and soreness over L trigger finger    Subjective: Pt stated, \"I still can't get the splint on very tight. I had to go down the steps today and my thumb kept popping. The tingling in my L hand keeps getting better. It almost feels like the other hand now. \"     Objective:  Updated POC to be completed by 10th visit.     INTERVENTION: COMPLETED: SPECIFICS/COMMENTS:   Modality:     Ice  -applies to L hand to decrease inflammation around the trigger finger intermittently throughout the session   ultrasound  -for break down scar tissue to decrease triggering at the thumb   Iontophoresis  To L hand thumb superior to A1 and A2 pulley - diluted acetic acid to reduce inflammation/pain  Dosage: 60  Intensity: 2.5   Time: 23 min    AROM:     L Hand                      -CMC radial/palmar abduction AROM x10 reps each x3 sec holds  -With splint donned: palmar/radial adduction to grasp/release functional objects x10  -Digital ABD/ADD x5 min with poms + HEP  -Digital extension for each digit x10 reps each + HEP  -Tendon glides x5 reps each x6 positions  -Small peg activity with splint donned x15 reps        Cognition/Visual:     Visual Attention X   -Word search: 60 min x16 words  -IQ fit game for visual perception  -Hidden pictures in a picture x 15 min x12 images   Memory        -visual memory-peg patterns-75% accuracy with 5 pegs, 4 colors  -auditory memory-peg patterns-90% accuracy with 4 pegs, 4 colors   Reaction time      -sorting deck of cards by suit- 6:41 mins to complete  -sorting cards in numerical order   Directions  -giving positional directions of a pattern in small peg board-moderate difficulty    Recall            -common places-grocery stores, fast food restaurants  -orientation-4/4  -recall of places based on location given (pharmacy on corner of [address] [facility] [address])   PROM/Stretching:     L thumb      -CMC all planes with stabilization of the MP/IP  -MP/IP flex/extension        Scar Mass/Edema Control:     Soft tissue massage  -over trigger finger to decrease triggering and break up tissue causing triggering   Manual edema mobilization  To L UE through digits with mobilization to elbow   Strengthening:               Other:     Splints                 X -Issued size L comfort cool brace for L hand to be worn AAT except for when using water  -Pt instructed to wear Oval 8 brace beneath Comfort Cool brace  -Teach and return demonstration of donning/doffing brace x3 with max Vcs and min tactile cues  -Hand-based CMC grind splint fabricated with blocking of thumb IP joint   HEP  -reviewed HEP and splinting instructions   Maximize Respiration  -Return demonstration for 3 exercises to maximize respiration: diaphragmatic breathing, pursed lip breathing, lazy 8 breathing     Assessment/Comments: Pt is making Fair + progress toward stated plan of care. New splint fabricated for pt this date to better stabilize MCP/IP into extension. Pattern used for hand-based CMC grind splint with IP free. Flexion of IP with MCP into extension confirmed additional trigger at A2 pulley in addition to A1 pulley. Thumb spica built up to block IP flexion as well. Pt demonstrated increased ease with word identification for word search which indicates improved visual scanning. Next session, challenge dynamic sitting balance and short-term memory.    -Rehab Potential: Good  -Requires OT Follow Up: Yes    Time In: 4:00 pm            Time Out: 5:00 pm             Visit #: 7     *Codes below are the only approved codes-64 units, through 5/25/23*    CODE   Minutes  Units   14734 Fluidotherapy-       46416 Manual-     79246 Therapeutic Ex-     26198 Therapeutic Activity- 60 4   12744 Neuromuscular Re-Ed-       69035 Paraffin-       74625 Iontophoresis-     38031 Ultrasound-           60 4       -Response to Treatment: Pt reported that this felt tighter and more secure for his thumb. Goals: Goals for pt can be see on initial eval.    Plan:   [x]  Continues Plan of care: Treatment covered based on POC and graduated to patient's progress. Pt education continues at each visit to obtain maximum benefits from skilled OT intervention.   []  Alter Plan of care:   []  Discharge:    Jose Antonio Zarate OTR/L #[id number]

## 2023-03-09 ENCOUNTER — HOSPITAL ENCOUNTER (OUTPATIENT)
Dept: OCCUPATIONAL THERAPY | Age: 68
Setting detail: THERAPIES SERIES
Discharge: HOME | End: 2023-03-09
Payer: COMMERCIAL

## 2023-03-09 ENCOUNTER — HOSPITAL ENCOUNTER (OUTPATIENT)
Dept: PHYSICAL THERAPY | Age: 68
Setting detail: THERAPIES SERIES
Discharge: HOME | End: 2023-03-09
Payer: COMMERCIAL

## 2023-03-09 NOTE — FLOWSHEET NOTE
[facility]  Phone: [phone] Fax: [phone]     Physical Therapy  Cancellation/No-show Note  Patient Name:  Alison Tovar  :  1955   Date:  3/9/2023    For today's appointment patient:  [x]  Cancelled  []  Rescheduled appointment  []  No-show     Reason given by patient:  []  Patient ill  []  Conflicting appointment  [x]  No transportation    []  Conflict with work  []  No reason given  []  Other:     Comments:      Electronically signed by:   Rebecca Jordan

## 2023-03-09 NOTE — PROGRESS NOTES
Phone: [phone] Fax: [phone]     Occupational Therapy   Cancellation/No-show Note     Patient Name:  Mario Alberto Isidro  : 1955  Date:  3/9/2023  MRN: [id number]    For today's appointment patient:       [x]  Cancelled   []  Rescheduled appointment   []  No-show     Reason given by patient:   []  Patient ill   []  Conflicting appointment   [x]  No transportation   []  Conflict with work   []  No reason given   []  Other:     Comments: Pt's transportation service did not come to pick him up. Over the phone, pt reported recent fall but no symptoms. Therapist advised pt to seek further medical attention with onset of new symptoms. Pt plans to attend next scheduled appt.     Electronically signed by: Morenita Edwards OT MS, OTR/L #[id number]

## 2023-03-14 ENCOUNTER — HOSPITAL ENCOUNTER (OUTPATIENT)
Dept: PHYSICAL THERAPY | Age: 68
Setting detail: THERAPIES SERIES
Discharge: HOME | End: 2023-03-14
Payer: COMMERCIAL

## 2023-03-14 ENCOUNTER — HOSPITAL ENCOUNTER (OUTPATIENT)
Dept: OCCUPATIONAL THERAPY | Age: 68
Setting detail: THERAPIES SERIES
Discharge: HOME | End: 2023-03-14
Payer: COMMERCIAL

## 2023-03-14 PROCEDURE — 97530 THERAPEUTIC ACTIVITIES: CPT

## 2023-03-14 PROCEDURE — 97110 THERAPEUTIC EXERCISES: CPT

## 2023-03-14 NOTE — PROGRESS NOTES
[facility]  Phone: [phone] Fax: [phone]       Physical Therapy Daily Treatment Note  Date:  3/14/2023    Patient Name:  Tierra Bunch    :  1955  MRN: [id number]    Restrictions/Precautions:    Diagnosis: Acute CVA, Mobility Issues Related to CVA  Treatment Diagnosis:    Insurance/Certification information: [employer] (eval + 10 thru 23)  Referring Physician: Kajal Hardin of care signed (Y/N):    Visit# / total visits:    Pain level: /10  Time In: 7:50  Time Out: 9:00         Subjective:  Pt c/o left hip pain this morning. Reports he fell off the end of his bed last week however no injuries reported. Exercises:  Exercise/Equipment Resistance/Repetitions Other comments   Bike   10 min    Hip/knee ext to neutral Passive 2 min B    Heel slides  15 x B    LTR 15 x B nt   Hip flex w/knees flexed 10 x B alt    SAQ  10 x 2 B           LAQ 2.5# 10 x 2 B    Sit to stand from 24\" box  10 x Facilitate posture            Side stepping    3 laps    Step ups fwd 6\" 10 x B nt                                                      Other Therapeutic Activities:     Home Exercise Program:  (3/7/23) Instructed pt in LE stretch in supine. Encouraged consistent stretching for carry over. Manual Treatments: Passive stretch B LE's to facilitate hip/knee extension to neutral. (2 min each)      Modalities:  MHP to left hip x 15 min prior to stretching ex     Timed Code Treatment Minutes: 60    Total Treatment Minutes:  70    Treatment/Activity Tolerance:  [x] Patient tolerated treatment well [] Patient limited by fatigue  [] Patient limited by pain  [] Patient limited by other medical complications  [x] Other: Good tolerance for modified exercise today with no report of increase in left hip pain.  .     Plan:   [x] Continue per plan of care [] Alter current plan (see comments)  [] Plan of care initiated [] Hold pending MD visit [] Discharge  Plan for Next Session: Treatment Charges: Mins Units   Initial Evaluation     Re-Evaluation     Ther Exercise         TE 40 3   Manual Therapy     MT 5 --   Ther Activities        TA     Gait Training          GT     Neuro Re-education NR     Modalities     Non-Billable Service Time 15    Other 10    Total Time/Units 70 3     Electronically signed by:   Rebecca Jordan

## 2023-03-14 NOTE — PROGRESS NOTES
OCCUPATIONAL THERAPY PROGRESS UPDATE    Date:  3/14/2023  Initial Evaluation Date: 23      Patient Name:  Roni Huber    :  1955    Restrictions/Precautions:  HTN; moderate fall risk  Diagnosis:  I63.9 (ICD-10-CM) - Acute cerebrovascular accident (CVA) ([facility] [address] M65.319 (ICD-10-CM) - Trigger finger of thumb, unspecified laterality                                                 Date of Surgery/Injury:      Insurance/Certification information:  [facility] [employer] - REF #[id number] 64 units approved (16 visits) for CPT codes 51-16-34-25, [address], [address] [place] [address], [id number], [id number], [phone], [id number] until 2023  Plan of care signed (Y/N): N  Visit# / total visits:      Referring Practitioner:  Woody Luciano MD  Specific Practitioner Orders: Evaluate and Treat     Assessment of current deficits   [x] Functional mobility             [x] ADLs           [x] Strength                  [x] Cognition   [x] Functional transfers           [x] IADLs          [x] Safety Awareness  [x] Endurance   [x] Fine Motor Coordination    [x] Balance      [x] Vision/perception    [x] Sensation     [] Gross Motor Coordination [x] ROM           [x] Pain                        [x] Edema          [] Scar Adhesion/Skin Integrity      OT PLAN OF CARE   OT POC based on physician orders, patient diagnosis and results of clinical assessment     Frequency/Duration: 2x/week for 16 visits                             Certification period From: 2023  To: 2023  Specific OT Treatment to include: CPT Codes: 41773, 05672,27540, 22554, 89607, 85741     [x] Instruction in HEP                   Modalities:  [x] Therapeutic Exercise                 [x] Ultrasound               [] Electrical Stimulation/Attended  [x] PROM/Stretching                    [x] Fluidotherapy          [x]  Paraffin                   [x] AAROM  [x] AROM                 [x] Iontophoresis:   [x] Tendon Glides                                               [] Neuromuscular Re-Ed            [x] ADL/IADL re-training    [x] Therapeutic Activity                  [x] Pain Management with/without modalities PRN                 [x] Manual Therapy                      [x] Splinting                                   [] Scar Management                   []Joint Protection/Training  []Ergonomics                             [x] Joint Mobilization                      [x] Adaptive Equipment Assessment/Training                             [x] Manual Edema Mobilization   [] Myofascial Release                 [x] Energy Conservation/Work Simplification  [x] GM/FM Coordination                [x] Safety retraining/education per  individual diagnosis/goals  [x] Desensitization/Sensory reeducation        Patient Specific Goal: To regain more functional use of L hand and occupational performance for tasks                             GOALS (Long term same as Short term):  1) Patient will demonstrate good understanding of home program (exercises/activities/diagnosis/prognosis/goals) with good accuracy. Progressing slowly - pt reports that he is focused on resting L hand in splint. 2) Patient will demonstrate increased active/passive range of motion of their L UE to [facility] for ADL/IADL completion. NT 03/14 for L thumb to not provoke triggering. 3) Patient will demonstrate increased trigger-free  and pinch of L hand. Progressing slowly - pt reports continued triggering. Splint revision fabricated 03/14. 4) Patient will demonstrate increased pinch strength of at least 5-10 / 5 pinch pounds of their L hand when triggering has resolved. NT 03/14 for L thumb to not provoke triggering. 5) Patient to report decreased pain in their affected L upper extremity from 10/10 to 3/10 or less with resistive functional use. Progressing slowly - up to 10/10 pain continues with triggering.   6) Increase in fine motor function as evidenced by decreased time to complete 9-hole peg test and/or MRMT test by at least 3-10 seconds. NT 03/14 for L hand to not provoke triggering. 7) Patient to report 100% compliance with their splint wear, care, and precautions if needed. Progressing well - reports 100% compliance with splint wear. 8) Patient to demonstrate an increase in sensation by one level of [place] monofilaments throughout L hand. Progressing well - pt reports continued improvement in sensation of L hand though not measured 03/14. 9) Patient to demonstrate decreased guarding of their affected extremity from 75% to 20% or less. Goal discharged 03/14 due to recommended rest due to trigger finger. 10) Patient will be knowledgeable of edema control techniques as evident with decreases from moderate to mild/none. Progressing fair - min cueing required for recall. Update measurement with continued wear of splint. 11) Patient will demonstrate improved functional activity tolerance from fair to good for ADL/IADL completion. Progressing well - pt reports that functional activity tolerance has increased to fair+ during daily activities. Pt continues to adapt by using [employer] cart inside [employer]. 12) Patient will decrease QuickDASH score to 25% or less for increased participation in daily functional activities. NT 03/14.  13) Patient/caregiver to demonstrate proper follow through of adaptive recommendations/compensatory techniques to increase safe functional ADLs as needed. Goal not yet addressed - address in future sessions. 16) Patient will maintain balance 4/5 trials when reaching for mod challenge outside base of support to improve dynamic sitting balance for ADL completion. Progressing well - maintained balance 03/14 for min challenge outside base of support. Pain Level: 8/10 pain and soreness over L trigger finger    Subjective: Pt stated, \"I fell and cracked the splint you made me last time. \"     Objective:  POC updated 03/14.  Updated POC to be completed by 16th visit/discharge.     INTERVENTION: COMPLETED: SPECIFICS/COMMENTS:   Modality:     Ice  -applies to L hand to decrease inflammation around the trigger finger intermittently throughout the session   ultrasound  -for break down scar tissue to decrease triggering at the thumb   Iontophoresis  To L hand thumb superior to A1 and A2 pulley - diluted acetic acid to reduce inflammation/pain  Dosage: 60  Intensity: 2.5   Time: 23 min    AROM/PROM/Stretching:     L Hand AROM                      -CMC radial/palmar abduction AROM x10 reps each x3 sec holds  -With splint donned: palmar/radial adduction to grasp/release functional objects x10  -Digital ABD/ADD x5 min with poms + HEP  -Digital extension for each digit x10 reps each + HEP  -Tendon glides x5 reps each x6 positions  -Small peg activity with splint donned x15 reps   L thumb PROM  -CMC all planes with stabilization of the MP/IP  -MP/IP flex/extension   Cognition/Visual:     Visual Attention         X  X -Word search: 60 min x16 words  -IQ fit game for visual perception  -Hidden pictures in a picture x 15 min x12 images  -Visual maze x15 min  -Visual tracking/saccades x5 reps each   Memory        -visual memory-peg patterns-75% accuracy with 5 pegs, 4 colors  -auditory memory-peg patterns-90% accuracy with 4 pegs, 4 colors   Reaction time      -sorting deck of cards by suit- 6:41 mins to complete  -sorting cards in numerical order   Directions  -giving positional directions of a pattern in small peg board-moderate difficulty    Recall             X -common places-grocery stores, fast food restaurants  -orientation-4/4  -recall of places based on location given (pharmacy on [address] [facility] [address])  -Remember 5 items for a grocery list x5 min   Therapeutic Activity     Dynamic Sitting Balance X     -Place/remove clothespins of various levels of resistance x50 reps        Scar Mass/Edema Control:     Soft tissue massage  -over trigger finger to decrease triggering and break up tissue causing triggering   Manual edema mobilization  To L UE through digits with mobilization to elbow   Strengthening:               Other:     Progress Update X -Take measurements, update goals, discuss priorities   Splints                 X -Issued size L comfort cool brace for L hand to be worn AAT except for when using water  -Pt instructed to wear Oval 8 brace beneath Comfort Cool brace  -Teach and return demonstration of donning/doffing brace x3 with max Vcs and min tactile cues  -Hand-based CMC grind splint fabricated with blocking of thumb IP joint   HEP  -reviewed HEP and splinting instructions   Maximize Respiration  -Return demonstration for 3 exercises to maximize respiration: diaphragmatic breathing, pursed lip breathing, lazy 8 breathing     Assessment/Comments: Pt is making Fair + progress toward stated plan of care. Hand-based thumb spica splint fabricated again this date due to damage sustained during pt's reported fall. Therapist confirmed that pt has had no symptoms following fall that require additional medical attention. Progress update taken this session. Pt demonstrates improvements in short-term memory, functional activity endurance, and sensation to L hand (according to verbal report). Persistent deficits include decreased visual attention, dynamic sitting balance, and triggering of L thumb. Next session, take measurements for edema, sensation of L hand, and discuss AE/home adaptations that may benefit patient.     UE Assessment:  AROM for ARI Ues WFLs with extension of thumb IP flexion deficit due to edema secondary to trigger thumb  Additional exception of L hand MF slight flexion deficit due to prior injury  5/5 strength ARI with exception of L shoulder is 4+/5  Palpation of triggering of left thumb IP joint indicates trigger thumb of A2 pulley     Sensation: Tingling throughout ulnar aspect of thumb; overall pt reports that tingling sensation has decreased since initial CVA     Able To Sense (Y) / Unable to Sense (N)  SEMMES-CARLI Thumb 2nd Digit 3rd Digit  4th Digit  5th Digit    Normal Touch  Size: 2.83             Diminished Light Touch   Size: 3.61             Diminished Protective Sense  Size: 4.31 Y Y Y Y Y   Loss of Protective Sense   Size: 4.56             Loss of Sensation  Size: 6.65                Vision / Perception: Pt wears reading glasses. Pt reports continued blurred vision. Tracking: able to track visual stimulus through all fields of vision on second try. On first try, lost stimulus moving downward from upper left quadrant  Convergence:  WNL  Saccades: Could only maintain visual attention on stimulus 3/6 trials     Edema Description/Circumferential Measurements:              Circumferential around thumb IP joint              Right: 16.9 cm              Left: 17.1 cm                 Moderate edema palpated throughout MP/IP of L thumb     Dynamometer (setting 2):                              Left: 36# - triggering with L thumb                                           Right: 65#                       Pinch Meter: Deferred this date due to triggering of L thumb              Lateral: Left= TBD,Right= TBD,               Palmar 3 point: Left= TBD,, Right= TBD,     9 Hole Peg Test:              Left: 31 sec              Right: 27 sec     Balance: Dynamic sitting balance is fair+ as pt maintained balance 4/4 trials for min challenge and 3/4 trials for mod challenge     Cognition:  Pt was only able to recall 1 of 3 word items after 5-minute delay  Pt did not recall reps and frequency of previously issued putty exercises that led to development of triggering in thumb     Endurance: Pt reports fair endurance for completion of ADLs     QuickDASH Score: 47.7% disability    -Rehab Potential: Good  -Requires OT Follow Up: Yes    Time In: 9:00 am            Time Out: 10:00 am             Visit #: 9     *Codes below are the only approved codes-64 units, through 5/25/23*    CODE   Minutes  Units   52078 Fluidotherapy-       53274 Manual-     18073 Therapeutic Ex-     77342 Therapeutic Activity- 60 4   60707 Neuromuscular Re-Ed-       66283 Paraffin-       88295 Iontophoresis-     03729 Ultrasound-           60 4       -Response to Treatment: Pt demonstrated excellent progress with short-term recall. Goals: Goals for pt can be see on initial eval.    Plan:   [x]  Continues Plan of care: Treatment covered based on POC and graduated to patient's progress. Pt education continues at each visit to obtain maximum benefits from skilled OT intervention.   []  Alter Plan of care:   []  Discharge:    Modesta Dominguez OTR/L #[id number]

## 2023-03-15 ENCOUNTER — HOSPITAL ENCOUNTER (OUTPATIENT)
Dept: INTERVENTIONAL RADIOLOGY/VASCULAR | Age: 68
Discharge: HOME | End: 2023-03-17
Payer: COMMERCIAL

## 2023-03-15 DIAGNOSIS — R25.2 LEG CRAMPING: ICD-10-CM

## 2023-03-15 PROCEDURE — 93922 UPR/L XTREMITY ART 2 LEVELS: CPT

## 2023-03-16 ENCOUNTER — HOSPITAL ENCOUNTER (OUTPATIENT)
Dept: PHYSICAL THERAPY | Age: 68
Setting detail: THERAPIES SERIES
Discharge: HOME | End: 2023-03-16
Payer: COMMERCIAL

## 2023-03-16 ENCOUNTER — HOSPITAL ENCOUNTER (OUTPATIENT)
Dept: OCCUPATIONAL THERAPY | Age: 68
Setting detail: THERAPIES SERIES
Discharge: HOME | End: 2023-03-16
Payer: COMMERCIAL

## 2023-03-16 DIAGNOSIS — I73.9 PAD (PERIPHERAL ARTERY DISEASE) (HCC): Primary | ICD-10-CM

## 2023-03-16 PROCEDURE — 97110 THERAPEUTIC EXERCISES: CPT

## 2023-03-16 PROCEDURE — 97530 THERAPEUTIC ACTIVITIES: CPT | Performed by: OCCUPATIONAL THERAPIST

## 2023-03-16 NOTE — PROGRESS NOTES
OCCUPATIONAL THERAPY PROGRESS UPDATE    Date:  3/16/2023  Initial Evaluation Date: 23      Patient Name:  Hannah Mixon    :  1955    Restrictions/Precautions:  HTN; moderate fall risk  Diagnosis:  I63.9 (ICD-10-CM) - Acute cerebrovascular accident (CVA) ([facility] [address] M65.319 (ICD-10-CM) - Trigger finger of thumb, unspecified laterality                                                 Date of Surgery/Injury:      Insurance/Certification information:  [facility] Medicaid - REF #[id number] 64 units approved (16 visits) for CPT codes [id number], [id number], [id number], [address] [place] [address], [id number], [id number], [phone], [id number] until 2023  Plan of care signed (Y/N): N  Visit# / total visits: 10/ 16     Referring Practitioner:  Rober Martinez MD  Specific Practitioner Orders: Evaluate and Treat     Assessment of current deficits   [x] Functional mobility             [x] ADLs           [x] Strength                  [x] Cognition   [x] Functional transfers           [x] IADLs          [x] Safety Awareness  [x] Endurance   [x] Fine Motor Coordination    [x] Balance      [x] Vision/perception    [x] Sensation     [] Gross Motor Coordination [x] ROM           [x] Pain                        [x] Edema          [] Scar Adhesion/Skin Integrity      OT PLAN OF CARE   OT POC based on physician orders, patient diagnosis and results of clinical assessment     Frequency/Duration: 2x/week for 16 visits                             Certification period From: 2023  To: 2023  Specific OT Treatment to include: CPT Codes: 30655, 79827,68322, 24445, 39639, 09252     [x] Instruction in HEP                   Modalities:  [x] Therapeutic Exercise                 [x] Ultrasound               [] Electrical Stimulation/Attended  [x] PROM/Stretching                    [x] Fluidotherapy          [x]  Paraffin                   [x] AAROM  [x] AROM                 [x] Iontophoresis:   [x] Tendon Glides                                               [] Neuromuscular Re-Ed            [x] ADL/IADL re-training    [x] Therapeutic Activity                  [x] Pain Management with/without modalities PRN                 [x] Manual Therapy                      [x] Splinting                                   [] Scar Management                   []Joint Protection/Training  []Ergonomics                             [x] Joint Mobilization                      [x] Adaptive Equipment Assessment/Training                             [x] Manual Edema Mobilization   [] Myofascial Release                 [x] Energy Conservation/Work Simplification  [x] GM/FM Coordination                [x] Safety retraining/education per  individual diagnosis/goals  [x] Desensitization/Sensory reeducation        Patient Specific Goal: To regain more functional use of L hand and occupational performance for tasks                             GOALS (Long term same as Short term): updated 3/14  1) Patient will demonstrate good understanding of home program (exercises/activities/diagnosis/prognosis/goals) with good accuracy. Progressing slowly - pt reports that he is focused on resting L hand in splint. 2) Patient will demonstrate increased active/passive range of motion of their L UE to [facility] for ADL/IADL completion. NT 03/14 for L thumb to not provoke triggering. 3) Patient will demonstrate increased trigger-free  and pinch of L hand. Progressing slowly - pt reports continued triggering. Splint revision fabricated 03/14. 4) Patient will demonstrate increased pinch strength of at least 5-10 / 5 pinch pounds of their L hand when triggering has resolved. NT 03/14 for L thumb to not provoke triggering. 5) Patient to report decreased pain in their affected L upper extremity from 10/10 to 3/10 or less with resistive functional use. Progressing slowly - up to 10/10 pain continues with triggering.   6) Increase in fine motor function as evidenced by decreased time to complete 9-hole peg test and/or MRMT test by at least 3-10 seconds. NT 03/14 for L hand to not provoke triggering. 7) Patient to report 100% compliance with their splint wear, care, and precautions if needed. Progressing well - reports 100% compliance with splint wear. 8) Patient to demonstrate an increase in sensation by one level of [place]-Florentin monofilaments throughout L hand. Progressing well - pt reports continued improvement in sensation of L hand though not measured 03/14. 9) Patient to demonstrate decreased guarding of their affected extremity from 75% to 20% or less. Goal discharged 03/14 due to recommended rest due to trigger finger. 10) Patient will be knowledgeable of edema control techniques as evident with decreases from moderate to mild/none. Progressing fair - min cueing required for recall. Update measurement with continued wear of splint. 11) Patient will demonstrate improved functional activity tolerance from fair to good for ADL/IADL completion. Progressing well - pt reports that functional activity tolerance has increased to fair+ during daily activities. Pt continues to adapt by using [employer] cart inside [employer]. 12) Patient will decrease QuickDASH score to 25% or less for increased participation in daily functional activities. NT 03/14.  13) Patient/caregiver to demonstrate proper follow through of adaptive recommendations/compensatory techniques to increase safe functional ADLs as needed. Goal not yet addressed - address in future sessions. 16) Patient will maintain balance 4/5 trials when reaching for mod challenge outside base of support to improve dynamic sitting balance for ADL completion. Progressing well - maintained balance 03/14 for min challenge outside base of support. Pain Level: no report of pain during functional tasks w/ use of hand based thermoplastic brace. End of session - patient reported discomfort R trunk following tasks to facilitate trunk elongation.  Decreased w/ rest    Subjective: Pt stated that he does not have trigger L Thumb when wearing brace     Objective:  POC updated 03/14. Updated POC to be completed by 16th visit/discharge.     INTERVENTION: COMPLETED: SPECIFICS/COMMENTS:   Modality:     Ice  -applies to L hand to decrease inflammation around the trigger finger intermittently throughout the session   ultrasound  -for break down scar tissue to decrease triggering at the thumb   Iontophoresis  To L hand thumb superior to A1 and A2 pulley - diluted acetic acid to reduce inflammation/pain  Dosage: 60  Intensity: 2.5   Time: 23 min    AROM/PROM/Stretching:     L Hand AROM                      -CMC radial/palmar abduction AROM x10 reps each x3 sec holds  -With splint donned: palmar/radial adduction to grasp/release functional objects x10  -Digital ABD/ADD x5 min with poms + HEP  -Digital extension for each digit x10 reps each + HEP  -Tendon glides x5 reps each x6 positions  -Small peg activity with splint donned x15 reps   L thumb PROM  -CMC all planes with stabilization of the MP/IP  -MP/IP flex/extension   Cognition/Visual:     Visual Attention         X   -Word search: 60 min x16 words  -IQ fit game for visual perception  -Hidden pictures in a picture x 15 min x12 images  -Visual maze x15 min  -Visual tracking/saccades x 15 reps each   Memory        -visual memory-peg patterns-75% accuracy with 5 pegs, 4 colors  -auditory memory-peg patterns-90% accuracy with 4 pegs, 4 colors   Reaction time      -sorting deck of cards by suit- 6:41 mins to complete  -sorting cards in numerical order   Directions  -giving positional directions of a pattern in small peg board-moderate difficulty    Recall           X -common places-grocery stores, fast food restaurants  -orientation-4/4  -recall of places based on location given (pharmacy on corner [address])  -Remember 5 items for a grocery list x5 min  Good recall of therapy schedule change for next week - No PT @ 8a 3/23   Therapeutic Activity     Dynamic Sitting Balance X     -Place/remove clothespins of various levels of resistance x50 reps. Activity positioned to facilitate forward/lateral trunk elongation        Scar Mass/Edema Control:     Soft tissue massage  -over trigger finger to decrease triggering and break up tissue causing triggering   Manual edema mobilization  To L UE through digits with mobilization to elbow   Strengthening:               Other:     Progress Update X -Take measurements, update goals, discuss priorities   Splints           X -Issued size L comfort cool brace for L hand to be worn AAT except for when using water  -Pt instructed to wear Oval 8 brace beneath Comfort Cool brace  -Teach and return demonstration of donning/doffing brace x3 with max Vcs and min tactile cues  -Hand-based CMC grind splint fabricated with blocking of thumb IP joint. Strap/velcro adjusted this date   HEP  -reviewed HEP and splinting instructions   Maximize Respiration  -Return demonstration for 3 exercises to maximize respiration: diaphragmatic breathing, pursed lip breathing, lazy 8 breathing   ADL/IADLs x Instructed on adapted techniques to increase function w/ opening pill bottles/dispensing meds from bottle. Patient reported he drops pills on floor at times-difficulty retrieving. Trial w/ dycem - increased function to open pill bottle and decreased rolling of pill if dropped. Issued dycem to improve function. Patient also to use dycem bottom of sink to facilitate stabilization of plates. Patient required Thang to don andrés jacket this date.      Assessment/Comments: Pt is making Fair + progress toward stated plan of care.  Pt demonstrates good recall for PT schedule change next week. He completed therapeutic tasks to facilitate visual scanning/saccades, dynamic sitting balance Issued dycem to improve function and independence/safety for washing dishes and dispensing pills. Next session, take measurements for edema,  sensation of L hand, and continue to instruct on AE/home adaptations that may benefit patient.    3/14/23 UE Assessment:  AROM for ARI Ues WFLs with extension of thumb IP flexion deficit due to edema secondary to trigger thumb  Additional exception of L hand MF slight flexion deficit due to prior injury  5/5 strength ARI with exception of L shoulder is 4+/5  Palpation of triggering of left thumb IP joint indicates trigger thumb of A2 pulley     3/14/23 Sensation: Tingling throughout ulnar aspect of thumb; overall pt reports that tingling sensation has decreased since initial CVA     Able To Sense (Y) / Unable to Sense (N)  SEMMES-CARLI Thumb 2nd Digit 3rd Digit  4th Digit  5th Digit    Normal Touch  Size: 2.83             Diminished Light Touch   Size: 3.61             Diminished Protective Sense  Size: 4.31 Y Y Y Y Y   Loss of Protective Sense   Size: 4.56             Loss of Sensation  Size: 6.65                3/14/23 Vision / Perception: Pt wears reading glasses. Pt reports continued blurred vision.  Tracking: able to track visual stimulus through all fields of vision on second try. On first try, lost stimulus moving downward from upper left quadrant  Convergence: WNL  Saccades: Could only maintain visual attention on stimulus 3/6 trials     3/14/23 Edema Description/Circumferential Measurements:              Circumferential around thumb IP joint              Right: 16.9 cm              Left: 17.1 cm                 Moderate edema palpated throughout MP/IP of L thumb     3/14/23 Dynamometer (setting 2):                              Left: 36# - triggering with L thumb                                           Right: 65#                       3/14/23 Pinch Meter: Deferred this date due to triggering of L thumb              Lateral: Left= TBD,Right= TBD,               Palmar 3 point: Left= TBD,, Right= TBD,     3/14/23 9 Hole Peg Test:              Left: 31 sec              Right: 27 sec     3/14/23 Balance:  Dynamic sitting balance is fair+ as pt maintained balance 4/4 trials for min challenge and 3/4 trials for mod challenge     3/14/23 Cognition:  Pt was only able to recall 1 of 3 word items after 5-minute delay  Pt did not recall reps and frequency of previously issued putty exercises that led to development of triggering in thumb     3/14/23 Endurance: Pt reports fair endurance for completion of ADLs     QuickDASH Score: 47.7% disability    -Rehab Potential: Good  -Requires OT Follow Up: Yes    Time In: 9:05 am            Time Out: 10:00 am             Visit #: 10     *Codes below are the only approved codes-64 units, through 5/25/23*    CODE   Minutes  Units   03746 Fluidotherapy-       77890 Manual-     02158 Therapeutic Ex-     70859 Therapeutic Activity- 55 4   92557 Neuromuscular Re-Ed-       20731 Paraffin-       12450 Iontophoresis-     34728 Ultrasound-             55 4       -Response to Treatment: Patient reported that he will use dycem to stabilize objects - jt protection and increased functional independence    Goals: Goals for pt can be see on initial eval.    Plan:   [x]  Continues Plan of care: Treatment covered based on POC and graduated to patient's progress. Pt education continues at each visit to obtain maximum benefits from skilled OT intervention.   []  [facility] of care:   []  Discharge:    Sarah Espana OTR/L #[id number]

## 2023-03-16 NOTE — PROGRESS NOTES
[facility]  Phone: [phone] Fax: [phone]       Physical Therapy Daily Treatment Note  Date:  3/16/2023    Patient Name:  Thierno Turner    :  1955  MRN: [id number]    Restrictions/Precautions:    Diagnosis: Acute CVA, Mobility Issues Related to CVA  Treatment Diagnosis:    Insurance/Certification information: [employer] (eval + 10 thru 23)  Referring Physician: Eliceo Rubio of care signed (Y/N):    Visit# / total visits:  10/11  Pain level: /10  Time In: 7:55  Time Out: 9:00         Subjective:  Pt with same c/o left hip pain this morning. Exercises:  Exercise/Equipment Resistance/Repetitions Other comments   Bike   10 min    Hip/knee ext to neutral Passive 2 min B    Heel slides  10 x B    LTR 15 x B    Hip flex w/knees flexed 10 x B alt    SAQ  10 x 2 B           LAQ 2.5# 10 x 2 B    Sit to stand from 24\" box  10 x Facilitate posture            Side stepping    3 laps    Step ups fwd 6\" 10 x B                                                       Other Therapeutic Activities:     Home Exercise Program:  (3/7/23) Instructed pt in LE stretch in supine. Encouraged consistent stretching for carry over. Manual Treatments: Passive stretch B LE's to facilitate hip/knee extension to neutral. (2 min each)      Modalities:       Timed Code Treatment Minutes: 55    Total Treatment Minutes:  65    Treatment/Activity Tolerance:  [x] Patient tolerated treatment well [] Patient limited by fatigue  [] Patient limited by pain  [] Patient limited by other medical complications  [x] Other: Good tolerance for modified exercise today with no report of increase in left hip pain.  .     Plan:   [x] Continue per plan of care [] Alter current plan (see comments)  [] Plan of care initiated [] Hold pending MD visit [] Discharge  Plan for Next Session:         Treatment Charges: Mins Units   Initial Evaluation     Re-Evaluation     Ther Exercise         TE 50 3   Manual Therapy MT 5 --   Ther Activities        TA     Gait Training          GT     Neuro Re-education NR     Modalities     Non-Billable Service Time     Other 10    Total Time/Units 65 3     Electronically signed by:   Rebecca Jordan

## 2023-03-21 ENCOUNTER — HOSPITAL ENCOUNTER (OUTPATIENT)
Dept: OCCUPATIONAL THERAPY | Age: 68
Setting detail: THERAPIES SERIES
Discharge: HOME | End: 2023-03-21
Payer: COMMERCIAL

## 2023-03-21 ENCOUNTER — HOSPITAL ENCOUNTER (OUTPATIENT)
Dept: PHYSICAL THERAPY | Age: 68
Setting detail: THERAPIES SERIES
Discharge: HOME | End: 2023-03-21
Payer: COMMERCIAL

## 2023-03-21 PROCEDURE — 97530 THERAPEUTIC ACTIVITIES: CPT

## 2023-03-21 PROCEDURE — 97110 THERAPEUTIC EXERCISES: CPT

## 2023-03-21 PROCEDURE — 97033 APP MDLTY 1+IONTPHRSIS EA 15: CPT

## 2023-03-21 NOTE — PROGRESS NOTES
is ambulating with rollator walker with good balance. Pt demonstrates good endurance for >60min of exercise/activity. Pt admits to not performing HEP d/t being busy with appointments. Encouraged pt to attempt to perform HEP. Plan:   [] Continue per plan of care [] Alter current plan (see comments)  [] Plan of care initiated [] Hold pending MD visit [x] Discharge  Plan for Next Session:         Treatment Charges: Mins Units   Initial Evaluation     Re-Evaluation     Ther Exercise         TE 55 4   Manual Therapy     MT 5 --   Ther Activities        TA     Gait Training          GT     Neuro Re-education NR     Modalities     Non-Billable Service Time     Other 10    Total Time/Units 70 4     Electronically signed by:   Rebecca Jordan

## 2023-03-21 NOTE — PROGRESS NOTES
and/or MRMT test by at least 3-10 seconds. NT 03/14 for L hand to not provoke triggering. 7) Patient to report 100% compliance with their splint wear, care, and precautions if needed. Progressing well - reports 100% compliance with splint wear. 8) Patient to demonstrate an increase in sensation by one level of [place]-Florentin monofilaments throughout L hand. Progressing well - pt reports continued improvement in sensation of L hand though not measured 03/14. 9) Patient to demonstrate decreased guarding of their affected extremity from 75% to 20% or less. Goal discharged 03/14 due to recommended rest due to trigger finger. 10) Patient will be knowledgeable of edema control techniques as evident with decreases from moderate to mild/none. Progressing fair - min cueing required for recall. Update measurement with continued wear of splint. 11) Patient will demonstrate improved functional activity tolerance from fair to good for ADL/IADL completion. Progressing well - pt reports that functional activity tolerance has increased to fair+ during daily activities. Pt continues to adapt by using [employer] cart inside [employer]. 12) Patient will decrease QuickDASH score to 25% or less for increased participation in daily functional activities. NT 03/14.  13) Patient/caregiver to demonstrate proper follow through of adaptive recommendations/compensatory techniques to increase safe functional ADLs as needed. Goal not yet addressed - address in future sessions. 16) Patient will maintain balance 4/5 trials when reaching for mod challenge outside base of support to improve dynamic sitting balance for ADL completion. Progressing well - maintained balance 03/14 for min challenge outside base of support. Pain Level: no report of pain during functional tasks w/ use of hand based thermoplastic brace. End of session - patient reported discomfort R trunk following tasks to facilitate trunk elongation.  Decreased w/

## 2023-03-22 ENCOUNTER — HOSPITAL ENCOUNTER (EMERGENCY)
Age: 68
Discharge: HOME | End: 2023-03-22
Payer: COMMERCIAL

## 2023-03-22 ENCOUNTER — APPOINTMENT (OUTPATIENT)
Dept: GENERAL RADIOLOGY | Age: 68
End: 2023-03-22
Payer: COMMERCIAL

## 2023-03-22 VITALS
TEMPERATURE: 97.6 F | RESPIRATION RATE: 18 BRPM | HEART RATE: 83 BPM | DIASTOLIC BLOOD PRESSURE: 71 MMHG | OXYGEN SATURATION: 99 % | SYSTOLIC BLOOD PRESSURE: 127 MMHG

## 2023-03-22 DIAGNOSIS — M10.9 GOUTY ARTHRITIS OF FOOT: ICD-10-CM

## 2023-03-22 DIAGNOSIS — M19.072 ARTHRITIS OF LEFT FOOT: Primary | ICD-10-CM

## 2023-03-22 DIAGNOSIS — M21.42 PES PLANUS OF LEFT FOOT: ICD-10-CM

## 2023-03-22 PROCEDURE — 99283 EMERGENCY DEPT VISIT LOW MDM: CPT

## 2023-03-22 PROCEDURE — 73630 X-RAY EXAM OF FOOT: CPT

## 2023-03-22 PROCEDURE — 6370000000 HC RX 637 (ALT 250 FOR IP): Performed by: NURSE PRACTITIONER

## 2023-03-22 RX ORDER — ACETAMINOPHEN 325 MG/1
650 TABLET ORAL ONCE
Status: COMPLETED | OUTPATIENT
Start: 2023-03-22 | End: 2023-03-22

## 2023-03-22 RX ORDER — PREDNISONE 10 MG/1
40 TABLET ORAL DAILY
Qty: 20 TABLET | Refills: 0 | Status: SHIPPED | OUTPATIENT
Start: 2023-03-22 | End: 2023-03-27

## 2023-03-22 RX ADMIN — ACETAMINOPHEN 650 MG: 325 TABLET ORAL at 12:20

## 2023-03-22 ASSESSMENT — LIFESTYLE VARIABLES
HOW OFTEN DO YOU HAVE A DRINK CONTAINING ALCOHOL: NEVER
HOW MANY STANDARD DRINKS CONTAINING ALCOHOL DO YOU HAVE ON A TYPICAL DAY: PATIENT DOES NOT DRINK

## 2023-03-22 ASSESSMENT — PAIN DESCRIPTION - LOCATION: LOCATION: FOOT

## 2023-03-22 ASSESSMENT — PAIN SCALES - GENERAL: PAINLEVEL_OUTOF10: 10

## 2023-03-22 ASSESSMENT — PAIN DESCRIPTION - ORIENTATION: ORIENTATION: LEFT

## 2023-03-22 ASSESSMENT — PAIN DESCRIPTION - DESCRIPTORS: DESCRIPTORS: SORE

## 2023-03-22 NOTE — ED PROVIDER NOTES
Consult(s):   none. Procedure(s):  None    Re-Assessment:  1355: I reviewed today's findings with patient. Discussed discharge plan at this time. Discussed the referral information for podiatry that I gave him and advise he needs to follow-up with his PCP for further management of gout. MDM:      Patient presented to the ER with complaints of left foot pain that is acute on chronic. No trauma. Worsening over the last 4 months. He has been in physical therapy after falling and suffering a stroke. He believes this is a gout flareup. He is prescribed allopurinol but he says that he only takes it when he has a gout flareup. Differential diagnosis: Orthopedic injury, foot sprain, foot contusion, gouty arthritis flareup    On exam, he is ambulatory, awake, alert and in no distress. He is complaining of left foot pain. He has a palpable PT and DP pulse in his left foot. Sensation is intact. Normal capillary refill. No wounds or deformities noted. X-ray of the left foot was performed that showed no acute osseous findings, mild degenerative changes and pes planus. He was medicated with 650 mg of Tylenol while he was here. I gave him 5 days of steroids because he states this feels just like a prior gout flareup that he had in his foot in the past.  I advised him to follow-up with his PCP within the next 3 to 5 days. He states he has an appointment coming up on the 30th. I provided him with podiatry information to follow-up with in case this becomes worse and difficult to manage. Patient verbalized understanding. All questions were answered. Patient was discharged home. Plan of Care/Counseling:  ANASTASIA Brandon CNP reviewed today's visit with the patient in addition to providing specific details for the plan of care and counseling regarding the diagnosis and prognosis. Questions are answered at this time and are agreeable with the plan. Assessment      1. Arthritis of left foot    2.  Pes

## 2023-03-22 NOTE — DISCHARGE INSTRUCTIONS
Take prednisone as directed for the next 5 days. Tylenol as needed for pain. Follow up with your PCP for further management of arthritis/gout. I have attached podiatry information for you to establish care if this persists.

## 2023-03-23 ENCOUNTER — HOSPITAL ENCOUNTER (OUTPATIENT)
Dept: OCCUPATIONAL THERAPY | Age: 68
Setting detail: THERAPIES SERIES
Discharge: HOME | End: 2023-03-23
Payer: COMMERCIAL

## 2023-03-23 PROCEDURE — 97530 THERAPEUTIC ACTIVITIES: CPT

## 2023-03-23 PROCEDURE — 97140 MANUAL THERAPY 1/> REGIONS: CPT

## 2023-03-23 PROCEDURE — 97110 THERAPEUTIC EXERCISES: CPT

## 2023-03-23 NOTE — PROGRESS NOTES
covered based on POC and graduated to patient's progress. Pt education continues at each visit to obtain maximum benefits from skilled OT intervention.   []  Alter Plan of care:   []  Discharge:    Elli Starks OTR/L #[id number]

## 2023-03-28 ENCOUNTER — APPOINTMENT (OUTPATIENT)
Dept: OCCUPATIONAL THERAPY | Age: 68
End: 2023-03-28
Payer: COMMERCIAL

## 2023-03-28 PROCEDURE — 97110 THERAPEUTIC EXERCISES: CPT

## 2023-03-28 PROCEDURE — 97530 THERAPEUTIC ACTIVITIES: CPT

## 2023-03-29 ENCOUNTER — APPOINTMENT (OUTPATIENT)
Dept: GENERAL RADIOLOGY | Age: 68
End: 2023-03-29
Payer: COMMERCIAL

## 2023-03-29 ENCOUNTER — HOSPITAL ENCOUNTER (OUTPATIENT)
Age: 68
Setting detail: OBSERVATION
Discharge: OTHER INSTITUTION | End: 2023-03-31
Attending: EMERGENCY MEDICINE | Admitting: INTERNAL MEDICINE
Payer: COMMERCIAL

## 2023-03-29 ENCOUNTER — APPOINTMENT (OUTPATIENT)
Dept: OCCUPATIONAL THERAPY | Age: 68
End: 2023-03-29
Payer: COMMERCIAL

## 2023-03-29 ENCOUNTER — APPOINTMENT (OUTPATIENT)
Dept: CT IMAGING | Age: 68
End: 2023-03-29
Payer: COMMERCIAL

## 2023-03-29 ENCOUNTER — APPOINTMENT (OUTPATIENT)
Dept: ULTRASOUND IMAGING | Age: 68
End: 2023-03-29
Payer: COMMERCIAL

## 2023-03-29 ENCOUNTER — APPOINTMENT (OUTPATIENT)
Dept: MRI IMAGING | Age: 68
End: 2023-03-29
Payer: COMMERCIAL

## 2023-03-29 DIAGNOSIS — R55 SYNCOPE AND COLLAPSE: Primary | ICD-10-CM

## 2023-03-29 DIAGNOSIS — S09.90XA CLOSED HEAD INJURY, INITIAL ENCOUNTER: ICD-10-CM

## 2023-03-29 DIAGNOSIS — I73.9 PAD (PERIPHERAL ARTERY DISEASE) (HCC): ICD-10-CM

## 2023-03-29 LAB
ALBUMIN SERPL-MCNC: 4.2 G/DL (ref 3.5–5.2)
ALP SERPL-CCNC: 172 U/L (ref 40–129)
ALT SERPL-CCNC: 35 U/L (ref 0–40)
AMMONIA PLAS-SCNC: 16.6 UMOL/L (ref 16–60)
AMPHET UR QL SCN: NOT DETECTED
ANION GAP SERPL CALCULATED.3IONS-SCNC: 17 MMOL/L (ref 7–16)
AST SERPL-CCNC: 23 U/L (ref 0–39)
BARBITURATES UR QL SCN: NOT DETECTED
BASOPHILS # BLD: 0.02 E9/L (ref 0–0.2)
BASOPHILS NFR BLD: 0.2 % (ref 0–2)
BENZODIAZ UR QL SCN: NOT DETECTED
BILIRUB DIRECT SERPL-MCNC: <0.2 MG/DL (ref 0–0.3)
BILIRUB INDIRECT SERPL-MCNC: ABNORMAL MG/DL (ref 0–1)
BILIRUB SERPL-MCNC: 0.5 MG/DL (ref 0–1.2)
BILIRUB UR QL STRIP: NEGATIVE
BUN SERPL-MCNC: 24 MG/DL (ref 6–23)
CALCIUM SERPL-MCNC: 9.7 MG/DL (ref 8.6–10.2)
CANNABINOIDS UR QL SCN: NOT DETECTED
CHLORIDE SERPL-SCNC: 96 MMOL/L (ref 98–107)
CK SERPL-CCNC: 117 U/L (ref 20–200)
CLARITY UR: CLEAR
CO2 SERPL-SCNC: 21 MMOL/L (ref 22–29)
COCAINE UR QL SCN: NOT DETECTED
COLOR UR: YELLOW
CREAT SERPL-MCNC: 1.1 MG/DL (ref 0.7–1.2)
DRUG SCREEN COMMENT UR-IMP: NORMAL
EKG ATRIAL RATE: 66 BPM
EKG P AXIS: -9 DEGREES
EKG P-R INTERVAL: 110 MS
EKG Q-T INTERVAL: 412 MS
EKG QRS DURATION: 98 MS
EKG QTC CALCULATION (BAZETT): 431 MS
EKG R AXIS: 20 DEGREES
EKG T AXIS: 44 DEGREES
EKG VENTRICULAR RATE: 66 BPM
EOSINOPHIL # BLD: 0.09 E9/L (ref 0.05–0.5)
EOSINOPHIL NFR BLD: 1.1 % (ref 0–6)
ERYTHROCYTE [DISTWIDTH] IN BLOOD BY AUTOMATED COUNT: 14 FL (ref 11.5–15)
FENTANYL SCREEN, URINE: NOT DETECTED
GLUCOSE BLD-MCNC: 290 MG/DL
GLUCOSE SERPL-MCNC: 518 MG/DL (ref 74–99)
GLUCOSE SERPL-MCNC: 592 MG/DL (ref 74–99)
GLUCOSE UR STRIP-MCNC: >=1000 MG/DL
HCT VFR BLD AUTO: 42 % (ref 37–54)
HGB BLD-MCNC: 13.6 G/DL (ref 12.5–16.5)
HGB UR QL STRIP: NEGATIVE
IMM GRANULOCYTES # BLD: 0.03 E9/L
IMM GRANULOCYTES NFR BLD: 0.4 % (ref 0–5)
INR BLD: 1
KETONES UR STRIP-MCNC: 40 MG/DL
LACTATE BLDV-SCNC: 1.4 MMOL/L (ref 0.5–2.2)
LEUKOCYTE ESTERASE UR QL STRIP: NEGATIVE
LIPASE: 35 U/L (ref 13–60)
LYMPHOCYTES # BLD: 1.51 E9/L (ref 1.5–4)
LYMPHOCYTES NFR BLD: 17.8 % (ref 20–42)
MAGNESIUM SERPL-MCNC: 2.4 MG/DL (ref 1.6–2.6)
MCH RBC QN AUTO: 27.4 PG (ref 26–35)
MCHC RBC AUTO-ENTMCNC: 32.4 % (ref 32–34.5)
MCV RBC AUTO: 84.5 FL (ref 80–99.9)
METER GLUCOSE: 290 MG/DL (ref 74–99)
METER GLUCOSE: >500 MG/DL (ref 74–99)
METHADONE UR QL SCN: NOT DETECTED
MONOCYTES # BLD: 0.62 E9/L (ref 0.1–0.95)
MONOCYTES NFR BLD: 7.3 % (ref 2–12)
NEUTROPHILS # BLD: 6.19 E9/L (ref 1.8–7.3)
NEUTS SEG NFR BLD: 73.2 % (ref 43–80)
NITRITE UR QL STRIP: NEGATIVE
OPIATES UR QL SCN: NOT DETECTED
OSMOLALITY SERPL CALC.SUM OF ELEC: 319 MOSM/KG (ref 285–310)
OXYCODONE URINE: NOT DETECTED
PCP UR QL SCN: NOT DETECTED
PH UR STRIP: 5 [PH] (ref 5–9)
PLATELET # BLD AUTO: 257 E9/L (ref 130–450)
PMV BLD AUTO: 12.2 FL (ref 7–12)
POTASSIUM SERPL-SCNC: 5.1 MMOL/L (ref 3.5–5)
PROT SERPL-MCNC: 7.8 G/DL (ref 6.4–8.3)
PROT UR STRIP-MCNC: NEGATIVE MG/DL
PROTHROMBIN TIME: 11.3 SEC (ref 9.3–12.4)
RBC # BLD AUTO: 4.97 E12/L (ref 3.8–5.8)
SARS-COV-2 RDRP RESP QL NAA+PROBE: NOT DETECTED
SODIUM SERPL-SCNC: 134 MMOL/L (ref 132–146)
SP GR UR STRIP: <=1.005 (ref 1–1.03)
TROPONIN, HIGH SENSITIVITY: 12 NG/L (ref 0–11)
TSH SERPL-MCNC: 0.48 UIU/ML (ref 0.27–4.2)
UROBILINOGEN UR STRIP-ACNC: 0.2 E.U./DL
WBC # BLD: 8.5 E9/L (ref 4.5–11.5)

## 2023-03-29 PROCEDURE — 36415 COLL VENOUS BLD VENIPUNCTURE: CPT

## 2023-03-29 PROCEDURE — G0378 HOSPITAL OBSERVATION PER HR: HCPCS

## 2023-03-29 PROCEDURE — 83735 ASSAY OF MAGNESIUM: CPT

## 2023-03-29 PROCEDURE — 6370000000 HC RX 637 (ALT 250 FOR IP): Performed by: INTERNAL MEDICINE

## 2023-03-29 PROCEDURE — 6370000000 HC RX 637 (ALT 250 FOR IP): Performed by: EMERGENCY MEDICINE

## 2023-03-29 PROCEDURE — 96372 THER/PROPH/DIAG INJ SC/IM: CPT

## 2023-03-29 PROCEDURE — 70450 CT HEAD/BRAIN W/O DYE: CPT

## 2023-03-29 PROCEDURE — 85025 COMPLETE CBC W/AUTO DIFF WBC: CPT

## 2023-03-29 PROCEDURE — 81003 URINALYSIS AUTO W/O SCOPE: CPT

## 2023-03-29 PROCEDURE — 99285 EMERGENCY DEPT VISIT HI MDM: CPT

## 2023-03-29 PROCEDURE — 93923 UPR/LXTR ART STDY 3+ LVLS: CPT

## 2023-03-29 PROCEDURE — A9577 INJ MULTIHANCE: HCPCS | Performed by: RADIOLOGY

## 2023-03-29 PROCEDURE — 72125 CT NECK SPINE W/O DYE: CPT

## 2023-03-29 PROCEDURE — 82550 ASSAY OF CK (CPK): CPT

## 2023-03-29 PROCEDURE — 84484 ASSAY OF TROPONIN QUANT: CPT

## 2023-03-29 PROCEDURE — 83930 ASSAY OF BLOOD OSMOLALITY: CPT

## 2023-03-29 PROCEDURE — 83690 ASSAY OF LIPASE: CPT

## 2023-03-29 PROCEDURE — 99223 1ST HOSP IP/OBS HIGH 75: CPT | Performed by: INTERNAL MEDICINE

## 2023-03-29 PROCEDURE — 2580000003 HC RX 258: Performed by: INTERNAL MEDICINE

## 2023-03-29 PROCEDURE — 87635 SARS-COV-2 COVID-19 AMP PRB: CPT

## 2023-03-29 PROCEDURE — 6360000002 HC RX W HCPCS: Performed by: INTERNAL MEDICINE

## 2023-03-29 PROCEDURE — 85610 PROTHROMBIN TIME: CPT

## 2023-03-29 PROCEDURE — 70553 MRI BRAIN STEM W/O & W/DYE: CPT

## 2023-03-29 PROCEDURE — 93005 ELECTROCARDIOGRAM TRACING: CPT | Performed by: EMERGENCY MEDICINE

## 2023-03-29 PROCEDURE — 84443 ASSAY THYROID STIM HORMONE: CPT

## 2023-03-29 PROCEDURE — 6360000004 HC RX CONTRAST MEDICATION: Performed by: RADIOLOGY

## 2023-03-29 PROCEDURE — 6370000000 HC RX 637 (ALT 250 FOR IP): Performed by: NURSE PRACTITIONER

## 2023-03-29 PROCEDURE — 82962 GLUCOSE BLOOD TEST: CPT

## 2023-03-29 PROCEDURE — 71045 X-RAY EXAM CHEST 1 VIEW: CPT

## 2023-03-29 PROCEDURE — 82140 ASSAY OF AMMONIA: CPT

## 2023-03-29 PROCEDURE — 82947 ASSAY GLUCOSE BLOOD QUANT: CPT

## 2023-03-29 PROCEDURE — 96374 THER/PROPH/DIAG INJ IV PUSH: CPT

## 2023-03-29 PROCEDURE — 96361 HYDRATE IV INFUSION ADD-ON: CPT

## 2023-03-29 PROCEDURE — 80076 HEPATIC FUNCTION PANEL: CPT

## 2023-03-29 PROCEDURE — 83605 ASSAY OF LACTIC ACID: CPT

## 2023-03-29 PROCEDURE — 80307 DRUG TEST PRSMV CHEM ANLYZR: CPT

## 2023-03-29 PROCEDURE — 93010 ELECTROCARDIOGRAM REPORT: CPT | Performed by: INTERNAL MEDICINE

## 2023-03-29 PROCEDURE — 2060000000 HC ICU INTERMEDIATE R&B

## 2023-03-29 PROCEDURE — 80048 BASIC METABOLIC PNL TOTAL CA: CPT

## 2023-03-29 RX ORDER — DEXTROSE MONOHYDRATE 100 MG/ML
INJECTION, SOLUTION INTRAVENOUS CONTINUOUS PRN
Status: DISCONTINUED | OUTPATIENT
Start: 2023-03-29 | End: 2023-03-30 | Stop reason: SDUPTHER

## 2023-03-29 RX ORDER — INSULIN LISPRO 100 [IU]/ML
0-4 INJECTION, SOLUTION INTRAVENOUS; SUBCUTANEOUS NIGHTLY
Status: DISCONTINUED | OUTPATIENT
Start: 2023-03-29 | End: 2023-03-30

## 2023-03-29 RX ORDER — SODIUM CHLORIDE 0.9 % (FLUSH) 0.9 %
5-40 SYRINGE (ML) INJECTION PRN
Status: DISCONTINUED | OUTPATIENT
Start: 2023-03-29 | End: 2023-03-31 | Stop reason: HOSPADM

## 2023-03-29 RX ORDER — LISINOPRIL 10 MG/1
40 TABLET ORAL DAILY
Status: DISCONTINUED | OUTPATIENT
Start: 2023-03-29 | End: 2023-03-31 | Stop reason: HOSPADM

## 2023-03-29 RX ORDER — ONDANSETRON 4 MG/1
4 TABLET, ORALLY DISINTEGRATING ORAL EVERY 8 HOURS PRN
Status: DISCONTINUED | OUTPATIENT
Start: 2023-03-29 | End: 2023-03-31 | Stop reason: HOSPADM

## 2023-03-29 RX ORDER — SODIUM CHLORIDE 9 MG/ML
INJECTION, SOLUTION INTRAVENOUS PRN
Status: DISCONTINUED | OUTPATIENT
Start: 2023-03-29 | End: 2023-03-31 | Stop reason: HOSPADM

## 2023-03-29 RX ORDER — SODIUM CHLORIDE 0.9 % (FLUSH) 0.9 %
5-40 SYRINGE (ML) INJECTION EVERY 12 HOURS SCHEDULED
Status: DISCONTINUED | OUTPATIENT
Start: 2023-03-29 | End: 2023-03-31 | Stop reason: HOSPADM

## 2023-03-29 RX ORDER — ASPIRIN 81 MG/1
81 TABLET, CHEWABLE ORAL DAILY
Status: DISCONTINUED | OUTPATIENT
Start: 2023-03-29 | End: 2023-03-31 | Stop reason: HOSPADM

## 2023-03-29 RX ORDER — HYDROCHLOROTHIAZIDE 12.5 MG/1
12.5 TABLET ORAL EVERY MORNING
Status: DISCONTINUED | OUTPATIENT
Start: 2023-03-30 | End: 2023-03-31 | Stop reason: HOSPADM

## 2023-03-29 RX ORDER — ATORVASTATIN CALCIUM 40 MG/1
80 TABLET, FILM COATED ORAL NIGHTLY
Status: DISCONTINUED | OUTPATIENT
Start: 2023-03-29 | End: 2023-03-31 | Stop reason: HOSPADM

## 2023-03-29 RX ORDER — EPINEPHRINE 0.3 MG/.3ML
0.3 INJECTION SUBCUTANEOUS ONCE
Status: DISCONTINUED | OUTPATIENT
Start: 2023-03-29 | End: 2023-03-31 | Stop reason: HOSPADM

## 2023-03-29 RX ORDER — SODIUM CHLORIDE 9 MG/ML
INJECTION, SOLUTION INTRAVENOUS CONTINUOUS
Status: DISCONTINUED | OUTPATIENT
Start: 2023-03-29 | End: 2023-03-31 | Stop reason: HOSPADM

## 2023-03-29 RX ORDER — ACETAMINOPHEN 650 MG/1
650 SUPPOSITORY RECTAL EVERY 6 HOURS PRN
Status: DISCONTINUED | OUTPATIENT
Start: 2023-03-29 | End: 2023-03-31 | Stop reason: HOSPADM

## 2023-03-29 RX ORDER — POLYETHYLENE GLYCOL 3350 17 G/17G
17 POWDER, FOR SOLUTION ORAL DAILY PRN
Status: DISCONTINUED | OUTPATIENT
Start: 2023-03-29 | End: 2023-03-31 | Stop reason: HOSPADM

## 2023-03-29 RX ORDER — CLOPIDOGREL BISULFATE 75 MG/1
75 TABLET ORAL DAILY
Status: DISCONTINUED | OUTPATIENT
Start: 2023-03-29 | End: 2023-03-31 | Stop reason: HOSPADM

## 2023-03-29 RX ORDER — ENOXAPARIN SODIUM 100 MG/ML
40 INJECTION SUBCUTANEOUS DAILY
Status: DISCONTINUED | OUTPATIENT
Start: 2023-03-29 | End: 2023-03-31 | Stop reason: HOSPADM

## 2023-03-29 RX ORDER — ALLOPURINOL 100 MG/1
100 TABLET ORAL DAILY
Status: DISCONTINUED | OUTPATIENT
Start: 2023-03-29 | End: 2023-03-31 | Stop reason: HOSPADM

## 2023-03-29 RX ORDER — ACETAMINOPHEN 325 MG/1
650 TABLET ORAL EVERY 6 HOURS PRN
Status: DISCONTINUED | OUTPATIENT
Start: 2023-03-29 | End: 2023-03-31 | Stop reason: HOSPADM

## 2023-03-29 RX ORDER — INSULIN LISPRO 100 [IU]/ML
10 INJECTION, SOLUTION INTRAVENOUS; SUBCUTANEOUS ONCE
Status: COMPLETED | OUTPATIENT
Start: 2023-03-29 | End: 2023-03-29

## 2023-03-29 RX ORDER — INSULIN LISPRO 100 [IU]/ML
0-8 INJECTION, SOLUTION INTRAVENOUS; SUBCUTANEOUS
Status: DISCONTINUED | OUTPATIENT
Start: 2023-03-29 | End: 2023-03-30

## 2023-03-29 RX ORDER — ONDANSETRON 2 MG/ML
4 INJECTION INTRAMUSCULAR; INTRAVENOUS EVERY 6 HOURS PRN
Status: DISCONTINUED | OUTPATIENT
Start: 2023-03-29 | End: 2023-03-31 | Stop reason: HOSPADM

## 2023-03-29 RX ORDER — FAMOTIDINE 20 MG/1
20 TABLET, FILM COATED ORAL DAILY
Status: DISCONTINUED | OUTPATIENT
Start: 2023-03-29 | End: 2023-03-31 | Stop reason: HOSPADM

## 2023-03-29 RX ORDER — AMLODIPINE BESYLATE 5 MG/1
10 TABLET ORAL DAILY
Status: DISCONTINUED | OUTPATIENT
Start: 2023-03-29 | End: 2023-03-31 | Stop reason: HOSPADM

## 2023-03-29 RX ADMIN — SODIUM CHLORIDE: 9 INJECTION, SOLUTION INTRAVENOUS at 18:52

## 2023-03-29 RX ADMIN — INSULIN LISPRO 10 UNITS: 100 INJECTION, SOLUTION INTRAVENOUS; SUBCUTANEOUS at 23:13

## 2023-03-29 RX ADMIN — ENOXAPARIN SODIUM 40 MG: 100 INJECTION SUBCUTANEOUS at 19:59

## 2023-03-29 RX ADMIN — ASPIRIN 81 MG CHEWABLE TABLET 81 MG: 81 TABLET CHEWABLE at 19:56

## 2023-03-29 RX ADMIN — GADOBENATE DIMEGLUMINE 13 ML: 529 INJECTION, SOLUTION INTRAVENOUS at 15:50

## 2023-03-29 RX ADMIN — ATORVASTATIN CALCIUM 80 MG: 40 TABLET, FILM COATED ORAL at 19:56

## 2023-03-29 RX ADMIN — INSULIN LISPRO 4 UNITS: 100 INJECTION, SOLUTION INTRAVENOUS; SUBCUTANEOUS at 23:13

## 2023-03-29 RX ADMIN — CLOPIDOGREL BISULFATE 75 MG: 75 TABLET ORAL at 19:56

## 2023-03-29 RX ADMIN — INSULIN HUMAN 10 UNITS: 100 INJECTION, SOLUTION PARENTERAL at 16:31

## 2023-03-29 RX ADMIN — FAMOTIDINE 20 MG: 20 TABLET, FILM COATED ORAL at 19:56

## 2023-03-29 ASSESSMENT — ENCOUNTER SYMPTOMS
RHINORRHEA: 0
SHORTNESS OF BREATH: 0
COUGH: 0
EYE PAIN: 0
CHEST TIGHTNESS: 0
COLOR CHANGE: 0
ABDOMINAL PAIN: 0
SINUS PAIN: 0
PHOTOPHOBIA: 0
BACK PAIN: 0

## 2023-03-29 ASSESSMENT — PAIN - FUNCTIONAL ASSESSMENT
PAIN_FUNCTIONAL_ASSESSMENT: NONE - DENIES PAIN
PAIN_FUNCTIONAL_ASSESSMENT: NONE - DENIES PAIN

## 2023-03-29 ASSESSMENT — PAIN SCALES - GENERAL: PAINLEVEL_OUTOF10: 0

## 2023-03-29 NOTE — ED PROVIDER NOTES
normal with no evidence of pancreatitis, TSH was normal complete blood count was stable hemoglobin was stable there is no signs of any anemia. Patient has PAD and upper lumbar result results were shown above. His vascular ultrasound showed significant disease bilateral left greater than right. Patient will be admitted for syncope with myoclonic jerks. This was likely cardioverted vasovagal.  Patient be admitted for observation telemetry monitoring and subspecialty evaluation. Since patient's vascular outpatient consult was canceled we will do a CT of for routine vascular consult for tomorrow. His vascular exam is stable at this time. Patient stable for telemetry floor. He does not need be transferred for this at this time    Disposition Considerations (tests considered but not done, Shared Decision Making, Pt Expectation of Test or Tx.):   Different thyroidAppropriate for outpatient management        I am the Primary Clinician of Record. FINAL IMPRESSION      1. Syncope and collapse    2. Closed head injury, initial encounter    3. PAD (peripheral artery disease) ([facility] [address]          DISPOSITION/PLAN     DISPOSITION Admitted 03/29/2023 04:35:56 PM      PATIENT REFERRED TO:  No follow-up provider specified.     DISCHARGE MEDICATIONS:  Current Discharge Medication List          DISCONTINUED MEDICATIONS:  Current Discharge Medication List        STOP taking these medications       acetaminophen (TYLENOL) 500 MG tablet Comments:   Reason for Stopping:                      (Please note that portions of this note were completed with a voice recognition program.  Efforts were made to edit the dictations but occasionally words are mis-transcribed.)    Mojgan Duval DO (electronically signed)           Mojgan Duval DO  03/29/23 2046

## 2023-03-29 NOTE — PROGRESS NOTES
Phone: [phone] Fax: [phone]     Occupational Therapy   Cancellation/No-show Note     Patient Name:  Ebonie Bustillo  : 1955  Date:  3/29/2023  MRN: [id number]    For today's appointment patient:       [x]  Cancelled   []  Rescheduled appointment   []  No-show     Reason given by patient:   []  Patient ill   []  Conflicting appointment   []  No transportation   []  Conflict with work   []  No reason given   []  Other:     Comments: Pt arrived 45 min late for appt reporting that he was [place] a stroke\". Emergency personnel were called and pt was transferred for further emergency medical attention.     Electronically signed by: Dena Church OT MS, OTR/L #[id number]

## 2023-03-29 NOTE — H&P
[facility] History and Physical      CHIEF COMPLAINT:  syncope episode     History of Present Illness:      76year old female with a past medical history of HLD, hypertension, OA and prostate cancer presented to the ED for syncope episodes. Patient reports when he was going to the bathroom to empty urinal when he began to have spasms in the right arm. His legs became weak and patient fell. During fall, patient his head on his toilet and broke his toilet seat. Patient seen and examined in the ED. Denies any decreased fluid in take. Denies CP, SOB, nausea, vomiting, urinary/ bowel issues. No further spasms noted. Patient states this occurred 2 times. Patient is a former smoker with a 10 year pack history- quitting in 2017. Denies any alcohol or drug abuse Allergies to Ibuprofen. Informant(s) for H&P: patient and emr     REVIEW OF SYSTEMS:  A comprehensive review of systems was negative except for: what is in the HPI      PMH:  Past Medical History:   Diagnosis Date    Cancer ([facility] [address] PROSTATE    2005, patient reports bone mets    Erectile dysfunction     Hyperlipidemia     Hypertension 2005    Osteoarthritis, hip/pelvic region and thigh 3/29/2013    Prostate cancer ([facility] [address] 2/26/2013       Surgical History:  Past Surgical History:   Procedure Laterality Date    BACK SURGERY      HIP SURGERY Right     LEG SURGERY      LUMBAR SPINE SURGERY  10/30/2017    PLIF L2 - S1   and laminectomy    PROSTATE BIOPSY         Medications Prior to Admission:    Prior to Admission medications    Medication Sig Start Date End Date Taking?  Authorizing Provider   hydroCHLOROthiazide (HYDRODIURIL) 12.5 MG tablet Take 1 tablet by mouth every morning 3/5/23   Rosemarie Sultana MD   MUCUS RELIEF 600 MG extended release tablet take 1 tablet by mouth twice a day 2/3/23   Rosemarie Sultana MD   famotidine (PEPCID) 20 MG tablet Take 1 tablet by mouth daily 1/3/23   Rosemarie Sultana MD   atorvastatin (LIPITOR) 80 MG tablet Take 1 tablet

## 2023-03-29 NOTE — ED NOTES
Pt came via EMS from [facility] rehab. Pt c/o weakness and multiple recent falls, pt denies any light headedness, sob states he just lost his balance while trying to empty urinal in bathroom. Was using his cane when he lost his balance. Denies loc, or injury.  Per EMS Blood glucose was over 205 MYRIAM Gonzales  03/29/23 1000 [facility], RN  03/29/23 1023

## 2023-03-29 NOTE — ED NOTES
Orthostatic bp  Supine- /77 (100) HR 68  Sitting- 147/125 (132) HR 66  Standing- 119/74 (85) HR75     Juany Stokes RN  03/29/23 1402

## 2023-03-30 ENCOUNTER — APPOINTMENT (OUTPATIENT)
Dept: CT IMAGING | Age: 68
End: 2023-03-30
Payer: COMMERCIAL

## 2023-03-30 PROBLEM — E78.5 HYPERLIPIDEMIA: Status: ACTIVE | Noted: 2023-03-30

## 2023-03-30 PROBLEM — E11.9 NEWLY DIAGNOSED DIABETES (HCC): Status: ACTIVE | Noted: 2023-03-30

## 2023-03-30 PROBLEM — E10.59 TYPE 1 DIABETES MELLITUS WITH CIRCULATORY COMPLICATION (HCC): Status: ACTIVE | Noted: 2023-03-30

## 2023-03-30 PROBLEM — Z91.119 DIETARY NONCOMPLIANCE: Status: ACTIVE | Noted: 2023-03-30

## 2023-03-30 PROBLEM — I73.9 PVD (PERIPHERAL VASCULAR DISEASE) WITH CLAUDICATION (HCC): Status: ACTIVE | Noted: 2023-03-30

## 2023-03-30 PROBLEM — Z86.73 H/O: CVA (CEREBROVASCULAR ACCIDENT): Status: ACTIVE | Noted: 2023-03-30

## 2023-03-30 LAB
AMMONIA PLAS-SCNC: 45.6 UMOL/L (ref 16–60)
ANION GAP SERPL CALCULATED.3IONS-SCNC: 12 MMOL/L (ref 7–16)
BUN SERPL-MCNC: 18 MG/DL (ref 6–23)
CALCIUM SERPL-MCNC: 9.4 MG/DL (ref 8.6–10.2)
CHLORIDE SERPL-SCNC: 107 MMOL/L (ref 98–107)
CO2 SERPL-SCNC: 22 MMOL/L (ref 22–29)
CREAT SERPL-MCNC: 1 MG/DL (ref 0.7–1.2)
EKG ATRIAL RATE: 63 BPM
EKG P AXIS: 11 DEGREES
EKG P-R INTERVAL: 104 MS
EKG Q-T INTERVAL: 416 MS
EKG QRS DURATION: 100 MS
EKG QTC CALCULATION (BAZETT): 425 MS
EKG R AXIS: 23 DEGREES
EKG T AXIS: 29 DEGREES
EKG VENTRICULAR RATE: 63 BPM
ERYTHROCYTE [DISTWIDTH] IN BLOOD BY AUTOMATED COUNT: 14.1 FL (ref 11.5–15)
GLUCOSE SERPL-MCNC: 139 MG/DL (ref 74–99)
GLUCOSE SERPL-MCNC: 643 MG/DL (ref 74–99)
HBA1C MFR BLD: 13.3 % (ref 4–5.6)
HBA1C MFR BLD: 13.7 % (ref 4–5.6)
HCT VFR BLD AUTO: 40.3 % (ref 37–54)
HGB BLD-MCNC: 13.1 G/DL (ref 12.5–16.5)
MAGNESIUM SERPL-MCNC: 2.3 MG/DL (ref 1.6–2.6)
MCH RBC QN AUTO: 27.4 PG (ref 26–35)
MCHC RBC AUTO-ENTMCNC: 32.5 % (ref 32–34.5)
MCV RBC AUTO: 84.3 FL (ref 80–99.9)
METER GLUCOSE: 145 MG/DL (ref 74–99)
METER GLUCOSE: 155 MG/DL (ref 74–99)
METER GLUCOSE: 273 MG/DL (ref 74–99)
METER GLUCOSE: 288 MG/DL (ref 74–99)
METER GLUCOSE: 492 MG/DL (ref 74–99)
METER GLUCOSE: >500 MG/DL (ref 74–99)
PLATELET # BLD AUTO: 243 E9/L (ref 130–450)
PMV BLD AUTO: 11.8 FL (ref 7–12)
POTASSIUM SERPL-SCNC: 3.7 MMOL/L (ref 3.5–5)
RBC # BLD AUTO: 4.78 E12/L (ref 3.8–5.8)
SODIUM SERPL-SCNC: 141 MMOL/L (ref 132–146)
TSH SERPL-MCNC: 0.63 UIU/ML (ref 0.27–4.2)
VIT B12 SERPL-MCNC: 1664 PG/ML (ref 211–946)
WBC # BLD: 7 E9/L (ref 4.5–11.5)

## 2023-03-30 PROCEDURE — 2580000003 HC RX 258: Performed by: INTERNAL MEDICINE

## 2023-03-30 PROCEDURE — 85027 COMPLETE CBC AUTOMATED: CPT

## 2023-03-30 PROCEDURE — 82962 GLUCOSE BLOOD TEST: CPT

## 2023-03-30 PROCEDURE — 82140 ASSAY OF AMMONIA: CPT

## 2023-03-30 PROCEDURE — 82947 ASSAY GLUCOSE BLOOD QUANT: CPT

## 2023-03-30 PROCEDURE — 82607 VITAMIN B-12: CPT

## 2023-03-30 PROCEDURE — 96361 HYDRATE IV INFUSION ADD-ON: CPT

## 2023-03-30 PROCEDURE — G0378 HOSPITAL OBSERVATION PER HR: HCPCS

## 2023-03-30 PROCEDURE — 36415 COLL VENOUS BLD VENIPUNCTURE: CPT

## 2023-03-30 PROCEDURE — 6370000000 HC RX 637 (ALT 250 FOR IP): Performed by: INTERNAL MEDICINE

## 2023-03-30 PROCEDURE — 99233 SBSQ HOSP IP/OBS HIGH 50: CPT | Performed by: INTERNAL MEDICINE

## 2023-03-30 PROCEDURE — 6360000002 HC RX W HCPCS: Performed by: INTERNAL MEDICINE

## 2023-03-30 PROCEDURE — 93010 ELECTROCARDIOGRAM REPORT: CPT | Performed by: INTERNAL MEDICINE

## 2023-03-30 PROCEDURE — 93005 ELECTROCARDIOGRAM TRACING: CPT | Performed by: INTERNAL MEDICINE

## 2023-03-30 PROCEDURE — 83036 HEMOGLOBIN GLYCOSYLATED A1C: CPT

## 2023-03-30 PROCEDURE — 84443 ASSAY THYROID STIM HORMONE: CPT

## 2023-03-30 PROCEDURE — 97165 OT EVAL LOW COMPLEX 30 MIN: CPT

## 2023-03-30 PROCEDURE — 80048 BASIC METABOLIC PNL TOTAL CA: CPT

## 2023-03-30 PROCEDURE — 96372 THER/PROPH/DIAG INJ SC/IM: CPT

## 2023-03-30 PROCEDURE — 99222 1ST HOSP IP/OBS MODERATE 55: CPT | Performed by: INTERNAL MEDICINE

## 2023-03-30 PROCEDURE — 99222 1ST HOSP IP/OBS MODERATE 55: CPT | Performed by: SURGERY

## 2023-03-30 PROCEDURE — 97161 PT EVAL LOW COMPLEX 20 MIN: CPT

## 2023-03-30 PROCEDURE — 83735 ASSAY OF MAGNESIUM: CPT

## 2023-03-30 RX ORDER — INSULIN GLARGINE 100 [IU]/ML
20 INJECTION, SOLUTION SUBCUTANEOUS EVERY MORNING
Status: DISCONTINUED | OUTPATIENT
Start: 2023-03-31 | End: 2023-03-31 | Stop reason: HOSPADM

## 2023-03-30 RX ORDER — INSULIN LISPRO 100 [IU]/ML
0-16 INJECTION, SOLUTION INTRAVENOUS; SUBCUTANEOUS
Status: DISCONTINUED | OUTPATIENT
Start: 2023-03-30 | End: 2023-03-30

## 2023-03-30 RX ORDER — INSULIN GLARGINE 100 [IU]/ML
20 INJECTION, SOLUTION SUBCUTANEOUS ONCE
Status: COMPLETED | OUTPATIENT
Start: 2023-03-30 | End: 2023-03-30

## 2023-03-30 RX ORDER — INSULIN GLARGINE 100 [IU]/ML
20 INJECTION, SOLUTION SUBCUTANEOUS NIGHTLY
Status: DISCONTINUED | OUTPATIENT
Start: 2023-03-30 | End: 2023-03-30

## 2023-03-30 RX ORDER — DEXTROSE MONOHYDRATE 100 MG/ML
INJECTION, SOLUTION INTRAVENOUS CONTINUOUS PRN
Status: DISCONTINUED | OUTPATIENT
Start: 2023-03-30 | End: 2023-03-31 | Stop reason: HOSPADM

## 2023-03-30 RX ORDER — INSULIN LISPRO 100 [IU]/ML
12 INJECTION, SOLUTION INTRAVENOUS; SUBCUTANEOUS ONCE
Status: COMPLETED | OUTPATIENT
Start: 2023-03-30 | End: 2023-03-30

## 2023-03-30 RX ORDER — INSULIN LISPRO 100 [IU]/ML
0-4 INJECTION, SOLUTION INTRAVENOUS; SUBCUTANEOUS NIGHTLY
Status: DISCONTINUED | OUTPATIENT
Start: 2023-03-30 | End: 2023-03-31 | Stop reason: HOSPADM

## 2023-03-30 RX ORDER — INSULIN LISPRO 100 [IU]/ML
7 INJECTION, SOLUTION INTRAVENOUS; SUBCUTANEOUS
Status: DISCONTINUED | OUTPATIENT
Start: 2023-03-30 | End: 2023-03-31

## 2023-03-30 RX ORDER — CILOSTAZOL 100 MG/1
100 TABLET ORAL 2 TIMES DAILY
Status: DISCONTINUED | OUTPATIENT
Start: 2023-03-30 | End: 2023-03-31

## 2023-03-30 RX ORDER — INSULIN GLARGINE 100 [IU]/ML
10 INJECTION, SOLUTION SUBCUTANEOUS NIGHTLY
Status: DISCONTINUED | OUTPATIENT
Start: 2023-03-30 | End: 2023-03-31

## 2023-03-30 RX ORDER — INSULIN LISPRO 100 [IU]/ML
0-12 INJECTION, SOLUTION INTRAVENOUS; SUBCUTANEOUS
Status: DISCONTINUED | OUTPATIENT
Start: 2023-03-30 | End: 2023-03-31

## 2023-03-30 RX ADMIN — AMLODIPINE BESYLATE 10 MG: 5 TABLET ORAL at 08:31

## 2023-03-30 RX ADMIN — INSULIN LISPRO 12 UNITS: 100 INJECTION, SOLUTION INTRAVENOUS; SUBCUTANEOUS at 11:46

## 2023-03-30 RX ADMIN — PETROLATUM: 420 OINTMENT TOPICAL at 19:55

## 2023-03-30 RX ADMIN — INSULIN GLARGINE 10 UNITS: 100 INJECTION, SOLUTION SUBCUTANEOUS at 20:30

## 2023-03-30 RX ADMIN — SODIUM CHLORIDE, PRESERVATIVE FREE 10 ML: 5 INJECTION INTRAVENOUS at 19:57

## 2023-03-30 RX ADMIN — INSULIN LISPRO 7 UNITS: 100 INJECTION, SOLUTION INTRAVENOUS; SUBCUTANEOUS at 16:10

## 2023-03-30 RX ADMIN — INSULIN GLARGINE 20 UNITS: 100 INJECTION, SOLUTION SUBCUTANEOUS at 13:18

## 2023-03-30 RX ADMIN — ENOXAPARIN SODIUM 40 MG: 100 INJECTION SUBCUTANEOUS at 16:03

## 2023-03-30 RX ADMIN — ATORVASTATIN CALCIUM 80 MG: 40 TABLET, FILM COATED ORAL at 19:52

## 2023-03-30 RX ADMIN — ALLOPURINOL 100 MG: 100 TABLET ORAL at 08:31

## 2023-03-30 RX ADMIN — SODIUM CHLORIDE: 9 INJECTION, SOLUTION INTRAVENOUS at 16:08

## 2023-03-30 RX ADMIN — INSULIN LISPRO 12 UNITS: 100 INJECTION, SOLUTION INTRAVENOUS; SUBCUTANEOUS at 16:10

## 2023-03-30 RX ADMIN — ACETAMINOPHEN 650 MG: 325 TABLET ORAL at 17:52

## 2023-03-30 RX ADMIN — LISINOPRIL 40 MG: 10 TABLET ORAL at 08:26

## 2023-03-30 RX ADMIN — ASPIRIN 81 MG CHEWABLE TABLET 81 MG: 81 TABLET CHEWABLE at 08:28

## 2023-03-30 RX ADMIN — HYDROCHLOROTHIAZIDE 12.5 MG: 12.5 TABLET ORAL at 08:28

## 2023-03-30 RX ADMIN — SODIUM CHLORIDE, PRESERVATIVE FREE 10 ML: 5 INJECTION INTRAVENOUS at 14:56

## 2023-03-30 RX ADMIN — CLOPIDOGREL BISULFATE 75 MG: 75 TABLET ORAL at 08:31

## 2023-03-30 RX ADMIN — FAMOTIDINE 20 MG: 20 TABLET, FILM COATED ORAL at 08:31

## 2023-03-30 RX ADMIN — PETROLATUM: 420 OINTMENT TOPICAL at 13:50

## 2023-03-30 ASSESSMENT — PAIN SCALES - GENERAL
PAINLEVEL_OUTOF10: 0
PAINLEVEL_OUTOF10: 0
PAINLEVEL_OUTOF10: 3

## 2023-03-30 ASSESSMENT — PAIN DESCRIPTION - LOCATION: LOCATION: LEG

## 2023-03-30 ASSESSMENT — PAIN DESCRIPTION - DESCRIPTORS: DESCRIPTORS: CRAMPING;DISCOMFORT;SQUEEZING

## 2023-03-30 ASSESSMENT — PAIN - FUNCTIONAL ASSESSMENT: PAIN_FUNCTIONAL_ASSESSMENT: PREVENTS OR INTERFERES SOME ACTIVE ACTIVITIES AND ADLS

## 2023-03-30 ASSESSMENT — PAIN DESCRIPTION - ORIENTATION: ORIENTATION: LEFT;UPPER

## 2023-03-30 ASSESSMENT — PAIN DESCRIPTION - PAIN TYPE: TYPE: ACUTE PAIN

## 2023-03-30 NOTE — PROGRESS NOTES
Rhythm Strip showing Junctional Rhythm. Dr. Mega Mathew notified; new order for Magnesium level and EKG. Patient notified and verbalized understanding.

## 2023-03-30 NOTE — PROGRESS NOTES
and Oriented     Balance: SBA/CGA . Fall risk due to  recent falls, weakness, decreased balance, pain in feet   Endurance: limited by pain in feet      ASSESSMENT  Pt displays functional ability as noted in the objective portion of this evaluation. Conditions Requiring Skilled Therapeutic Intervention:    [x]Decreased strength     []Decreased ROM  [x]Decreased functional mobility  [x]Decreased balance   [x]Decreased endurance   []Decreased posture  []Decreased sensation  []Decreased coordination   []Decreased vision  [x]Decreased safety awareness   [x]Increased pain         Treatment/Education:    Pt  in bed  upon arrival ; agreeable to PT. Mobility as above. Cues given for hand placement with transfers, to lock rollator, and to not abandon rollator when turning to sit. Decreased safety awareness noted. Pt educated on fall risk, safety with mobility        Patient response to education:   Pt verbalized understanding Pt demonstrated skill Pt requires further education in this area    Needs cues   x       Comments:  Pt left  sitting EOB after session, with call light in reach. Rehab potential is Good for reaching above PT goals. Pts/ family goals   Stated stated     Patient and or family understand(s) diagnosis, prognosis, and plan of care. -  yes     PLAN  PT care will be provided in accordance with the objectives noted above. Whenever appropriate, clear delegation orders will be provided for nursing staff. Exercises and functional mobility practice will be used as well as appropriate assistive devices or modalities to obtain goals. Patient and family education will also be administered as needed.         PLAN OF CARE:    Current Treatment Recommendations     [x] Strengthening to improve independence with functional mobility   [] ROM to improve independence with functional mobility   [x] Balance Training to improve static/dynamic balance and to reduce fall risk  [x] Endurance Training to improve activity tolerance during functional mobility   [x] Transfer Training to improve safety and independence with all functional transfers   [x] Gait Training to improve gait mechanics, endurance and assess need for appropriate assistive device  [x] Stair Training in preparation for safe discharge home and/or into the community   [x] Positioning to prevent skin breakdown and contractures  [x] Safety and Education Training   [x] Patient/Caregiver Education   [] HEP  [] Other     Frequency of treatments will be 2-5x/week x 7-10 days. Time in:  1115   Time out:  1130       Evaluation Time includes thorough review of current medical information, gathering information on past medical history/social history and prior level of function, completion of standardized testing/informal observation of tasks, assessment of data and education on plan of care and goals.     CPT codes:  [x] Low Complexity PT evaluation 27814  [] Moderate Complexity PT evaluation 33244  [] High Complexity PT evaluation 82436  [] PT Re-evaluation 54819  [] Gait training 55364  minutes  [] Therapeutic activities 49847  minutes  [] Therapeutic exercises 47685  minutes  [] Neuromuscular reeducation 81680  minutes       Michael Gonsalves number:  PT [id number]

## 2023-03-30 NOTE — PROGRESS NOTES
FSBS registering high. STAT blood glucose obtained per protocol. Awaiting results. Dr. Cherelle Joe notified. New orders to administer Humalog 12 units SQ x 1 dose now. Patient notified and verbalized understanding.

## 2023-03-30 NOTE — PROGRESS NOTES
Dr. Elsie Aparicio notified of Glucose level 643. Give Lantus 20 units SQ now. Dr. Sarita Young updated through [facility].

## 2023-03-30 NOTE — CARE COORDINATION
Social Work / Discharge Planning :SW met with patient and explained role as discharge planner/ transition of care. Patient admitted with Syncope and collapse. Patient resides by himself in an apartment. His Rolator is in his room. He is current outpatient with therapy. Goals at discharge discussed and therapy ordered. Patient feels he will need rehab at discharge. Patient has a past hx at [facility] and if he needs rehab, that where he would like to go. Patient PCP is Dr Andrzej Fernandes and he gets his meds at [facility]. Await treatment plan. SW to follow. Electronically signed by MARLENE Garcia on 3/30/23 at 11:02 AM EDT     Addendum: Therapy am/pac 17/24. SW made referral to [facility] and they accepted and started pre-cert. N 17, HENS and transport forms completed. Await pre-cert. Will need COVID. SW to follow. Electronically signed by MARLENE Garcia on 3/30/23 at 2:50 PM EDT        The Plan for Transition of Care is related to the following treatment goals: SNF    The Patient as provided with a choice of provider and agrees   with the discharge plan. [x] Yes [] No    Freedom of choice list was provided with basic dialogue that supports the patient's individualized plan of care/goals, treatment preferences and shares the quality data associated with the providers. [x] Yes [] No   Case Management Assessment  Initial Evaluation    Date/Time of Evaluation: 3/30/2023 11:08 AM  Assessment Completed by: MARLENE Garcia    If patient is discharged prior to next notation, then this note serves as note for discharge by case management.     Patient Name: Patricia Bruner                   YOB: 1955  Diagnosis: Syncope and collapse [R55]  PAD (peripheral artery disease) ([facility] [address] [I73.9]  Closed head injury, initial encounter [S09.90XA]                   Date / Time: 3/29/2023  9:20 AM    Patient Admission Status: Inpatient   Readmission Risk (Low < 19, Mod (19-27), High > 27): Readmission Risk Score: 13.5    Current PCP: Natalie Hodgson MD  PCP verified by CM? Yes    Chart Reviewed: Yes      History Provided by: Patient  Patient Orientation: Alert and Oriented, Person, Place, Situation    Patient Cognition: Alert    Hospitalization in the last 30 days (Readmission):  No  If yes, Readmission Assessment in  Navigator will be completed. Advance Directives:      Code Status: Full Code   Patient's Primary Decision Maker is:        Discharge Planning:    Patient lives with:   Type of Home:    Primary Care Giver: Self  Patient Support Systems include:     Current Financial resources:    Current community resources:    Current services prior to admission:              Current DME:              Type of Home Care services:       ADLS  Prior functional level: Independent in ADLs/IADLs  Current functional level: Assistance with the following:, Haley Has, Shopping, [facility], Bathing    PT AM-PAC:   /24  OT AM-PAC:   /24    Family can provide assistance at DC: Yes  Would you like Case Management to discuss the discharge plan with any other family members/significant others, and if so, who?  No  Plans to Return to Present Housing: Unknown at present  Other Identified Issues/Barriers to RETURNING to current housing: yes  Potential Assistance needed at discharge:              Potential DME:    Patient expects to discharge to:    Plan for transportation at discharge:      Financial    Payor: Becca Lucero / Plan: Jensen Henriquez / Product Type: *No Product type* /     Does insurance require precert for SNF: Yes    Potential assistance Purchasing Medications:    Meds-to-Beds request: Yes      [facility] [address] [place] [address] - [address] [phone] - F [phone]  [address]  [place] [address]  Phone: [phone] Fax: [phone]      Notes:    Factors facilitating achievement of predicted outcomes: Family support, Cooperative, Pleasant, Sense of humor, Good insight into deficits,

## 2023-03-30 NOTE — PROGRESS NOTES
with treatment plan   []  Showed very little interest in complying with treatment plan   [x]  Seems to have trouble applying concepts to daily lifestyle       COMMENTS:  Patient was pleasant and open to diabetes education. He struggled with staying on topic and became visibly overwhelmed when discussing prevention and treatment of hypo/hyperglycemia. Empathized with patient and offered encouragement. He will need reinforcement on all topics. Admits to regularly drinking large quantities juice. Suggested alternative beverages, patient agreeable. Appears motivated to do well. No discharge orders placed at this time. Diabetes education will follow up with patient tomorrow (3/31/23) to provide glucose meter instruction as well as any medication/injection instructions as needed. Recommendations:   [x] Carbohydrate-controlled diet    [x] Script for glucometer and supplies (per preference of patient's insurance)  [] Script for insulin pens and pen needles (if insulin is ordered at discharge for home use)   [] Consult to social work: patient has no insurance or has financial hardship  [] Inpatient consult to endocrinologist   [x] Follow up with endocrinologist as an outpatient   [] Home healthcare nursing to increase compliance to treatment plan   [x] Script for outpatient diabetes education classes (from doctor)        Thank you for this consult.       IVONNE Hinkle

## 2023-03-30 NOTE — CONSULTS
PRN Monalisa Ivan, DO        enoxaparin (LOVENOX) injection 40 mg  40 mg SubCUTAneous Daily Maryanne MAXI Brittney, DO   40 mg at 23    ondansetron (ZOFRAN-ODT) disintegrating tablet 4 mg  4 mg Oral Q8H PRN Maryanne J Tyro, DO        Or    ondansetron (ZOFRAN) injection 4 mg  4 mg IntraVENous Q6H PRN Maryanne J Brittney, DO        polyethylene glycol (GLYCOLAX) packet 17 g  17 g Oral Daily PRN Maryanne J Brittney, DO        acetaminophen (TYLENOL) tablet 650 mg  650 mg Oral Q6H PRN Maryanne J Tyro, DO        Or    acetaminophen (TYLENOL) suppository 650 mg  650 mg Rectal Q6H PRN Maryanne GERMAN Brittney, DO        0.9 % sodium chloride infusion   IntraVENous Continuous Monalisa Ivan,  mL/hr at 23 New Bag at 23 185       Past Medical History:   Diagnosis Date    Cancer [facility]) PROSTATE    , patient reports bone mets    Erectile dysfunction     Hyperlipidemia     Hypertension     Osteoarthritis, hip/pelvic region and thigh 3/29/2013    Prostate cancer ([facility] [address] 2013       Past Surgical History:   Procedure Laterality Date    BACK SURGERY      HIP SURGERY Right     LEG SURGERY      LUMBAR SPINE SURGERY  10/30/2017    PLIF L2 - S1   and laminectomy    PROSTATE BIOPSY         No family history on file. Social History     Socioeconomic History    Marital status:       Spouse name: Not on file    Number of children: Not on file    Years of education: Not on file    Highest education level: Not on file   Occupational History     Employer: NONE   Tobacco Use    Smoking status: Former     Packs/day: 0.25     Years: 10.00     Pack years: 2.50     Types: Cigarettes     Quit date: 2017     Years since quittin.2    Smokeless tobacco: Never   Vaping Use    Vaping Use: Never used   Substance and Sexual Activity    Alcohol use: Yes     Comment: SOCIALLY    Drug use: No    Sexual activity: Not Currently     Partners: Female   Other Topics Concern    Not on file   Social History Narrative    Not on file carcinoma the prostate with bone involvement      Physical Exam:  BP (!) 161/69   Pulse 76   Temp 97.4 °F (36.3 °C) (Axillary)   Resp 18   Ht 5' 11\" (1.803 m)   Wt 147 lb 3.2 oz (66.8 kg)   SpO2 99%   BMI 20.53 kg/m²   General appearance:  Alert, awake, oriented x 3. No distress. Skin:  Warm and dry. Head:  Normocephalic. No masses, lesions or tenderness. Eyes:  Conjunctivae appear normal; PERRL. Ears:  External ears normal.    Nose/Sinuses:  Septum midline, mucosa normal; no drainage. Oropharynx:  Clear, no exudate noted. Neck:  No jugular venous distention, lymphadenopathy or thyromegaly. No evidence of carotid bruit      Lungs:  Clear to ausculation bilaterally. No rhonchi, crackles, wheezes. Heart:  Regular rate and rhythm. No rub or murmur. .    Abdomen:  Soft, non-tender. No masses, organomegaly. Musculoskeletal: No joint effusions, tenderness swelling or warmth. Neuro: Speech is intact. Moving all extremities. No focal motor or sensory deficits. Extremities:  Both feet are warm to touch. The color of both feet is normal.          Pulses Right  Left    Brachial 3 3    Radial    3=normal   Femoral 2 2  2=diminished   Popliteal    1=barely palpable   Dorsalis pedis 0 0  0=absent   Posterior tibial    4=aneurysmal           Other pertinent information:1. The past medical records were reviewed.     2.    Lab Results   Component Value Date    WBC 7.0 03/30/2023    HGB 13.1 03/30/2023    HCT 40.3 03/30/2023    MCV 84.3 03/30/2023     03/30/2023      Lab Results   Component Value Date     03/30/2023    K 3.7 03/30/2023     03/30/2023    CO2 22 03/30/2023    BUN 18 03/30/2023    CREATININE 1.0 03/30/2023    GLUCOSE 139 (H) 03/30/2023    CALCIUM 9.4 03/30/2023    PROT 7.8 03/29/2023    LABALBU 4.2 03/29/2023    BILITOT 0.5 03/29/2023    ALKPHOS 172 (H) 03/29/2023    AST 23 03/29/2023    ALT 35 03/29/2023    LABGLOM >60 03/30/2023    GFRAA >60

## 2023-03-30 NOTE — ACP (ADVANCE CARE PLANNING)
Advance Care Planning   Healthcare Decision Maker:  Patient. Next of Kin       Click here to complete Healthcare Decision Makers including selection of the Healthcare Decision Maker Relationship (ie \"Primary\"). Today we documented Decision Maker(s) consistent with Legal Next of Kin hierarchy.

## 2023-03-30 NOTE — PROGRESS NOTES
Radiology department called and stated patient will need new peripheral IV site, #20g and preferably in antecubital. Patient to be be NPO for 4 hours. Tentative time of CTA scheduled for 1630. Patient notified to remain NPO until after scheduled exam. Patient verbalized understanding.

## 2023-03-30 NOTE — PROGRESS NOTES
[facility] Progress Note    Admitting Date and Time: 3/29/2023  9:20 AM  Admit Dx: Syncope and collapse [R55]  PAD (peripheral artery disease) ([facility]) [I73.9]  Closed head injury, initial encounter [S09.90XA]    Subjective:  Patient is being followed for Syncope and collapse [R55]  PAD (peripheral artery disease) ([address] [I73.9]  Closed head injury, initial encounter [S09.90XA]     Patient denies any complaints. I did discuss with him that we will have diabetic educator talk to him    ROS: denies fever, chills, cp, sob, n/v, HA unless stated above.       insulin lispro  0-16 Units SubCUTAneous TID WC    insulin lispro  0-4 Units SubCUTAneous Nightly    insulin glargine  20 Units SubCUTAneous Nightly    allopurinol  100 mg Oral Daily    amLODIPine  10 mg Oral Daily    aspirin  81 mg Oral Daily    atorvastatin  80 mg Oral Nightly    clopidogrel  75 mg Oral Daily    EPINEPHrine  0.3 mg IntraMUSCular Once    famotidine  20 mg Oral Daily    hydroCHLOROthiazide  12.5 mg Oral QAM    leuprolide  11.25 mg IntraMUSCular Once    lisinopril  40 mg Oral Daily    sodium chloride flush  5-40 mL IntraVENous 2 times per day    enoxaparin  40 mg SubCUTAneous Daily     glucose, 4 tablet, PRN  dextrose bolus, 125 mL, PRN   Or  dextrose bolus, 250 mL, PRN  glucagon (rDNA), 1 mg, PRN  dextrose, , Continuous PRN  sodium chloride flush, 5-40 mL, PRN  sodium chloride, , PRN  ondansetron, 4 mg, Q8H PRN   Or  ondansetron, 4 mg, Q6H PRN  polyethylene glycol, 17 g, Daily PRN  acetaminophen, 650 mg, Q6H PRN   Or  acetaminophen, 650 mg, Q6H PRN  glucose, 4 tablet, PRN  dextrose bolus, 125 mL, PRN   Or  dextrose bolus, 250 mL, PRN  glucagon (rDNA), 1 mg, PRN  dextrose, , Continuous PRN         Objective:    BP (!) 161/69   Pulse 76   Temp 97.4 °F (36.3 °C) (Axillary)   Resp 18   Ht 5' 11\" (1.803 m)   Wt 147 lb 3.2 oz (66.8 kg)   SpO2 99%   BMI 20.53 kg/m²     General Appearance: alert and oriented to person, place and time and in no acute distress  Skin: warm and dry  Head: normocephalic and atraumatic  Eyes: pupils equal, round, and reactive to light, extraocular eye movements intact, conjunctivae normal  Neck: neck supple and non tender without mass   Pulmonary/Chest: clear to auscultation bilaterally- no wheezes, rales or rhonchi, normal air movement, no respiratory distress  Cardiovascular: normal rate, normal S1 and S2 and no carotid bruits  Abdomen: soft, non-tender, non-distended, normal bowel sounds, no masses or organomegaly  Extremities: no cyanosis, no clubbing and no edema  Neurologic: no cranial nerve deficit and speech normal        Recent Labs     03/29/23  1330 03/29/23  1737 03/29/23  2145 03/30/23  0408     --   --  141   K 5.1*  --   --  3.7   CL 96*  --   --  107   CO2 21*  --   --  22   BUN 24*  --   --  18   CREATININE 1.1  --   --  1.0   GLUCOSE 592* 290 518* 139*   CALCIUM 9.7  --   --  9.4       Recent Labs     03/29/23  1330 03/30/23  0408   WBC 8.5 7.0   RBC 4.97 4.78   HGB 13.6 13.1   HCT 42.0 40.3   MCV 84.5 84.3   MCH 27.4 27.4   MCHC 32.4 32.5   RDW 14.0 14.1    243   MPV 12.2* 11.8         Assessment:    Principal Problem:    Syncope and collapse  Resolved Problems:    * No resolved hospital problems. *      Plan:    Syncope - MRI brain is unremarkable. Normal TSH, B12, Ammonia, and orthostats. CT head and cervical spine are negative. UA is negative  Newly diagnosed DM-II - Will consult endocrinology and diabetic educator. At this time I have started him on lantus 20 u daily and high dose insulin SS  H/O PAD - Patient is already on asa, plavaix, and lipitor. Arterial studies done. Vascular surgery consulted in the ER.    Hyperkalemia - Resolved  Metabolic acidosis - Resolved  Metastatic prostate cancer - Currently on chemotherapy, lupron  H/O posterior CVA - Continue asa, plavix, and lipitor  H/O cauda equina syndrome S/P decompressive lumbar laminectomy and fusion, L2 to S1  HTN - Continue

## 2023-03-30 NOTE — DISCHARGE INSTR - COC
Osteoarthritis of both hips M16.0    Lower back injury S39. 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    Diplopia H53.2    Syncope and collapse R55    H/O: CVA (cerebrovascular accident) Z86.73       Isolation/Infection:   Isolation            No Isolation          Patient Infection Status       Infection Onset Added Last Indicated Last Indicated By Review Planned Expiration Resolved Resolved By    None active    Resolved    COVID-19 (Rule Out) 23 COVID-19, Rapid (Ordered)   23 Rule-Out Test Resulted    COVID-19 23 POCT COVID-19 Rapid, NAAT   23     COVID-19 (Rule Out) 22 COVID-19, Rapid (Ordered)   22 Rule-Out Test Resulted            Nurse Assessment:  Last Vital Signs: BP (!) 161/69   Pulse 76   Temp 97.4 °F (36.3 °C) (Axillary)   Resp 18   Ht 5' 11\" (1.803 m)   Wt 147 lb 3.2 oz (66.8 kg)   SpO2 99%   BMI 20.53 kg/m²     Last documented pain score (0-10 scale): Pain Level: 0  Last Weight:   Wt Readings from Last 1 Encounters:   23 147 lb 3.2 oz (66.8 kg)     Mental Status:  oriented    IV Access:  - None    Nursing Mobility/ADLs:  Walking   Assisted  Transfer  Assisted  Bathing  Assisted  Dressing  Assisted  Toileting  Assisted  Feeding  Independent  Med Admin  Independent  Med Delivery   whole    Wound Care Documentation and Therapy:        Elimination:  Continence:    Bowel: Yes  Bladder: Yes  Urinary Catheter: None   Colostomy/Ileostomy/Ileal Conduit: No

## 2023-03-30 NOTE — CONSULTS
dose based on the blood glucose trend & insulin requirement  Upon discharge, I will arrange for the patient to be seen in the endocrinology clinic for routine diabetes maintenance and prevention    Dietary noncompliance  Discussed with patient the importance of eating consistent carbohydrate meals, avoiding high glycemic index food. Also, discussed with patient the risk and negative consequences of dietary noncompliance on blood glucose control, blood pressure and weight    HLD  Lipitor 80 mg daily     Interdisciplinary plan for communication with healthcare providers:   Consult recommendations were discussed with the Primary Service/Nursing staff      The above issues were reviewed with the patient who understood and agreed with the plan. Thank you for allowing us to participate in the care of this patient. Please do not hesitate to contact us with any additional questions. Mary Jo Nunez MD  Endocrinologist, [facility] Diabetes Care and Endocrinology   [address] [place] [address]   Phone: [phone]  Fax: [phone]  --------------------------------------------  An electronic signature was used to authenticate this note.  Jaron Ro MD on 3/30/2023 at 3:39 PM

## 2023-03-30 NOTE — PROGRESS NOTES
Call placed to [facility] PD dispatch re: wallet with significant amount of cash in in to come lock it up 1023    PD [place] arrived to unit to attempt to have patient lock wallet up with them. Reported back to me that patient refused. 1032    Patient left unit at 1750 to go to CT, officer arrived to unit shortly after reporting that patient was verbally aggressive with CT staff and stating that his wallet was missing. Checked room with officer and Junior Ibrahim found in trash    2985 patient arrived back to unit    Offered again for patient to have wallet locked up, especially due to significant amount of cash in wallet. Patient continues to refuse.

## 2023-03-30 NOTE — PROGRESS NOTES
to improve tolerance and functional strength for ADLs    Balance retraining/tolerance tasks for facilitation of postural control with dynamic challenges during ADLs . Positioning to improve functional independence  []    Recommended Adaptive Equipment: TBD     Home Living: Pt lives alone, 2nd floor apartment. 7 steps/rail to enter    Bathroom setup: [address] owned: rollator     Prior Level of Function: Independent  with ADLs , assist as needed  with IADLs; ambulated with rollator   Driving: family drives    Pain Level: B feet ; patient reports gout   Cognition: A&O: pleasant, following commands, conversing    Memory:  fair +    Sequencing:  fair+   Problem solving:  fair+   Judgement/safety:  fair +     Functional Assessment:  AM-PAC Daily Activity Raw Score: 16/24   Initial Eval Status  Date: 3/30/2023 Treatment Status  Date: STGs = LTGs  Time frame: 10-14 days   Feeding Independent      Grooming SBA/set-up   Standing at sink   Mod I    UB Dressing SBA/set-up   Mod I    LB Dressing Mod A  Patient unable to bend  or lift legs to reach feet   Mod I    Bathing Min A   Mod I    Toileting SBA   Independent    Bed Mobility  SBA  Supine to sit   Mod I    Functional Transfers CGA  Sit-stand from elevated bed   Mod I    Functional Mobility CGA/SBA, rollator   To/from bathroom   Mod I  with good tolerance    Balance Sitting:     Static:  Independent     Dynamic:SBA   Standing: CGA/SBA   Independent    Activity Tolerance No SOB   LE pain limiting   Good  with ADL activity    Visual/  Perceptual Glasses: none by bedside                 Hand Dominance right   AROM (PROM) Strength Additional Info:    RUE  WFL WFL good  and wfl FMC/dexterity noted during ADL tasks       LUE WFL WFL good  and wfl FMC/dexterity noted during ADL tasks       Hearing: WFL   Sensation:  No c/o numbness or tingling   Tone: WFL  Edema: none observed     Comments: Upon arrival patient lying in bed .   At end of session, patient

## 2023-03-31 VITALS
HEART RATE: 67 BPM | BODY MASS INDEX: 24.5 KG/M2 | OXYGEN SATURATION: 98 % | DIASTOLIC BLOOD PRESSURE: 85 MMHG | TEMPERATURE: 98 F | WEIGHT: 175 LBS | SYSTOLIC BLOOD PRESSURE: 195 MMHG | HEIGHT: 71 IN | RESPIRATION RATE: 18 BRPM

## 2023-03-31 LAB
METER GLUCOSE: 173 MG/DL (ref 74–99)
METER GLUCOSE: 283 MG/DL (ref 74–99)
METER GLUCOSE: 342 MG/DL (ref 74–99)
SARS-COV-2 RDRP RESP QL NAA+PROBE: NOT DETECTED

## 2023-03-31 PROCEDURE — 2580000003 HC RX 258: Performed by: INTERNAL MEDICINE

## 2023-03-31 PROCEDURE — 96361 HYDRATE IV INFUSION ADD-ON: CPT

## 2023-03-31 PROCEDURE — 6370000000 HC RX 637 (ALT 250 FOR IP): Performed by: INTERNAL MEDICINE

## 2023-03-31 PROCEDURE — 99239 HOSP IP/OBS DSCHRG MGMT >30: CPT | Performed by: INTERNAL MEDICINE

## 2023-03-31 PROCEDURE — 82962 GLUCOSE BLOOD TEST: CPT

## 2023-03-31 PROCEDURE — 99232 SBSQ HOSP IP/OBS MODERATE 35: CPT | Performed by: INTERNAL MEDICINE

## 2023-03-31 PROCEDURE — 87635 SARS-COV-2 COVID-19 AMP PRB: CPT

## 2023-03-31 PROCEDURE — 6370000000 HC RX 637 (ALT 250 FOR IP): Performed by: SURGERY

## 2023-03-31 PROCEDURE — G0378 HOSPITAL OBSERVATION PER HR: HCPCS

## 2023-03-31 RX ORDER — INSULIN LISPRO 100 [IU]/ML
0-18 INJECTION, SOLUTION INTRAVENOUS; SUBCUTANEOUS
Status: DISCONTINUED | OUTPATIENT
Start: 2023-03-31 | End: 2023-03-31 | Stop reason: HOSPADM

## 2023-03-31 RX ORDER — INSULIN LISPRO 100 [IU]/ML
10 INJECTION, SOLUTION INTRAVENOUS; SUBCUTANEOUS
Status: DISCONTINUED | OUTPATIENT
Start: 2023-03-31 | End: 2023-03-31

## 2023-03-31 RX ORDER — INSULIN LISPRO 100 [IU]/ML
12 INJECTION, SOLUTION INTRAVENOUS; SUBCUTANEOUS
Status: DISCONTINUED | OUTPATIENT
Start: 2023-03-31 | End: 2023-03-31 | Stop reason: HOSPADM

## 2023-03-31 RX ORDER — CILOSTAZOL 100 MG/1
100 TABLET ORAL 2 TIMES DAILY
Status: DISCONTINUED | OUTPATIENT
Start: 2023-03-31 | End: 2023-03-31 | Stop reason: HOSPADM

## 2023-03-31 RX ORDER — CILOSTAZOL 100 MG/1
100 TABLET ORAL 2 TIMES DAILY
Qty: 60 TABLET | Refills: 3 | Status: SHIPPED | OUTPATIENT
Start: 2023-03-31

## 2023-03-31 RX ORDER — INSULIN GLARGINE 100 [IU]/ML
15 INJECTION, SOLUTION SUBCUTANEOUS NIGHTLY
Status: DISCONTINUED | OUTPATIENT
Start: 2023-03-31 | End: 2023-03-31 | Stop reason: HOSPADM

## 2023-03-31 RX ADMIN — INSULIN GLARGINE 20 UNITS: 100 INJECTION, SOLUTION SUBCUTANEOUS at 08:10

## 2023-03-31 RX ADMIN — INSULIN LISPRO 7 UNITS: 100 INJECTION, SOLUTION INTRAVENOUS; SUBCUTANEOUS at 07:59

## 2023-03-31 RX ADMIN — ASPIRIN 81 MG CHEWABLE TABLET 81 MG: 81 TABLET CHEWABLE at 07:52

## 2023-03-31 RX ADMIN — CLOPIDOGREL BISULFATE 75 MG: 75 TABLET ORAL at 07:52

## 2023-03-31 RX ADMIN — CILOSTAZOL 100 MG: 100 TABLET ORAL at 12:16

## 2023-03-31 RX ADMIN — INSULIN LISPRO 12 UNITS: 100 INJECTION, SOLUTION INTRAVENOUS; SUBCUTANEOUS at 16:47

## 2023-03-31 RX ADMIN — HYDROCHLOROTHIAZIDE 12.5 MG: 12.5 TABLET ORAL at 07:52

## 2023-03-31 RX ADMIN — INSULIN LISPRO 12 UNITS: 100 INJECTION, SOLUTION INTRAVENOUS; SUBCUTANEOUS at 12:19

## 2023-03-31 RX ADMIN — INSULIN LISPRO 3 UNITS: 100 INJECTION, SOLUTION INTRAVENOUS; SUBCUTANEOUS at 16:48

## 2023-03-31 RX ADMIN — SODIUM CHLORIDE, PRESERVATIVE FREE 10 ML: 5 INJECTION INTRAVENOUS at 08:14

## 2023-03-31 RX ADMIN — FAMOTIDINE 20 MG: 20 TABLET, FILM COATED ORAL at 07:52

## 2023-03-31 RX ADMIN — INSULIN LISPRO 6 UNITS: 100 INJECTION, SOLUTION INTRAVENOUS; SUBCUTANEOUS at 08:02

## 2023-03-31 RX ADMIN — INSULIN LISPRO 12 UNITS: 100 INJECTION, SOLUTION INTRAVENOUS; SUBCUTANEOUS at 12:22

## 2023-03-31 RX ADMIN — AMLODIPINE BESYLATE 10 MG: 5 TABLET ORAL at 07:52

## 2023-03-31 RX ADMIN — LISINOPRIL 40 MG: 10 TABLET ORAL at 07:52

## 2023-03-31 RX ADMIN — ALLOPURINOL 100 MG: 100 TABLET ORAL at 07:52

## 2023-03-31 ASSESSMENT — PAIN SCALES - GENERAL: PAINLEVEL_OUTOF10: 0

## 2023-03-31 NOTE — CARE COORDINATION
Social Work / Discharge Planning : VONDA followed up with patient and two nieces in patient room for an update on D/C plans. [facility]. Await pre-cert. Nieces had some concerns and wanted patient advocate. Message left with patient advocate Pierre Jules # [phone]. Await pre-cert. Will need COVID at discharge. Patient does have his wallet in his pocket. SW offered to have Police called and come to room to lock in safe. Patient politely declined and stated he will keep in on his pocket. SW to follow. Electronically signed by MARLENE Daniels on 3/31/23 at 9:27 AM EDT     Addendum: [facility] receive pre[facility]cert. Pre-cert is good 48 hours. Patient can be transported via [facility]. VONDA to follow. Electronically signed by MARLENE Daniels on 3/31/23 at 10:45 AM EDT     Addendum: Patient will be discharged to [facility] today at 3:30 [facility]. Patient updated. VONDA updated family Tad Backers [phone]. Shelly Jaramillo from [facility] updated. SW to follow. Electronically signed by MARLENE Daniels on 3/31/23 at 12:05 PM EDT     The Plan for Transition of Care is related to the following treatment goals: [facility]    The Patient and/or patient representative two nilaz Conner and Richard Ding was provided with a choice of provider and agrees   with the discharge plan. [x] Yes [] No    Freedom of choice list was provided with basic dialogue that supports the patient's individualized plan of care/goals, treatment preferences and shares the quality data associated with the providers.  [x] Yes [] No

## 2023-03-31 NOTE — PROGRESS NOTES
with treatment plan   []  Showed very little interest in complying with treatment plan   [x]  Seems to have trouble applying concepts to daily lifestyle       COMMENTS:  Patient was pleasant and open to diabetes education. He struggled with staying on topic and became visibly overwhelmed when discussing prevention and treatment of hypo/hyperglycemia. Empathized with patient and offered encouragement. He will need reinforcement on all topics. Admits to regularly drinking large quantities juice. Suggested alternative beverages, patient agreeable. Appears motivated to do well. No discharge orders placed at this time. Diabetes education will follow up with patient tomorrow (3/31/23) to provide glucose meter instruction as well as any medication/injection instructions as needed. 3/31/23 MC: Glucose meter instruction provided. Target BG levels reviewed. Patient instructed on the preparation and injection of insulin dose via pen method. Patient verbalized understanding of site rotation, storage and proper sharps disposal. Patient to be d/c today to [facility] where they will continue to train and assist him with using a glucometer and insulin pen. Recommendations:   [x] Carbohydrate-controlled diet    [x] Script for glucometer and supplies (per preference of patient's insurance)  [] Script for insulin pens and pen needles (if insulin is ordered at discharge for home use)   [] Consult to social work: patient has no insurance or has financial hardship  [] Inpatient consult to endocrinologist   [x] Follow up with endocrinologist as an outpatient   [] Home healthcare nursing to increase compliance to treatment plan   [x] Script for outpatient diabetes education classes (from doctor)        Thank you for this consult.       IVONNE Last

## 2023-03-31 NOTE — PROGRESS NOTES
Spoke with Dr Ashli Rider.  He feels the benefits outweigh the risks of having patient on pletal, aspirin and plavix      Patient can also be discharged per him

## 2023-03-31 NOTE — PROGRESS NOTES
PCT Adiel Abademanuellauren reported to me that the patient became irate, he was yelling at her and pulled his IV apart, started having a bowel movement in his bed and then got up and walked to the bathroom-refusing her assistance. I went in to speak with him about this, he was sitting on the toilet yelling at me, stated \"my nursing and aid care has been terrible since I got here. I have been waiting too long after pushing the button for help\". I tried to explain to him that his call light was not alarming to the desk, and their were two PCT's standing outside of his room waiting for him to put his call light on for assistance as he told them he would. He continued to yell at me, stating he knows how to push a button and that he is tired of the things that have taken place since he was admitted and he will no longer put up with it. I apologized on behalf of the staff that he has had issues with, explained to him that there was nothing that I could do to change it but assured him that today would be a different day and asked that he put this all behind us so we can move on from it. I also explained to him that he can not disconnect his own IV tubing as his tubing and IV fluids then become contaminated and have to be completely replaced. Patient verbalized understanding. PCT's in room with patient waiting to assist him back to bed.

## 2023-03-31 NOTE — PLAN OF CARE
Problem: Safety - Adult  Goal: Free from fall injury  Outcome: Completed     Problem: Chronic Conditions and Co-morbidities  Goal: Patient's chronic conditions and co-morbidity symptoms are monitored and maintained or improved  Outcome: Completed     Problem: Skin/Tissue Integrity  Goal: Absence of new skin breakdown  Description: 1. Monitor for areas of redness and/or skin breakdown  2. Assess vascular access sites hourly  3. Every 4-6 hours minimum:  Change oxygen saturation probe site  4. Every 4-6 hours:  If on nasal continuous positive airway pressure, respiratory therapy assess nares and determine need for appliance change or resting period.   Outcome: Completed     Problem: Pain  Goal: Verbalizes/displays adequate comfort level or baseline comfort level  Outcome: Completed

## 2023-03-31 NOTE — PROGRESS NOTES
Spoke with Dr Elham Franklin re: discharge. Patient ok for d/c after changes to insulin made by him.  Dr. Amy Avina notified

## 2023-03-31 NOTE — PROGRESS NOTES
[facility] notified of patient discharge. Tele removed, cleaned and returned to closet. IV removed without complication. Patient waiting on transport to facility.

## 2023-03-31 NOTE — PROGRESS NOTES
ENDOCRINOLOGY PROGRESS NOTE      Date of admission: 3/29/2023  Date of service: 3/31/2023  Admitting physician: Harriet Gee MD   Primary Care Physician: Raad Wong MD  Consultant physician: Christina Fine MD     Reason for the consultation:  Newly diagnosed DM type 2     History of Present Illness: The history is provided by the patient. Accuracy of the patient data is excellent    Kiesha Bowers is a very pleasant 76 y.o. old male with PMH of HTN, HLD, prostate cancer and other listed below admitted to [facility] on 3/29/2023 because of syncope, endocrine service was consulted for diabetes management.     Subjective   The patient was seen and examined, no acute event overnight, BG still running high     Inpatient diet:   Carb Restricted diet     Point of care glucose monitoring   (Independently reviewed)   Recent Labs     03/30/23  0038 03/30/23  0228 03/30/23  0533 03/30/23  1130 03/30/23  1602 03/30/23  1953 03/31/23  0608 03/31/23  1115   GLUMET 288* 155* 145* >500* 492* 273* 283* 342*       Scheduled Meds:   insulin glargine  15 Units SubCUTAneous Nightly    cilostazol  100 mg Oral BID    insulin lispro  0-18 Units SubCUTAneous TID WC    insulin lispro  12 Units SubCUTAneous TID WC    insulin lispro  0-4 Units SubCUTAneous Nightly    insulin glargine  20 Units SubCUTAneous QAM    allopurinol  100 mg Oral Daily    amLODIPine  10 mg Oral Daily    aspirin  81 mg Oral Daily    atorvastatin  80 mg Oral Nightly    clopidogrel  75 mg Oral Daily    EPINEPHrine  0.3 mg IntraMUSCular Once    famotidine  20 mg Oral Daily    hydroCHLOROthiazide  12.5 mg Oral QAM    leuprolide  11.25 mg IntraMUSCular Once    lisinopril  40 mg Oral Daily    sodium chloride flush  5-40 mL IntraVENous 2 times per day    enoxaparin  40 mg SubCUTAneous Daily       PRN Meds:   dextrose bolus, 125 mL, PRN   Or  dextrose bolus, 250 mL, PRN  glucagon (rDNA), 1 mg, PRN  dextrose, , Continuous PRN  Glucose, 15 g, PRN  white petrolatum, , BID as described. XR CHEST PORTABLE   Final Result   No acute cardiopulmonary disease. CTA ABDOMINAL AORTA W BILAT RUNOFF W CONTRAST    (Results Pending)       Medical Records/Labs/Images review:   I personally reviewed and summarized previous records   All labs and imaging were reviewed independently     [address] Humphreys, a 76 y.o.-old male seen today for inpatient diabetes management     Newly diagnosed Diabetes Mellitus type 2  Patient's diabetes is uncontrolled   BS still above goal  will change diabetes regimen to: Increase Lantus 20u AM, 15  u at night   Humalog 12 u with meals   Increase ss to high dose sliding scale   Continue glucose check with meals and at bedtime   Will titrate insulin dose based on the blood glucose trend & insulin requirement  Upon discharge, I will arrange for the patient to be seen in the endocrinology clinic for routine diabetes maintenance and prevention    Dietary noncompliance  Discussed with patient the importance of eating consistent carbohydrate meals, avoiding high glycemic index food. Also, discussed with patient the risk and negative consequences of dietary noncompliance on blood glucose control, blood pressure and weight    HLD  Lipitor 80 mg daily     Interdisciplinary plan for communication with healthcare providers:   Consult recommendations were discussed with the Primary Service/Nursing staff      The above issues were reviewed with the patient who understood and agreed with the plan. Thank you for allowing us to participate in the care of this patient. Please do not hesitate to contact us with any additional questions. Lucy Hutchinson MD  Endocrinologist, [facility]   [address] [place] [address]   Phone: [phone]  Fax: [phone]  --------------------------------------------  An electronic signature was used to authenticate this note.  Zara Aguero MD on 3/31/2023 at 3:12 PM

## 2023-03-31 NOTE — PROGRESS NOTES
Patient was scheduled to be picked up by [facility] at . When they did not arrive I contacted them to ask when we should expect them so I could notify the patient, the person who answered the phone stated there was a family emergency with one of the workers causing all of their scheduled pick ups to be delayed. Per [facility], they will be here to pick patient in 90 minutes.

## 2023-03-31 NOTE — PROGRESS NOTES
Call to [facility] to get update on arrival time. State that they will be here shortly and the crew is in route.

## 2023-03-31 NOTE — DISCHARGE SUMMARY
and with absent waveform and absent pressure at the left posterior tibial artery in keeping with occlusion of this vessel. 3.  Abnormal flattened PVR and digital PPG waveforms at the metatarsal levels and toes bilaterally in keeping with hemodynamically significant arterial narrowing. MRI BRAIN W WO CONTRAST    Result Date: 3/29/2023  EXAMINATION: MRI OF THE BRAIN WITHOUT AND WITH CONTRAST  3/29/2023 3:04 pm TECHNIQUE: Multiplanar multisequence MRI of the head/brain was performed without and with the administration of intravenous contrast. COMPARISON: Head CT dated 03/29/2023 and MRI brain dated 08/16/2022 HISTORY: ORDERING SYSTEM PROVIDED HISTORY: old cva, possible seizures, multiple falls, prostate ca w mets, eval for mets TECHNOLOGIST PROVIDED HISTORY: Reason for exam:->old cva, possible seizures, multiple falls, prostate ca w mets, eval for mets What is the sedation requirement?->None Decision Support Exception - unselect if not a suspected or confirmed emergency medical condition->Emergency Medical Condition (MA) FINDINGS: INTRACRANIAL STRUCTURES/VENTRICLES:  There is no acute infarct. Mild atrophy and chronic white matter ischemic changes are noted. Small old infarcts are again seen involving the left basal ganglia/caudate head and right dorsal laurel. Some of the images are degraded by motion artifact. No mass effect or midline shift. No evidence of an acute intracranial hemorrhage. The ventricles and sulci are normal in size and configuration. The sellar/suprasellar regions appear unremarkable. The normal signal voids within the major intracranial vessels appear maintained. No abnormal focus of enhancement is seen within the brain. ORBITS: The visualized portion of the orbits demonstrate no acute abnormality. SINUSES: The visualized paranasal sinuses and mastoid air cells demonstrate no acute abnormality.  BONES/SOFT TISSUES: The bone marrow signal intensity appears normal. The soft tissues demonstrate no acute abnormality. 1. No evidence of acute intracranial abnormality. 2. Small old infarcts in the left basal ganglia/caudate head and right dorsal laurel. Mild atrophy and chronic white matter ischemic changes. Patient Instructions:      Medication List        START taking these medications      cilostazol 100 MG tablet  Commonly known as: PLETAL  Take 1 tablet by mouth 2 times daily            CONTINUE taking these medications      allopurinol 100 MG tablet  Commonly known as: ZYLOPRIM  Take 1 tablet by mouth daily Please start once flare up resolved.      amLODIPine 10 MG tablet  Commonly known as: NORVASC  take 1 tablet by mouth once daily     aspirin 81 MG chewable tablet  Take 1 tablet by mouth daily     atorvastatin 80 MG tablet  Commonly known as: LIPITOR  Take 1 tablet by mouth nightly     clopidogrel 75 MG tablet  Commonly known as: PLAVIX  Take 1 tablet by mouth daily     EPINEPHrine 0.3 MG/0.3ML Soaj injection  Commonly known as: EPIPEN     famotidine 20 MG tablet  Commonly known as: PEPCID  Take 1 tablet by mouth daily     hydroCHLOROthiazide 12.5 MG tablet  Commonly known as: HYDRODIURIL  Take 1 tablet by mouth every morning     leuprolide 11.25 MG injection  Commonly known as: LUPRON     lisinopril 20 MG tablet  Commonly known as: PRINIVIL;ZESTRIL  Take 2 tablets by mouth daily     potassium chloride 20 MEQ extended release tablet  Commonly known as: KLOR-CON M            STOP taking these medications      acetaminophen 500 MG tablet  Commonly known as: TYLENOL     Mucus Relief 600 MG extended release tablet  Generic drug: guaiFENesin               Where to Get Your Medications        You can get these medications from any pharmacy    Bring a paper prescription for each of these medications  cilostazol 100 MG tablet           Note that 36 minutes was spent in preparing discharge papers, discussing discharge with patient, medication review, etc.    Signed:  Electronically signed

## 2023-04-07 ENCOUNTER — HOSPITAL ENCOUNTER (EMERGENCY)
Age: 68
Discharge: HOME | End: 2023-04-08
Attending: EMERGENCY MEDICINE
Payer: COMMERCIAL

## 2023-04-07 ENCOUNTER — APPOINTMENT (OUTPATIENT)
Dept: GENERAL RADIOLOGY | Age: 68
End: 2023-04-07
Payer: COMMERCIAL

## 2023-04-07 DIAGNOSIS — R73.9 HYPERGLYCEMIA: ICD-10-CM

## 2023-04-07 DIAGNOSIS — R07.9 CHEST PAIN, UNSPECIFIED TYPE: Primary | ICD-10-CM

## 2023-04-07 LAB
ALBUMIN SERPL-MCNC: 3 G/DL (ref 3.5–5.2)
ALP SERPL-CCNC: 126 U/L (ref 40–129)
ALT SERPL-CCNC: 39 U/L (ref 0–40)
ANION GAP SERPL CALCULATED.3IONS-SCNC: 10 MMOL/L (ref 7–16)
AST SERPL-CCNC: 33 U/L (ref 0–39)
BASOPHILS # BLD: 0.02 E9/L (ref 0–0.2)
BASOPHILS NFR BLD: 0.4 % (ref 0–2)
BETA-HYDROXYBUTYRATE: 0.27 MMOL/L (ref 0.02–0.27)
BILIRUB SERPL-MCNC: <0.2 MG/DL (ref 0–1.2)
BNP BLD-MCNC: 27 PG/ML (ref 0–125)
BUN SERPL-MCNC: 21 MG/DL (ref 6–23)
CALCIUM SERPL-MCNC: 9 MG/DL (ref 8.6–10.2)
CHLORIDE SERPL-SCNC: 102 MMOL/L (ref 98–107)
CHP ED QC CHECK: NORMAL
CO2 SERPL-SCNC: 20 MMOL/L (ref 22–29)
CREAT SERPL-MCNC: 1.1 MG/DL (ref 0.7–1.2)
EOSINOPHIL # BLD: 0.12 E9/L (ref 0.05–0.5)
EOSINOPHIL NFR BLD: 2.3 % (ref 0–6)
ERYTHROCYTE [DISTWIDTH] IN BLOOD BY AUTOMATED COUNT: 14.5 FL (ref 11.5–15)
GLUCOSE BLD-MCNC: 396 MG/DL
GLUCOSE SERPL-MCNC: 440 MG/DL (ref 74–99)
HCT VFR BLD AUTO: 37.7 % (ref 37–54)
HGB BLD-MCNC: 11.9 G/DL (ref 12.5–16.5)
IMM GRANULOCYTES # BLD: 0.02 E9/L
IMM GRANULOCYTES NFR BLD: 0.4 % (ref 0–5)
LACTATE BLDV-SCNC: 2.4 MMOL/L (ref 0.5–2.2)
LYMPHOCYTES # BLD: 1.19 E9/L (ref 1.5–4)
LYMPHOCYTES NFR BLD: 22.5 % (ref 20–42)
MCH RBC QN AUTO: 27.5 PG (ref 26–35)
MCHC RBC AUTO-ENTMCNC: 31.6 % (ref 32–34.5)
MCV RBC AUTO: 87.1 FL (ref 80–99.9)
METER GLUCOSE: 352 MG/DL (ref 74–99)
METER GLUCOSE: 367 MG/DL (ref 74–99)
METER GLUCOSE: 396 MG/DL (ref 74–99)
MONOCYTES # BLD: 0.59 E9/L (ref 0.1–0.95)
MONOCYTES NFR BLD: 11.1 % (ref 2–12)
NEUTROPHILS # BLD: 3.36 E9/L (ref 1.8–7.3)
NEUTS SEG NFR BLD: 63.3 % (ref 43–80)
PLATELET # BLD AUTO: 283 E9/L (ref 130–450)
PMV BLD AUTO: 11.5 FL (ref 7–12)
POTASSIUM SERPL-SCNC: 5 MMOL/L (ref 3.5–5)
PROT SERPL-MCNC: 6.2 G/DL (ref 6.4–8.3)
RBC # BLD AUTO: 4.33 E12/L (ref 3.8–5.8)
REASON FOR REJECTION: NORMAL
REASON FOR REJECTION: NORMAL
REJECTED TEST: NORMAL
REJECTED TEST: NORMAL
SODIUM SERPL-SCNC: 132 MMOL/L (ref 132–146)
TROPONIN, HIGH SENSITIVITY: 10 NG/L (ref 0–11)
WBC # BLD: 5.3 E9/L (ref 4.5–11.5)

## 2023-04-07 PROCEDURE — 2580000003 HC RX 258: Performed by: EMERGENCY MEDICINE

## 2023-04-07 PROCEDURE — 6370000000 HC RX 637 (ALT 250 FOR IP): Performed by: EMERGENCY MEDICINE

## 2023-04-07 PROCEDURE — 85025 COMPLETE CBC W/AUTO DIFF WBC: CPT

## 2023-04-07 PROCEDURE — 84484 ASSAY OF TROPONIN QUANT: CPT

## 2023-04-07 PROCEDURE — 82962 GLUCOSE BLOOD TEST: CPT

## 2023-04-07 PROCEDURE — 71045 X-RAY EXAM CHEST 1 VIEW: CPT

## 2023-04-07 PROCEDURE — 36415 COLL VENOUS BLD VENIPUNCTURE: CPT

## 2023-04-07 PROCEDURE — 83605 ASSAY OF LACTIC ACID: CPT

## 2023-04-07 PROCEDURE — 83880 ASSAY OF NATRIURETIC PEPTIDE: CPT

## 2023-04-07 PROCEDURE — 80053 COMPREHEN METABOLIC PANEL: CPT

## 2023-04-07 PROCEDURE — 82010 KETONE BODYS QUAN: CPT

## 2023-04-07 PROCEDURE — 93005 ELECTROCARDIOGRAM TRACING: CPT | Performed by: EMERGENCY MEDICINE

## 2023-04-07 RX ORDER — 0.9 % SODIUM CHLORIDE 0.9 %
1000 INTRAVENOUS SOLUTION INTRAVENOUS ONCE
Status: COMPLETED | OUTPATIENT
Start: 2023-04-07 | End: 2023-04-08

## 2023-04-07 RX ORDER — SODIUM CHLORIDE 9 MG/ML
INJECTION, SOLUTION INTRAVENOUS CONTINUOUS
Status: DISCONTINUED | OUTPATIENT
Start: 2023-04-07 | End: 2023-04-08 | Stop reason: HOSPADM

## 2023-04-07 RX ADMIN — SODIUM CHLORIDE: 9 INJECTION, SOLUTION INTRAVENOUS at 20:11

## 2023-04-07 RX ADMIN — INSULIN HUMAN 5 UNITS: 100 INJECTION, SOLUTION PARENTERAL at 23:50

## 2023-04-07 RX ADMIN — SODIUM CHLORIDE 1000 ML: 9 INJECTION, SOLUTION INTRAVENOUS at 20:11

## 2023-04-07 RX ADMIN — INSULIN HUMAN 5 UNITS: 100 INJECTION, SOLUTION PARENTERAL at 22:02

## 2023-04-07 ASSESSMENT — PAIN DESCRIPTION - DESCRIPTORS: DESCRIPTORS: DULL

## 2023-04-07 ASSESSMENT — PAIN SCALES - GENERAL: PAINLEVEL_OUTOF10: 10

## 2023-04-07 ASSESSMENT — PAIN DESCRIPTION - LOCATION: LOCATION: CHEST

## 2023-04-07 ASSESSMENT — PAIN - FUNCTIONAL ASSESSMENT: PAIN_FUNCTIONAL_ASSESSMENT: 0-10

## 2023-04-08 VITALS
TEMPERATURE: 98 F | HEIGHT: 71 IN | DIASTOLIC BLOOD PRESSURE: 86 MMHG | BODY MASS INDEX: 25.2 KG/M2 | HEART RATE: 67 BPM | OXYGEN SATURATION: 99 % | WEIGHT: 180 LBS | RESPIRATION RATE: 19 BRPM | SYSTOLIC BLOOD PRESSURE: 173 MMHG

## 2023-04-08 LAB — METER GLUCOSE: 263 MG/DL (ref 74–99)

## 2023-04-08 PROCEDURE — 82962 GLUCOSE BLOOD TEST: CPT

## 2023-04-08 NOTE — ED NOTES
Lab spoke with RN, rejected previous labs.  verbalized acceptance of new labs with Opt[employer] labels signed by RN with date and time stamp.       Marisel Peterson RN  04/07/23 7310

## 2023-04-08 NOTE — ED NOTES
Patient upset that [facility] will not be here until [id number]. He started to yell that we are not doing anything for him that we should find some other way back to the facility. This nurse advised that this nurse called 2 other companies for transport and they are not available. He said then I will have my niece come get me. This nurse stated this nurse would need to call the facility to see if they would accept his niece to get him. This nurse called [facility] spoke with Sindhu Jameson she advised that it is ok to have family member bring him.       Yuriy Bowens RN  04/08/23 0001

## 2023-04-08 NOTE — ED PROVIDER NOTES
Ref Range    Glucose 396 mg/dL    QC OK? pass    POCT Glucose   Result Value Ref Range    Meter Glucose 396 (H) 74 - 99 mg/dL   POCT Glucose   Result Value Ref Range    Meter Glucose 367 (H) 74 - 99 mg/dL   POCT Glucose   Result Value Ref Range    Meter Glucose 352 (H) 74 - 99 mg/dL   EKG 12 Lead   Result Value Ref Range    Ventricular Rate 71 BPM    Atrial Rate 71 BPM    P-R Interval 124 ms    QRS Duration 96 ms    Q-T Interval 386 ms    QTc Calculation (Bazett) 419 ms    P Axis 6 degrees    R Axis 42 degrees    T Axis 56 degrees   ,       RADIOLOGY:  Interpreted by Radiologist unless otherwise specified  XR CHEST PORTABLE   Final Result   No acute process. ------------------------- NURSING NOTES AND VITALS REVIEWED ---------------------------   The nursing notes within the ED encounter and vital signs as below have been reviewed by myself  BP (!) 174/78   Pulse 71   Temp 98 °F (36.7 °C)   Resp 26   Ht 5' 11\" (1.803 m)   Wt 180 lb (81.6 kg)   SpO2 98%   BMI 25.10 kg/m²     Oxygen Saturation Interpretation: Normal    The patients available past medical records and past encounters were reviewed. ------------------------------ ED COURSE/MEDICAL DECISION MAKING----------------------  Medications   0.9 % sodium chloride infusion ( IntraVENous Rate/Dose Change 4/7/23 2230)   insulin regular (HUMULIN R;NOVOLIN R) injection 5 Units (has no administration in time range)   0.9 % sodium chloride bolus (1,000 mLs IntraVENous New Bag 4/7/23 2011)   insulin regular (HUMULIN R;NOVOLIN R) injection 5 Units (5 Units IntraVENous Given 4/7/23 2202)           The cardiac monitor revealed sinus with a heart rate in the 70s as interpreted by me. The cardiac monitor was ordered secondary to the patient's CP and to monitor the patient for dysrhythmia. CPT [id number]         Medical Decision Making:     Patient presents for hyperglycemia. Concern for DKA, HHS, uncontrolled diabetes, among other pathologies.

## 2023-04-09 LAB
EKG ATRIAL RATE: 71 BPM
EKG P AXIS: 6 DEGREES
EKG P-R INTERVAL: 124 MS
EKG Q-T INTERVAL: 386 MS
EKG QRS DURATION: 96 MS
EKG QTC CALCULATION (BAZETT): 419 MS
EKG R AXIS: 42 DEGREES
EKG T AXIS: 56 DEGREES
EKG VENTRICULAR RATE: 71 BPM

## 2023-04-17 ENCOUNTER — OFFICE VISIT (OUTPATIENT)
Dept: ENDOCRINOLOGY | Age: 68
End: 2023-04-17
Payer: COMMERCIAL

## 2023-04-17 VITALS
HEART RATE: 80 BPM | OXYGEN SATURATION: 99 % | HEIGHT: 71 IN | RESPIRATION RATE: 18 BRPM | DIASTOLIC BLOOD PRESSURE: 71 MMHG | BODY MASS INDEX: 25.1 KG/M2 | SYSTOLIC BLOOD PRESSURE: 130 MMHG

## 2023-04-17 DIAGNOSIS — Z91.119 DIETARY NONCOMPLIANCE: ICD-10-CM

## 2023-04-17 DIAGNOSIS — E11.65 TYPE 2 DIABETES MELLITUS WITH HYPERGLYCEMIA, UNSPECIFIED WHETHER LONG TERM INSULIN USE (HCC): Primary | ICD-10-CM

## 2023-04-17 DIAGNOSIS — E55.9 VITAMIN D DEFICIENCY: ICD-10-CM

## 2023-04-17 PROCEDURE — 1036F TOBACCO NON-USER: CPT | Performed by: INTERNAL MEDICINE

## 2023-04-17 PROCEDURE — 3078F DIAST BP <80 MM HG: CPT | Performed by: INTERNAL MEDICINE

## 2023-04-17 PROCEDURE — 1124F ACP DISCUSS-NO DSCNMKR DOCD: CPT | Performed by: INTERNAL MEDICINE

## 2023-04-17 PROCEDURE — G8427 DOCREV CUR MEDS BY ELIG CLIN: HCPCS | Performed by: INTERNAL MEDICINE

## 2023-04-17 PROCEDURE — 2022F DILAT RTA XM EVC RTNOPTHY: CPT | Performed by: INTERNAL MEDICINE

## 2023-04-17 PROCEDURE — 3046F HEMOGLOBIN A1C LEVEL >9.0%: CPT | Performed by: INTERNAL MEDICINE

## 2023-04-17 PROCEDURE — 99214 OFFICE O/P EST MOD 30 MIN: CPT | Performed by: INTERNAL MEDICINE

## 2023-04-17 PROCEDURE — G8417 CALC BMI ABV UP PARAM F/U: HCPCS | Performed by: INTERNAL MEDICINE

## 2023-04-17 PROCEDURE — 3017F COLORECTAL CA SCREEN DOC REV: CPT | Performed by: INTERNAL MEDICINE

## 2023-04-17 PROCEDURE — 3075F SYST BP GE 130 - 139MM HG: CPT | Performed by: INTERNAL MEDICINE

## 2023-04-17 RX ORDER — INSULIN GLARGINE 100 [IU]/ML
INJECTION, SOLUTION SUBCUTANEOUS
COMMUNITY
Start: 2023-04-16 | End: 2023-04-17

## 2023-04-17 RX ORDER — METFORMIN HYDROCHLORIDE 500 MG/1
500 TABLET, EXTENDED RELEASE ORAL
Qty: 60 TABLET | Refills: 11 | Status: SHIPPED | OUTPATIENT
Start: 2023-04-17

## 2023-04-17 RX ORDER — INSULIN GLARGINE 100 [IU]/ML
35 INJECTION, SOLUTION SUBCUTANEOUS NIGHTLY
Qty: 5 ADJUSTABLE DOSE PRE-FILLED PEN SYRINGE | Refills: 3
Start: 2023-04-17

## 2023-04-17 RX ORDER — INSULIN LISPRO 100 [IU]/ML
INJECTION, SOLUTION INTRAVENOUS; SUBCUTANEOUS
COMMUNITY
Start: 2023-04-07 | End: 2023-04-17

## 2023-04-17 NOTE — PATIENT INSTRUCTIONS
Recommendations for today's visit  Take Lantus 35 units daily   Stop Humalog   Start Metformin 500 mg twice a day with meals   Start Jardiance 25 mg daily   Check Blood sugar 3-4 times/day before meals and at bedtime and send us sugar log in TWO DAYS     I you have any questions please call Dr. Jayson Vann office     Sean Watts MD  Endocrinologist, [facility])   [address] [facility], [address] [place] [address],[facility] [address]   Phone: [phone]  Fax: [phone]  Email: [email or web address]

## 2023-04-17 NOTE — PROGRESS NOTES
[address] [place] [address] Department of Endocrinology Diabetes and Metabolism   [address] [facility] [address]   Phone: [phone]  Fax: [phone]       Date of service: 4/17/2023  Primary Care Physician: Link Rodarte MD  Consultant physician: Toño Lay MD     Reason for the consultation:  DM type 2     History of Present Illness: The history is provided by the patient. Accuracy of the patient data is excellent    Huynh Gunner is a very pleasant 76 y.o. old male with PMH of HTN, HLD, prostate cancer and other listed below seen today for follow up visit     The patient diagnsoed with DM in 3/2023. At that timehe was admitted to the hospital with DKA and found to have  , AG 17, Bicarb 21. A1c 13.7%   Pt currently on Lantus 45u daily, Humalog per sliding scale     Check BG 4 times a day and readings are at goal. No hypoglycemia   Pt has been following DM diet as recommended     Lab Results   Component Value Date/Time    LABA1C 13.7 03/30/2023 01:17 PM       Past medical history:   Past Medical History:   Diagnosis Date    Cancer ([address] PROSTATE    2005, patient reports bone mets    Erectile dysfunction     Hyperlipidemia     Hypertension 2005    Osteoarthritis, hip/pelvic region and thigh 3/29/2013    Prostate cancer ([address] 2/26/2013    PVD (peripheral vascular disease) with claudication ([address] 3/30/2023    Ankle-brachial index of 0.79 on the right and 0.42 on the left    Type 1 diabetes mellitus with circulatory complication ([address] 8/89/5344       Past surgical history:  Past Surgical History:   Procedure Laterality Date    BACK SURGERY      HIP SURGERY Right     LEG SURGERY      LUMBAR SPINE SURGERY  10/30/2017    PLIF L2 - S1   and laminectomy    PROSTATE BIOPSY         Social history:   Tobacco:   reports that he quit smoking about 5 years ago. His smoking use included cigarettes. He has a 2.50 pack-year smoking history.  He has never used smokeless tobacco.  Alcohol:   reports

## 2023-04-19 DIAGNOSIS — I10 ESSENTIAL HYPERTENSION: ICD-10-CM

## 2023-04-19 DIAGNOSIS — Z87.39 HISTORY OF GOUT: ICD-10-CM

## 2023-04-19 DIAGNOSIS — E11.65 TYPE 2 DIABETES MELLITUS WITH HYPERGLYCEMIA, UNSPECIFIED WHETHER LONG TERM INSULIN USE (HCC): ICD-10-CM

## 2023-04-19 RX ORDER — PEN NEEDLE, DIABETIC 29 GAUGE
1 NEEDLE, DISPOSABLE MISCELLANEOUS DAILY
Qty: 100 EACH | Refills: 3 | Status: SHIPPED | OUTPATIENT
Start: 2023-04-19

## 2023-04-19 RX ORDER — METFORMIN HYDROCHLORIDE 500 MG/1
500 TABLET, EXTENDED RELEASE ORAL 2 TIMES DAILY
Qty: 60 TABLET | Refills: 11 | OUTPATIENT
Start: 2023-04-19

## 2023-04-19 RX ORDER — CILOSTAZOL 100 MG/1
100 TABLET ORAL 2 TIMES DAILY
Qty: 60 TABLET | Refills: 3 | OUTPATIENT
Start: 2023-04-19

## 2023-04-19 RX ORDER — ALLOPURINOL 100 MG/1
100 TABLET ORAL DAILY
Qty: 90 TABLET | Refills: 1 | Status: SHIPPED | OUTPATIENT
Start: 2023-04-19

## 2023-04-19 RX ORDER — HYDROCHLOROTHIAZIDE 12.5 MG/1
12.5 TABLET ORAL EVERY MORNING
Qty: 30 TABLET | Refills: 2 | Status: SHIPPED
Start: 2023-04-19 | End: 2023-05-09 | Stop reason: SDUPTHER

## 2023-04-19 RX ORDER — INSULIN GLARGINE 100 [IU]/ML
35 INJECTION, SOLUTION SUBCUTANEOUS NIGHTLY
Qty: 5 ADJUSTABLE DOSE PRE-FILLED PEN SYRINGE | Refills: 3 | OUTPATIENT
Start: 2023-04-19

## 2023-04-25 ENCOUNTER — HOSPITAL ENCOUNTER (OUTPATIENT)
Dept: OCCUPATIONAL THERAPY | Age: 68
Setting detail: THERAPIES SERIES
Discharge: HOME | End: 2023-04-25
Payer: COMMERCIAL

## 2023-04-25 PROCEDURE — 97530 THERAPEUTIC ACTIVITIES: CPT

## 2023-04-25 PROCEDURE — 97033 APP MDLTY 1+IONTPHRSIS EA 15: CPT

## 2023-04-25 NOTE — PROGRESS NOTES
OCCUPATIONAL THERAPY REASSESSMENT    Date:  2023  Initial Evaluation Date: 23      Patient Name:  Obed Coughlin    :  1955    Restrictions/Precautions:  HTN; moderate fall risk  Diagnosis:  I63.9 (ICD-10-CM) - Acute cerebrovascular accident (CVA) ([facility] [address] M65.319 (ICD-10-CM) - Trigger finger of thumb, unspecified laterality                                                 Date of Surgery/Injury:      Insurance/Certification information:  [employer] [place] Medicaid - REF #[id number] 64 units approved (16 visits) for CPT codes 51-16-34-25, [address], [id number], [id number], [phone], [id number] until 2023  Plan of care signed (Y/N): N  Visit# / total visits:      Referring Practitioner:  Zohreh Kennedy MD  Specific Practitioner Orders: Evaluate and Treat     Assessment of current deficits   [x] Functional mobility             [x] ADLs           [x] Strength                  [x] Cognition   [x] Functional transfers           [x] IADLs          [x] Safety Awareness  [x] Endurance   [x] Fine Motor Coordination    [x] Balance      [x] Vision/perception    [x] Sensation     [] Gross Motor Coordination [x] ROM           [x] Pain                        [x] Edema          [] Scar Adhesion/Skin Integrity      OT PLAN OF CARE   OT POC based on physician orders, patient diagnosis and results of clinical assessment     Frequency/Duration: 2x/week for 16 visits                             Certification period From: 2023  To: 2023  Specific OT Treatment to include: CPT Codes: 12453, 81922,70998, 12633, 46030, 89266     [x] Instruction in HEP                   Modalities:  [x] Therapeutic Exercise                 [x] Ultrasound               [] Electrical Stimulation/Attended  [x] PROM/Stretching                    [x] Fluidotherapy          [x]  Paraffin                   [x] AAROM  [x] AROM                 [x] Iontophoresis:   [x] Tendon Glides                                               []

## 2023-04-27 ENCOUNTER — OFFICE VISIT (OUTPATIENT)
Dept: FAMILY MEDICINE CLINIC | Age: 68
End: 2023-04-27
Payer: COMMERCIAL

## 2023-04-27 VITALS
SYSTOLIC BLOOD PRESSURE: 128 MMHG | HEIGHT: 71 IN | WEIGHT: 190 LBS | TEMPERATURE: 97 F | HEART RATE: 91 BPM | RESPIRATION RATE: 16 BRPM | DIASTOLIC BLOOD PRESSURE: 76 MMHG | OXYGEN SATURATION: 99 % | BODY MASS INDEX: 26.6 KG/M2

## 2023-04-27 DIAGNOSIS — E11.65 TYPE 2 DIABETES MELLITUS WITH HYPERGLYCEMIA, UNSPECIFIED WHETHER LONG TERM INSULIN USE (HCC): ICD-10-CM

## 2023-04-27 PROCEDURE — 3017F COLORECTAL CA SCREEN DOC REV: CPT | Performed by: STUDENT IN AN ORGANIZED HEALTH CARE EDUCATION/TRAINING PROGRAM

## 2023-04-27 PROCEDURE — 3078F DIAST BP <80 MM HG: CPT | Performed by: STUDENT IN AN ORGANIZED HEALTH CARE EDUCATION/TRAINING PROGRAM

## 2023-04-27 PROCEDURE — G8417 CALC BMI ABV UP PARAM F/U: HCPCS | Performed by: STUDENT IN AN ORGANIZED HEALTH CARE EDUCATION/TRAINING PROGRAM

## 2023-04-27 PROCEDURE — 1036F TOBACCO NON-USER: CPT | Performed by: STUDENT IN AN ORGANIZED HEALTH CARE EDUCATION/TRAINING PROGRAM

## 2023-04-27 PROCEDURE — G8427 DOCREV CUR MEDS BY ELIG CLIN: HCPCS | Performed by: STUDENT IN AN ORGANIZED HEALTH CARE EDUCATION/TRAINING PROGRAM

## 2023-04-27 PROCEDURE — 99214 OFFICE O/P EST MOD 30 MIN: CPT | Performed by: STUDENT IN AN ORGANIZED HEALTH CARE EDUCATION/TRAINING PROGRAM

## 2023-04-27 PROCEDURE — 3074F SYST BP LT 130 MM HG: CPT | Performed by: STUDENT IN AN ORGANIZED HEALTH CARE EDUCATION/TRAINING PROGRAM

## 2023-04-27 PROCEDURE — 2022F DILAT RTA XM EVC RTNOPTHY: CPT | Performed by: STUDENT IN AN ORGANIZED HEALTH CARE EDUCATION/TRAINING PROGRAM

## 2023-04-27 PROCEDURE — 3046F HEMOGLOBIN A1C LEVEL >9.0%: CPT | Performed by: STUDENT IN AN ORGANIZED HEALTH CARE EDUCATION/TRAINING PROGRAM

## 2023-04-27 PROCEDURE — 1124F ACP DISCUSS-NO DSCNMKR DOCD: CPT | Performed by: STUDENT IN AN ORGANIZED HEALTH CARE EDUCATION/TRAINING PROGRAM

## 2023-04-27 RX ORDER — POTASSIUM CHLORIDE 1500 MG/1
20 TABLET, FILM COATED, EXTENDED RELEASE ORAL DAILY
COMMUNITY
Start: 2023-04-18

## 2023-04-27 RX ORDER — NIFEDIPINE 60 MG/1
TABLET, FILM COATED, EXTENDED RELEASE ORAL
COMMUNITY
Start: 2023-04-01 | End: 2023-04-27

## 2023-04-27 RX ORDER — INSULIN GLARGINE 100 [IU]/ML
35 INJECTION, SOLUTION SUBCUTANEOUS NIGHTLY
Qty: 5 ADJUSTABLE DOSE PRE-FILLED PEN SYRINGE | Refills: 5 | Status: SHIPPED | OUTPATIENT
Start: 2023-04-27

## 2023-04-27 RX ORDER — DEXTROSE 4 G
TABLET,CHEWABLE ORAL
COMMUNITY
Start: 2023-04-18

## 2023-04-27 RX ORDER — SYRING-NEEDL,DISP,INSUL,0.3 ML 28GX1/2"
SYRINGE, EMPTY DISPOSABLE MISCELLANEOUS
COMMUNITY
Start: 2023-04-18

## 2023-04-27 RX ORDER — LANCETS 28 GAUGE
EACH MISCELLANEOUS
COMMUNITY
Start: 2023-04-18

## 2023-04-27 RX ORDER — CILOSTAZOL 100 MG/1
100 TABLET ORAL 2 TIMES DAILY
COMMUNITY

## 2023-04-27 RX ORDER — LISINOPRIL 40 MG/1
40 TABLET ORAL EVERY MORNING
COMMUNITY
Start: 2023-04-18

## 2023-04-27 RX ORDER — NIFEDIPINE 60 MG/1
TABLET, EXTENDED RELEASE ORAL
COMMUNITY
Start: 2023-04-18

## 2023-04-27 RX ORDER — METFORMIN HYDROCHLORIDE 500 MG/1
500 TABLET, EXTENDED RELEASE ORAL
Qty: 60 TABLET | Refills: 11 | Status: SHIPPED | OUTPATIENT
Start: 2023-04-27

## 2023-04-27 ASSESSMENT — ENCOUNTER SYMPTOMS
ABDOMINAL PAIN: 0
DIARRHEA: 0
NAUSEA: 0
COUGH: 0
CONSTIPATION: 0
VOMITING: 0
SHORTNESS OF BREATH: 0

## 2023-04-27 NOTE — PROGRESS NOTES
S: Gifty Falling 76 y.o. male  here for discussion of DM meds    Insulin Requiring Diabetes:   Diagnosis as of 3/23; initial hemoglobin A1c = 13.7%. He is followed by Dr. Jermain Lloyd. He is prescribed metformin, Jardiance 25 mg a day, and Lantus. He reports that he ran out of the supplies from the hospital; when he went to the pharmacy to  his prescriptions, they were not there. Chart reviewed; prescriptions were electronically prescribed. In addition, patient was prescribed a freestyle maira and was never shown how to use it; this education took place during today's visit    O: VS: /76 (Site: Left Upper Arm, Position: Sitting)   Pulse 91   Temp 97 °F (36.1 °C)   Resp 16   Ht 5' 11\" (1.803 m)   Wt 190 lb (86.2 kg)   SpO2 99%   BMI 26.50 kg/m²    General: NAD              HEENT: WDL              Neck: WDL   CV:  RRR, no gallops, rubs, or murmurs   Resp: CTAB no R/R/W   Abd:  Soft, nontender, no masses    Ext:  no C/C/E    Assessment / Plan:      Estiven Max was seen today for diabetes and medication refill. Diagnoses and all orders for this visit:    1. Type 2 diabetes mellitus with hyperglycemia, unspecified whether long term insulin use ([facility] [address]  - Patient would like to switch diabetic management to here. I agreed to manage it. Refills provided   - CGM placed in office today. He will return to the office tomorrow for further instruction on how to use freestyle maira janeen   - insulin glargine (LANTUS SOLOSTAR) 100 UNIT/ML injection pen; Inject 35 Units into the skin nightly  Dispense: 5 Adjustable Dose Pre-filled Pen Syringe; Refill: 5  - empagliflozin (JARDIANCE) 25 MG tablet; Take 1 tablet by mouth daily  Dispense: 30 tablet; Refill: 11  - metFORMIN (GLUCOPHAGE-XR) 500 MG extended release tablet; Take 1 tablet by mouth daily (with breakfast)  Dispense: 60 tablet; Refill: 11              Return to Office: Return in about 3 months (around 7/27/2023) for chronic diseases.

## 2023-04-27 NOTE — PROGRESS NOTES
[facility]  FAMILY MEDICINE RESIDENCY PROGRAM  DATE OF VISIT : 2023    Patient : Donnie Boast   Age : 76 y.o.    : 1955   MRN : [id number]   ______________________________________________________________________    Chief Complaint :   Chief Complaint   Patient presents with    Diabetes    Medication Refill     Has many questions about new meds with diabetes       HPI : Donnie Boast is 76 y.o. male who presented to the clinic today for diabetes. He has questions about his mediications. He is following with endocrinology. Newly diagnosed, A1C 3/2023 13.7. On lantus 35 u daily, metformin 500 BID, jardiance 25 daily. Would like to follow with me instead for DM due to convenience. Was recently in nursing home for rehab. Did not receive refills from nursing home after he was discharged. Was also prescribed CGM and was not instructed on how to use it           Patient's medications, allergies, past medical, surgical, social and family histories were reviewed and updated as appropriate. Past Medical History :  Past Medical History:   Diagnosis Date    Cancer ([address] PROSTATE    , patient reports bone mets    Erectile dysfunction     Hyperlipidemia     Hypertension     Osteoarthritis, hip/pelvic region and thigh 3/29/2013    Prostate cancer ([address] 2013    PVD (peripheral vascular disease) with claudication ([address] 3/30/2023    Ankle-brachial index of 0.79 on the right and 0.42 on the left    Type 1 diabetes mellitus with circulatory complication ([address]      Past Surgical History:   Procedure Laterality Date    BACK SURGERY      HIP SURGERY Right     LEG SURGERY      LUMBAR SPINE SURGERY  10/30/2017    PLIF L2 - S1   and laminectomy    PROSTATE BIOPSY         Allergies :    Allergies   Allergen Reactions    Ibuprofen Swelling    Benzocaine      EDEMA        Medication List :    Current Outpatient Medications   Medication Sig Dispense Refill    FreeStyle Lancets MISC

## 2023-05-02 ENCOUNTER — HOSPITAL ENCOUNTER (OUTPATIENT)
Dept: OCCUPATIONAL THERAPY | Age: 68
Setting detail: THERAPIES SERIES
Discharge: HOME | End: 2023-05-02
Payer: COMMERCIAL

## 2023-05-02 PROCEDURE — 97530 THERAPEUTIC ACTIVITIES: CPT

## 2023-05-02 NOTE — PROGRESS NOTES
OCCUPATIONAL THERAPY DAILY NOTE    Date:  2023  Initial Evaluation Date: 23      Patient Name:  Jose E Pablo    :  1955    Restrictions/Precautions:  HTN; moderate fall risk  Diagnosis:  I63.9 (ICD-10-CM) - Acute cerebrovascular accident (CVA) ([facility] [address] M65.319 (ICD-10-CM) - Trigger finger of thumb, unspecified laterality                                                 Date of Surgery/Injury:      Insurance/Certification information:  [facility] Medicaid - REF #[id number] 64 units approved (16 visits) for CPT codes 51-16-34-25, [address], [address] [place] [address], [id number], [id number], [phone], [id number] until 2023  Plan of care signed (Y/N): N  Visit# / total visits:      Referring Practitioner:  Mara Novak MD  Specific Practitioner Orders: Evaluate and Treat     Assessment of current deficits   [x] Functional mobility             [x] ADLs           [x] Strength                  [x] Cognition   [x] Functional transfers           [x] IADLs          [x] Safety Awareness  [x] Endurance   [x] Fine Motor Coordination    [x] Balance      [x] Vision/perception    [x] Sensation     [] Gross Motor Coordination [x] ROM           [x] Pain                        [x] Edema          [] Scar Adhesion/Skin Integrity      OT PLAN OF CARE   OT POC based on physician orders, patient diagnosis and results of clinical assessment     Frequency/Duration: 2x/week for 16 visits                             Certification period From: 2023  To: 2023  Specific OT Treatment to include: CPT Codes: 15889, 50820,26966, 12613, 99194, 89523     [x] Instruction in HEP                   Modalities:  [x] Therapeutic Exercise                 [x] Ultrasound               [] Electrical Stimulation/Attended  [x] PROM/Stretching                    [x] Fluidotherapy          [x]  Paraffin                   [x] AAROM  [x] AROM                 [x] Iontophoresis:   [x] Tendon Glides                                               []

## 2023-05-03 ENCOUNTER — NURSE ONLY (OUTPATIENT)
Dept: FAMILY MEDICINE CLINIC | Age: 68
End: 2023-05-03

## 2023-05-03 DIAGNOSIS — E10.59 TYPE 1 DIABETES MELLITUS WITH OTHER CIRCULATORY COMPLICATION (HCC): Primary | ICD-10-CM

## 2023-05-03 NOTE — PROGRESS NOTES
Patient came to office to have Freestyle brand CGM placed and started. Supplies reviewed with patient. Sensor in place to right upper arm from previous. Turner set up and date and time corrected. Turner activated and will begin recording in 1 hour. Patient reports he is comfortable with use and will check blood sugar at least once every 8 hours and bring reader to future appointments. Requested he call for any questions or problems.

## 2023-05-08 ENCOUNTER — APPOINTMENT (OUTPATIENT)
Dept: OCCUPATIONAL THERAPY | Age: 68
End: 2023-05-08
Payer: COMMERCIAL

## 2023-05-08 DIAGNOSIS — I10 ESSENTIAL HYPERTENSION: ICD-10-CM

## 2023-05-08 DIAGNOSIS — I63.9 ACUTE CEREBROVASCULAR ACCIDENT (CVA) (HCC): ICD-10-CM

## 2023-05-08 DIAGNOSIS — K21.9 GASTROESOPHAGEAL REFLUX DISEASE, UNSPECIFIED WHETHER ESOPHAGITIS PRESENT: ICD-10-CM

## 2023-05-08 RX ORDER — HYDROCHLOROTHIAZIDE 12.5 MG/1
12.5 TABLET ORAL EVERY MORNING
Qty: 30 TABLET | Refills: 2 | Status: CANCELLED | OUTPATIENT
Start: 2023-05-08

## 2023-05-08 NOTE — TELEPHONE ENCOUNTER
Last Appointment:  4/27/2023  Future Appointments   Date Time Provider Alee Regina   5/12/2023  8:15 AM Collette Junk, OT [facility] [place] [facility]   5/16/2023  8:00 AM Collette Junk, OT [facility] [place] [facility]   7/11/2023  9:00 AM Delroy Carrington, [address] Neurology -   7/28/2023  9:00 AM [facility]

## 2023-05-09 RX ORDER — POTASSIUM CHLORIDE 1500 MG/1
20 TABLET, FILM COATED, EXTENDED RELEASE ORAL DAILY
Qty: 60 TABLET | Refills: 0 | Status: SHIPPED | OUTPATIENT
Start: 2023-05-09

## 2023-05-09 RX ORDER — HYDROCHLOROTHIAZIDE 12.5 MG/1
12.5 TABLET ORAL EVERY MORNING
Qty: 30 TABLET | Refills: 2 | Status: SHIPPED | OUTPATIENT
Start: 2023-05-09

## 2023-05-09 RX ORDER — ASPIRIN 81 MG/1
81 TABLET, CHEWABLE ORAL DAILY
Qty: 30 TABLET | Refills: 3 | Status: SHIPPED | OUTPATIENT
Start: 2023-05-09

## 2023-05-09 RX ORDER — AMLODIPINE BESYLATE 10 MG/1
10 TABLET ORAL DAILY
Qty: 90 TABLET | Refills: 2 | Status: SHIPPED | OUTPATIENT
Start: 2023-05-09

## 2023-05-09 RX ORDER — CILOSTAZOL 100 MG/1
100 TABLET ORAL 2 TIMES DAILY
Qty: 60 TABLET | Refills: 2 | Status: SHIPPED | OUTPATIENT
Start: 2023-05-09

## 2023-05-09 RX ORDER — FAMOTIDINE 20 MG/1
20 TABLET, FILM COATED ORAL DAILY
Qty: 60 TABLET | Refills: 3 | Status: SHIPPED | OUTPATIENT
Start: 2023-05-09

## 2023-05-09 RX ORDER — CLOPIDOGREL BISULFATE 75 MG/1
75 TABLET ORAL DAILY
Qty: 30 TABLET | Refills: 3 | OUTPATIENT
Start: 2023-05-09

## 2023-05-09 RX ORDER — AMLODIPINE BESYLATE 10 MG/1
10 TABLET ORAL DAILY
Qty: 90 TABLET | Refills: 2 | OUTPATIENT
Start: 2023-05-09 | End: 2024-02-03

## 2023-05-12 ENCOUNTER — HOSPITAL ENCOUNTER (OUTPATIENT)
Dept: OCCUPATIONAL THERAPY | Age: 68
Setting detail: THERAPIES SERIES
Discharge: HOME | End: 2023-05-12
Payer: COMMERCIAL

## 2023-05-12 PROCEDURE — 97033 APP MDLTY 1+IONTPHRSIS EA 15: CPT

## 2023-05-12 PROCEDURE — 97110 THERAPEUTIC EXERCISES: CPT

## 2023-05-12 PROCEDURE — 97140 MANUAL THERAPY 1/> REGIONS: CPT

## 2023-05-12 NOTE — PROGRESS NOTES
OCCUPATIONAL THERAPY DAILY NOTE    Date:  2023  Initial Evaluation Date: 23      Patient Name:  Valentina Dunlap    :  1955    Restrictions/Precautions:  HTN; moderate fall risk  Diagnosis:  I63.9 (ICD-10-CM) - Acute cerebrovascular accident (CVA) ([facility] [address] M65.319 (ICD-10-CM) - Trigger finger of thumb, unspecified laterality                                                 Date of Surgery/Injury:      Insurance/Certification information:  [facility] Medicaid - REF #[id number] 64 units approved (16 visits) for CPT codes 51-16-34-25, [address], [address] [place] [address], [id number], [id number], [phone], [id number] until 2023  Plan of care signed (Y/N): N  Visit# / total visits: 15     Referring Practitioner:  Rm Alas MD  Specific Practitioner Orders: Evaluate and Treat     Assessment of current deficits   [x] Functional mobility             [x] ADLs           [x] Strength                  [x] Cognition   [x] Functional transfers           [x] IADLs          [x] Safety Awareness  [x] Endurance   [x] Fine Motor Coordination    [x] Balance      [x] Vision/perception    [x] Sensation     [] Gross Motor Coordination [x] ROM           [x] Pain                        [x] Edema          [] Scar Adhesion/Skin Integrity      OT PLAN OF CARE   OT POC based on physician orders, patient diagnosis and results of clinical assessment     Frequency/Duration: 2x/week for 16 visits                             Certification period From: 2023  To: 2023  Specific OT Treatment to include: CPT Codes: 75424, 61302,78986, 44017, 14310, 43054     [x] Instruction in HEP                   Modalities:  [x] Therapeutic Exercise                 [x] Ultrasound               [] Electrical Stimulation/Attended  [x] PROM/Stretching                    [x] Fluidotherapy          [x]  Paraffin                   [x] AAROM  [x] AROM                 [x] Iontophoresis:   [x] Tendon Glides                                               []

## 2023-05-16 ENCOUNTER — TELEPHONE (OUTPATIENT)
Dept: FAMILY MEDICINE CLINIC | Age: 68
End: 2023-05-16

## 2023-05-16 ENCOUNTER — HOSPITAL ENCOUNTER (OUTPATIENT)
Dept: OCCUPATIONAL THERAPY | Age: 68
Setting detail: THERAPIES SERIES
Discharge: HOME | End: 2023-05-16
Payer: COMMERCIAL

## 2023-05-16 PROCEDURE — 97110 THERAPEUTIC EXERCISES: CPT

## 2023-05-16 PROCEDURE — 97033 APP MDLTY 1+IONTPHRSIS EA 15: CPT

## 2023-05-16 PROCEDURE — 97140 MANUAL THERAPY 1/> REGIONS: CPT

## 2023-05-16 NOTE — TELEPHONE ENCOUNTER
----- Message from Ebonie García sent at 5/16/2023 10:06 AM EDT -----  Subject: Message to Provider    QUESTIONS  Information for Provider? VERT IMPORTANT? Patient has important   information for this office from OT. April العراقي's number, [phone]. Call patient asap. Regarding patient's continuing therapy.  ---------------------------------------------------------------------------  --------------  [employer]  [phone]; OK to leave message on voicemail  ---------------------------------------------------------------------------  --------------  SCRIPT ANSWERS  Relationship to Patient?  Self

## 2023-05-16 NOTE — PROGRESS NOTES
Loss of Protective Sense   Size: 4.56             Loss of Sensation  Size: 6.65                  Edema Description/Circumferential Measurements:              Circumferential around thumb IP joint              Right: 16.9 cm              Left: 17.1 cm --> 16.8 cm --> 16.6 cm --> 16.6 cm                 Moderate edema palpated throughout MP/IP of L thumb     Dynamometer (setting 2):   Deferred 05/16 due to triggering of L thumb                           Left: 36# - triggering with L thumb                                           Right: 65#                       Pinch Meter: Deferred 05/16 due to triggering of L thumb              Lateral: Left= TBD,Right= TBD,               Palmar 3 point: Left= TBD,, Right= TBD,     9 Hole Peg Test: Deferred 05/16 due to triggering of L thumb              Left: 31 sec              Right: 27 sec     Balance: Dynamic sitting balance is fair+ as pt maintained balance 4/4 trials for min challenge and 3/4 trials for mod challenge     Endurance: Pt reports fair endurance for completion of ADLs --> fair+     QuickDASH Score: 47.7% disability --> 52.3%    -Rehab Potential: Good  -Requires OT Follow Up: Yes    Time In: 8:07 am            Time Out: 9:00 am             Visit #: 16     *Codes below are the only approved codes-64 units, through 5/25/23*    CODE   Minutes  Units   60925 Fluidotherapy-       45906 Manual- 14 1   85473 Therapeutic Ex- 10 1   70987 Therapeutic Activity-     49094 Neuromuscular Re-Ed-       13850 Paraffin-       38457 Iontophoresis- 29 2   66870 Ultrasound-             53 4       -Response to Treatment: Pt demonstrated understanding of guidance to contact doctor to discuss possible cortisone shot. Plan:   [x]  Continues Plan of care: Treatment covered based on POC and graduated to patient's progress. Pt education continues at each visit to obtain maximum benefits from skilled OT intervention.   []  Alter Plan of care:   []  Discharge:    Day Mireles OT R/L

## 2023-05-16 NOTE — TELEPHONE ENCOUNTER
Patient called and stated he was advised by his occupational therapist that he needs a cortisone shot in his left hand before he can resume therapy.   Occupation therapist is from [facility]    If appropriate, he would like to come in and get an injection in his hand as soon as possible so he can resume his therapy

## 2023-05-18 ENCOUNTER — TELEPHONE (OUTPATIENT)
Dept: OCCUPATIONAL THERAPY | Age: 68
End: 2023-05-18

## 2023-05-18 NOTE — TELEPHONE ENCOUNTER
Pt called to make therapist aware that pt is scheduled to receive cortisone shot to L thumb on 05/30/2023. Therapist urged pt to actively discuss with physician to determine this is the best option, considering the pt's overall health. Recommendation was made to consider cortisone shot as some patients find relief from TF symptoms following injection. Pt is to call at the end of the month after injection is considered/received. Insurance authorization will be sought at that time to extend plan of care.     Otilio Loja OT R/L #[id number], CKTP

## 2023-05-22 ENCOUNTER — TELEPHONE (OUTPATIENT)
Dept: FAMILY MEDICINE CLINIC | Age: 68
End: 2023-05-22

## 2023-05-22 DIAGNOSIS — Z59.89 INSURANCE COVERAGE PROBLEMS: Primary | ICD-10-CM

## 2023-05-22 SDOH — ECONOMIC STABILITY - INCOME SECURITY: OTHER PROBLEMS RELATED TO HOUSING AND ECONOMIC CIRCUMSTANCES: Z59.89

## 2023-05-22 NOTE — TELEPHONE ENCOUNTER
Patient phoned stated that he got his medicaid cut off. Patient does not understand why he left several messages to job and family but did not hear back from them'  Patient is asking for help to get this straighten out.   Please call patient   Thank you April  Patient P# [phone]

## 2023-05-23 ENCOUNTER — TELEPHONE (OUTPATIENT)
Dept: FAMILY MEDICINE CLINIC | Age: 68
End: 2023-05-23

## 2023-05-30 ENCOUNTER — OFFICE VISIT (OUTPATIENT)
Dept: FAMILY MEDICINE CLINIC | Age: 68
End: 2023-05-30
Payer: COMMERCIAL

## 2023-05-30 VITALS
BODY MASS INDEX: 27.16 KG/M2 | SYSTOLIC BLOOD PRESSURE: 120 MMHG | TEMPERATURE: 97.3 F | RESPIRATION RATE: 18 BRPM | HEART RATE: 77 BPM | DIASTOLIC BLOOD PRESSURE: 59 MMHG | WEIGHT: 194 LBS | HEIGHT: 71 IN | OXYGEN SATURATION: 97 %

## 2023-05-30 DIAGNOSIS — M65.312 TRIGGER FINGER OF LEFT THUMB: Primary | ICD-10-CM

## 2023-05-30 DIAGNOSIS — Z76.0 MEDICATION REFILL: ICD-10-CM

## 2023-05-30 PROCEDURE — G8417 CALC BMI ABV UP PARAM F/U: HCPCS | Performed by: STUDENT IN AN ORGANIZED HEALTH CARE EDUCATION/TRAINING PROGRAM

## 2023-05-30 PROCEDURE — 20550 NJX 1 TENDON SHEATH/LIGAMENT: CPT | Performed by: STUDENT IN AN ORGANIZED HEALTH CARE EDUCATION/TRAINING PROGRAM

## 2023-05-30 PROCEDURE — 1036F TOBACCO NON-USER: CPT | Performed by: STUDENT IN AN ORGANIZED HEALTH CARE EDUCATION/TRAINING PROGRAM

## 2023-05-30 PROCEDURE — 3074F SYST BP LT 130 MM HG: CPT | Performed by: STUDENT IN AN ORGANIZED HEALTH CARE EDUCATION/TRAINING PROGRAM

## 2023-05-30 PROCEDURE — 3017F COLORECTAL CA SCREEN DOC REV: CPT | Performed by: STUDENT IN AN ORGANIZED HEALTH CARE EDUCATION/TRAINING PROGRAM

## 2023-05-30 PROCEDURE — G8427 DOCREV CUR MEDS BY ELIG CLIN: HCPCS | Performed by: STUDENT IN AN ORGANIZED HEALTH CARE EDUCATION/TRAINING PROGRAM

## 2023-05-30 PROCEDURE — 99214 OFFICE O/P EST MOD 30 MIN: CPT | Performed by: STUDENT IN AN ORGANIZED HEALTH CARE EDUCATION/TRAINING PROGRAM

## 2023-05-30 PROCEDURE — 1124F ACP DISCUSS-NO DSCNMKR DOCD: CPT | Performed by: STUDENT IN AN ORGANIZED HEALTH CARE EDUCATION/TRAINING PROGRAM

## 2023-05-30 PROCEDURE — 3078F DIAST BP <80 MM HG: CPT | Performed by: STUDENT IN AN ORGANIZED HEALTH CARE EDUCATION/TRAINING PROGRAM

## 2023-05-30 RX ORDER — LIDOCAINE HYDROCHLORIDE 10 MG/ML
20 INJECTION, SOLUTION INFILTRATION; PERINEURAL ONCE
Status: COMPLETED | OUTPATIENT
Start: 2023-05-30 | End: 2023-05-30

## 2023-05-30 RX ORDER — INSULIN GLARGINE 100 [IU]/ML
35 INJECTION, SOLUTION SUBCUTANEOUS NIGHTLY
Qty: 5 ADJUSTABLE DOSE PRE-FILLED PEN SYRINGE | Refills: 5 | Status: SHIPPED | OUTPATIENT
Start: 2023-05-30

## 2023-05-30 RX ORDER — TRIAMCINOLONE ACETONIDE 40 MG/ML
40 INJECTION, SUSPENSION INTRA-ARTICULAR; INTRAMUSCULAR ONCE
Status: COMPLETED | OUTPATIENT
Start: 2023-05-30 | End: 2023-05-30

## 2023-05-30 RX ORDER — LISINOPRIL 40 MG/1
40 TABLET ORAL EVERY MORNING
Qty: 30 TABLET | Refills: 3 | Status: SHIPPED | OUTPATIENT
Start: 2023-05-30

## 2023-05-30 RX ORDER — LISINOPRIL 40 MG/1
40 TABLET ORAL EVERY MORNING
Qty: 30 TABLET | Status: CANCELLED | OUTPATIENT
Start: 2023-05-30

## 2023-05-30 RX ADMIN — TRIAMCINOLONE ACETONIDE 40 MG: 40 INJECTION, SUSPENSION INTRA-ARTICULAR; INTRAMUSCULAR at 15:54

## 2023-05-30 RX ADMIN — LIDOCAINE HYDROCHLORIDE 20 ML: 10 INJECTION, SOLUTION INFILTRATION; PERINEURAL at 15:53

## 2023-05-30 ASSESSMENT — ENCOUNTER SYMPTOMS: COLOR CHANGE: 0

## 2023-05-30 NOTE — PROGRESS NOTES
Attending Physician Statement    S:   Chief Complaint   Patient presents with    Hand Pain     Left  Would like an injection       68/M who presents for evaluation of left thumb pain. Patient complaints of left thumb catching. This has been present a few months however recently has become progressively more aggravating. This affects most every activity involving gripping. Originally only a catching sensation was present, however now locking and pain are present. Treatment to date: Activity modification, OT, bracing and OTC medications without significant relief. O: Blood pressure (!) 120/59, pulse 77, temperature 97.3 °F (36.3 °C), temperature source Temporal, resp. rate 18, height 5' 11\" (1.803 m), weight 194 lb (88 kg), SpO2 97 %. Exam:   Heart - RRR   Lungs - clear   MSK - RIGHT Hand: No ecchymosis or swelling. ( + ) active triggering. No obvious deformities. No hypothenar or thenar atrophy. TTP: ( - ) Scaphoid, ( - ) Ulnar Styloid, ( - ) Distal Radius, ( + ) A1 Pulley  Carpal Tunnel: ( - ) Tinels, ( - ) Phalens, ( - ) Carpal Tunnel Compression  Special Tests: ( - ) CMC Compression, ( - ) Finkelsteins, ( - ) Thumb UCL Stress Test  Strength and sensation intact. A: Trigger finger, Thumb  P:  CSI as documented by resident   Follow-up as ordered    I have discussed the case, including pertinent history and exam findings with the resident. I also have personally seen and examined the patient. I agree with the documented assessment and plan.        Stefani Emmanuel MD

## 2023-05-30 NOTE — PROGRESS NOTES
[address] [facility] [place] MEDICINE RESIDENCY PROGRAM  DATE OF VISIT : 2023    Patient : Christine Ramirez   Age : 76 y.o.    : 1955   MRN : [id number]   ______________________________________________________________________    Chief Complaint :   Chief Complaint   Patient presents with    Hand Pain     Left  Would like an injection        HPI : Christine Ramirez is 76 y.o. male who presented to the clinic today for trigger finger injection. Patient complaints of L thumb catching. This has been present a few months however recently has become progressively more aggravating. This affects most every activity involving gripping. Originally only a catching sensation was present, however now locking and pain are present. Treatment to date: Activity modification, PT and OTC medications without significant relief. Patient's medications, allergies, past medical, surgical, social and family histories were reviewed and updated as appropriate. Past Medical History :  Past Medical History:   Diagnosis Date    Cancer ([address] PROSTATE    , patient reports bone mets    Erectile dysfunction     Hyperlipidemia     Hypertension     Osteoarthritis, hip/pelvic region and thigh 3/29/2013    Prostate cancer ([address] 2013    PVD (peripheral vascular disease) with claudication ([address] 3/30/2023    Ankle-brachial index of 0.79 on the right and 0.42 on the left    Type 1 diabetes mellitus with circulatory complication ([address]      Past Surgical History:   Procedure Laterality Date    BACK SURGERY      HIP SURGERY Right     LEG SURGERY      LUMBAR SPINE SURGERY  10/30/2017    PLIF L2 - S1   and laminectomy    PROSTATE BIOPSY         Allergies :    Allergies   Allergen Reactions    Ibuprofen Swelling    Benzocaine      EDEMA        Medication List :    Current Outpatient Medications   Medication Sig Dispense Refill    lisinopril (PRINIVIL;ZESTRIL) 40 MG tablet Take 1 tablet by mouth every morning 30

## 2023-06-01 ENCOUNTER — TELEPHONE (OUTPATIENT)
Dept: FAMILY MEDICINE CLINIC | Age: 68
End: 2023-06-01

## 2023-06-01 DIAGNOSIS — E11.65 TYPE 2 DIABETES MELLITUS WITH HYPERGLYCEMIA, UNSPECIFIED WHETHER LONG TERM INSULIN USE (HCC): Primary | ICD-10-CM

## 2023-06-01 RX ORDER — METFORMIN HYDROCHLORIDE 500 MG/1
500 TABLET, EXTENDED RELEASE ORAL
Qty: 60 TABLET | Refills: 11 | Status: SHIPPED | OUTPATIENT
Start: 2023-06-01

## 2023-06-20 ENCOUNTER — HOSPITAL ENCOUNTER (OUTPATIENT)
Dept: OCCUPATIONAL THERAPY | Age: 68
Setting detail: THERAPIES SERIES
Discharge: HOME | End: 2023-06-20
Payer: COMMERCIAL

## 2023-06-20 PROCEDURE — 97033 APP MDLTY 1+IONTPHRSIS EA 15: CPT

## 2023-06-20 PROCEDURE — 97140 MANUAL THERAPY 1/> REGIONS: CPT

## 2023-06-20 PROCEDURE — 97110 THERAPEUTIC EXERCISES: CPT

## 2023-06-20 PROCEDURE — 97530 THERAPEUTIC ACTIVITIES: CPT

## 2023-06-20 NOTE — PROGRESS NOTES
OCCUPATIONAL THERAPY PROGRESS UPDATE    Date:  2023  Initial Evaluation Date: 23      Patient Name:  Naomy Brigsg    :  1955    Restrictions/Precautions:  HTN/DM; moderate fall risk  Diagnosis:  I63.9 (ICD-10-CM) - Acute cerebrovascular accident (CVA) ([facility] [address] M65.319 (ICD-10-CM) - Trigger finger of thumb, unspecified laterality                                                 Date of Surgery/Injury:      Insurance/Certification information:  [facility] Medicaid - REF #[id number] 8 visits approved for CPT codes 51-16-34-25, [address], [place] [address], [address] [place] [address], [id number], [id number], [phone], [id number] until 10/01/2023  Plan of care signed (Y/N): Y  Visit# / total visits: 2     Referring Practitioner:  Hailey Nayak MD  Specific Practitioner Orders: Evaluate and Treat     Assessment of current deficits   [x] Functional mobility             [x] ADLs           [x] Strength                  [x] Cognition   [x] Functional transfers           [x] IADLs          [x] Safety Awareness  [x] Endurance   [x] Fine Motor Coordination    [x] Balance      [x] Vision/perception    [x] Sensation     [] Gross Motor Coordination [x] ROM           [x] Pain                        [x] Edema          [] Scar Adhesion/Skin Integrity      OT PLAN OF CARE   OT POC based on physician orders, patient diagnosis and results of clinical assessment     Frequency/Duration: 2x/week for 16 visits                             Certification period From: 2023  To: 2023  Specific OT Treatment to include: CPT Codes: 18308, 02592,42928, 93886, 54283, 01580     [x] Instruction in HEP                   Modalities:  [x] Therapeutic Exercise                 [x] Ultrasound               [] Electrical Stimulation/Attended  [x] PROM/Stretching                    [x] Fluidotherapy          [x]  Paraffin                   [x] AAROM  [x] AROM                 [x] Iontophoresis:   [x] Tendon Glides                                               []

## 2023-06-21 ENCOUNTER — NURSE ONLY (OUTPATIENT)
Dept: FAMILY MEDICINE CLINIC | Age: 68
End: 2023-06-21

## 2023-06-21 DIAGNOSIS — E10.59 TYPE 1 DIABETES MELLITUS WITH OTHER CIRCULATORY COMPLICATION (HCC): Primary | ICD-10-CM

## 2023-06-21 NOTE — PROGRESS NOTES
Patient came to office with new freestyle CGM. Device placed to right arm and instructed on use. Patient verbalized understanding and will call office for any questions or problems. Will stop back in office in 2 weeks for placement of next device.

## 2023-06-27 ENCOUNTER — HOSPITAL ENCOUNTER (OUTPATIENT)
Dept: OCCUPATIONAL THERAPY | Age: 68
Setting detail: THERAPIES SERIES
Discharge: HOME | End: 2023-06-27
Payer: COMMERCIAL

## 2023-06-27 PROCEDURE — 97110 THERAPEUTIC EXERCISES: CPT

## 2023-06-27 PROCEDURE — 97140 MANUAL THERAPY 1/> REGIONS: CPT

## 2023-06-27 PROCEDURE — 97530 THERAPEUTIC ACTIVITIES: CPT

## 2023-06-27 PROCEDURE — 97033 APP MDLTY 1+IONTPHRSIS EA 15: CPT

## 2023-07-02 DIAGNOSIS — I63.9 ACUTE CEREBROVASCULAR ACCIDENT (CVA) (HCC): ICD-10-CM

## 2023-07-03 RX ORDER — CLOPIDOGREL BISULFATE 75 MG/1
TABLET ORAL
Qty: 90 TABLET | OUTPATIENT
Start: 2023-07-03

## 2023-07-03 NOTE — TELEPHONE ENCOUNTER
Last Appointment:  5/30/2023  Future Appointments   Date Time Provider [address]   7/5/2023  8:00 AM CLAUDETTE Larios OT [place] [facility]   7/11/2023  9:00 AM Nelly Camarog, [address]  Neurology -   7/28/2023  9:00 AM Jocelynn Noe MD [facility]

## 2023-07-05 ENCOUNTER — HOSPITAL ENCOUNTER (OUTPATIENT)
Dept: OCCUPATIONAL THERAPY | Age: 68
Setting detail: THERAPIES SERIES
Discharge: HOME | End: 2023-07-05
Payer: COMMERCIAL

## 2023-07-05 PROCEDURE — 97530 THERAPEUTIC ACTIVITIES: CPT

## 2023-07-05 PROCEDURE — 97033 APP MDLTY 1+IONTPHRSIS EA 15: CPT

## 2023-07-05 PROCEDURE — 97110 THERAPEUTIC EXERCISES: CPT

## 2023-07-05 RX ORDER — ISOPROPYL ALCOHOL 70 ML/100ML
SWAB TOPICAL
Qty: 100 EACH | Refills: 1 | Status: SHIPPED | OUTPATIENT
Start: 2023-07-05

## 2023-07-05 RX ORDER — PEN NEEDLE, DIABETIC 29 GAUGE
1 NEEDLE, DISPOSABLE MISCELLANEOUS DAILY
Qty: 100 EACH | Refills: 3 | Status: SHIPPED | OUTPATIENT
Start: 2023-07-05

## 2023-07-05 NOTE — PROGRESS NOTES
OCCUPATIONAL THERAPY DAILY NOTE    Date:  2023  Initial Evaluation Date: 23      Patient Name:  Gaby Corbett    :  1955    Restrictions/Precautions:  HTN/DM; moderate fall risk  Diagnosis:  I63.9 (ICD-10-CM) - Acute cerebrovascular accident (CVA) ([address]); M65.319 (ICD-10-CM) - Trigger finger of thumb, unspecified laterality                                                 Date of Surgery/Injury:      Insurance/Certification information:  [facility] Medicaid - REF #[id number] 8 visits approved for CPT codes 51-16-34-25, [address] [place] [address], [address], [id number], [id number], [phone], [id number] until 10/01/2023  Plan of care signed (Y/N): Y  Visit# / total visits:      Referring Practitioner:  Tesfaye Muro MD  Specific Practitioner Orders: Evaluate and Treat     Assessment of current deficits   [x] Functional mobility             [x] ADLs           [x] Strength                  [x] Cognition   [x] Functional transfers           [x] IADLs          [x] Safety Awareness  [x] Endurance   [x] Fine Motor Coordination    [x] Balance      [x] Vision/perception    [x] Sensation     [] Gross Motor Coordination [x] ROM           [x] Pain                        [x] Edema          [] Scar Adhesion/Skin Integrity      OT PLAN OF CARE   OT POC based on physician orders, patient diagnosis and results of clinical assessment     Frequency/Duration: 2x/week for 16 visits                             Certification period From: 2023  To: 2023  Specific OT Treatment to include: CPT Codes: 12639, 42213,42137, 53951, 39823, 28693     [x] Instruction in HEP                   Modalities:  [x] Therapeutic Exercise                 [x] Ultrasound               [] Electrical Stimulation/Attended  [x] PROM/Stretching                    [x] Fluidotherapy          [x]  Paraffin                   [x] AAROM  [x] AROM                 [x] Iontophoresis:   [x] Tendon Glides                                               [] Neuromuscular

## 2023-07-11 ENCOUNTER — HOSPITAL ENCOUNTER (OUTPATIENT)
Dept: OCCUPATIONAL THERAPY | Age: 68
Setting detail: THERAPIES SERIES
Discharge: HOME | End: 2023-07-11
Payer: COMMERCIAL

## 2023-07-11 PROCEDURE — 97110 THERAPEUTIC EXERCISES: CPT

## 2023-07-11 PROCEDURE — 97033 APP MDLTY 1+IONTPHRSIS EA 15: CPT

## 2023-07-11 PROCEDURE — 97530 THERAPEUTIC ACTIVITIES: CPT

## 2023-07-11 NOTE — PROGRESS NOTES
Pt arrives to tx session stating \" I'm doing good. I'm feeling good. My thumb is feeling good. \" Therapist issued pt a sports figure book for home leisure activity of reading secondary to last weeks leisure activity questionnaire findings. Pt was excited to receive book. Therapist adds to gentle soft theraputty resistive use ex's today adding pinching & pinch/pulls for total ^d time of 6 minutes without any ^d A1 pulley irritation of thumb. Therapist instructs pt to add theraputty ex's to HEP 1 x a day for 5 minutes only as tolerated. Therapist instructs pt to continue weaning from splint use as tolerated as long as his thumb remains symptom free. Pt displays faint thickness of A1 pulley area of thumb but no triggering evident. Therapist completes Ionto tx to area with decreased swelling noted post. Pt tolerates tx very well at 2.5 intensity & 60 dose. Will cont tx with focus on decreasing thumb trigger symptoms while improving fxl use of left thumb/hand and assisting pt to find appropriate leisure activities for enhancing a healthy lifestyle.     Sensation:    Able To Sense (Y) / Unable to Sense (N) 06/20  SEMMES-CARLI Thumb 2nd Digit 3rd Digit  4th Digit  5th Digit    Normal Touch  Size: 2.83             Diminished Light Touch   Size: 3.61  Y           Diminished Protective Sense  Size: 4.31  Y Y Y Y   Loss of Protective Sense   Size: 4.56             Loss of Sensation  Size: 6.65                  Edema Description/Circumferential Measurements:              Circumferential around thumb IP joint              Right: 16.9 cm              Left: 17.1 cm --> 16.8 cm --> 16.6 cm --> 16.5 cm              Very mild edema palpated throughout MP/IP of L thumb     Dynamometer (setting 2):  Deferred                         Left: 36# previous measurement- will reassess in a couple weeks if thumb remains trigger free                                         Right: 65#                       Pinch Meter: Deferred

## 2023-07-17 ENCOUNTER — TELEPHONE (OUTPATIENT)
Dept: FAMILY MEDICINE CLINIC | Age: 68
End: 2023-07-17

## 2023-07-17 RX ORDER — POTASSIUM CHLORIDE 1500 MG/1
TABLET, EXTENDED RELEASE ORAL
Qty: 60 TABLET | Refills: 0 | Status: SHIPPED | OUTPATIENT
Start: 2023-07-17

## 2023-07-17 NOTE — TELEPHONE ENCOUNTER
Last Appointment:  5/30/2023  Future Appointments  7/18/2023  8:00 AM    CLAUDETTE Mahoney OT             [place] HOD  7/25/2023  8:00 AM    CLAUDETTE Mahoney OT             [place] HOD  7/28/2023  9:00 AM    MD Osiris Buenrostro [facility]

## 2023-07-17 NOTE — TELEPHONE ENCOUNTER
----- Message from Rose Marie Bro sent at 7/17/2023  2:47 PM EDT -----  Subject: Refill Request    QUESTIONS  Name of Medication? clopidogrel (PLAVIX) 75 MG tablet  Patient-reported dosage and instructions? 75mg, 1x daily  How many days do you have left? 0  Preferred Pharmacy? [address]  Pharmacy phone number (if available)? [phone]  ---------------------------------------------------------------------------  --------------  CALL BACK INFO  What is the best way for the office to contact you? OK to leave message on   voicemail  Preferred Call Back Phone Number? [phone]  ---------------------------------------------------------------------------  --------------  SCRIPT ANSWERS  Relationship to Patient?  Self

## 2023-07-18 ENCOUNTER — HOSPITAL ENCOUNTER (OUTPATIENT)
Dept: OCCUPATIONAL THERAPY | Age: 68
Setting detail: THERAPIES SERIES
Discharge: HOME | End: 2023-07-18
Payer: COMMERCIAL

## 2023-07-18 NOTE — PROGRESS NOTES
Occupational Therapy      Phone: [phone] Fax: [phone]     Occupational Therapy   Cancellation Note     Patient Name:  Anju Muniz  : 1955  Date:  2023  MRN: [id number]    For today's appointment patient:   [x]  Cancelled   [x]  Rescheduled appointment   []  No-show     Reason given by patient:   []  Patient ill   []  Conflicting appointment   [x]  No transportation   []  Conflict with work   []  No reason given   []  Other:    Comments: Pts ride company was a no show    Electronically signed by: Sheri Ruelas [address], [id number]

## 2023-07-25 ENCOUNTER — HOSPITAL ENCOUNTER (OUTPATIENT)
Dept: OCCUPATIONAL THERAPY | Age: 68
Setting detail: THERAPIES SERIES
Discharge: HOME | End: 2023-07-25
Payer: COMMERCIAL

## 2023-07-25 PROCEDURE — 97110 THERAPEUTIC EXERCISES: CPT

## 2023-07-25 PROCEDURE — 97530 THERAPEUTIC ACTIVITIES: CPT

## 2023-07-25 PROCEDURE — 97033 APP MDLTY 1+IONTPHRSIS EA 15: CPT

## 2023-07-25 NOTE — PROGRESS NOTES
donning/doffing brace x3 with max Vcs and min tactile cues    -Hand-based CMC splint with blocking of thumb IP joint. HEP  -reviewed HEP and splinting instructions   Maximize Respiration  -Return demonstration for 3 exercises to maximize respiration: diaphragmatic breathing, pursed lip breathing, lazy 8 breathing   Health Promotion  -Review discussion on how to incorporate healthy practices into daily/weekly routines & healthy eating habits     Assessment/Comments: Pt arrives to tx session reporting the only time he is wearing the thumb splint is when he is out of his house. Pt also reports his fingers/thumb feel tight but his thumb is \"not catching\". Pt reports compliance most days with soft theraputty ex's at home one time a day x 5 mins. Therapist adds hands on resistive thumb ex's, all planes today with good result. Mary Ann Portal ex's progressed with pt utilizing ^d 1# hand ball vs unweighted ball with good result. Therapist instructs pt to continue weaning from splint use as tolerated as long as his thumb remains symptom free. Pt displays faint thickness of A1 pulley area of thumb but no triggering evident. Therapist completes Ionto tx to area with decreased swelling noted post. Pt tolerates tx very well at 2.5 intensity & 60 dose. Will cont tx with focus on decreasing thumb trigger symptoms while improving fxl use & strength of left thumb/hand and assisting pt to find appropriate leisure activities for enhancing a healthy lifestyle x 2 more visits.     Sensation:    Able To Sense (Y) / Unable to Sense (N) 06/20  SEMMES-CARLI Thumb 2nd Digit 3rd Digit  4th Digit  5th Digit    Normal Touch  Size: 2.83             Diminished Light Touch   Size: 3.61  Y           Diminished Protective Sense  Size: 4.31  Y Y Y Y   Loss of Protective Sense   Size: 4.56             Loss of Sensation  Size: 6.65                  Edema Description/Circumferential Measurements:              Circumferential around thumb IP joint

## 2023-07-28 ENCOUNTER — TELEPHONE (OUTPATIENT)
Dept: FAMILY MEDICINE CLINIC | Age: 68
End: 2023-07-28

## 2023-07-28 ENCOUNTER — OFFICE VISIT (OUTPATIENT)
Dept: FAMILY MEDICINE CLINIC | Age: 68
End: 2023-07-28
Payer: COMMERCIAL

## 2023-07-28 VITALS
DIASTOLIC BLOOD PRESSURE: 67 MMHG | WEIGHT: 193 LBS | SYSTOLIC BLOOD PRESSURE: 121 MMHG | TEMPERATURE: 97.3 F | BODY MASS INDEX: 26.92 KG/M2 | OXYGEN SATURATION: 99 % | HEART RATE: 66 BPM

## 2023-07-28 DIAGNOSIS — Z12.11 SCREENING FOR COLON CANCER: ICD-10-CM

## 2023-07-28 DIAGNOSIS — E11.65 TYPE 2 DIABETES MELLITUS WITH HYPERGLYCEMIA, UNSPECIFIED WHETHER LONG TERM INSULIN USE (HCC): Primary | ICD-10-CM

## 2023-07-28 DIAGNOSIS — C61 CARCINOMA OF PROSTATE (HCC): ICD-10-CM

## 2023-07-28 LAB
ALBUMIN SERPL-MCNC: 4.1 G/DL (ref 3.5–5.2)
ALP BLD-CCNC: 113 U/L (ref 40–129)
ALT SERPL-CCNC: 12 U/L (ref 0–40)
ANION GAP SERPL CALCULATED.3IONS-SCNC: 18 MMOL/L (ref 7–16)
AST SERPL-CCNC: 22 U/L (ref 0–39)
BILIRUB SERPL-MCNC: 0.3 MG/DL (ref 0–1.2)
BUN BLDV-MCNC: 23 MG/DL (ref 6–23)
CALCIUM SERPL-MCNC: 9.5 MG/DL (ref 8.6–10.2)
CHLORIDE BLD-SCNC: 108 MMOL/L (ref 98–107)
CO2: 17 MMOL/L (ref 22–29)
CREAT SERPL-MCNC: 1.3 MG/DL (ref 0.7–1.2)
GFR SERPL CREATININE-BSD FRML MDRD: 58 ML/MIN/1.73M2
GLUCOSE BLD-MCNC: 81 MG/DL (ref 74–99)
HBA1C MFR BLD: 6.3 %
POTASSIUM SERPL-SCNC: 4.4 MMOL/L (ref 3.5–5)
PROSTATE SPECIFIC ANTIGEN: 0.16 NG/ML (ref 0–4)
SODIUM BLD-SCNC: 143 MMOL/L (ref 132–146)
TOTAL PROTEIN: 7.8 G/DL (ref 6.4–8.3)

## 2023-07-28 PROCEDURE — 36415 COLL VENOUS BLD VENIPUNCTURE: CPT | Performed by: FAMILY MEDICINE

## 2023-07-28 ASSESSMENT — ENCOUNTER SYMPTOMS
DIARRHEA: 0
COUGH: 0
ABDOMINAL PAIN: 0
VOMITING: 0
NAUSEA: 0
SHORTNESS OF BREATH: 0
CONSTIPATION: 0

## 2023-07-28 NOTE — PROGRESS NOTES
[address]  FAMILY MEDICINE RESIDENCY PROGRAM  DATE OF VISIT : 2023    Patient : Krystian Reno   Age : 76 y.o.    : 1955   MRN : [id number]   ______________________________________________________________________    Chief Complaint :   Chief Complaint   Patient presents with    Follow-up    Diabetes       HPI : Krystian Reno is 76 y.o. male who presented to the clinic today for diabetes follow up. A1C today is 6.3, down from 13.7. On lantus 35 u daily, metformin 500 BID, jardiance 25 daily. Was also prescribed CGM, but it came off while he was on the bus. Has not used it in 3 months. Has been checking his sugars with glucometer, but does not remember what they were. Denies diabetic symptoms. Has not follow up with eye doc and dentist.      H/o prostate cancer. On leuprolide. No urinary symptoms. Patient's medications, allergies, past medical, surgical, social and family histories were reviewed and updated as appropriate. Past Medical History :  Past Medical History:   Diagnosis Date    Cancer ([address]) PROSTATE    , patient reports bone mets    Erectile dysfunction     Hyperlipidemia     Hypertension     Osteoarthritis, hip/pelvic region and thigh 3/29/2013    Prostate cancer ([address]) 2013    PVD (peripheral vascular disease) with claudication ([address]) 3/30/2023    Ankle-brachial index of 0.79 on the right and 0.42 on the left    Type 1 diabetes mellitus with circulatory complication ([address])      Past Surgical History:   Procedure Laterality Date    BACK SURGERY      HIP SURGERY Right     LEG SURGERY      LUMBAR SPINE SURGERY  10/30/2017    PLIF L2 - S1   and laminectomy    PROSTATE BIOPSY         Allergies :    Allergies   Allergen Reactions    Ibuprofen Swelling    Benzocaine      EDEMA        Medication List :    Current Outpatient Medications   Medication Sig Dispense Refill    potassium chloride (KLOR-CON M) 20 MEQ TBCR extended release tablet take 1 tablet by

## 2023-07-28 NOTE — TELEPHONE ENCOUNTER
----- Message from Remedios Lindsay sent at 7/28/2023 11:32 AM EDT -----  Subject: Referral Request    Reason for referral request? Pt needs a new eye doctor   referral/recommendation as the [facility], which PCP Shade Alpers   recommended, is not accepting Pts. Pt needs a diabetic eye exam. Pt has no   preferences or anyone specific in mind for an eye doctor. Pt and PCP   discussed this need during appt on 7/28/23 at 9am.  Provider patient wants to be referred to(if known):     Provider Phone Number(if known):     Additional Information for Provider?   ---------------------------------------------------------------------------  --------------  Kelvin Galt Hermiol    [phone]; OK to leave message on voicemail  ---------------------------------------------------------------------------  --------------

## 2023-08-01 ENCOUNTER — HOSPITAL ENCOUNTER (OUTPATIENT)
Dept: OCCUPATIONAL THERAPY | Age: 68
Setting detail: THERAPIES SERIES
Discharge: HOME | End: 2023-08-01
Payer: COMMERCIAL

## 2023-08-01 PROCEDURE — 97033 APP MDLTY 1+IONTPHRSIS EA 15: CPT

## 2023-08-01 PROCEDURE — 97110 THERAPEUTIC EXERCISES: CPT

## 2023-08-01 PROCEDURE — 97530 THERAPEUTIC ACTIVITIES: CPT

## 2023-08-01 NOTE — PROGRESS NOTES
OCCUPATIONAL THERAPY DAILY NOTE    Date:  2023  Initial Evaluation Date: 23      Patient Name:  Radha Zamora    :  1955    Restrictions/Precautions:  HTN/DM; moderate fall risk  Diagnosis:  I63.9 (ICD-10-CM) - Acute cerebrovascular accident (CVA) ([address]); M65.319 (ICD-10-CM) - Trigger finger of thumb, unspecified laterality                                                 Date of Surgery/Injury:      Insurance/Certification information:  [facility] Medicaid - REF #[id number] 8 visits approved for CPT codes 51-16-34-25, [address] [place] [address], [id number], [address] [place] [address], [id number], [id number], [phone], [id number] until 10/01/2023  Plan of care signed (Y/N): Y  Visit# / total visits:      Referring Practitioner:  Jocelynn Noe MD  Specific Practitioner Orders: Evaluate and Treat     Assessment of current deficits   [x] Functional mobility             [x] ADLs           [x] Strength                  [x] Cognition   [x] Functional transfers           [x] IADLs          [x] Safety Awareness  [x] Endurance   [x] Fine Motor Coordination    [x] Balance      [x] Vision/perception    [x] Sensation     [] Gross Motor Coordination [x] ROM           [x] Pain                        [x] Edema          [] Scar Adhesion/Skin Integrity      OT PLAN OF CARE   OT POC based on physician orders, patient diagnosis and results of clinical assessment     Frequency/Duration: 2x/week for 16 visits                             Certification period From: 2023  To: 2023  Specific OT Treatment to include: CPT Codes: 35265, 75992,76434, 37072, 94950, 51375     [x] Instruction in HEP                   Modalities:  [x] Therapeutic Exercise                 [x] Ultrasound               [] Electrical Stimulation/Attended  [x] PROM/Stretching                    [x] Fluidotherapy          [x]  Paraffin                   [x] AAROM  [x] AROM                 [x] Iontophoresis:   [x] Tendon Glides                                               [] Neuromuscular

## 2023-08-07 ENCOUNTER — TELEPHONE (OUTPATIENT)
Dept: FAMILY MEDICINE CLINIC | Age: 68
End: 2023-08-07

## 2023-08-07 NOTE — TELEPHONE ENCOUNTER
Phoned pt because we received a message from lab stating they didn't receive his urine. Pt stated that someone told him to leave his urine in the bathroom. Pt also stated that he would be glad to give a urine during his next visit.

## 2023-08-08 ENCOUNTER — TELEPHONE (OUTPATIENT)
Dept: FAMILY MEDICINE CLINIC | Age: 68
End: 2023-08-08

## 2023-08-08 ENCOUNTER — HOSPITAL ENCOUNTER (OUTPATIENT)
Dept: OCCUPATIONAL THERAPY | Age: 68
Setting detail: THERAPIES SERIES
Discharge: HOME | End: 2023-08-08
Payer: COMMERCIAL

## 2023-08-08 DIAGNOSIS — I63.9 ACUTE CEREBROVASCULAR ACCIDENT (CVA) (HCC): Primary | ICD-10-CM

## 2023-08-08 DIAGNOSIS — M65.312 TRIGGER FINGER OF LEFT THUMB: ICD-10-CM

## 2023-08-08 DIAGNOSIS — E11.65 TYPE 2 DIABETES MELLITUS WITH HYPERGLYCEMIA, UNSPECIFIED WHETHER LONG TERM INSULIN USE (HCC): Primary | ICD-10-CM

## 2023-08-08 PROCEDURE — 97530 THERAPEUTIC ACTIVITIES: CPT

## 2023-08-08 PROCEDURE — 97033 APP MDLTY 1+IONTPHRSIS EA 15: CPT

## 2023-08-08 PROCEDURE — 97110 THERAPEUTIC EXERCISES: CPT

## 2023-08-08 NOTE — PROGRESS NOTES
OCCUPATIONAL THERAPY PROGRESS UPDATE/ POSSIBLE DISCHARGE SUMMARY    Date:  2023  Initial Evaluation Date: 23      Patient Name:  Rice Party    :  1955    Restrictions/Precautions:  HTN/DM; moderate fall risk  Diagnosis:  I63.9 (ICD-10-CM) - Acute cerebrovascular accident (CVA) ([address]); M65.319 (ICD-10-CM) - Trigger finger of thumb, unspecified laterality                                                 Date of Surgery/Injury:      Insurance/Certification information:  [facility] Medicaid - REF #[id number] 8 visits approved for CPT codes 51-16-34-25,  [facility], [id number], [address] [place] [address], [id number], [id number], [phone], [id number] until 10/01/2023  Plan of care signed (Y/N): Y  Visit# / total visits:      Referring Practitioner:  Samantha Villeda MD  Specific Practitioner Orders: Evaluate and Treat     Assessment of current deficits   [x] Functional mobility             [x] ADLs           [x] Strength                  [x] Cognition   [x] Functional transfers           [x] IADLs          [x] Safety Awareness  [x] Endurance   [x] Fine Motor Coordination    [x] Balance      [x] Vision/perception    [x] Sensation     [] Gross Motor Coordination [x] ROM           [x] Pain                        [x] Edema          [] Scar Adhesion/Skin Integrity      OT PLAN OF CARE   OT POC based on physician orders, patient diagnosis and results of clinical assessment     Frequency/Duration: 2x/week for 16 visits                             Certification period From: 2023  To: 2023  Specific OT Treatment to include: CPT Codes: 28505, 39124,74568, 24607, 54008, 50783     [x] Instruction in HEP                   Modalities:  [x] Therapeutic Exercise                 [x] Ultrasound               [] Electrical Stimulation/Attended  [x] PROM/Stretching                    [x] Fluidotherapy          [x]  Paraffin                   [x] AAROM  [x] AROM                 [x] Iontophoresis:   [x] Tendon Glides

## 2023-08-14 ENCOUNTER — NURSE ONLY (OUTPATIENT)
Dept: FAMILY MEDICINE CLINIC | Age: 68
End: 2023-08-14
Payer: COMMERCIAL

## 2023-08-14 DIAGNOSIS — E10.59 TYPE 1 DIABETES MELLITUS WITH OTHER CIRCULATORY COMPLICATION (HCC): Primary | ICD-10-CM

## 2023-08-14 LAB
ALBUMIN SERPL-MCNC: 4.2 G/DL (ref 3.5–5.2)
ALP BLD-CCNC: 134 U/L (ref 40–129)
ALT SERPL-CCNC: 14 U/L (ref 0–40)
ANION GAP SERPL CALCULATED.3IONS-SCNC: 12 MMOL/L (ref 7–16)
AST SERPL-CCNC: 14 U/L (ref 0–39)
BILIRUB SERPL-MCNC: 0.2 MG/DL (ref 0–1.2)
BUN BLDV-MCNC: 20 MG/DL (ref 6–23)
CALCIUM SERPL-MCNC: 9.8 MG/DL (ref 8.6–10.2)
CHLORIDE BLD-SCNC: 107 MMOL/L (ref 98–107)
CO2: 22 MMOL/L (ref 22–29)
CREAT SERPL-MCNC: 1.3 MG/DL (ref 0.7–1.2)
GFR SERPL CREATININE-BSD FRML MDRD: >60 ML/MIN/1.73M2
GLUCOSE BLD-MCNC: 92 MG/DL (ref 74–99)
MICROALBUMIN/CREAT 24H UR: <12 MG/L (ref 0–19)
POTASSIUM SERPL-SCNC: 4.3 MMOL/L (ref 3.5–5)
SODIUM BLD-SCNC: 141 MMOL/L (ref 132–146)
TOTAL PROTEIN: 8.1 G/DL (ref 6.4–8.3)

## 2023-08-14 PROCEDURE — 36415 COLL VENOUS BLD VENIPUNCTURE: CPT | Performed by: FAMILY MEDICINE

## 2023-08-14 RX ORDER — CILOSTAZOL 100 MG/1
TABLET ORAL
Qty: 60 TABLET | Refills: 5 | Status: SHIPPED | OUTPATIENT
Start: 2023-08-14

## 2023-08-14 NOTE — TELEPHONE ENCOUNTER
Last Appointment:  7/28/2023  Future Appointments  9/21/2023  1:30 PM    Magan Peterson MD        [facility]        [facility]  11/3/2023  9:00 AM    MD Shanthi Aldana [facility]

## 2023-08-17 ENCOUNTER — HOSPITAL ENCOUNTER (OUTPATIENT)
Dept: OCCUPATIONAL THERAPY | Age: 68
Setting detail: THERAPIES SERIES
End: 2023-08-17
Payer: COMMERCIAL

## 2023-09-12 RX ORDER — POTASSIUM CHLORIDE 1500 MG/1
TABLET, EXTENDED RELEASE ORAL
Qty: 60 TABLET | Refills: 0 | Status: SHIPPED | OUTPATIENT
Start: 2023-09-12

## 2023-09-12 NOTE — TELEPHONE ENCOUNTER
Last Appointment:  7/28/2023  Future Appointments   Date Time Provider [address]   9/21/2023  1:30 PM Julio Ferro MD [facility]   11/3/2023  9:00 AM MD Jaye Spann [facility]

## 2023-09-19 DIAGNOSIS — Z76.0 MEDICATION REFILL: ICD-10-CM

## 2023-09-19 DIAGNOSIS — I63.9 ACUTE CEREBROVASCULAR ACCIDENT (CVA) (HCC): ICD-10-CM

## 2023-09-21 ENCOUNTER — OFFICE VISIT (OUTPATIENT)
Dept: SURGERY | Age: 68
End: 2023-09-21
Payer: COMMERCIAL

## 2023-09-21 ENCOUNTER — PREP FOR PROCEDURE (OUTPATIENT)
Dept: SURGERY | Age: 68
End: 2023-09-21

## 2023-09-21 ENCOUNTER — TELEPHONE (OUTPATIENT)
Dept: SURGERY | Age: 68
End: 2023-09-21

## 2023-09-21 VITALS
DIASTOLIC BLOOD PRESSURE: 67 MMHG | RESPIRATION RATE: 16 BRPM | OXYGEN SATURATION: 98 % | SYSTOLIC BLOOD PRESSURE: 113 MMHG | BODY MASS INDEX: 27.3 KG/M2 | HEART RATE: 73 BPM | WEIGHT: 195 LBS | HEIGHT: 71 IN

## 2023-09-21 DIAGNOSIS — Z12.11 ENCOUNTER FOR SCREENING COLONOSCOPY: Primary | ICD-10-CM

## 2023-09-21 DIAGNOSIS — Z12.11 SCREEN FOR COLON CANCER: ICD-10-CM

## 2023-09-21 PROCEDURE — 99202 OFFICE O/P NEW SF 15 MIN: CPT | Performed by: SURGERY

## 2023-09-21 PROCEDURE — 99024 POSTOP FOLLOW-UP VISIT: CPT | Performed by: SURGERY

## 2023-09-21 RX ORDER — POLYETHYLENE GLYCOL 3350, SODIUM SULFATE ANHYDROUS, SODIUM BICARBONATE, SODIUM CHLORIDE, POTASSIUM CHLORIDE 236; 22.74; 6.74; 5.86; 2.97 G/4L; G/4L; G/4L; G/4L; G/4L
1 POWDER, FOR SOLUTION ORAL ONCE
Qty: 4000 ML | Refills: 0 | Status: SHIPPED | OUTPATIENT
Start: 2023-09-21 | End: 2023-09-21

## 2023-09-21 NOTE — PROGRESS NOTES
[facility]  HISTORY & PHYSICAL      CC:  Colorectal cancer screening         HPI:     Lieutenant Hammond is here as New patient(2023). The patient was sent here to discuss colorectal cancer screening. The patient denies any weight loss, rectal bleeding, abdominal pain, or change in bowel habits. There is no family history of IBD or colorectal cancer. The patient   had a screening colonoscopy >10 years ago and polyp was removed. Past Medical History:   Diagnosis Date    Cancer [facility]) PROSTATE    , patient reports bone mets    Erectile dysfunction     Hyperlipidemia     Hypertension     Osteoarthritis, hip/pelvic region and thigh 3/29/2013    Prostate cancer ([address]) 2013    PVD (peripheral vascular disease) with claudication ([address]) 3/30/2023    Ankle-brachial index of 0.79 on the right and 0.42 on the left    Type 1 diabetes mellitus with circulatory complication ([address])      Past Surgical History:   Procedure Laterality Date    BACK SURGERY      HIP SURGERY Right     LEG SURGERY      LUMBAR SPINE SURGERY  10/30/2017    PLIF L2 - S1   and laminectomy    PROSTATE BIOPSY       Social History     Socioeconomic History    Marital status:       Spouse name: Not on file    Number of children: Not on file    Years of education: Not on file    Highest education level: Not on file   Occupational History     Employer: NONE   Tobacco Use    Smoking status: Former     Packs/day: 0.25     Years: 10.00     Additional pack years: 0.00     Total pack years: 2.50     Types: Cigarettes     Quit date: 2017     Years since quittin.7    Smokeless tobacco: Never   Vaping Use    Vaping Use: Never used   Substance and Sexual Activity    Alcohol use: Yes     Comment: SOCIALLY    Drug use: No    Sexual activity: Not Currently     Partners: Female   Other Topics Concern    Not on file   Social History Narrative    Not on file     Social Determinants of Health     Financial Resource

## 2023-09-21 NOTE — TELEPHONE ENCOUNTER
Prior Authorization Form:      DEMOGRAPHICS:                     Patient Name:  Shagufta Mcginnis  Patient :  1955            Insurance:  Payor: Fina Falk / Plan: Verkb Magana / Product Type: *No Product type* /   Insurance ID Number:    Payer/Plan Subscr  Sex Relation Sub.  Ins. ID Effective Group Num   1Rivas Interiano 1955 Male Self [id number] [facility] [address]         DIAGNOSIS & PROCEDURE:                       Procedure/Operation: Colonoscopy           CPT Code: 01466    Diagnosis:  screening    ICD10 Code: z12.11    [facility]  [facility]    Surgeon:  Destiney Harper MD      [facility] INFORMATION:                          Date: 2023    Time: 1115              Anesthesia:  MAC/TIVA                                                       Status:  Outpatient          Electronically signed by Gabriela Silva MA on 2023 at 1:55 PM

## 2023-09-21 NOTE — TELEPHONE ENCOUNTER
Last Appointment:  7/28/2023  Future Appointments   Date Time Provider [address]   11/3/2023  9:00 AM MD Isaura Kohli [facility]

## 2023-09-21 NOTE — PATIENT INSTRUCTIONS
broth (bouillon or consomme)   Clear sodas   Plain gelatin   Honey   Ice pops without bits of fruit or fruit pulp   Tea or coffee without milk or cream    Any foods not on the above list should be avoided. Also, for certain tests, such as colon exams, your doctor may ask you to avoid liquids or gelatin with red coloring. A typical menu on the clear liquid diet may look like this:     Breakfast:  1 glass fruit juice  1 cup coffee or tea (without dairy products)  1 cup broth  1 bowl gelatin     Snack:  1 glass fruit juice  1 bowl gelatin     Lunch:  1 glass fruit juice  1 glass water  1 cup broth  1 bowl gelatin     Snack:  1 ice pop (without fruit pulp)  1 cup coffee or tea (without dairy products) or a soft drink     Dinner:  1 cup juice or water  1 cup broth  1 bowl gelatin  1 cup coffee or tea     Results  Although the clear liquid diet may not be very exciting, it does fulfill its purpose. It's designed to keep your stomach and intestines clear, limit strain to your digestive system, but keep your body hydrated as you prepare for or recover from a medical procedure. Risks  Because a clear liquid diet can't provide you with adequate calories and nutrients, it shouldn't be used for more than a few days. Only use the clear liquid diet as directed by your doctor. If your doctor prescribes a clear liquid diet before a medical test, be sure to follow the diet instructions exactly. If you don't follow the diet exactly, you risk an inaccurate test and may have to reschedule the procedure for another time. The importance of proper hydration  A colonoscopy prep causes the body to lose a significant amount of fluid and can result in sickness due to dehydration. It's important that you prepare your body by drinking extra clear liquids before the prep. Stay hydrated by drinking all required clear liquids during the prep.  Replenish your system by drinking clear liquids after returning home from your

## 2023-09-25 RX ORDER — ASPIRIN 81 MG/1
81 TABLET, CHEWABLE ORAL DAILY
Qty: 30 TABLET | Refills: 3 | Status: SHIPPED | OUTPATIENT
Start: 2023-09-25

## 2023-09-25 RX ORDER — LISINOPRIL 40 MG/1
40 TABLET ORAL EVERY MORNING
Qty: 30 TABLET | Refills: 3 | Status: SHIPPED | OUTPATIENT
Start: 2023-09-25

## 2023-10-10 ENCOUNTER — TELEPHONE (OUTPATIENT)
Dept: FAMILY MEDICINE CLINIC | Age: 68
End: 2023-10-10

## 2023-10-10 NOTE — TELEPHONE ENCOUNTER
----- Message from Ellen Manuel sent at 10/10/2023  9:58 AM EDT -----  Subject: Message to Provider    QUESTIONS  Information for Provider? Patient said he was on the phone with Paul   they said he need a prior Authorization for OCCUPATIONAL THERAPY to   cristi, patient would like a call back once sent.  ---------------------------------------------------------------------------  --------------  Jeison Recio [facility]  [phone]; OK to leave message on voicemail  ---------------------------------------------------------------------------  --------------  SCRIPT ANSWERS  Relationship to Patient?  Self

## 2023-10-18 ENCOUNTER — TELEPHONE (OUTPATIENT)
Dept: FAMILY MEDICINE CLINIC | Age: 68
End: 2023-10-18

## 2023-10-18 DIAGNOSIS — R29.898 WEAKNESS OF LEFT HAND: Primary | ICD-10-CM

## 2023-10-18 NOTE — TELEPHONE ENCOUNTER
The patient is requesting a new referral for occupational and physical therapy   To begin therapy again   Left side weakness  is reason for therapy   Please advise

## 2023-10-21 PROBLEM — Z12.11 SCREEN FOR COLON CANCER: Status: RESOLVED | Noted: 2023-09-21 | Resolved: 2023-10-21

## 2023-10-30 ENCOUNTER — HOSPITAL ENCOUNTER (OUTPATIENT)
Dept: OCCUPATIONAL THERAPY | Age: 68
Setting detail: THERAPIES SERIES
Discharge: HOME | End: 2023-10-30
Payer: COMMERCIAL

## 2023-10-30 PROCEDURE — 97165 OT EVAL LOW COMPLEX 30 MIN: CPT

## 2023-10-30 NOTE — PROGRESS NOTES
OCCUPATIONAL THERAPY INITIAL EVALUATION    [facility] TH[facility]  [address] [place] [address]  Dept: [phone]  HUJ: [address]  MAIN OT fax [phone]    Date:  10/30/2023  Initial Evaluation Date: 10/30/2023   Evaluating Therapist: Zulya Luevano OT    Patient Name:  Maura Courser    :  1955    Restrictions/Precautions:  Cancer/DM; moderate fall risk  Diagnosis:  R29.898 (ICD-10-CM) - Weakness of left hand        Date of Surgery/Injury: Ongoing since 6927    Insurance/Certification information:  [facility] Medicaid - auth required; 30 visits/lolis year; no copay/no deduct    CPT Codes Requested: [address], [id number], [address], [id number], [address], [address], [address] [facility], [address]    Plan of care signed (Y/N): N  Visit# / total visits:     Referring Practitioner:  Flora Wilson MD - NPI #[id number]  Specific Practitioner Orders: OT Evaluate and Treat    Assessment of current deficits   [] Functional mobility  [x] ADLs  [x] Strength   [] Cognition   [] Functional transfers   [x] IADLs  [] Safety Awareness  [x] Endurance   [x] Fine Motor Coordination  [] Balance  [] Vision/perception  [x] Sensation    [] Gross Motor Coordination [x] ROM  [x] Pain   [x] Edema    [] Scar Adhesion/Skin Integrity     OT PLAN OF CARE   OT POC based on physician orders, patient diagnosis and results of clinical assessment    Frequency/Duration 1-2x / week for 16 visits.    Certification period From: 10/30/2023  To:      Specific OT Treatment to include:   [x] Instruction in HEP                   Modalities:  [x] Therapeutic Exercise        [x] Ultrasound               [] Electrical Stimulation/Attended  [x] PROM/Stretching                    [x] Fluidotherapy          [x]  Paraffin                   [x] AAROM  [x] AROM                 [x] Iontophoresis:   [x] Tendon Glides                                               [] Neuromuscular Re-Ed            [x] ADL/IADL

## 2023-11-03 ENCOUNTER — OFFICE VISIT (OUTPATIENT)
Dept: FAMILY MEDICINE CLINIC | Age: 68
End: 2023-11-03
Payer: COMMERCIAL

## 2023-11-03 VITALS
SYSTOLIC BLOOD PRESSURE: 128 MMHG | WEIGHT: 200 LBS | HEART RATE: 70 BPM | BODY MASS INDEX: 28 KG/M2 | OXYGEN SATURATION: 95 % | HEIGHT: 71 IN | RESPIRATION RATE: 18 BRPM | TEMPERATURE: 97.3 F | DIASTOLIC BLOOD PRESSURE: 68 MMHG

## 2023-11-03 DIAGNOSIS — Z87.39 HISTORY OF GOUT: ICD-10-CM

## 2023-11-03 DIAGNOSIS — Z11.59 ENCOUNTER FOR HEPATITIS C SCREENING TEST FOR LOW RISK PATIENT: ICD-10-CM

## 2023-11-03 DIAGNOSIS — E11.65 TYPE 2 DIABETES MELLITUS WITH HYPERGLYCEMIA, UNSPECIFIED WHETHER LONG TERM INSULIN USE (HCC): Primary | ICD-10-CM

## 2023-11-03 LAB
ALBUMIN SERPL-MCNC: 4.3 G/DL (ref 3.5–5.2)
ALP BLD-CCNC: 121 U/L (ref 40–129)
ALT SERPL-CCNC: 13 U/L (ref 0–40)
ANION GAP SERPL CALCULATED.3IONS-SCNC: 13 MMOL/L (ref 7–16)
AST SERPL-CCNC: 14 U/L (ref 0–39)
BILIRUB SERPL-MCNC: <0.2 MG/DL (ref 0–1.2)
BUN BLDV-MCNC: 19 MG/DL (ref 6–23)
CALCIUM SERPL-MCNC: 9.6 MG/DL (ref 8.6–10.2)
CHLORIDE BLD-SCNC: 108 MMOL/L (ref 98–107)
CHOLESTEROL: 209 MG/DL
CO2: 21 MMOL/L (ref 22–29)
CREAT SERPL-MCNC: 1.3 MG/DL (ref 0.7–1.2)
GFR SERPL CREATININE-BSD FRML MDRD: >60 ML/MIN/1.73M2
GLUCOSE BLD-MCNC: 119 MG/DL (ref 74–99)
HBA1C MFR BLD: 6 %
HDLC SERPL-MCNC: 62 MG/DL
LDL CHOLESTEROL: 116 MG/DL
POTASSIUM SERPL-SCNC: 4.4 MMOL/L (ref 3.5–5)
SODIUM BLD-SCNC: 142 MMOL/L (ref 132–146)
TOTAL PROTEIN: 8 G/DL (ref 6.4–8.3)
TRIGL SERPL-MCNC: 155 MG/DL
VLDLC SERPL CALC-MCNC: 31 MG/DL

## 2023-11-03 PROCEDURE — 1124F ACP DISCUSS-NO DSCNMKR DOCD: CPT | Performed by: STUDENT IN AN ORGANIZED HEALTH CARE EDUCATION/TRAINING PROGRAM

## 2023-11-03 PROCEDURE — G8417 CALC BMI ABV UP PARAM F/U: HCPCS | Performed by: FAMILY MEDICINE

## 2023-11-03 PROCEDURE — 83036 HEMOGLOBIN GLYCOSYLATED A1C: CPT | Performed by: STUDENT IN AN ORGANIZED HEALTH CARE EDUCATION/TRAINING PROGRAM

## 2023-11-03 PROCEDURE — 3017F COLORECTAL CA SCREEN DOC REV: CPT | Performed by: FAMILY MEDICINE

## 2023-11-03 PROCEDURE — 3078F DIAST BP <80 MM HG: CPT | Performed by: STUDENT IN AN ORGANIZED HEALTH CARE EDUCATION/TRAINING PROGRAM

## 2023-11-03 PROCEDURE — 36415 COLL VENOUS BLD VENIPUNCTURE: CPT | Performed by: FAMILY MEDICINE

## 2023-11-03 PROCEDURE — 3044F HG A1C LEVEL LT 7.0%: CPT | Performed by: FAMILY MEDICINE

## 2023-11-03 PROCEDURE — G8484 FLU IMMUNIZE NO ADMIN: HCPCS | Performed by: FAMILY MEDICINE

## 2023-11-03 PROCEDURE — 1036F TOBACCO NON-USER: CPT | Performed by: FAMILY MEDICINE

## 2023-11-03 PROCEDURE — 3044F HG A1C LEVEL LT 7.0%: CPT | Performed by: STUDENT IN AN ORGANIZED HEALTH CARE EDUCATION/TRAINING PROGRAM

## 2023-11-03 PROCEDURE — G8427 DOCREV CUR MEDS BY ELIG CLIN: HCPCS | Performed by: FAMILY MEDICINE

## 2023-11-03 PROCEDURE — 3074F SYST BP LT 130 MM HG: CPT | Performed by: STUDENT IN AN ORGANIZED HEALTH CARE EDUCATION/TRAINING PROGRAM

## 2023-11-03 PROCEDURE — 2022F DILAT RTA XM EVC RTNOPTHY: CPT | Performed by: FAMILY MEDICINE

## 2023-11-03 PROCEDURE — 99213 OFFICE O/P EST LOW 20 MIN: CPT | Performed by: STUDENT IN AN ORGANIZED HEALTH CARE EDUCATION/TRAINING PROGRAM

## 2023-11-03 RX ORDER — POTASSIUM CHLORIDE 1500 MG/1
20 TABLET, EXTENDED RELEASE ORAL DAILY
Qty: 60 TABLET | Refills: 0 | Status: CANCELLED | OUTPATIENT
Start: 2023-11-03

## 2023-11-03 RX ORDER — ALLOPURINOL 100 MG/1
100 TABLET ORAL DAILY
Qty: 90 TABLET | Refills: 0 | Status: SHIPPED | OUTPATIENT
Start: 2023-11-03

## 2023-11-03 RX ORDER — ATORVASTATIN CALCIUM 80 MG/1
80 TABLET, FILM COATED ORAL NIGHTLY
Qty: 30 TABLET | Refills: 3 | Status: SHIPPED | OUTPATIENT
Start: 2023-11-03

## 2023-11-03 RX ORDER — POLYETHYLENE GLYCOL-3350 AND ELECTROLYTES 236; 6.74; 5.86; 2.97; 22.74 G/274.31G; G/274.31G; G/274.31G; G/274.31G; G/274.31G
POWDER, FOR SOLUTION ORAL
COMMUNITY
Start: 2023-09-21

## 2023-11-03 ASSESSMENT — ENCOUNTER SYMPTOMS
COUGH: 0
DIARRHEA: 0
NAUSEA: 0
ABDOMINAL PAIN: 0
CONSTIPATION: 0
VOMITING: 0
SHORTNESS OF BREATH: 0

## 2023-11-06 LAB — HEPATITIS C ANTIBODY: NONREACTIVE

## 2023-11-09 ENCOUNTER — HOSPITAL ENCOUNTER (OUTPATIENT)
Dept: OCCUPATIONAL THERAPY | Age: 68
Setting detail: THERAPIES SERIES
Discharge: HOME | End: 2023-11-09

## 2023-11-09 NOTE — PROGRESS NOTES
[address] [place], [place] [address]  [facility]  [address] [place] [address]  Dept: [phone]  Loc: [phone]   [facility] MAIN OT fax [phone]    Cancellation/No Show Note      Date:  2023    Patient Name:  Nathaniel Brown     :  1955         PT ID: [id number]    Total missed visits including today: 1       Total number of no shows: 0    For today's appointment patient:   [x]  Cancelled   []  Rescheduled appointment   []  No-show     Reason given by patient:   []  Patient ill   []  Conflicting appointment   []  No transportation   []  Conflict with work   []  No reason given   []  Other:    Comments:                    Comments:  Provider cancelled today's appointment due to no insurance authorization yet received for treatment. Pt plans to attend next scheduled appointment.     Electronically signed by:  Karel Rizo OT, R/L #[id number], TAMARA

## 2023-11-13 NOTE — TELEPHONE ENCOUNTER
Last Appointment:  11/3/2023  Future Appointments  11/15/2023 9:00 AM    MALCOM Macedo  11/16/2023 9:00 AM    MALCOM Macedo  11/29/2023 9:00 AM    MALCOM Macedo  12/1/2023  9:20 AM    MD Nayla Rojas [facility]

## 2023-11-14 RX ORDER — POTASSIUM CHLORIDE 1500 MG/1
TABLET, EXTENDED RELEASE ORAL
Qty: 60 TABLET | Refills: 0 | OUTPATIENT
Start: 2023-11-14

## 2023-11-15 ENCOUNTER — HOSPITAL ENCOUNTER (OUTPATIENT)
Dept: OCCUPATIONAL THERAPY | Age: 68
Setting detail: THERAPIES SERIES
Discharge: HOME | End: 2023-11-15
Payer: COMMERCIAL

## 2023-11-15 PROCEDURE — 97110 THERAPEUTIC EXERCISES: CPT

## 2023-11-15 PROCEDURE — 97018 PARAFFIN BATH THERAPY: CPT

## 2023-11-15 PROCEDURE — 97530 THERAPEUTIC ACTIVITIES: CPT

## 2023-11-15 PROCEDURE — 97140 MANUAL THERAPY 1/> REGIONS: CPT

## 2023-11-15 NOTE — PROGRESS NOTES
[] Neuromuscular Re-Ed            [x] ADL/IADL re-training    [x] Therapeutic Activity                  [x] Pain Management with/without modalities PRN                 [x] Manual Therapy                      [x] Splinting                                   [] Scar Management                   []Joint Protection/Training  []Ergonomics                             [x] Joint Mobilization                      [] Adaptive Equipment Assessment/Training                             [x] Manual Edema Mobilization   [x] Myofascial Release                 [] Energy Conservation/Work Simplification  [x] GM/FM Coordination                [x] Safety retraining/education per  individual diagnosis/goals  [x] Desensitization/Sensory Reeducation        Patient Specific Goal: To regain flexibility and strength of L hand for IADL tasks                             GOALS (Long term same as Short term):  1) Patient will demonstrate good understanding of home program (exercises/activities/diagnosis/prognosis/goals/splinting) with good accuracy. 2) Patient will demonstrate increased active/passive range of motion of their L thumb by at least 5* for ADL/IADL completion. 3) Patient will demonstrate increased /pinch strength of at least 5-10 / 2-5 pinch pounds of their left hand for improved ability to lift walker and grocery bags with left hand. 4) Increase in fine motor function as evidenced by decreased time to complete 9-hole peg test and/or MRMT test by at least 2-10 seconds with L hand in order to complete improved manipulation of objects for grooming. 5) Patient to report compliance with at least one technique for sensory reeducation to dorsal and riggs aspects of left thumb to reduce interference of decreased sensation on functional activities.   6) Patient will be knowledgeable of edema control techniques as evident with decreases by at least 1.0 cm and/or report knowledge of

## 2023-11-16 ENCOUNTER — HOSPITAL ENCOUNTER (OUTPATIENT)
Dept: OCCUPATIONAL THERAPY | Age: 68
Setting detail: THERAPIES SERIES
Discharge: HOME | End: 2023-11-16
Payer: COMMERCIAL

## 2023-11-16 PROCEDURE — 97035 APP MDLTY 1+ULTRASOUND EA 15: CPT

## 2023-11-16 PROCEDURE — 97110 THERAPEUTIC EXERCISES: CPT

## 2023-11-16 PROCEDURE — 97140 MANUAL THERAPY 1/> REGIONS: CPT

## 2023-11-16 PROCEDURE — 97530 THERAPEUTIC ACTIVITIES: CPT

## 2023-11-16 NOTE — PROGRESS NOTES
OCCUPATIONAL THERAPY DAILY NOTE  Y [facility]  [address] [place] [address]  Dept: [phone]  Loc: [address]  MAIN OT fax [phone]      Date:  2023  Initial Evaluation Date: 10/30/2023   Evaluating Therapist: Josefa Moser OT    Patient Name:  Angelo Hernandez    :  1955    Restrictions/Precautions:  Cancer/DM; moderate fall risk  Diagnosis:  R29.898 (ICD-10-CM) - Weakness of left hand                                                              Date of Surgery/Injury: Ongoing since 7570     Insurance/Certification information:  [facility] Medicaid - REF#[id number] for 15 occupational therapy visits approved until 2024     Plan of care signed (Y/N): N  Visit# / total visits: 3/ 16     Referring Practitioner:  Paolo Gruber MD - NPI #[id number]  Specific Practitioner Orders: OT Evaluate and Treat     Assessment of current deficits   [] Functional mobility             [x] ADLs           [x] Strength                  [] Cognition   [] Functional transfers           [x] IADLs          [] Safety Awareness  [x] Endurance   [x] Fine Motor Coordination    [] Balance      [] Vision/perception    [x] Sensation     [] Gross Motor Coordination [x] ROM           [x] Pain                        [x] Edema          [] Scar Adhesion/Skin Integrity      OT PLAN OF CARE   OT POC based on physician orders, patient diagnosis and results of clinical assessment     Frequency/Duration 1-2x / week for 16 visits.    Certification period From: 10/30/2023  To:       Specific OT Treatment to include:   [x] Instruction in HEP                   Modalities:  [x] Therapeutic Exercise                 [x] Ultrasound               [] Electrical Stimulation/Attended  [x] PROM/Stretching                    [x] Fluidotherapy          [x]  Paraffin                   [x] AAROM  [x] AROM                 [x] Iontophoresis:   [x] Tendon Glides

## 2023-11-17 ENCOUNTER — TELEPHONE (OUTPATIENT)
Dept: SURGERY | Age: 68
End: 2023-11-17

## 2023-11-17 NOTE — TELEPHONE ENCOUNTER
Does patient need to hole Petal prior to procedure on 12/11/2023? Forwarding to Dr. Ulisses Ahumada for further advisement.   Electronically signed by Nico Patel MA on 11/17/23 at 1:14 PM EST

## 2023-11-17 NOTE — TELEPHONE ENCOUNTER
Per Dr. Yomaira Beckford \" He can stay on Pletal\". MA informed PAT.   Electronically signed by Magdalena Fairbanks MA on 11/17/23 at 2:10 PM EST

## 2023-11-29 ENCOUNTER — HOSPITAL ENCOUNTER (OUTPATIENT)
Dept: OCCUPATIONAL THERAPY | Age: 68
Setting detail: THERAPIES SERIES
Discharge: HOME | End: 2023-11-29
Payer: COMMERCIAL

## 2023-11-29 PROCEDURE — 97530 THERAPEUTIC ACTIVITIES: CPT

## 2023-11-29 PROCEDURE — 97110 THERAPEUTIC EXERCISES: CPT

## 2023-11-29 PROCEDURE — 97140 MANUAL THERAPY 1/> REGIONS: CPT

## 2023-11-29 NOTE — PROGRESS NOTES
OCCUPATIONAL THERAPY DAILY NOTE  Y [facility] TH[facility]  [address] [place] [address]  Dept: [phone]  Loc: [address] MAIN OT fax [phone]      Date:  2023  Initial Evaluation Date: 10/30/2023   Evaluating Therapist: Geoff Gomez OT    Patient Name:  Nereida No    :  1955    Restrictions/Precautions:  Cancer/DM; moderate fall risk  Diagnosis:  R29.898 (ICD-10-CM) - Weakness of left hand                                                              Date of Surgery/Injury: Ongoing since 1605     Insurance/Certification information:  [facility] Medicaid - REF#[id number] for 15 occupational therapy visits approved until 2024     Plan of care signed (Y/N): N  Visit# / total visits:      Referring Practitioner:  Sherif Iqbal MD - NPI #[id number]  Specific Practitioner Orders: OT Evaluate and Treat     Assessment of current deficits   [] Functional mobility             [x] ADLs           [x] Strength                  [] Cognition   [] Functional transfers           [x] IADLs          [] Safety Awareness  [x] Endurance   [x] Fine Motor Coordination    [] Balance      [] Vision/perception    [x] Sensation     [] Gross Motor Coordination [x] ROM           [x] Pain                        [x] Edema          [] Scar Adhesion/Skin Integrity      OT PLAN OF CARE   OT POC based on physician orders, patient diagnosis and results of clinical assessment     Frequency/Duration 1-2x / week for 16 visits.    Certification period From: 10/30/2023  To:       Specific OT Treatment to include:   [x] Instruction in HEP                   Modalities:  [x] Therapeutic Exercise                 [x] Ultrasound               [] Electrical Stimulation/Attended  [x] PROM/Stretching                    [x] Fluidotherapy          [x]  Paraffin                   [x] AAROM  [x] AROM                 [x] Iontophoresis:   [x] Tendon Glides

## 2023-12-01 ENCOUNTER — OFFICE VISIT (OUTPATIENT)
Dept: FAMILY MEDICINE CLINIC | Age: 68
End: 2023-12-01
Payer: COMMERCIAL

## 2023-12-01 VITALS
HEART RATE: 83 BPM | DIASTOLIC BLOOD PRESSURE: 59 MMHG | TEMPERATURE: 97.2 F | SYSTOLIC BLOOD PRESSURE: 108 MMHG | BODY MASS INDEX: 28.17 KG/M2 | OXYGEN SATURATION: 93 % | WEIGHT: 202 LBS

## 2023-12-01 DIAGNOSIS — Z76.0 MEDICATION REFILL: ICD-10-CM

## 2023-12-01 DIAGNOSIS — E11.65 TYPE 2 DIABETES MELLITUS WITH HYPERGLYCEMIA, UNSPECIFIED WHETHER LONG TERM INSULIN USE (HCC): ICD-10-CM

## 2023-12-01 PROCEDURE — 99214 OFFICE O/P EST MOD 30 MIN: CPT | Performed by: STUDENT IN AN ORGANIZED HEALTH CARE EDUCATION/TRAINING PROGRAM

## 2023-12-01 PROCEDURE — 1036F TOBACCO NON-USER: CPT | Performed by: STUDENT IN AN ORGANIZED HEALTH CARE EDUCATION/TRAINING PROGRAM

## 2023-12-01 PROCEDURE — 3017F COLORECTAL CA SCREEN DOC REV: CPT | Performed by: STUDENT IN AN ORGANIZED HEALTH CARE EDUCATION/TRAINING PROGRAM

## 2023-12-01 PROCEDURE — G8428 CUR MEDS NOT DOCUMENT: HCPCS | Performed by: STUDENT IN AN ORGANIZED HEALTH CARE EDUCATION/TRAINING PROGRAM

## 2023-12-01 PROCEDURE — 3074F SYST BP LT 130 MM HG: CPT | Performed by: STUDENT IN AN ORGANIZED HEALTH CARE EDUCATION/TRAINING PROGRAM

## 2023-12-01 PROCEDURE — 2022F DILAT RTA XM EVC RTNOPTHY: CPT | Performed by: STUDENT IN AN ORGANIZED HEALTH CARE EDUCATION/TRAINING PROGRAM

## 2023-12-01 PROCEDURE — G8484 FLU IMMUNIZE NO ADMIN: HCPCS | Performed by: STUDENT IN AN ORGANIZED HEALTH CARE EDUCATION/TRAINING PROGRAM

## 2023-12-01 PROCEDURE — G8417 CALC BMI ABV UP PARAM F/U: HCPCS | Performed by: STUDENT IN AN ORGANIZED HEALTH CARE EDUCATION/TRAINING PROGRAM

## 2023-12-01 PROCEDURE — 3044F HG A1C LEVEL LT 7.0%: CPT | Performed by: STUDENT IN AN ORGANIZED HEALTH CARE EDUCATION/TRAINING PROGRAM

## 2023-12-01 PROCEDURE — 3078F DIAST BP <80 MM HG: CPT | Performed by: STUDENT IN AN ORGANIZED HEALTH CARE EDUCATION/TRAINING PROGRAM

## 2023-12-01 PROCEDURE — 1124F ACP DISCUSS-NO DSCNMKR DOCD: CPT | Performed by: STUDENT IN AN ORGANIZED HEALTH CARE EDUCATION/TRAINING PROGRAM

## 2023-12-01 RX ORDER — INSULIN GLARGINE 100 [IU]/ML
30 INJECTION, SOLUTION SUBCUTANEOUS NIGHTLY
Qty: 5 ADJUSTABLE DOSE PRE-FILLED PEN SYRINGE | Refills: 5 | Status: SHIPPED | OUTPATIENT
Start: 2023-12-01

## 2023-12-01 RX ORDER — METFORMIN HYDROCHLORIDE 500 MG/1
1000 TABLET, EXTENDED RELEASE ORAL
Qty: 60 TABLET | Refills: 11
Start: 2023-12-01

## 2023-12-01 RX ORDER — LEUPROLIDE ACETATE 22.5 MG
KIT INTRAMUSCULAR
COMMUNITY
Start: 2023-11-16

## 2023-12-01 ASSESSMENT — ENCOUNTER SYMPTOMS
SHORTNESS OF BREATH: 0
COUGH: 0
ABDOMINAL PAIN: 0
VOMITING: 0
CONSTIPATION: 0
DIARRHEA: 0
NAUSEA: 0

## 2023-12-01 NOTE — PROGRESS NOTES
[address]  FAMILY MEDICINE RESIDENCY PROGRAM  DATE OF VISIT : 2023    Patient : Kayy Beatty   Age : 76 y.o.    : 1955   MRN : [id number]   ______________________________________________________________________    Chief Complaint :   Chief Complaint   Patient presents with    Diabetes     Pt here to f/u on his diabetes. HPI : Kayy Beatty is 76 y.o. male who presented to the clinic today for diabetes and HTN follow up. DM- A1C today is 6.0, down from 6.3. On lantus 35 u daily, metformin 500 BID, jardiance 25 daily. Was instructed to decrease lantus and increase metformin during last visit but he has not. Has not  been checking his sugars. Denies diabetic symptoms. Some burning sensation in L foot. Has not seen dentist due to not having any teeth and does not wear dentures. Cr 1.3    HTN- low in office today, on amlodipine 10 mg daily, HCTZ 12.5 MG daily, lisinopril 40 mg daily. Dinking pint of wine once a day/ per month. H/o binge drinking beer in the past, but has quit drinking that       Patient's medications, allergies, past medical, surgical, social and family histories were reviewed and updated as appropriate. Past Medical History :  Past Medical History:   Diagnosis Date    Cancer ([address]) PROSTATE    , patient reports bone mets    Erectile dysfunction     Hyperlipidemia     Hypertension     Osteoarthritis, hip/pelvic region and thigh 3/29/2013    Prostate cancer ([address]) 2013    PVD (peripheral vascular disease) with claudication ([address]) 3/30/2023    Ankle-brachial index of 0.79 on the right and 0.42 on the left    Type 1 diabetes mellitus with circulatory complication ([address])      Past Surgical History:   Procedure Laterality Date    BACK SURGERY      HIP SURGERY Right     LEG SURGERY      LUMBAR SPINE SURGERY  10/30/2017    PLIF L2 - S1   and laminectomy    PROSTATE BIOPSY         Allergies :    Allergies   Allergen Reactions    Ibuprofen

## 2023-12-06 ENCOUNTER — TELEPHONE (OUTPATIENT)
Dept: FAMILY MEDICINE CLINIC | Age: 68
End: 2023-12-06

## 2023-12-06 ENCOUNTER — HOSPITAL ENCOUNTER (EMERGENCY)
Age: 68
Discharge: LEFT BEFORE DISCHARGE | End: 2023-12-06
Attending: EMERGENCY MEDICINE
Payer: COMMERCIAL

## 2023-12-06 ENCOUNTER — APPOINTMENT (OUTPATIENT)
Dept: GENERAL RADIOLOGY | Age: 68
End: 2023-12-06
Payer: COMMERCIAL

## 2023-12-06 ENCOUNTER — APPOINTMENT (OUTPATIENT)
Dept: CT IMAGING | Age: 68
End: 2023-12-06
Payer: COMMERCIAL

## 2023-12-06 ENCOUNTER — TELEPHONE (OUTPATIENT)
Dept: SURGERY | Age: 68
End: 2023-12-06

## 2023-12-06 VITALS
HEIGHT: 71 IN | HEART RATE: 63 BPM | SYSTOLIC BLOOD PRESSURE: 159 MMHG | BODY MASS INDEX: 28.28 KG/M2 | RESPIRATION RATE: 12 BRPM | DIASTOLIC BLOOD PRESSURE: 88 MMHG | OXYGEN SATURATION: 99 % | WEIGHT: 202 LBS | TEMPERATURE: 98.3 F

## 2023-12-06 DIAGNOSIS — R07.9 CHEST PAIN, UNSPECIFIED TYPE: Primary | ICD-10-CM

## 2023-12-06 LAB
ALBUMIN SERPL-MCNC: 3.8 G/DL (ref 3.5–5.2)
ALP SERPL-CCNC: 124 U/L (ref 40–129)
ALT SERPL-CCNC: 12 U/L (ref 0–40)
ANION GAP SERPL CALCULATED.3IONS-SCNC: 12 MMOL/L (ref 7–16)
AST SERPL-CCNC: 17 U/L (ref 0–39)
BASOPHILS # BLD: 0.03 K/UL (ref 0–0.2)
BASOPHILS NFR BLD: 0 % (ref 0–2)
BILIRUB SERPL-MCNC: 0.2 MG/DL (ref 0–1.2)
BUN SERPL-MCNC: 24 MG/DL (ref 6–23)
CALCIUM SERPL-MCNC: 9.4 MG/DL (ref 8.6–10.2)
CHLORIDE SERPL-SCNC: 106 MMOL/L (ref 98–107)
CO2 SERPL-SCNC: 20 MMOL/L (ref 22–29)
CREAT SERPL-MCNC: 1.5 MG/DL (ref 0.7–1.2)
D DIMER: 949 NG/ML DDU (ref 0–232)
EKG ATRIAL RATE: 70 BPM
EKG P AXIS: 7 DEGREES
EKG P-R INTERVAL: 130 MS
EKG Q-T INTERVAL: 412 MS
EKG QRS DURATION: 94 MS
EKG QTC CALCULATION (BAZETT): 444 MS
EKG R AXIS: 9 DEGREES
EKG T AXIS: 55 DEGREES
EKG VENTRICULAR RATE: 70 BPM
EOSINOPHIL # BLD: 0.25 K/UL (ref 0.05–0.5)
EOSINOPHILS RELATIVE PERCENT: 4 % (ref 0–6)
ERYTHROCYTE [DISTWIDTH] IN BLOOD BY AUTOMATED COUNT: 14.6 % (ref 11.5–15)
GFR SERPL CREATININE-BSD FRML MDRD: 51 ML/MIN/1.73M2
GLUCOSE SERPL-MCNC: 106 MG/DL (ref 74–99)
HCT VFR BLD AUTO: 39.7 % (ref 37–54)
HGB BLD-MCNC: 12.7 G/DL (ref 12.5–16.5)
IMM GRANULOCYTES # BLD AUTO: <0.03 K/UL (ref 0–0.58)
IMM GRANULOCYTES NFR BLD: 0 % (ref 0–5)
INFLUENZA A BY PCR: NOT DETECTED
INFLUENZA B BY PCR: NOT DETECTED
LYMPHOCYTES NFR BLD: 1.14 K/UL (ref 1.5–4)
LYMPHOCYTES RELATIVE PERCENT: 16 % (ref 20–42)
MCH RBC QN AUTO: 28.4 PG (ref 26–35)
MCHC RBC AUTO-ENTMCNC: 32 G/DL (ref 32–34.5)
MCV RBC AUTO: 88.8 FL (ref 80–99.9)
MONOCYTES NFR BLD: 0.53 K/UL (ref 0.1–0.95)
MONOCYTES NFR BLD: 7 % (ref 2–12)
NEUTROPHILS NFR BLD: 73 % (ref 43–80)
NEUTS SEG NFR BLD: 5.25 K/UL (ref 1.8–7.3)
PLATELET # BLD AUTO: 325 K/UL (ref 130–450)
PMV BLD AUTO: 10.2 FL (ref 7–12)
POTASSIUM SERPL-SCNC: 4.1 MMOL/L (ref 3.5–5)
PROT SERPL-MCNC: 7.6 G/DL (ref 6.4–8.3)
RBC # BLD AUTO: 4.47 M/UL (ref 3.8–5.8)
SARS-COV-2 RDRP RESP QL NAA+PROBE: NOT DETECTED
SODIUM SERPL-SCNC: 138 MMOL/L (ref 132–146)
SPECIMEN DESCRIPTION: NORMAL
TROPONIN I SERPL HS-MCNC: 7 NG/L (ref 0–11)
TROPONIN I SERPL HS-MCNC: 7 NG/L (ref 0–11)
WBC OTHER # BLD: 7.2 K/UL (ref 4.5–11.5)

## 2023-12-06 PROCEDURE — 93010 ELECTROCARDIOGRAM REPORT: CPT | Performed by: INTERNAL MEDICINE

## 2023-12-06 PROCEDURE — 85379 FIBRIN DEGRADATION QUANT: CPT

## 2023-12-06 PROCEDURE — 74177 CT ABD & PELVIS W/CONTRAST: CPT

## 2023-12-06 PROCEDURE — 93005 ELECTROCARDIOGRAM TRACING: CPT

## 2023-12-06 PROCEDURE — 99285 EMERGENCY DEPT VISIT HI MDM: CPT

## 2023-12-06 PROCEDURE — 87502 INFLUENZA DNA AMP PROBE: CPT

## 2023-12-06 PROCEDURE — 80053 COMPREHEN METABOLIC PANEL: CPT

## 2023-12-06 PROCEDURE — 71045 X-RAY EXAM CHEST 1 VIEW: CPT

## 2023-12-06 PROCEDURE — 6360000004 HC RX CONTRAST MEDICATION: Performed by: RADIOLOGY

## 2023-12-06 PROCEDURE — 84484 ASSAY OF TROPONIN QUANT: CPT

## 2023-12-06 PROCEDURE — 85025 COMPLETE CBC W/AUTO DIFF WBC: CPT

## 2023-12-06 PROCEDURE — 6370000000 HC RX 637 (ALT 250 FOR IP)

## 2023-12-06 PROCEDURE — 71275 CT ANGIOGRAPHY CHEST: CPT

## 2023-12-06 PROCEDURE — 87635 SARS-COV-2 COVID-19 AMP PRB: CPT

## 2023-12-06 RX ORDER — ASPIRIN 81 MG/1
324 TABLET, CHEWABLE ORAL ONCE
Status: COMPLETED | OUTPATIENT
Start: 2023-12-06 | End: 2023-12-06

## 2023-12-06 RX ORDER — ASPIRIN 81 MG/1
TABLET, CHEWABLE ORAL
Status: COMPLETED
Start: 2023-12-06 | End: 2023-12-06

## 2023-12-06 RX ADMIN — IOPAMIDOL 75 ML: 755 INJECTION, SOLUTION INTRAVENOUS at 11:24

## 2023-12-06 RX ADMIN — ASPIRIN 324 MG: 81 TABLET, CHEWABLE ORAL at 09:35

## 2023-12-06 RX ADMIN — ASPIRIN 81 MG CHEWABLE TABLET 324 MG: 81 TABLET CHEWABLE at 09:35

## 2023-12-06 ASSESSMENT — PAIN DESCRIPTION - DESCRIPTORS: DESCRIPTORS: PRESSURE;STABBING

## 2023-12-06 ASSESSMENT — PAIN DESCRIPTION - LOCATION: LOCATION: CHEST;BACK

## 2023-12-06 ASSESSMENT — PAIN DESCRIPTION - ORIENTATION: ORIENTATION: MID

## 2023-12-06 ASSESSMENT — PAIN - FUNCTIONAL ASSESSMENT: PAIN_FUNCTIONAL_ASSESSMENT: 0-10

## 2023-12-06 ASSESSMENT — PAIN SCALES - GENERAL: PAINLEVEL_OUTOF10: 8

## 2023-12-06 NOTE — TELEPHONE ENCOUNTER
ECC called in this morning on the Nurse4 Triage line with a red flag, stating that patient started taking his Metformin 2 tabs BID and is having severe chest pain. I advised PT he needs to go to the ER. Patient stated he would need an ambulance and asked me to call one for him. Confirmed patients address and called him an ambulance. Ambulance is on the way.

## 2023-12-06 NOTE — ED NOTES
Spoke with Jesse Friday in the lab, she reports the repeat trop is currently running, they have been having analyzer issues.      Chandu James, MYRIAM  12/06/23 4762

## 2023-12-06 NOTE — DISCHARGE INSTRUCTIONS
Please follow-up with your primary care physician as well as your heart doctor to follow-up on your symptoms and to discuss her recent emergency room visit.   Reasons to return would be worsening of your symptoms, severe chest pain, severe shortness of breath, severe nausea vomiting, or uncontrolled fevers

## 2023-12-06 NOTE — TELEPHONE ENCOUNTER
----- Message from Lucia Smith sent at 12/6/2023  8:10 AM EST -----  Subject: Message to Provider    QUESTIONS  Information for Provider? Pt called in to state that he has a Biopsy on   12/11/2023 but will be canceling due to [facility] telling him he had   to have someone with him and also stay with him 24hrs after. Pt does not   have anyone to go with him and be with him after. Please contact pt to   discuss. Thank you.  ---------------------------------------------------------------------------  --------------  John Flores INFO  [phone]; OK to leave message on voicemail  ---------------------------------------------------------------------------  --------------  SCRIPT ANSWERS  Relationship to Patient?  Self

## 2023-12-06 NOTE — TELEPHONE ENCOUNTER
MA received call from pt stating he would like to cx colonoscopy with Dr. Jose Packer 12/11/23. Pt states that he is unable to find someone to take him home from procedure, as well as sit with him for 24 hours after procedure. MA called surgery scheduling and spoke with Delma to cx pt procedure. Routing to [employer] for informational purposes.     Electronically signed by Kin Lal MA on 12/6/2023 at 9:02 AM

## 2023-12-07 ENCOUNTER — TELEPHONE (OUTPATIENT)
Dept: FAMILY MEDICINE CLINIC | Age: 68
End: 2023-12-07

## 2023-12-07 NOTE — TELEPHONE ENCOUNTER
Patient called in triage line complaining of havng chest pain after starting his Metformin 2 tabs BID. Advised patient he needs to go to the ER with those symptoms. Patient requested I call him an ambulance and so I did. Patient was concerned on how he was going to get home. I then advised patient that he would be provided a ride home from the E.R, not knowing we no longer did 'Provide a ride. \" Patient was concerned about a ride but then found  ride home. Patient made it home safely.

## 2023-12-07 NOTE — TELEPHONE ENCOUNTER
----- Message from Eleazar Flowers sent at 12/6/2023  2:40 PM EST -----  Subject: Message to Provider    QUESTIONS  Information for Provider? [phone] from [facility] ER would   like Diann Bai to call him regarding pt Thank you  ---------------------------------------------------------------------------  --------------  [address]  [phone]; OK to leave message on voicemail  ---------------------------------------------------------------------------  --------------  SCRIPT ANSWERS  Relationship to Patient? Covered Entity  Covered Entity Type? Hospital?  Representative Name?  Kishor Bruner

## 2023-12-13 ENCOUNTER — HOSPITAL ENCOUNTER (OUTPATIENT)
Dept: OCCUPATIONAL THERAPY | Age: 68
Setting detail: THERAPIES SERIES
Discharge: HOME | End: 2023-12-13
Payer: COMMERCIAL

## 2023-12-13 PROCEDURE — 97018 PARAFFIN BATH THERAPY: CPT

## 2023-12-13 PROCEDURE — 97140 MANUAL THERAPY 1/> REGIONS: CPT

## 2023-12-13 PROCEDURE — 97110 THERAPEUTIC EXERCISES: CPT

## 2023-12-13 NOTE — PROGRESS NOTES
OCCUPATIONAL THERAPY DAILY NOTE  [facility] THER[facility]  [address] [place] [address]  Dept: [phone]  Loc: [address]  MAIN OT fax [phone]      Date:  2023  Initial Evaluation Date: 10/30/2023   Evaluating Therapist: Caden Renteria OT    Patient Name:  Radha Hernandez    :  1955    Restrictions/Precautions:  Cancer/DM; moderate fall risk  Diagnosis:  R29.898 (ICD-10-CM) - Weakness of left hand                                                              Date of Surgery/Injury: Ongoing since 2483     Insurance/Certification information:  [facility] Medicaid - REF#[id number] for 15 occupational therapy visits approved until 2024     Plan of care signed (Y/N): N  Visit# / total visits:      Referring Practitioner:  Salma Marquez MD - NPI #[id number]  Specific Practitioner Orders: OT Evaluate and Treat     Assessment of current deficits   [] Functional mobility             [x] ADLs           [x] Strength                  [] Cognition   [] Functional transfers           [x] IADLs          [] Safety Awareness  [x] Endurance   [x] Fine Motor Coordination    [] Balance      [] Vision/perception    [x] Sensation     [] Gross Motor Coordination [x] ROM           [x] Pain                        [x] Edema          [] Scar Adhesion/Skin Integrity      OT PLAN OF CARE   OT POC based on physician orders, patient diagnosis and results of clinical assessment     Frequency/Duration 1-2x / week for 16 visits.    Certification period From: 10/30/2023  To:       Specific OT Treatment to include:   [x] Instruction in HEP                   Modalities:  [x] Therapeutic Exercise                 [x] Ultrasound               [] Electrical Stimulation/Attended  [x] PROM/Stretching                    [x] Fluidotherapy          [x]  Paraffin                   [x] AAROM  [x] AROM                 [x] Iontophoresis:   [x] Tendon Glides

## 2023-12-15 RX ORDER — POTASSIUM CHLORIDE 1500 MG/1
TABLET, EXTENDED RELEASE ORAL
Qty: 60 TABLET | Refills: 0 | OUTPATIENT
Start: 2023-12-15

## 2023-12-28 ENCOUNTER — HOSPITAL ENCOUNTER (OUTPATIENT)
Dept: OCCUPATIONAL THERAPY | Age: 68
Setting detail: THERAPIES SERIES
Discharge: HOME | End: 2023-12-28
Payer: COMMERCIAL

## 2023-12-28 PROCEDURE — 97018 PARAFFIN BATH THERAPY: CPT | Performed by: OCCUPATIONAL THERAPIST

## 2023-12-28 PROCEDURE — 97530 THERAPEUTIC ACTIVITIES: CPT | Performed by: OCCUPATIONAL THERAPIST

## 2023-12-28 PROCEDURE — 97140 MANUAL THERAPY 1/> REGIONS: CPT | Performed by: OCCUPATIONAL THERAPIST

## 2023-12-28 PROCEDURE — 97110 THERAPEUTIC EXERCISES: CPT | Performed by: OCCUPATIONAL THERAPIST

## 2023-12-28 NOTE — PROGRESS NOTES
OCCUPATIONAL THERAPY DAILY NOTE  [facility] THER[facility]  [address] [place] [address]  Dept: [phone]  Loc: [address]  MAIN OT fax [phone]      Date:  2023  Initial Evaluation Date: 10/30/2023   Evaluating Therapist: Genaro Fay OT    Patient Name:  Alee Manuel    :  1955    Restrictions/Precautions:  Cancer/DM; moderate fall risk  Diagnosis:  R29.898 (ICD-10-CM) - Weakness of left hand                                                              Date of Surgery/Injury: Ongoing since 4391     Insurance/Certification information:  [facility] Medicaid - REF#[id number] for 15 occupational therapy visits approved until 2024     Plan of care signed (Y/N): N  Visit# / total visits:      Referring Practitioner:  Mayelin Hickman MD - NPI #[id number]  Specific Practitioner Orders: OT Evaluate and Treat     Assessment of current deficits   [] Functional mobility             [x] ADLs           [x] Strength                  [] Cognition   [] Functional transfers           [x] IADLs          [] Safety Awareness  [x] Endurance   [x] Fine Motor Coordination    [] Balance      [] Vision/perception    [x] Sensation     [] Gross Motor Coordination [x] ROM           [x] Pain                        [x] Edema          [] Scar Adhesion/Skin Integrity      OT PLAN OF CARE   OT POC based on physician orders, patient diagnosis and results of clinical assessment     Frequency/Duration 1-2x / week for 16 visits.    Certification period From: 10/30/2023  To:       Specific OT Treatment to include:   [x] Instruction in HEP                   Modalities:  [x] Therapeutic Exercise                 [x] Ultrasound               [] Electrical Stimulation/Attended  [x] PROM/Stretching                    [x] Fluidotherapy          [x]  Paraffin                   [x] AAROM  [x] AROM                 [x] Iontophoresis:   [x] Tendon Glides

## 2024-01-03 ENCOUNTER — TELEPHONE (OUTPATIENT)
Dept: FAMILY MEDICINE CLINIC | Age: 69
End: 2024-01-03

## 2024-01-03 DIAGNOSIS — E11.65 TYPE 2 DIABETES MELLITUS WITH HYPERGLYCEMIA, UNSPECIFIED WHETHER LONG TERM INSULIN USE (HCC): ICD-10-CM

## 2024-01-03 NOTE — TELEPHONE ENCOUNTER
Pt called about metformin script written on 12/1/23.  No e-confirm that script was sent.  Please check and send

## 2024-01-03 NOTE — TELEPHONE ENCOUNTER
Last Appointment:  12/1/2023  Future Appointments  1/4/2024   9:00 AM    [facility]  3/5/2024   9:20 AM    Audra Hood MD [facility]

## 2024-01-04 ENCOUNTER — HOSPITAL ENCOUNTER (OUTPATIENT)
Dept: OCCUPATIONAL THERAPY | Age: 69
Setting detail: THERAPIES SERIES
Discharge: HOME | End: 2024-01-04
Payer: COMMERCIAL

## 2024-01-04 PROCEDURE — 97018 PARAFFIN BATH THERAPY: CPT | Performed by: OCCUPATIONAL THERAPIST

## 2024-01-04 PROCEDURE — 97530 THERAPEUTIC ACTIVITIES: CPT | Performed by: OCCUPATIONAL THERAPIST

## 2024-01-04 PROCEDURE — 97110 THERAPEUTIC EXERCISES: CPT | Performed by: OCCUPATIONAL THERAPIST

## 2024-01-04 RX ORDER — POTASSIUM CHLORIDE 1500 MG/1
TABLET, EXTENDED RELEASE ORAL
Qty: 60 TABLET | Refills: 0 | OUTPATIENT
Start: 2024-01-04

## 2024-01-04 NOTE — PROGRESS NOTES
25982 Therapeutic Ex 30 2   19190 Therapeutic Activity 20 1   32200 Neuromuscular Re-Ed     78197 Manual Therapy     41901 ADL/COMP Tech Train     24118 Orthotic Management/Training      Other                 Total  60 4       Plan: OT 1-2x/week for 16 sessions    [x]  Continues Plan of care with focus on to reduce pain/edema, improve ROM/dexterity, strengthen L hand, sensory reeducation, prevent recurrence of trigger thumb, and to issue home exercise and splinting program for improved functional use of L hand: Treatment covered based on POC and graduated to patient's progress.   Pt education continues at each visit to obtain maximum benefits from skilled OT intervention.  []  Alter Plan of care:   []  Discharge:     Christina Hills, OTR/L; [id number]

## 2024-01-05 RX ORDER — METFORMIN HYDROCHLORIDE 500 MG/1
1000 TABLET, EXTENDED RELEASE ORAL 2 TIMES DAILY
Qty: 60 TABLET | Refills: 5 | Status: SHIPPED | OUTPATIENT
Start: 2024-01-05

## 2024-01-08 DIAGNOSIS — K21.9 GASTROESOPHAGEAL REFLUX DISEASE, UNSPECIFIED WHETHER ESOPHAGITIS PRESENT: ICD-10-CM

## 2024-01-08 NOTE — TELEPHONE ENCOUNTER
Last Appointment:  12/1/2023  Future Appointments  1/10/2024  11:00 AM   April Chavarria OT        [facility]  1/17/2024  11:00 AM   April Chavarria OT        [facility]  1/24/2024  11:00 AM   April [facility]avarria OT        [facility]  3/5/2024   9:20 AM    Audra Hood MD          [facility]

## 2024-01-09 DIAGNOSIS — I10 ESSENTIAL HYPERTENSION: ICD-10-CM

## 2024-01-09 RX ORDER — HYDROCHLOROTHIAZIDE 12.5 MG/1
12.5 TABLET ORAL EVERY MORNING
Qty: 90 TABLET | Refills: 0 | Status: SHIPPED | OUTPATIENT
Start: 2024-01-09

## 2024-01-09 RX ORDER — FAMOTIDINE 20 MG/1
20 TABLET, FILM COATED ORAL DAILY
Qty: 60 TABLET | Refills: 3 | Status: SHIPPED | OUTPATIENT
Start: 2024-01-09

## 2024-01-09 NOTE — TELEPHONE ENCOUNTER
Last Appointment:  12/1/2023  Future Appointments  1/10/2024  11:00 AM   April [facility]arria OT        [facility]  1/17/2024  11:00 AM   April Chavarria OT        [facility]  1/24/2024  11:00 AM   [facility]        [facility]  3/5/2024   9:20 AM    Audra Hood MD          [facility]

## 2024-01-10 ENCOUNTER — HOSPITAL ENCOUNTER (OUTPATIENT)
Dept: OCCUPATIONAL THERAPY | Age: 69
Setting detail: THERAPIES SERIES
Discharge: HOME | End: 2024-01-10
Payer: COMMERCIAL

## 2024-01-10 PROCEDURE — 97140 MANUAL THERAPY 1/> REGIONS: CPT

## 2024-01-10 PROCEDURE — 97110 THERAPEUTIC EXERCISES: CPT

## 2024-01-10 PROCEDURE — 97018 PARAFFIN BATH THERAPY: CPT

## 2024-01-10 NOTE — PROGRESS NOTES
reduction techniques.  7) Patient will demonstrate min to no difficulty with simulated cutting with fork and knife using ARI hands.  8) Patient will decrease QuickDASH score to 25% or less for increased participation in daily functional activities.     TODAY'S TREATMENT     Pain Level: 6-7 on scale of 1-10,no report of pain this date    Subjective: Pt states, \"I have been cutting my meat using my left hand to hold the knife. My thumb actually hurts today.\"     Objective:    Updated POC to be completed by 10th visit.    INTERVENTION: COMPLETED: SPECIFICS/COMMENTS:   Modality:     Paraffin X -At session start to decrease pain and increase tissue extensibility. ROM completed w/ paraffin in place   Ultrasound  Superficial to L hand web space, thenar eminence  3.3 mHz  100% Duty cycle  0.8 intensity  8 min   AROM/AAROM:     L Hand X  X  X   -Thumb MP/IP flexion with blocking x15 reps + HEP  -Thumb towel scrunches x10 reps + HEP  -Whole hand towel scrunches (thumb flexion focus) x10 reps  -Thumb abduction to push marbles x10 reps x5 sets        PROM/Stretching:     L Hand X -Thumb, gently, within tolerance        Scar Mass/Edema Control/Manual Techniques:     Edema Reduction  -Manual edema mobilization through digits with mobilization to elbow   Manual Techniques X        X -MFR to adductor pollicis + CRC  -Deep tissue massage to thenar eminence  -Prolonged stretch to OPB using resistive web  -Soft tissue mobilization to thumb/OPB  -Joint distractions to thumb IP joint   Therapeutic Activity:     ADL Sim  -Improved function for cutting - patient reported that he was able to cut his roast   Fine Motor                -Big peg activity  -Small peg activity  -Link/unlink paper clips x20 reps  -In-hand manipulation with graded particles (small beads. marbles) with 3 total x25 reps each  -Flip cards x52 reps  -Sting beads x30 reps  -Thumb adduction with marbles x25 reps x2 sets  -Composite thumb flexion with sponges x25 reps

## 2024-01-17 ENCOUNTER — HOSPITAL ENCOUNTER (OUTPATIENT)
Dept: OCCUPATIONAL THERAPY | Age: 69
Setting detail: THERAPIES SERIES
Discharge: HOME | End: 2024-01-17
Payer: COMMERCIAL

## 2024-01-17 NOTE — PROGRESS NOTES
reduction techniques.  7) Patient will demonstrate min to no difficulty with simulated cutting with fork and knife using ARI hands.  8) Patient will decrease QuickDASH score to 25% or less for increased participation in daily functional activities.     TODAY'S TREATMENT     Pain Level: 0 on scale of 1-10,no report of pain this date    Subjective: Pt states no new complaints.     Objective:    Updated POC to be completed by 10th visit.    INTERVENTION: COMPLETED: SPECIFICS/COMMENTS:   Modality:     Paraffin X -At session start to decrease pain and increase tissue extensibility. ROM completed w/ paraffin in place   Ultrasound  Superficial to L hand web space, thenar eminence  3.3 mHz  100% Duty cycle  0.8 intensity  8 min   AROM/AAROM:     L Hand X  X  X   -Thumb MP/IP flexion with blocking x15 reps + HEP  -Thumb towel scrunches x10 reps + HEP  -Whole hand towel scrunches (thumb flexion focus) x10 reps  -Thumb abduction to push marbles x10 reps x5 sets        PROM/Stretching:     L Hand X  X -Thumb, gently, within tolerance  -Wrist extensor/brachioradialis stretch into medium resistance web x10 sec hold x10 reps        Scar Mass/Edema Control/Manual Techniques:     Edema Reduction  -Manual edema mobilization through digits with mobilization to elbow   Manual Techniques          -MFR to adductor pollicis + CRC  -Deep tissue massage to thenar eminence  -Prolonged stretch to OPB using resistive web  -Soft tissue mobilization to thumb/OPB  -Joint distractions to thumb IP joint   Therapeutic Activity:     ADL Sim  -Improved function for cutting - patient reported that he was able to cut his roast   Fine Motor                -Big peg activity  -Small peg activity  -Link/unlink paper clips x20 reps  -In-hand manipulation with graded particles (small beads. marbles) with 3 total x25 reps each  -Flip cards x52 reps  -Sting beads x30 reps  -Thumb adduction with marbles x25 reps x2 sets  -Composite thumb flexion with sponges x25

## 2024-01-24 ENCOUNTER — APPOINTMENT (OUTPATIENT)
Dept: OCCUPATIONAL THERAPY | Age: 69
End: 2024-01-24
Payer: COMMERCIAL

## 2024-01-30 DIAGNOSIS — Z87.39 HISTORY OF GOUT: ICD-10-CM

## 2024-01-30 RX ORDER — ALLOPURINOL 100 MG/1
TABLET ORAL
Qty: 90 TABLET | Refills: 0 | Status: SHIPPED | OUTPATIENT
Start: 2024-01-30

## 2024-01-30 NOTE — TELEPHONE ENCOUNTER
Last Appointment:  12/1/2023  Future Appointments   Date Time Provider Department Center   1/31/2024 11:00 AM April Chavarria OT [facility]   2/2/2024  9:00 AM April Chavarria OT [facility]   2/9/2024 10:00 AM April Chavarria OT [facility]   2/12/2024 10:00 AM April Chavarria OT [facility]   2/19/2024 10:00 AM April Chavarria OT [facility]   2/28/2024 10:00 AM April Chavarria OT [facility]CC[facility]   3/5/2024  9:20 AM [facility]

## 2024-01-31 ENCOUNTER — HOSPITAL ENCOUNTER (OUTPATIENT)
Dept: OCCUPATIONAL THERAPY | Age: 69
Setting detail: THERAPIES SERIES
Discharge: HOME | End: 2024-01-31
Payer: COMMERCIAL

## 2024-01-31 PROCEDURE — 97018 PARAFFIN BATH THERAPY: CPT

## 2024-01-31 PROCEDURE — 97140 MANUAL THERAPY 1/> REGIONS: CPT

## 2024-01-31 PROCEDURE — 97110 THERAPEUTIC EXERCISES: CPT

## 2024-01-31 PROCEDURE — 97530 THERAPEUTIC ACTIVITIES: CPT

## 2024-01-31 NOTE — PROGRESS NOTES
digits with mobilization to elbow   Manual Techniques          -MFR to adductor pollicis + CRC  -Deep tissue massage to thenar eminence  -Prolonged stretch to OPB using resistive web  -Soft tissue mobilization to thumb/OPB  -Joint distractions to thumb IP joint   Therapeutic Activity:     ADL Sim  -Improved function for cutting - patient reported that he was able to cut his roast   Fine Motor                -Big peg activity  -Small peg activity  -Link/unlink paper clips x20 reps  -In-hand manipulation with graded particles (small beads. marbles) with 3 total x25 reps each  -Flip cards x52 reps  -Sting beads x30 reps  -Thumb adduction with marbles x25 reps x2 sets  -Composite thumb flexion with sponges x25 reps   Sensory Reeducation        -Textured ball toss and roll. He was able to discriminate texture and temperature but reported continued numbness finger tips  -sensory discrimination task - identified particles/small beads in medium putty w/ vision occluded   Strengthening:     L UE       X    X         -Therabar light resistance (yellow) into pronation, supination, twisting x15 reps x3 sets  -Pick pegs x5 reps out of light resistance (green) putty  -3# Digi- isolated digits and thumb adduciton x10 reps x3 sets  -Digit extender light resistance (red) x15 reps x1 set  -2.2# ball toss up in pronation, catch in supination (facilitation of wide grasp)  -Putty light resistance: mass grasp, key pinch, riggs pinch, digital adduction/abduction x10 reps each x2 sets        Other:     Kinesiology Tape   Overactive muscle activation  Adductor pollicis  25% tension  [place]ainfield on origin  End on insertion          Assessment/Comments: Measurements taken this session for progress update. Goals met for ROM, edema, and cutting food with a fork and knife. Pt demonstrates no difficulty with this. Pt does state continued difficulty with opening jars. Continue skilled OT with a focus on reducing edema, improving fine motor

## 2024-02-02 ENCOUNTER — HOSPITAL ENCOUNTER (OUTPATIENT)
Dept: OCCUPATIONAL THERAPY | Age: 69
Setting detail: THERAPIES SERIES
Discharge: HOME | End: 2024-02-02
Payer: COMMERCIAL

## 2024-02-02 PROCEDURE — 97530 THERAPEUTIC ACTIVITIES: CPT

## 2024-02-02 PROCEDURE — 97140 MANUAL THERAPY 1/> REGIONS: CPT

## 2024-02-02 PROCEDURE — 97110 THERAPEUTIC EXERCISES: CPT

## 2024-02-02 NOTE — PROGRESS NOTES
OCCUPATIONAL THERAPY DAILY NOTE    [facility]  [facility]  [address] [facility] [address]  Dept: [phone]  Loc: [phone]   [facility] fax [phone]      Date:  2024  Initial Evaluation Date: 10/30/2023   Evaluating Therapist: April Chavarria OT    Patient Name:  Larry Mares    :  1955    Restrictions/Precautions:  Cancer/DM; moderate fall risk  Diagnosis:  R29.898 (ICD-10-CM) - Weakness of left hand                                                              Date of Surgery/Injury: Ongoing since 2023     Insurance/Certification information:  [facility] Medicaid - REF#[id number] for 15 occupational therapy visits approved until 2024     Plan of care signed (Y/N): N  Visit# / total visits:      Referring Practitioner:  Audra Hood MD - NPI #[id number]  Specific Practitioner Orders: OT Evaluate and Treat     Assessment of current deficits   [] Functional mobility             [x] ADLs           [x] Strength                  [] Cognition   [] Functional transfers           [x] IADLs          [] Safety Awareness  [x] Endurance   [x] Fine Motor Coordination    [] Balance      [] Vision/perception    [x] Sensation     [] Gross Motor Coordination [x] ROM           [x] Pain                        [x] Edema          [] Scar Adhesion/Skin Integrity      OT PLAN OF CARE   OT POC based on physician orders, patient diagnosis and results of clinical assessment     Frequency/Duration 1-2x / week for 16 visits.   Certification period From: 10/30/2023  To:       Specific OT Treatment to include:   [x] Instruction in HEP                   Modalities:  [x] Therapeutic Exercise                 [x] Ultrasound               [] Electrical Stimulation/Attended  [x] PROM/Stretching                    [x] Fluidotherapy          [x]  Paraffin                   [x] AAROM  [x] AROM                 [x] Iontophoresis:   [x] Tendon Glides

## 2024-02-09 ENCOUNTER — HOSPITAL ENCOUNTER (OUTPATIENT)
Dept: OCCUPATIONAL THERAPY | Age: 69
Setting detail: THERAPIES SERIES
Discharge: HOME | End: 2024-02-09
Payer: COMMERCIAL

## 2024-02-09 DIAGNOSIS — Z76.0 MEDICATION REFILL: ICD-10-CM

## 2024-02-09 PROCEDURE — 97110 THERAPEUTIC EXERCISES: CPT

## 2024-02-09 PROCEDURE — 97018 PARAFFIN BATH THERAPY: CPT

## 2024-02-09 PROCEDURE — 97140 MANUAL THERAPY 1/> REGIONS: CPT

## 2024-02-09 RX ORDER — LISINOPRIL 40 MG/1
40 TABLET ORAL EVERY MORNING
Qty: 120 TABLET | Refills: 0 | Status: SHIPPED | OUTPATIENT
Start: 2024-02-09

## 2024-02-09 NOTE — TELEPHONE ENCOUNTER
Last Appointment:  12/1/2023  Future Appointments   Date Time Provider Department Center   2/12/2024 10:00 AM April [facility]   2/19/2024 10:00 AM April [facility]av[facility]ria [facility]   2/28/2024 10:00 AM April Chav[facility]ria OT [facility]   3/5/2024  9:20 AM Audra Hood MD [facility]

## 2024-02-09 NOTE — PROGRESS NOTES
OCCUPATIONAL THERAPY DAILY NOTE    [facility]  [facility]  [address] [address]  Dept: [phone]  Loc: [phone]   [facility] fax [phone]      Date:  2024  Initial Evaluation Date: 10/30/2023   Evaluating Therapist: April Chavarria OT    Patient Name:  Larry Mares    :  1955    Restrictions/Precautions:  Cancer/DM; moderate fall risk  Diagnosis:  R29.898 (ICD-10-CM) - Weakness of left hand                                                              Date of Surgery/Injury: Ongoing since 2023     Insurance/Certification information:  [facility] Medicaid - REF#[id number] for 15 occupational therapy visits approved until 2024     Plan of care signed (Y/N): N  Visit# / total visits:      Referring Practitioner:  Audra Hood MD - NPI #[id number]  Specific Practitioner Orders: OT Evaluate and Treat     Assessment of current deficits   [] Functional mobility             [x] ADLs           [x] Strength                  [] Cognition   [] Functional transfers           [x] IADLs          [] Safety Awareness  [x] Endurance   [x] Fine Motor Coordination    [] Balance      [] Vision/perception    [x] Sensation     [] Gross Motor Coordination [x] ROM           [x] Pain                        [x] Edema          [] Scar Adhesion/Skin Integrity      OT PLAN OF CARE   OT POC based on physician orders, patient diagnosis and results of clinical assessment     Frequency/Duration 1-2x / week for 16 visits.   Certification period From: 10/30/2023  To:       Specific OT Treatment to include:   [x] Instruction in HEP                   Modalities:  [x] Therapeutic Exercise                 [x] Ultrasound               [] Electrical Stimulation/Attended  [x] PROM/Stretching                    [x] Fluidotherapy          [x]  Paraffin                   [x] AAROM  [x] AROM                 [x] Iontophoresis:   [x] Tendon Glides

## 2024-02-12 ENCOUNTER — HOSPITAL ENCOUNTER (OUTPATIENT)
Dept: OCCUPATIONAL THERAPY | Age: 69
Setting detail: THERAPIES SERIES
Discharge: HOME | End: 2024-02-12
Payer: COMMERCIAL

## 2024-02-12 PROCEDURE — 97018 PARAFFIN BATH THERAPY: CPT

## 2024-02-12 PROCEDURE — 97530 THERAPEUTIC ACTIVITIES: CPT

## 2024-02-12 PROCEDURE — 97110 THERAPEUTIC EXERCISES: CPT

## 2024-02-12 NOTE — PROGRESS NOTES
Assessment/Comments: Pt presents to session with decreased tightness of thumb adductor but reports that he think he may have recently broken two toes during recent move of residence. Pt encouraged to seek further medical attention for this and [facility] while performing fine motor exercises. Pt had good tolerance to exercises with no increased pain reported. Verbal cueing required to continue with pinching activity. Continue skilled OT for two additional sessions to progress strength and dexterity as tolerated.    L Hand ROM    AROM [x]         PROM[] IE 01/31/24        THUMB MP Flexion/Extension 0-50*/ 14-23* H 39*  60*         IP Flexion/Extension 0-80*/ 23-30* H 64*  80*         Radial Abduction 53-71* 55*  --         Palmar Abduction 50-71* 52* --           Sensation:   Pt reports numbness/tingling on radial and riggs aspects of left thumb --> continues  Pt reports slight persistent tingling of L hand digits --> resolved     L Hand on 01/31/2024  Able To Sense (Y) / Unable to Sense (N)  SEMMES-CARLI Thumb 2nd Digit 3rd Digit  4th Digit  5th Digit    Normal Touch  Size: 2.83             Diminished Light Touch   Size: 3.61 Y  Y Y Y  Y    Diminished Protective Sense  Size: 4.31          Loss of Protective Sense   Size: 4.56             Loss of Sensation  Size: 6.65                Edema Description/Circumferential Measurements:              Figure 8 Measurement--  R: 44.7 cm              L: 46.3 cm --> 45.2 cm     Dynamometer (setting 2) IE 01/31/24      Left  50#  55#     Right  70#  ---     Pinch (lateral)         Left 22#   23#     Right 25#   ---     Pinch (tripod)         Left  15#  15#     Right  17# ---      **Norms for age/gender for  strength is 91 # and 78 #   **Norms for age/gender for lateral key pinch strength is 23 # and 22 #   **Norms for age/gender for riggs pinch strength is 17 # and 15 #     9 Hole Peg test  IE  01/31/24     Left 25 sec   24 sec

## 2024-02-19 ENCOUNTER — HOSPITAL ENCOUNTER (OUTPATIENT)
Dept: OCCUPATIONAL THERAPY | Age: 69
Setting detail: THERAPIES SERIES
Discharge: HOME | End: 2024-02-19
Payer: COMMERCIAL

## 2024-02-19 PROCEDURE — 97018 PARAFFIN BATH THERAPY: CPT

## 2024-02-19 PROCEDURE — 97530 THERAPEUTIC ACTIVITIES: CPT

## 2024-02-19 PROCEDURE — 97110 THERAPEUTIC EXERCISES: CPT

## 2024-02-19 NOTE — PROGRESS NOTES
[x] Tendon Glides                                               [] Neuromuscular Re-Ed            [x] ADL/IADL re-training    [x] Therapeutic Activity                  [x] Pain Management with/without modalities PRN                 [x] Manual Therapy                      [x] Splinting                                   [] Scar Management                   []Joint Protection/Training  []Ergonomics                             [x] Joint Mobilization                      [] Adaptive Equipment Assessment/Training                             [x] Manual Edema Mobilization   [x] Myofascial Release                 [] Energy Conservation/Work Simplification  [x] GM/FM Coordination                [x] Safety retraining/education per  individual diagnosis/goals  [x] Desensitization/Sensory Reeducation        Patient Specific Goal: To regain flexibility and strength of L hand for IADL tasks                             GOALS (Long term same as Short term): Status updated 02/19/2024  1) Patient will demonstrate good understanding of home program (exercises/activities/diagnosis/prognosis/goals/splinting) with good accuracy.   Progressing - pt reports recent noncompliance with PROM/AROM/FM exercises using thumb for HEP. Continues 02/19/2024.  2) Patient will demonstrate increased active/passive range of motion of their L thumb by at least 5* for ADL/IADL completion.  Goal met 01/31/24 and discharged.  3) Patient will demonstrate increased /pinch strength of at least 5-10 / 2-5 pinch pounds of their left hand for improved ability to lift walker and grocery bags with left hand.   Goal met for  strength - continue to progress pinch strength as tolerated.  4) Increase in fine motor function as evidenced by decreased time to complete 9-hole peg test and/or MRMT test by at least 2-10 seconds with L hand in order to complete improved manipulation of objects for grooming.   Goal met and ongoing- see below for mild reduction.

## 2024-02-26 ENCOUNTER — APPOINTMENT (OUTPATIENT)
Dept: OCCUPATIONAL THERAPY | Age: 69
End: 2024-02-26
Payer: COMMERCIAL

## 2024-02-28 ENCOUNTER — HOSPITAL ENCOUNTER (OUTPATIENT)
Dept: OCCUPATIONAL THERAPY | Age: 69
Setting detail: THERAPIES SERIES
Discharge: HOME | End: 2024-02-28
Payer: COMMERCIAL

## 2024-02-28 PROCEDURE — 97530 THERAPEUTIC ACTIVITIES: CPT

## 2024-02-28 PROCEDURE — 97022 WHIRLPOOL THERAPY: CPT

## 2024-02-28 PROCEDURE — 97110 THERAPEUTIC EXERCISES: CPT

## 2024-02-28 NOTE — PROGRESS NOTES
OCCUPATIONAL THERAPY DAILY NOTE    DISCHARGE SUMMARY    [facility]  [facility] [place]  [facility]  [address] [facility] [address]  Dept: [phone]  Loc: [phone]   [facility] fax [phone]      Date:  2024  Initial Evaluation Date: 10/30/2023   Evaluating Therapist: April Chavarria OT    Patient Name:  Larry Mares    :  1955    Restrictions/Precautions:  Cancer/DM; moderate fall risk  Diagnosis:  R29.898 (ICD-10-CM) - Weakness of left hand                                                              Date of Surgery/Injury: Ongoing since 2023     Insurance/Certification information:  [facility] Medicaid -   REF#[id number] for 15 occupational therapy visits approved until 2024     Plan of care signed (Y/N): N  Visit# / total visits:      Referring Practitioner:  Audra Hood MD - NPI #[id number]  Specific Practitioner Orders: OT Evaluate and Treat     Assessment of current deficits   [] Functional mobility             [x] ADLs           [x] Strength                  [] Cognition   [] Functional transfers           [x] IADLs          [] Safety Awareness  [x] Endurance   [x] Fine Motor Coordination    [] Balance      [] Vision/perception    [x] Sensation     [] Gross Motor Coordination [x] ROM           [x] Pain                        [x] Edema          [] Scar Adhesion/Skin Integrity      OT PLAN OF CARE   OT POC based on physician orders, patient diagnosis and results of clinical assessment     Frequency/Duration 1-2x / week for 16 visits.   Certification period From: 10/30/2023  To:       Specific OT Treatment to include:   [x] Instruction in HEP                   Modalities:  [x] Therapeutic Exercise                 [x] Ultrasound               [] Electrical Stimulation/Attended  [x] PROM/Stretching                    [x] Fluidotherapy          [x]  Paraffin                   [x] AAROM  [x] AROM                 [x] Iontophoresis:

## 2024-03-01 DIAGNOSIS — E11.65 TYPE 2 DIABETES MELLITUS WITH HYPERGLYCEMIA, UNSPECIFIED WHETHER LONG TERM INSULIN USE (HCC): ICD-10-CM

## 2024-03-04 NOTE — TELEPHONE ENCOUNTER
Last Appointment:  12/1/2023  Future Appointments   Date Time Provider Department Center   3/5/2024  9:20 AM Audra Hood MD [facility]

## 2024-03-05 RX ORDER — ATORVASTATIN CALCIUM 80 MG/1
80 TABLET, FILM COATED ORAL NIGHTLY
Qty: 30 TABLET | Refills: 0 | Status: SHIPPED | OUTPATIENT
Start: 2024-03-05

## 2024-03-25 ENCOUNTER — HOSPITAL ENCOUNTER (OUTPATIENT)
Dept: CT IMAGING | Age: 69
Discharge: HOME | End: 2024-03-27
Payer: OTHER GOVERNMENT

## 2024-03-25 ENCOUNTER — TELEPHONE (OUTPATIENT)
Dept: FAMILY MEDICINE CLINIC | Age: 69
End: 2024-03-25

## 2024-03-25 DIAGNOSIS — I70.222 ATHEROSCLEROSIS OF NATIVE ARTERY OF LEFT LOWER EXTREMITY WITH REST PAIN (HCC): ICD-10-CM

## 2024-03-25 PROCEDURE — 75635 CT ANGIO ABDOMINAL ARTERIES: CPT

## 2024-03-25 PROCEDURE — 6360000004 HC RX CONTRAST MEDICATION: Performed by: RADIOLOGY

## 2024-03-25 RX ADMIN — IOPAMIDOL 120 ML: 755 INJECTION, SOLUTION INTRAVENOUS at 08:52

## 2024-03-25 NOTE — TELEPHONE ENCOUNTER
Patient called stating he is getting a cat scan on his pelvis and legs. He is asking for an order for a cat scan of his left foot so he can get it done at [facility]. Patient states his foot is tingly and hurts to walk.  Patient is scheduled for an appointment on March 29th. Please advise

## 2024-03-29 ENCOUNTER — OFFICE VISIT (OUTPATIENT)
Dept: FAMILY MEDICINE CLINIC | Age: 69
End: 2024-03-29
Payer: OTHER GOVERNMENT

## 2024-03-29 ENCOUNTER — HOSPITAL ENCOUNTER (EMERGENCY)
Age: 69
Discharge: HOME | End: 2024-03-29
Attending: EMERGENCY MEDICINE
Payer: COMMERCIAL

## 2024-03-29 VITALS
OXYGEN SATURATION: 97 % | HEIGHT: 71 IN | TEMPERATURE: 97.9 F | BODY MASS INDEX: 27.33 KG/M2 | SYSTOLIC BLOOD PRESSURE: 117 MMHG | WEIGHT: 195.2 LBS | DIASTOLIC BLOOD PRESSURE: 59 MMHG | RESPIRATION RATE: 16 BRPM | HEART RATE: 62 BPM

## 2024-03-29 VITALS
DIASTOLIC BLOOD PRESSURE: 66 MMHG | HEART RATE: 64 BPM | SYSTOLIC BLOOD PRESSURE: 144 MMHG | OXYGEN SATURATION: 98 % | TEMPERATURE: 98 F | HEIGHT: 71 IN | RESPIRATION RATE: 21 BRPM | BODY MASS INDEX: 27.3 KG/M2 | WEIGHT: 195 LBS

## 2024-03-29 DIAGNOSIS — Z79.4 TYPE 2 DIABETES MELLITUS WITH HYPERGLYCEMIA, WITH LONG-TERM CURRENT USE OF INSULIN (HCC): ICD-10-CM

## 2024-03-29 DIAGNOSIS — I73.9 PERIPHERAL VASCULAR DISEASE (HCC): Primary | ICD-10-CM

## 2024-03-29 DIAGNOSIS — M79.672 LEFT FOOT PAIN: ICD-10-CM

## 2024-03-29 DIAGNOSIS — E11.65 TYPE 2 DIABETES MELLITUS WITH HYPERGLYCEMIA, WITH LONG-TERM CURRENT USE OF INSULIN (HCC): ICD-10-CM

## 2024-03-29 DIAGNOSIS — I73.9 PVD (PERIPHERAL VASCULAR DISEASE) WITH CLAUDICATION (HCC): Primary | ICD-10-CM

## 2024-03-29 DIAGNOSIS — Z12.11 SCREENING FOR COLON CANCER: ICD-10-CM

## 2024-03-29 DIAGNOSIS — I10 ESSENTIAL HYPERTENSION: ICD-10-CM

## 2024-03-29 DIAGNOSIS — K21.9 GASTROESOPHAGEAL REFLUX DISEASE, UNSPECIFIED WHETHER ESOPHAGITIS PRESENT: ICD-10-CM

## 2024-03-29 LAB
ALBUMIN SERPL-MCNC: 4.1 G/DL (ref 3.5–5.2)
ALP SERPL-CCNC: 119 U/L (ref 40–129)
ALT SERPL-CCNC: <5 U/L (ref 0–40)
ANION GAP SERPL CALCULATED.3IONS-SCNC: 13 MMOL/L (ref 7–16)
AST SERPL-CCNC: 15 U/L (ref 0–39)
BASOPHILS # BLD: 0.02 K/UL (ref 0–0.2)
BASOPHILS NFR BLD: 0 % (ref 0–2)
BILIRUB SERPL-MCNC: 0.2 MG/DL (ref 0–1.2)
BUN SERPL-MCNC: 27 MG/DL (ref 6–23)
CALCIUM SERPL-MCNC: 9.6 MG/DL (ref 8.6–10.2)
CHLORIDE SERPL-SCNC: 107 MMOL/L (ref 98–107)
CO2 SERPL-SCNC: 24 MMOL/L (ref 22–29)
CREAT SERPL-MCNC: 1.2 MG/DL (ref 0.7–1.2)
EOSINOPHIL # BLD: 0.11 K/UL (ref 0.05–0.5)
EOSINOPHILS RELATIVE PERCENT: 2 % (ref 0–6)
ERYTHROCYTE [DISTWIDTH] IN BLOOD BY AUTOMATED COUNT: 14.6 % (ref 11.5–15)
GFR SERPL CREATININE-BSD FRML MDRD: 64 ML/MIN/1.73M2
GLUCOSE SERPL-MCNC: 89 MG/DL (ref 74–99)
HBA1C MFR BLD: 6.3 %
HCT VFR BLD AUTO: 41.5 % (ref 37–54)
HGB BLD-MCNC: 13.3 G/DL (ref 12.5–16.5)
IMM GRANULOCYTES # BLD AUTO: <0.03 K/UL (ref 0–0.58)
IMM GRANULOCYTES NFR BLD: 0 % (ref 0–5)
INR PPP: 1.1
LYMPHOCYTES NFR BLD: 1.22 K/UL (ref 1.5–4)
LYMPHOCYTES RELATIVE PERCENT: 19 % (ref 20–42)
MCH RBC QN AUTO: 28.6 PG (ref 26–35)
MCHC RBC AUTO-ENTMCNC: 32 G/DL (ref 32–34.5)
MCV RBC AUTO: 89.2 FL (ref 80–99.9)
MONOCYTES NFR BLD: 0.58 K/UL (ref 0.1–0.95)
MONOCYTES NFR BLD: 9 % (ref 2–12)
NEUTROPHILS NFR BLD: 69 % (ref 43–80)
NEUTS SEG NFR BLD: 4.37 K/UL (ref 1.8–7.3)
PARTIAL THROMBOPLASTIN TIME: 30.5 SEC (ref 24.5–35.1)
PLATELET # BLD AUTO: 292 K/UL (ref 130–450)
PMV BLD AUTO: 9.9 FL (ref 7–12)
POTASSIUM SERPL-SCNC: 4.3 MMOL/L (ref 3.5–5)
PROT SERPL-MCNC: 7.6 G/DL (ref 6.4–8.3)
PROTHROMBIN TIME: 11.9 SEC (ref 9.3–12.4)
RBC # BLD AUTO: 4.65 M/UL (ref 3.8–5.8)
SODIUM SERPL-SCNC: 144 MMOL/L (ref 132–146)
WBC OTHER # BLD: 6.3 K/UL (ref 4.5–11.5)

## 2024-03-29 PROCEDURE — 3074F SYST BP LT 130 MM HG: CPT | Performed by: STUDENT IN AN ORGANIZED HEALTH CARE EDUCATION/TRAINING PROGRAM

## 2024-03-29 PROCEDURE — 1036F TOBACCO NON-USER: CPT | Performed by: STUDENT IN AN ORGANIZED HEALTH CARE EDUCATION/TRAINING PROGRAM

## 2024-03-29 PROCEDURE — 85610 PROTHROMBIN TIME: CPT

## 2024-03-29 PROCEDURE — 96376 TX/PRO/DX INJ SAME DRUG ADON: CPT

## 2024-03-29 PROCEDURE — 96365 THER/PROPH/DIAG IV INF INIT: CPT

## 2024-03-29 PROCEDURE — 3044F HG A1C LEVEL LT 7.0%: CPT | Performed by: STUDENT IN AN ORGANIZED HEALTH CARE EDUCATION/TRAINING PROGRAM

## 2024-03-29 PROCEDURE — 99284 EMERGENCY DEPT VISIT MOD MDM: CPT

## 2024-03-29 PROCEDURE — 80053 COMPREHEN METABOLIC PANEL: CPT

## 2024-03-29 PROCEDURE — 3078F DIAST BP <80 MM HG: CPT | Performed by: STUDENT IN AN ORGANIZED HEALTH CARE EDUCATION/TRAINING PROGRAM

## 2024-03-29 PROCEDURE — 83036 HEMOGLOBIN GLYCOSYLATED A1C: CPT | Performed by: STUDENT IN AN ORGANIZED HEALTH CARE EDUCATION/TRAINING PROGRAM

## 2024-03-29 PROCEDURE — 85730 THROMBOPLASTIN TIME PARTIAL: CPT

## 2024-03-29 PROCEDURE — 2022F DILAT RTA XM EVC RTNOPTHY: CPT | Performed by: STUDENT IN AN ORGANIZED HEALTH CARE EDUCATION/TRAINING PROGRAM

## 2024-03-29 PROCEDURE — 2580000003 HC RX 258: Performed by: EMERGENCY MEDICINE

## 2024-03-29 PROCEDURE — 1124F ACP DISCUSS-NO DSCNMKR DOCD: CPT | Performed by: STUDENT IN AN ORGANIZED HEALTH CARE EDUCATION/TRAINING PROGRAM

## 2024-03-29 PROCEDURE — 6360000002 HC RX W HCPCS

## 2024-03-29 PROCEDURE — 3017F COLORECTAL CA SCREEN DOC REV: CPT | Performed by: STUDENT IN AN ORGANIZED HEALTH CARE EDUCATION/TRAINING PROGRAM

## 2024-03-29 PROCEDURE — 99213 OFFICE O/P EST LOW 20 MIN: CPT | Performed by: STUDENT IN AN ORGANIZED HEALTH CARE EDUCATION/TRAINING PROGRAM

## 2024-03-29 PROCEDURE — 85025 COMPLETE CBC W/AUTO DIFF WBC: CPT

## 2024-03-29 PROCEDURE — G8484 FLU IMMUNIZE NO ADMIN: HCPCS | Performed by: STUDENT IN AN ORGANIZED HEALTH CARE EDUCATION/TRAINING PROGRAM

## 2024-03-29 PROCEDURE — G8427 DOCREV CUR MEDS BY ELIG CLIN: HCPCS | Performed by: STUDENT IN AN ORGANIZED HEALTH CARE EDUCATION/TRAINING PROGRAM

## 2024-03-29 PROCEDURE — G8417 CALC BMI ABV UP PARAM F/U: HCPCS | Performed by: STUDENT IN AN ORGANIZED HEALTH CARE EDUCATION/TRAINING PROGRAM

## 2024-03-29 RX ORDER — AMLODIPINE BESYLATE 10 MG/1
10 TABLET ORAL DAILY
Qty: 90 TABLET | Refills: 0 | Status: SHIPPED | OUTPATIENT
Start: 2024-03-29

## 2024-03-29 RX ORDER — HEPARIN SODIUM 1000 [USP'U]/ML
80 INJECTION, SOLUTION INTRAVENOUS; SUBCUTANEOUS ONCE
Status: COMPLETED | OUTPATIENT
Start: 2024-03-29 | End: 2024-03-29

## 2024-03-29 RX ORDER — FAMOTIDINE 20 MG/1
20 TABLET, FILM COATED ORAL DAILY
Qty: 90 TABLET | Refills: 0 | Status: SHIPPED | OUTPATIENT
Start: 2024-03-29

## 2024-03-29 RX ORDER — HEPARIN SODIUM 10000 [USP'U]/100ML
5-30 INJECTION, SOLUTION INTRAVENOUS CONTINUOUS
Status: DISCONTINUED | OUTPATIENT
Start: 2024-03-29 | End: 2024-03-29 | Stop reason: HOSPADM

## 2024-03-29 RX ORDER — HEPARIN SODIUM 1000 [USP'U]/ML
40 INJECTION, SOLUTION INTRAVENOUS; SUBCUTANEOUS PRN
Status: DISCONTINUED | OUTPATIENT
Start: 2024-03-29 | End: 2024-03-29

## 2024-03-29 RX ORDER — CILOSTAZOL 100 MG/1
100 TABLET ORAL 2 TIMES DAILY
Qty: 180 TABLET | Refills: 0 | Status: SHIPPED | OUTPATIENT
Start: 2024-03-29

## 2024-03-29 RX ORDER — ASPIRIN 81 MG/1
81 TABLET, CHEWABLE ORAL DAILY
Qty: 30 TABLET | Refills: 3 | Status: SHIPPED
Start: 2024-03-29 | End: 2024-03-29

## 2024-03-29 RX ORDER — PEN NEEDLE, DIABETIC 29 GAUGE
1 NEEDLE, DISPOSABLE MISCELLANEOUS DAILY
Qty: 100 EACH | Refills: 3 | Status: SHIPPED
Start: 2024-03-29 | End: 2024-03-29

## 2024-03-29 RX ORDER — HEPARIN SODIUM 1000 [USP'U]/ML
80 INJECTION, SOLUTION INTRAVENOUS; SUBCUTANEOUS PRN
Status: DISCONTINUED | OUTPATIENT
Start: 2024-03-29 | End: 2024-03-29

## 2024-03-29 RX ORDER — POTASSIUM CHLORIDE 1500 MG/1
20 TABLET, EXTENDED RELEASE ORAL DAILY
Qty: 90 TABLET | Status: CANCELLED | OUTPATIENT
Start: 2024-03-29

## 2024-03-29 RX ORDER — ALLOPURINOL 100 MG/1
100 TABLET ORAL DAILY
COMMUNITY

## 2024-03-29 RX ORDER — ASPIRIN 81 MG/1
81 TABLET, CHEWABLE ORAL DAILY
COMMUNITY

## 2024-03-29 RX ORDER — METFORMIN HYDROCHLORIDE 500 MG/1
500 TABLET, EXTENDED RELEASE ORAL
COMMUNITY

## 2024-03-29 RX ORDER — SODIUM CHLORIDE 9 MG/ML
INJECTION, SOLUTION INTRAVENOUS CONTINUOUS
Status: DISCONTINUED | OUTPATIENT
Start: 2024-03-29 | End: 2024-03-29 | Stop reason: HOSPADM

## 2024-03-29 RX ORDER — HYDROCHLOROTHIAZIDE 12.5 MG/1
12.5 TABLET ORAL EVERY MORNING
Qty: 90 TABLET | Refills: 0 | Status: SHIPPED | OUTPATIENT
Start: 2024-03-29

## 2024-03-29 RX ADMIN — HEPARIN SODIUM 7080 UNITS: 1000 INJECTION INTRAVENOUS; SUBCUTANEOUS at 11:58

## 2024-03-29 RX ADMIN — HEPARIN SODIUM 18 UNITS/KG/HR: 10000 INJECTION, SOLUTION INTRAVENOUS at 12:00

## 2024-03-29 RX ADMIN — SODIUM CHLORIDE: 9 INJECTION, SOLUTION INTRAVENOUS at 12:10

## 2024-03-29 SDOH — ECONOMIC STABILITY: FOOD INSECURITY: WITHIN THE PAST 12 MONTHS, THE FOOD YOU BOUGHT JUST DIDN'T LAST AND YOU DIDN'T HAVE MONEY TO GET MORE.: NEVER TRUE

## 2024-03-29 SDOH — ECONOMIC STABILITY: FOOD INSECURITY: WITHIN THE PAST 12 MONTHS, YOU WORRIED THAT YOUR FOOD WOULD RUN OUT BEFORE YOU GOT MONEY TO BUY MORE.: NEVER TRUE

## 2024-03-29 SDOH — ECONOMIC STABILITY: INCOME INSECURITY: HOW HARD IS IT FOR YOU TO PAY FOR THE VERY BASICS LIKE FOOD, HOUSING, MEDICAL CARE, AND HEATING?: NOT HARD AT ALL

## 2024-03-29 ASSESSMENT — ENCOUNTER SYMPTOMS
ABDOMINAL PAIN: 0
VOMITING: 0
FACIAL SWELLING: 0
COLOR CHANGE: 1
COUGH: 0
SHORTNESS OF BREATH: 0
NAUSEA: 0
ABDOMINAL DISTENTION: 0
TROUBLE SWALLOWING: 0
CONSTIPATION: 0
DIARRHEA: 0

## 2024-03-29 ASSESSMENT — PAIN DESCRIPTION - PAIN TYPE: TYPE: ACUTE PAIN

## 2024-03-29 ASSESSMENT — PATIENT HEALTH QUESTIONNAIRE - PHQ9
SUM OF ALL RESPONSES TO PHQ QUESTIONS 1-9: 0
SUM OF ALL RESPONSES TO PHQ9 QUESTIONS 1 & 2: 0
SUM OF ALL RESPONSES TO PHQ QUESTIONS 1-9: 0
2. FEELING DOWN, DEPRESSED OR HOPELESS: NOT AT ALL
1. LITTLE INTEREST OR PLEASURE IN DOING THINGS: NOT AT ALL

## 2024-03-29 ASSESSMENT — PAIN SCALES - GENERAL: PAINLEVEL_OUTOF10: 6

## 2024-03-29 ASSESSMENT — PAIN - FUNCTIONAL ASSESSMENT: PAIN_FUNCTIONAL_ASSESSMENT: 0-10

## 2024-03-29 ASSESSMENT — PAIN DESCRIPTION - FREQUENCY: FREQUENCY: CONTINUOUS

## 2024-03-29 ASSESSMENT — LIFESTYLE VARIABLES: HOW OFTEN DO YOU HAVE A DRINK CONTAINING ALCOHOL: NEVER

## 2024-03-29 ASSESSMENT — PAIN DESCRIPTION - DESCRIPTORS: DESCRIPTORS: ACHING

## 2024-03-29 ASSESSMENT — PAIN DESCRIPTION - LOCATION: LOCATION: FOOT

## 2024-03-29 ASSESSMENT — PAIN DESCRIPTION - ORIENTATION: ORIENTATION: LEFT

## 2024-03-29 NOTE — PROGRESS NOTES
[facility]  FAMILY MEDICINE RESIDENCY PROGRAM  DATE OF VISIT : 3/29/2024    Patient : Larry Mares   Age : 69 y.o.    : 1955   MRN : [id number]   ______________________________________________________________________    Chief Complaint :   Chief Complaint   Patient presents with    Diabetes Mellitus    Numbness     Left foot numbness       HPI : Larry Mares is 69 y.o. male who presented to the clinic today for chronic diseases. PMH of HTN, PVD, GERD, T2DM.     HTN-on amlodipine 10 mg, lisinopril 40 mg daily, HCTZ 1200 mg daily.  Mildly hypertensive today, but asymptomatic.   [id number] metformin 1000 mg twice daily, Lantus 30 units nightly, Jardiance 25 mg daily. No issues with medications. Patient has had no hypoglycemic episodes. Patient denies nausea and vomiting. Home sugars: patient does not check sugars. A1C today 6.3. The patient hasn't been mindful of what has been eating and wasn't following diabetes diet.  He  is up to date with eye exam and denied any history of diabetic retinopathy. Performs own feet care. No Retinopathy, Nephropathy. H/o Neuropathy.   PVD-on Pletal, atorvastatin 80 mg and ASA.  Underwent CTA abdominal aorta with bilateral runoff: 3/25 which showed severe stenosis of bilateral distal common iliac arteries and atherosclerotic plaque formation.  Follows with vascular.  GERD-controlled on Pepcid 20 mg daily    Drinking 2 beers per week. Wasn't able to get colonscopy because he did nto have a person to come with him.     Concerns for left foot pain.  Kicked his foot on 3/8. Foot XR showed no fx. Has been having pain since then, numbness, pain and difficulty walking left foot. Denies fever,  chills. Denies falls or weakness. Tylenol with minimal relief.  Noticed color changes in that foot as well.        Patient's medications, allergies, past medical, surgical, social and family histories were reviewed and updated as appropriate.    Past Medical History :  Past

## 2024-03-29 NOTE — CONSULTS
VASCULAR SURGERY  CONSULT NOTE  3/29/2024    Physician Consulted: Dr. Ogden  Reason for Consult: limb ischemia  Referring Physician: Dr. Summer MURO  Larry Mares is a 69 y.o. male who presents for evaluation of left foot pain and discoloration.  Patient's past medical history is pertinent for hypertension, DM, hyperlipidemia, PVD.  [facility] for peripheral vascular disease, last seen approximately 1 year ago.  Patient reports that on March 8 he had trauma to his left foot, he states that ever since that is been dark in color and painful.  He denies any loss of sensation, states he does have trouble moving his toes and it is painful to walk.  He was seen at primary care clinic today.  He had a CTA aorta with runoff on 3/26 and he was instructed to come to the emergency department from his appointment.  He denies any new/evolving symptoms of his left lower extremity and foot in the past 2 weeks.  Patient reports that he does drink 2-3 times a week, when he does so he will smoke cigarettes, denies daily cigarette use.      Past Medical History:   Diagnosis Date    Cancer (HCC) PROSTATE    2005, patient reports bone mets    Erectile dysfunction     Hyperlipidemia     Hypertension 2005    Osteoarthritis, hip/pelvic region and thigh 03/29/2013    Prostate cancer (HCC) 02/26/2013    PVD (peripheral vascular disease) with claudication ([facility]) 03/30/2023    Ankle-brachial index of 0.79 on the right and 0.42 on the left       Past Surgical History:   Procedure Laterality Date    BACK SURGERY      HIP SURGERY Right     LEG SURGERY      LUMBAR SPINE SURGERY  10/30/2017    PLIF L2 - S1   and laminectomy    PROSTATE BIOPSY         Medications Prior to Admission    Prior to Admission medications    Medication Sig Start Date End Date Taking? Authorizing Provider   amLODIPine (NORVASC) 10 MG tablet Take 1 tablet by mouth daily 3/29/24   Audra Hood MD   aspirin 81 MG chewable tablet

## 2024-03-29 NOTE — ED PROVIDER NOTES
Left foot pain          DISPOSITION/PLAN     DISPOSITION Decision To Discharge 03/29/2024 01:31:47 PM    PATIENT REFERRED TO:  Philippe Ogden MD  [address]  [place] [address]  [phone]    Schedule an appointment as soon as possible for a visit  make an appointment soon as possible    Audra Hood MD  [address] [place] [address]  [phone]    Schedule an appointment as soon as possible for a visit       [facility] Emergency Department  [address] [place] [address]  [phone]  Go to   If symptoms worsen      DISCHARGE MEDICATIONS:  Discharge Medication List as of 3/29/2024  1:45 PM               (Please note that portions of this note were completed with a voice recognition program.  Efforts were made to edit the dictations but occasionally words are mis-transcribed.)    George Jerome,  (electronically signed)

## 2024-03-29 NOTE — DISCHARGE INSTRUCTIONS
Discharge Instructions for Lower extremity angiogram    Call Dr. Ogden office [phone] for follow-up appointment.    Medications    Your doctor may recommend:   Over-the-counter or prescription pain medicine   Continue your aspirin, statin and cilostazol    When taking medicines:   Take your medicine as directed. Do not change the amount or the schedule.   Do not stop taking them without talking to your doctor.   Do not share them.     Lifestyle Changes    You and your doctor will plan lifestyle changes that will help you recover. Some things to keep in mind include:   Atherosclerosis and high blood pressure should be carefully managed. This can be done with medicines and a healthy lifestyle.   If you smoke, talk to your doctor about quitting.     Follow-up   Call Your Doctor If Any of the Following Occurs   After you leave the hospital, call your doctor if any of the following occurs:   Redness, swelling, increasing pain  Signs of infection, including fever and chills     Call 911 or go to the emergency room right away if you have:   Shortness of breath   Chest pain   If you think you have an emergency,  CALL 911.      Has never been high, so get more readings at home or in our office.

## 2024-04-01 ENCOUNTER — TELEPHONE (OUTPATIENT)
Dept: VASCULAR SURGERY | Age: 69
End: 2024-04-01

## 2024-04-01 NOTE — TELEPHONE ENCOUNTER
Pt left a message through the answering service to schedule ER f/u with Dr. Ogden.  Can schedule arteriogram with Dr. Ogden per ER note or pt can continue f/u with [facility]  If he chooses to follow with Dr. Ogden, medical insurance would be used.

## 2024-04-08 ENCOUNTER — OFFICE VISIT (OUTPATIENT)
Dept: FAMILY MEDICINE CLINIC | Age: 69
End: 2024-04-08
Payer: COMMERCIAL

## 2024-04-08 VITALS
DIASTOLIC BLOOD PRESSURE: 52 MMHG | SYSTOLIC BLOOD PRESSURE: 93 MMHG | OXYGEN SATURATION: 97 % | RESPIRATION RATE: 18 BRPM | TEMPERATURE: 97.9 F | HEART RATE: 66 BPM

## 2024-04-08 DIAGNOSIS — I73.9 PVD (PERIPHERAL VASCULAR DISEASE) WITH CLAUDICATION (HCC): Primary | ICD-10-CM

## 2024-04-08 PROCEDURE — 1036F TOBACCO NON-USER: CPT | Performed by: STUDENT IN AN ORGANIZED HEALTH CARE EDUCATION/TRAINING PROGRAM

## 2024-04-08 PROCEDURE — 3078F DIAST BP <80 MM HG: CPT | Performed by: STUDENT IN AN ORGANIZED HEALTH CARE EDUCATION/TRAINING PROGRAM

## 2024-04-08 PROCEDURE — G8427 DOCREV CUR MEDS BY ELIG CLIN: HCPCS | Performed by: STUDENT IN AN ORGANIZED HEALTH CARE EDUCATION/TRAINING PROGRAM

## 2024-04-08 PROCEDURE — G8417 CALC BMI ABV UP PARAM F/U: HCPCS | Performed by: STUDENT IN AN ORGANIZED HEALTH CARE EDUCATION/TRAINING PROGRAM

## 2024-04-08 PROCEDURE — 99213 OFFICE O/P EST LOW 20 MIN: CPT | Performed by: STUDENT IN AN ORGANIZED HEALTH CARE EDUCATION/TRAINING PROGRAM

## 2024-04-08 PROCEDURE — 3074F SYST BP LT 130 MM HG: CPT | Performed by: STUDENT IN AN ORGANIZED HEALTH CARE EDUCATION/TRAINING PROGRAM

## 2024-04-08 PROCEDURE — 3017F COLORECTAL CA SCREEN DOC REV: CPT | Performed by: STUDENT IN AN ORGANIZED HEALTH CARE EDUCATION/TRAINING PROGRAM

## 2024-04-08 PROCEDURE — 1124F ACP DISCUSS-NO DSCNMKR DOCD: CPT | Performed by: STUDENT IN AN ORGANIZED HEALTH CARE EDUCATION/TRAINING PROGRAM

## 2024-04-08 NOTE — PROGRESS NOTES
S: 69 y.o. male here for f/u LE pain, pulselessness, paresthesia, pallor. Vascular said he's fine, f/u outpt. Pt feeling better today.   Low BP. Asx.     O: VS: BP (!) 93/52   Pulse 66   Temp 97.9 °F (36.6 °C) (Temporal)   Resp 18   SpO2 97%    General: NAD, alert and interacting appropriately.    CV:  RRR, no gallops, rubs, or murmurs    Resp: CTAB      Impression: f/u LE pain, pulselessness, paresthesia, pallor.   Plan:   F/u Vascular, CTM  CTM BP      Attending Physician Statement  I have discussed the case, including pertinent history and exam findings with the resident.  I agree with the documented assessment and plan.

## 2024-04-08 NOTE — PROGRESS NOTES
[facility]  FAMILY MEDICINE RESIDENCY PROGRAM  DATE OF VISIT : 2024    Patient : Larry Mares   Age : 69 y.o.    : 1955   MRN : [id number]   ______________________________________________________________________    Chief Complaint :   Chief Complaint   Patient presents with    Follow-up    Circulatory Problem       HPI : Larry Mares is 69 y.o. male who presented to the clinic today for PVD. Duing last visit, was sent to ER due to concern for acute on chronic limb ischemia. Vascular surgery was consultewd from ER, evaluated patient and said ok to f/u OP. Needs to schedule arteriogram. Pain and color has improved per patient. Dr. Ogden's office reached out to patient and stated he can follow with their group if he does not use [facility]. Patient is agreeable to that.         Patient's medications, allergies, past medical, surgical, social and family histories were reviewed and updated as appropriate.    Past Medical History :  Past Medical History:   Diagnosis Date    Cancer (HCC) PROSTATE    , patient reports bone mets    Erectile dysfunction     Hyperlipidemia     Hypertension     Osteoarthritis, hip/pelvic region and thigh 2013    Prostate cancer (HCC) 2013    PVD (peripheral vascular disease) with claudication ([facility]) 2023    Ankle-brachial index of 0.79 on the right and 0.42 on the left     Past Surgical History:   Procedure Laterality Date    BACK SURGERY      HIP SURGERY Right     LEG SURGERY      LUMBAR SPINE SURGERY  10/30/2017    PLIF L2 - S1   and laminectomy    PROSTATE BIOPSY         Allergies :   Allergies   Allergen Reactions    Ibuprofen Swelling    Benzocaine      EDEMA        Medication List :    Current Outpatient Medications   Medication Sig Dispense Refill    amLODIPine (NORVASC) 10 MG tablet Take 1 tablet by mouth daily 90 tablet 0    cilostazol (PLETAL) 100 MG tablet Take 1 tablet by mouth 2 times daily 180 tablet 0

## 2024-04-09 DIAGNOSIS — E11.65 TYPE 2 DIABETES MELLITUS WITH HYPERGLYCEMIA, UNSPECIFIED WHETHER LONG TERM INSULIN USE (HCC): ICD-10-CM

## 2024-04-09 RX ORDER — ATORVASTATIN CALCIUM 80 MG/1
80 TABLET, FILM COATED ORAL NIGHTLY
Qty: 90 TABLET | Refills: 3 | Status: SHIPPED | OUTPATIENT
Start: 2024-04-09

## 2024-04-25 ENCOUNTER — OFFICE VISIT (OUTPATIENT)
Dept: VASCULAR SURGERY | Age: 69
End: 2024-04-25

## 2024-04-25 ENCOUNTER — TELEPHONE (OUTPATIENT)
Dept: VASCULAR SURGERY | Age: 69
End: 2024-04-25

## 2024-04-25 DIAGNOSIS — B35.3 TINEA PEDIS OF BOTH FEET: ICD-10-CM

## 2024-04-25 DIAGNOSIS — F17.200 SMOKING: ICD-10-CM

## 2024-04-25 DIAGNOSIS — I73.9 PVD (PERIPHERAL VASCULAR DISEASE) WITH CLAUDICATION (HCC): ICD-10-CM

## 2024-04-25 DIAGNOSIS — Z86.73 H/O: CVA (CEREBROVASCULAR ACCIDENT): Primary | ICD-10-CM

## 2024-04-25 RX ORDER — CLOTRIMAZOLE 1 %
CREAM (GRAM) TOPICAL
Qty: 1 EACH | Refills: 10 | Status: SHIPPED | OUTPATIENT
Start: 2024-04-25

## 2024-04-25 RX ORDER — TERBINAFINE HYDROCHLORIDE 250 MG/1
250 TABLET ORAL DAILY
Qty: 20 TABLET | Refills: 0 | Status: SHIPPED | OUTPATIENT
Start: 2024-04-25

## 2024-04-25 NOTE — PROGRESS NOTES
Chief Complaint:   Chief Complaint   Patient presents with    Circulatory Problem     Follow up PVD.         HPI: Patient came to the office, for the evaluation of vascular status of the legs, has unfortunately multiple, risk factors including multiple strokes in the past with a pontine infarct, tells me he is doing better, some of the function in the hands has come back    Recently was complaining of some discomfort in the toes of the left foot, does [facility] but no actual ulcerations    His multiple medical issues including hypertension, hyperlipidemia, gout, stroke, carcinoma the prostate, diabetes mellitus reasonably stable      Patient denies any focal lateralizing neurological symptoms like loss of speech, vision or loss of function of extremity    Patient can walk short distance to the help of walker and at home with a cane, and denies any symptoms of rest pain    Allergies   Allergen Reactions    Ibuprofen Swelling    Benzocaine      EDEMA        Current Outpatient Medications   Medication Sig Dispense Refill    clotrimazole (LOTRIMIN AF) 1 % cream Apply topically to the toes, in between the toes, foot up to the ankle 2 times daily, both legs, for 1 month 1 each 10    terbinafine (LAMISIL) 250 MG tablet Take 1 tablet by mouth daily 20 tablet 0    atorvastatin (LIPITOR) 80 MG tablet take 1 tablet by mouth every evening 90 tablet 3    amLODIPine (NORVASC) 10 MG tablet Take 1 tablet by mouth daily 90 tablet 0    cilostazol (PLETAL) 100 MG tablet Take 1 tablet by mouth 2 times daily 180 tablet 0    empagliflozin (JARDIANCE) 25 MG tablet Take 1 tablet by mouth daily 90 tablet 0    famotidine (PEPCID) 20 MG tablet Take 1 tablet by mouth daily 90 tablet 0    hydroCHLOROthiazide 12.5 MG tablet Take 1 tablet by mouth every morning 90 tablet 0    allopurinol (ZYLOPRIM) 100 MG tablet Take 1 tablet by mouth daily      aspirin 81 MG chewable tablet Take 1 tablet by mouth daily

## 2024-04-25 NOTE — TELEPHONE ENCOUNTER
Notified patient of lower extremity arterial doppler study at [facility] on 5-13-24 at 1:00 pm. Titus at 12:30 pm.

## 2024-05-02 RX ORDER — ALLOPURINOL 100 MG/1
TABLET ORAL
Qty: 90 TABLET | OUTPATIENT
Start: 2024-05-02

## 2024-05-13 ENCOUNTER — HOSPITAL ENCOUNTER (OUTPATIENT)
Dept: INTERVENTIONAL RADIOLOGY/VASCULAR | Age: 69
Discharge: HOME | End: 2024-05-15
Attending: SURGERY
Payer: COMMERCIAL

## 2024-05-13 DIAGNOSIS — I73.9 PVD (PERIPHERAL VASCULAR DISEASE) WITH CLAUDICATION (HCC): ICD-10-CM

## 2024-05-13 DIAGNOSIS — B35.3 TINEA PEDIS OF BOTH FEET: ICD-10-CM

## 2024-05-13 DIAGNOSIS — F17.200 SMOKING: ICD-10-CM

## 2024-05-13 DIAGNOSIS — Z86.73 H/O: CVA (CEREBROVASCULAR ACCIDENT): ICD-10-CM

## 2024-05-13 PROCEDURE — 93923 UPR/LXTR ART STDY 3+ LVLS: CPT

## 2024-05-13 PROCEDURE — 93923 UPR/LXTR ART STDY 3+ LVLS: CPT | Performed by: SURGERY

## 2024-05-16 ENCOUNTER — OFFICE VISIT (OUTPATIENT)
Dept: FAMILY MEDICINE CLINIC | Age: 69
End: 2024-05-16
Payer: COMMERCIAL

## 2024-05-16 VITALS
WEIGHT: 197 LBS | OXYGEN SATURATION: 97 % | RESPIRATION RATE: 18 BRPM | BODY MASS INDEX: 27.58 KG/M2 | HEART RATE: 56 BPM | DIASTOLIC BLOOD PRESSURE: 63 MMHG | HEIGHT: 71 IN | TEMPERATURE: 97.7 F | SYSTOLIC BLOOD PRESSURE: 132 MMHG

## 2024-05-16 DIAGNOSIS — M1A.9XX0 CHRONIC GOUT INVOLVING TOE WITHOUT TOPHUS, UNSPECIFIED CAUSE, UNSPECIFIED LATERALITY: ICD-10-CM

## 2024-05-16 DIAGNOSIS — B35.1 ONYCHOMYCOSIS: ICD-10-CM

## 2024-05-16 DIAGNOSIS — I73.9 PVD (PERIPHERAL VASCULAR DISEASE) WITH CLAUDICATION (HCC): Primary | ICD-10-CM

## 2024-05-16 DIAGNOSIS — E11.65 TYPE 2 DIABETES MELLITUS WITH HYPERGLYCEMIA, UNSPECIFIED WHETHER LONG TERM INSULIN USE (HCC): ICD-10-CM

## 2024-05-16 PROBLEM — M54.9 BACK PAIN: Status: ACTIVE | Noted: 2024-05-16

## 2024-05-16 PROBLEM — M25.559 ARTHRALGIA OF HIP: Status: ACTIVE | Noted: 2024-05-16

## 2024-05-16 PROBLEM — G82.20 PARAPARESIS (HCC): Status: ACTIVE | Noted: 2017-10-26

## 2024-05-16 PROBLEM — I63.9 CEREBRAL INFARCTION, UNSPECIFIED (HCC): Status: ACTIVE | Noted: 2024-05-16

## 2024-05-16 PROBLEM — E11.40 TYPE 2 DIABETES MELLITUS WITH DIABETIC NEUROPATHY, UNSPECIFIED (HCC): Status: ACTIVE | Noted: 2024-05-16

## 2024-05-16 PROBLEM — N18.2 CHRONIC KIDNEY DISEASE, STAGE 2 (MILD): Chronic | Status: ACTIVE | Noted: 2021-09-01

## 2024-05-16 PROBLEM — N52.9 ERECTILE DYSFUNCTION: Status: ACTIVE | Noted: 2024-05-16

## 2024-05-16 PROBLEM — I25.10 ATHEROSCLEROTIC HEART DISEASE OF NATIVE CORONARY ARTERY WITHOUT ANGINA PECTORIS: Status: ACTIVE | Noted: 2023-03-31

## 2024-05-16 PROBLEM — I69.30 SEQUELAE OF CEREBRAL INFARCTION: Status: ACTIVE | Noted: 2024-05-16

## 2024-05-16 PROBLEM — M62.81 MUSCLE WEAKNESS (GENERALIZED): Status: ACTIVE | Noted: 2023-03-31

## 2024-05-16 PROBLEM — I15.1 HYPERTENSION SECONDARY TO OTHER RENAL DISORDERS: Chronic | Status: ACTIVE | Noted: 2021-09-01

## 2024-05-16 PROBLEM — R31.9 HEMATURIA: Status: ACTIVE | Noted: 2024-05-16

## 2024-05-16 PROBLEM — E11.51 TYPE 2 DIABETES MELLITUS WITH DIABETIC PERIPHERAL ANGIOPATHY WITHOUT GANGRENE (HCC): Status: ACTIVE | Noted: 2023-03-31

## 2024-05-16 LAB
ALBUMIN: 4.1 G/DL (ref 3.5–5.2)
ALP BLD-CCNC: 114 U/L (ref 40–129)
ALT SERPL-CCNC: 10 U/L (ref 0–40)
ANION GAP SERPL CALCULATED.3IONS-SCNC: 16 MMOL/L (ref 7–16)
AST SERPL-CCNC: 8 U/L (ref 0–39)
BILIRUB SERPL-MCNC: <0.2 MG/DL (ref 0–1.2)
BUN BLDV-MCNC: 36 MG/DL (ref 6–23)
CALCIUM SERPL-MCNC: 9.5 MG/DL (ref 8.6–10.2)
CHLORIDE BLD-SCNC: 106 MMOL/L (ref 98–107)
CO2: 16 MMOL/L (ref 22–29)
CREAT SERPL-MCNC: 1.3 MG/DL (ref 0.7–1.2)
GFR, ESTIMATED: 61 ML/MIN/1.73M2
GLUCOSE BLD-MCNC: 125 MG/DL (ref 74–99)
POTASSIUM SERPL-SCNC: 4.2 MMOL/L (ref 3.5–5)
SODIUM BLD-SCNC: 138 MMOL/L (ref 132–146)
TOTAL PROTEIN: 7.6 G/DL (ref 6.4–8.3)
URIC ACID: 5.9 MG/DL (ref 3.4–7)

## 2024-05-16 PROCEDURE — 1124F ACP DISCUSS-NO DSCNMKR DOCD: CPT | Performed by: STUDENT IN AN ORGANIZED HEALTH CARE EDUCATION/TRAINING PROGRAM

## 2024-05-16 PROCEDURE — 3078F DIAST BP <80 MM HG: CPT | Performed by: STUDENT IN AN ORGANIZED HEALTH CARE EDUCATION/TRAINING PROGRAM

## 2024-05-16 PROCEDURE — 3044F HG A1C LEVEL LT 7.0%: CPT | Performed by: STUDENT IN AN ORGANIZED HEALTH CARE EDUCATION/TRAINING PROGRAM

## 2024-05-16 PROCEDURE — 99213 OFFICE O/P EST LOW 20 MIN: CPT | Performed by: STUDENT IN AN ORGANIZED HEALTH CARE EDUCATION/TRAINING PROGRAM

## 2024-05-16 PROCEDURE — 3075F SYST BP GE 130 - 139MM HG: CPT | Performed by: STUDENT IN AN ORGANIZED HEALTH CARE EDUCATION/TRAINING PROGRAM

## 2024-05-16 RX ORDER — ALLOPURINOL 100 MG/1
100 TABLET ORAL DAILY
Qty: 30 TABLET | Refills: 1 | Status: SHIPPED | OUTPATIENT
Start: 2024-05-16

## 2024-05-16 RX ORDER — PEN NEEDLE, DIABETIC 29 G X1/2"
NEEDLE, DISPOSABLE MISCELLANEOUS
COMMUNITY
Start: 2024-05-15 | End: 2024-05-16 | Stop reason: SDUPTHER

## 2024-05-16 RX ORDER — PEN NEEDLE, DIABETIC 29 G X1/2"
1 NEEDLE, DISPOSABLE MISCELLANEOUS DAILY
Qty: 100 EACH | Refills: 2 | Status: SHIPPED | OUTPATIENT
Start: 2024-05-16

## 2024-05-16 RX ORDER — PEN NEEDLE, DIABETIC 31 GX5/16"
NEEDLE, DISPOSABLE MISCELLANEOUS
Qty: 200 EACH | Refills: 11 | Status: SHIPPED | OUTPATIENT
Start: 2024-05-16

## 2024-05-16 NOTE — PROGRESS NOTES
[facility]  FAMILY MEDICINE RESIDENCY PROGRAM  DATE OF VISIT : 2024    Patient : Larry Mares   Age : 69 y.o.    : 1955   MRN : [id number]   ______________________________________________________________________    Chief Complaint :   Chief Complaint   Patient presents with    Medication Refill     Pen needles, alcohol swabs    Follow-Up from Hospital       HPI : Larry Mares is 69 y.o. male who presented to the clinic today for PVD. He followed up with vascular surgery. They had a follow up ultrasound , similar to his last US 3/2023. He is supposed to follow up next year with vascular. He wouldl espinoza to go to OT for his left leg. I did explain to him that PT might be more helpful, but he insisted on OT.  He was also given lamisil and lotrimin. He completed lamisil  [facility] Has h/o gout. needs refill on allopurinol. Has not a flare up in years.       Patient's medications, allergies, past medical, surgical, social and family histories were reviewed and updated as appropriate.    Past Medical History :  Past Medical History:   Diagnosis Date    Cancer (HCC) PROSTATE    , patient reports bone mets    Erectile dysfunction     Hyperlipidemia     Hypertension     Osteoarthritis, hip/pelvic region and thigh 2013    Prostate cancer (HCC) 2013    PVD (peripheral vascular disease) with claudication ([facility]) 2023    Ankle-brachial index of 0.79 on the right and 0.42 on the left    Tinea pedis of both feet 2024     Past Surgical History:   Procedure Laterality Date    BACK SURGERY      HIP SURGERY Right     LEG SURGERY      LUMBAR SPINE SURGERY  10/30/2017    PLIF L2 - S1   and laminectomy    PROSTATE BIOPSY         Allergies :   Allergies   Allergen Reactions    Ibuprofen Swelling    Benzocaine      EDEMA        Medication List :    Current Outpatient Medications   Medication Sig Dispense Refill    allopurinol (ZYLOPRIM) 100 MG tablet 
whether long term insulin use (HCC)  - refill needles and alcohol wipes. Will check A1C during next visit   - DROPLET PEN NEEDLES 29G X 12MM MISC; Inject 1 each into the skin daily  Dispense: 100 each; Refill: 2  - Alcohol Swabs (ALCOHOL PREP) PADS; Use to clean injection site 4 times a day. Use to clean finger prior to checking sugar 4 times a day.  Dispense: 200 each; Refill: 11              Return to Office: Return in about 1 month (around 6/16/2024) for diabetes.      Assessment/Plan:    1) Peripheral Vascular disease: Severe but Stable. Under care of Vascular        -     Continue current management        -     Referral to PT due to LLE weakness    2) Onychomyocosis: Vascular prescribed Lamisil and treatment completed.         -     [facility]        -     check LFT post Lamisil treatment    3) Gout: Stable and well controlled        -     Refill allopurinol        -     Uric acid level today    4) Insulin Requiring Diabetes:         -    Patient requests refills of glucose monitoring supplies        -     Patient has upcoming appointment for DM check up         Return in about 1 month (around 6/16/2024) for diabetes.    Attending Physician Statement  I have discussed the case, including pertinent history and exam findings with the resident.  I agree with the documented assessment and plan.         Liyah Rodriguez MD

## 2024-06-06 DIAGNOSIS — Z76.0 MEDICATION REFILL: ICD-10-CM

## 2024-06-07 RX ORDER — LISINOPRIL 40 MG/1
40 TABLET ORAL EVERY MORNING
Qty: 120 TABLET | Refills: 0 | Status: SHIPPED | OUTPATIENT
Start: 2024-06-07

## 2024-06-12 ENCOUNTER — OFFICE VISIT (OUTPATIENT)
Dept: FAMILY MEDICINE CLINIC | Age: 69
End: 2024-06-12
Payer: COMMERCIAL

## 2024-06-12 VITALS
SYSTOLIC BLOOD PRESSURE: 164 MMHG | OXYGEN SATURATION: 97 % | TEMPERATURE: 97.8 F | DIASTOLIC BLOOD PRESSURE: 68 MMHG | BODY MASS INDEX: 27.58 KG/M2 | WEIGHT: 197 LBS | RESPIRATION RATE: 18 BRPM | HEART RATE: 53 BPM | HEIGHT: 71 IN

## 2024-06-12 DIAGNOSIS — B35.3 TINEA PEDIS OF LEFT FOOT: ICD-10-CM

## 2024-06-12 DIAGNOSIS — I73.9 PVD (PERIPHERAL VASCULAR DISEASE) WITH CLAUDICATION (HCC): Primary | ICD-10-CM

## 2024-06-12 DIAGNOSIS — Z91.81 AT HIGH RISK FOR FALLS: ICD-10-CM

## 2024-06-12 PROCEDURE — 3077F SYST BP >= 140 MM HG: CPT | Performed by: STUDENT IN AN ORGANIZED HEALTH CARE EDUCATION/TRAINING PROGRAM

## 2024-06-12 PROCEDURE — G2211 COMPLEX E/M VISIT ADD ON: HCPCS | Performed by: STUDENT IN AN ORGANIZED HEALTH CARE EDUCATION/TRAINING PROGRAM

## 2024-06-12 PROCEDURE — G8417 CALC BMI ABV UP PARAM F/U: HCPCS | Performed by: STUDENT IN AN ORGANIZED HEALTH CARE EDUCATION/TRAINING PROGRAM

## 2024-06-12 PROCEDURE — 3078F DIAST BP <80 MM HG: CPT | Performed by: STUDENT IN AN ORGANIZED HEALTH CARE EDUCATION/TRAINING PROGRAM

## 2024-06-12 PROCEDURE — 3017F COLORECTAL CA SCREEN DOC REV: CPT | Performed by: STUDENT IN AN ORGANIZED HEALTH CARE EDUCATION/TRAINING PROGRAM

## 2024-06-12 PROCEDURE — G8427 DOCREV CUR MEDS BY ELIG CLIN: HCPCS | Performed by: STUDENT IN AN ORGANIZED HEALTH CARE EDUCATION/TRAINING PROGRAM

## 2024-06-12 PROCEDURE — 4004F PT TOBACCO SCREEN RCVD TLK: CPT | Performed by: STUDENT IN AN ORGANIZED HEALTH CARE EDUCATION/TRAINING PROGRAM

## 2024-06-12 PROCEDURE — 1124F ACP DISCUSS-NO DSCNMKR DOCD: CPT | Performed by: STUDENT IN AN ORGANIZED HEALTH CARE EDUCATION/TRAINING PROGRAM

## 2024-06-12 PROCEDURE — 99213 OFFICE O/P EST LOW 20 MIN: CPT | Performed by: STUDENT IN AN ORGANIZED HEALTH CARE EDUCATION/TRAINING PROGRAM

## 2024-06-12 RX ORDER — CLOTRIMAZOLE 1 %
CREAM (GRAM) TOPICAL
Qty: 1 EACH | Refills: 10 | Status: SHIPPED | OUTPATIENT
Start: 2024-06-12

## 2024-06-12 RX ORDER — ACETAMINOPHEN 500 MG
500 TABLET ORAL EVERY 6 HOURS PRN
Qty: 120 TABLET | Refills: 0 | Status: SHIPPED | OUTPATIENT
Start: 2024-06-12

## 2024-06-12 NOTE — PROGRESS NOTES
[facility]  FAMILY MEDICINE RESIDENCY PROGRAM  DATE OF VISIT : 2024    Patient : Larry Mares   Age : 69 y.o.    : 1955   MRN : [id number]   ______________________________________________________________________    Chief Complaint :   Chief Complaint   Patient presents with    Foot Pain     Left   Would like something for pain        HPI : Larry Mares is 69 y.o. male who presented to the clinic today for PVD follow up. Having more pain. Has not been taking anything. Needs refill on lotrimin.  Has h/o gout, but uric acid during last visit was normal. Quit smoking. On lipitor, pletal.     Patient's medications, allergies, past medical, surgical, social and family histories were reviewed and updated as appropriate.    Past Medical History :  Past Medical History:   Diagnosis Date    Cancer (HCC) PROSTATE    , patient reports bone mets    Erectile dysfunction     Hyperlipidemia     Hypertension     Osteoarthritis, hip/pelvic region and thigh 2013    Prostate cancer ([facility]) 2013    PVD (peripheral vascular disease) with claudication ([facility]) 2023    Ankle-brachial index of 0.79 on the right and 0.42 on the left    Tinea pedis of both feet 2024     Past Surgical History:   Procedure Laterality Date    BACK SURGERY      HIP SURGERY Right     LEG SURGERY      LUMBAR SPINE SURGERY  10/30/2017    PLIF L2 - S1   and laminectomy    PROSTATE BIOPSY         Allergies :   Allergies   Allergen Reactions    Ibuprofen Swelling    Benzocaine      EDEMA        Medication List :    Current Outpatient Medications   Medication Sig Dispense Refill    acetaminophen (TYLENOL) 500 MG tablet Take 1 tablet by mouth every 6 hours as needed for Pain 120 tablet 0    clotrimazole (LOTRIMIN AF) 1 % cream Apply topically to the toes, in between the toes, foot up to the ankle 2 times daily, both legs, for 1 month 1 each 10    lisinopril (PRINIVIL;ZESTRIL) 40 MG tablet take 1 tablet by

## 2024-06-12 NOTE — PROGRESS NOTES
S: 69 y.o. male presents today for: Foot Pain     PVD w/ increased pain. Not doing an exercise program. Follows w vascular. Recently stopped smoking. Takes statin.     Cologuard candidate reviewed instructions.     O: VS: BP (!) 164/68 (Site: Right Upper Arm, Position: Sitting, Cuff Size: Large Adult)   Pulse 53   Temp 97.8 °F (36.6 °C) (Temporal)   Resp 18   Ht 1.803 m (5' 11\")   Wt 89.4 kg (197 lb)   SpO2 97%   BMI 27.48 kg/m²   AAO/NAD, appropriate affect for mood  CV:  RRR, no murmur  Resp: CTAB  Ext: pulses nonpalpable. Skin color normal, warm. Toes minimal movement, stiff, painful. +hx gout. No LE Edema.     Impression/Plan:   1) PVD w/ Foot pain - trial tylenol. Cont lamisil/lotrimin per vascular. Not candidate for re-vasc. Consider exercise program.     Attending Physician Statement  I have discussed the case, including pertinent history and exam findings with the resident.  I agree with the documented assessment and plan.      Og Nguyen MD

## 2024-06-19 DIAGNOSIS — I10 ESSENTIAL HYPERTENSION: ICD-10-CM

## 2024-06-19 NOTE — TELEPHONE ENCOUNTER
Please refill :)  Last Appointment:  6/12/2024  Future Appointments   Date Time Provider Department Center   7/19/2024  2:00 PM Alice Lucas MD [facility]   5/15/2025  1:15 PM Celso Ott MD VAS/MED [facility]

## 2024-06-20 RX ORDER — HYDROCHLOROTHIAZIDE 12.5 MG/1
12.5 TABLET ORAL EVERY MORNING
Qty: 90 TABLET | Refills: 0 | Status: SHIPPED | OUTPATIENT
Start: 2024-06-20

## 2024-06-21 ENCOUNTER — TELEPHONE (OUTPATIENT)
Dept: VASCULAR SURGERY | Age: 69
End: 2024-06-21

## 2024-06-21 DIAGNOSIS — I73.9 PVD (PERIPHERAL VASCULAR DISEASE) WITH CLAUDICATION (HCC): Primary | ICD-10-CM

## 2024-06-21 DIAGNOSIS — N18.32 CHRONIC RENAL FAILURE, STAGE 3B (HCC): ICD-10-CM

## 2024-06-21 NOTE — TELEPHONE ENCOUNTER
Spoke with patient, gave him directions to [facility].  He has to give his insurance a 3 day notice for transportation.   He states he will arrange transportation for 6-24-24.

## 2024-06-21 NOTE — TELEPHONE ENCOUNTER
I called the patient again, this time fortunately patient was at home answered the phone    Explained to him the test results, significant mainly femoral-popliteal arterial occlusive disease of the left leg with evidence of mild iliac disease    Patient tells me that he is still having problems with the pain discomfort in the left foot and the toes, and the arterial Doppler study revealed significantly diminished pulse volume recordings of the metatarsals and the toes, and as there is some evidence of iliac artery disease, will check his BMP, if satisfactory may consider CTA of the aorta with runoff    Patient was recommended, to continue the low-dose aspirin, the cilostazol in the meanwhile, get the blood work done at [facility] and wants to review the results with scheduling for angiogram, all his questions were answered

## 2024-06-23 DIAGNOSIS — I73.9 PVD (PERIPHERAL VASCULAR DISEASE) WITH CLAUDICATION (HCC): ICD-10-CM

## 2024-06-24 RX ORDER — CILOSTAZOL 100 MG/1
100 TABLET ORAL 2 TIMES DAILY
Qty: 180 TABLET | Refills: 0 | Status: SHIPPED
Start: 2024-06-24 | End: 2024-06-27 | Stop reason: SDUPTHER

## 2024-06-24 NOTE — TELEPHONE ENCOUNTER
Last Appointment:  6/12/2024  Future Appointments   Date Time Provider Department Center   7/19/2024  2:00 PM Alice Lucas MD [facility] [place] [facility]   5/15/2025  1:15 PM Celso Ott MD [facility]/MED [facility]

## 2024-06-25 ENCOUNTER — CLINICAL DOCUMENTATION (OUTPATIENT)
Dept: WOUND CARE | Age: 69
End: 2024-06-25

## 2024-06-25 ENCOUNTER — HOSPITAL ENCOUNTER (OUTPATIENT)
Age: 69
Discharge: HOME | End: 2024-06-25
Payer: COMMERCIAL

## 2024-06-25 ENCOUNTER — CLINICAL DOCUMENTATION (OUTPATIENT)
Dept: VASCULAR SURGERY | Age: 69
End: 2024-06-25

## 2024-06-25 DIAGNOSIS — I73.9 PVD (PERIPHERAL VASCULAR DISEASE) WITH CLAUDICATION (HCC): ICD-10-CM

## 2024-06-25 DIAGNOSIS — I70.222 ATHEROSCLEROSIS OF NATIVE ARTERY OF LEFT LOWER EXTREMITY WITH REST PAIN (HCC): Primary | ICD-10-CM

## 2024-06-25 DIAGNOSIS — N18.32 CHRONIC RENAL FAILURE, STAGE 3B (HCC): ICD-10-CM

## 2024-06-25 PROBLEM — I70.229 ATHEROSCLEROSIS OF NATIVE ARTERIES OF EXTREMITY WITH REST PAIN (HCC): Status: ACTIVE | Noted: 2024-06-25

## 2024-06-25 LAB
ANION GAP SERPL CALCULATED.3IONS-SCNC: 19 MMOL/L (ref 7–16)
BUN SERPL-MCNC: 19 MG/DL (ref 6–23)
CALCIUM SERPL-MCNC: 9.2 MG/DL (ref 8.6–10.2)
CHLORIDE SERPL-SCNC: 108 MMOL/L (ref 98–107)
CO2 SERPL-SCNC: 19 MMOL/L (ref 22–29)
CREAT SERPL-MCNC: 1.2 MG/DL (ref 0.7–1.2)
GFR, ESTIMATED: 64 ML/MIN/1.73M2
GLUCOSE SERPL-MCNC: 103 MG/DL (ref 74–99)
POTASSIUM SERPL-SCNC: 4 MMOL/L (ref 3.5–5)
SODIUM SERPL-SCNC: 146 MMOL/L (ref 132–146)

## 2024-06-25 PROCEDURE — 36415 COLL VENOUS BLD VENIPUNCTURE: CPT

## 2024-06-25 PROCEDURE — 80048 BASIC METABOLIC PNL TOTAL CA: CPT

## 2024-06-26 ENCOUNTER — TELEPHONE (OUTPATIENT)
Dept: VASCULAR SURGERY | Age: 69
End: 2024-06-26

## 2024-06-26 NOTE — TELEPHONE ENCOUNTER
PAD (peripheral artery disease)   Elevated troponin   Appreciate cards consult.  Tn elevation from infection and hypotension.  TTE done and was unchanged.  Held cozaar and chlorthalidone in setting of ANGELICA, but may restart at DC.     Spoke with the pt, scheduled abdominal aortogram possible intervention with Dr. Ernst 7/16/24 at 7:30 a.m.  Pt was instructed to report to register at 6:15 a.m; will call the office back for further instructions.

## 2024-06-27 ENCOUNTER — TELEPHONE (OUTPATIENT)
Dept: VASCULAR SURGERY | Age: 69
End: 2024-06-27

## 2024-06-27 DIAGNOSIS — I10 ESSENTIAL HYPERTENSION: ICD-10-CM

## 2024-06-27 DIAGNOSIS — K21.9 GASTROESOPHAGEAL REFLUX DISEASE, UNSPECIFIED WHETHER ESOPHAGITIS PRESENT: ICD-10-CM

## 2024-06-27 DIAGNOSIS — M1A.9XX0 CHRONIC GOUT INVOLVING TOE WITHOUT TOPHUS, UNSPECIFIED CAUSE, UNSPECIFIED LATERALITY: ICD-10-CM

## 2024-06-27 DIAGNOSIS — Z76.0 MEDICATION REFILL: ICD-10-CM

## 2024-06-27 DIAGNOSIS — Z79.4 TYPE 2 DIABETES MELLITUS WITH HYPERGLYCEMIA, WITH LONG-TERM CURRENT USE OF INSULIN (HCC): ICD-10-CM

## 2024-06-27 DIAGNOSIS — E11.65 TYPE 2 DIABETES MELLITUS WITH HYPERGLYCEMIA, UNSPECIFIED WHETHER LONG TERM INSULIN USE (HCC): ICD-10-CM

## 2024-06-27 DIAGNOSIS — E11.65 TYPE 2 DIABETES MELLITUS WITH HYPERGLYCEMIA, WITH LONG-TERM CURRENT USE OF INSULIN (HCC): ICD-10-CM

## 2024-06-27 DIAGNOSIS — I73.9 PVD (PERIPHERAL VASCULAR DISEASE) WITH CLAUDICATION (HCC): ICD-10-CM

## 2024-06-27 RX ORDER — ASPIRIN 81 MG/1
81 TABLET, CHEWABLE ORAL DAILY
Qty: 30 TABLET | Refills: 2 | Status: SHIPPED | OUTPATIENT
Start: 2024-06-27

## 2024-06-27 RX ORDER — CLOTRIMAZOLE 1 %
CREAM (GRAM) TOPICAL
Qty: 1 EACH | Refills: 10 | Status: SHIPPED | OUTPATIENT
Start: 2024-06-27

## 2024-06-27 RX ORDER — LISINOPRIL 40 MG/1
40 TABLET ORAL EVERY MORNING
Qty: 120 TABLET | Refills: 0 | Status: SHIPPED | OUTPATIENT
Start: 2024-06-27

## 2024-06-27 RX ORDER — AMLODIPINE BESYLATE 10 MG/1
10 TABLET ORAL DAILY
Qty: 90 TABLET | Refills: 0 | Status: SHIPPED | OUTPATIENT
Start: 2024-06-27

## 2024-06-27 RX ORDER — ATORVASTATIN CALCIUM 80 MG/1
80 TABLET, FILM COATED ORAL NIGHTLY
Qty: 90 TABLET | Refills: 3 | Status: SHIPPED | OUTPATIENT
Start: 2024-06-27

## 2024-06-27 RX ORDER — CILOSTAZOL 100 MG/1
100 TABLET ORAL 2 TIMES DAILY
Qty: 180 TABLET | Refills: 0 | Status: SHIPPED | OUTPATIENT
Start: 2024-06-27

## 2024-06-27 RX ORDER — FAMOTIDINE 20 MG/1
20 TABLET, FILM COATED ORAL DAILY
Qty: 90 TABLET | Refills: 0 | Status: SHIPPED | OUTPATIENT
Start: 2024-06-27

## 2024-06-27 RX ORDER — METFORMIN HYDROCHLORIDE 500 MG/1
500 TABLET, EXTENDED RELEASE ORAL
Qty: 30 TABLET | Refills: 3 | Status: SHIPPED | OUTPATIENT
Start: 2024-06-27

## 2024-06-27 RX ORDER — TERBINAFINE HYDROCHLORIDE 250 MG/1
250 TABLET ORAL DAILY
Qty: 20 TABLET | Refills: 0 | Status: SHIPPED | OUTPATIENT
Start: 2024-06-27

## 2024-06-27 RX ORDER — PEN NEEDLE, DIABETIC 31 GX5/16"
NEEDLE, DISPOSABLE MISCELLANEOUS
Qty: 200 EACH | Refills: 11 | Status: SHIPPED | OUTPATIENT
Start: 2024-06-27

## 2024-06-27 RX ORDER — ACETAMINOPHEN 500 MG
500 TABLET ORAL EVERY 6 HOURS PRN
Qty: 120 TABLET | Refills: 0 | Status: SHIPPED | OUTPATIENT
Start: 2024-06-27

## 2024-06-27 RX ORDER — HYDROCHLOROTHIAZIDE 12.5 MG/1
12.5 TABLET ORAL EVERY MORNING
Qty: 90 TABLET | Refills: 0 | Status: SHIPPED | OUTPATIENT
Start: 2024-06-27

## 2024-06-27 RX ORDER — INSULIN GLARGINE 100 [IU]/ML
30 INJECTION, SOLUTION SUBCUTANEOUS NIGHTLY
Qty: 5 ADJUSTABLE DOSE PRE-FILLED PEN SYRINGE | Refills: 5 | Status: SHIPPED | OUTPATIENT
Start: 2024-06-27

## 2024-06-27 RX ORDER — PEN NEEDLE, DIABETIC 29 G X1/2"
1 NEEDLE, DISPOSABLE MISCELLANEOUS DAILY
Qty: 100 EACH | Refills: 2 | Status: SHIPPED | OUTPATIENT
Start: 2024-06-27

## 2024-06-27 RX ORDER — ALLOPURINOL 100 MG/1
100 TABLET ORAL DAILY
Qty: 30 TABLET | Refills: 1 | Status: SHIPPED | OUTPATIENT
Start: 2024-06-27

## 2024-06-27 RX ORDER — LEUPROLIDE ACETATE 22.5 MG
22.5 KIT INTRAMUSCULAR
Qty: 1 EACH | Refills: 2 | Status: SHIPPED | OUTPATIENT
Start: 2024-06-27

## 2024-06-27 NOTE — TELEPHONE ENCOUNTER
Pt phoned the office regarding 7/16/24 angiogram with Dr. Ernst.  He was instructed to report to [address] floor registration at 6:30 a.m and to be NPO after midnight the night before except heart and/or BP meds the morning of with sips of water and to hold metformin day of and 2 days after.  He will call with further questions.

## 2024-07-01 DIAGNOSIS — Z79.4 TYPE 2 DIABETES MELLITUS WITH HYPERGLYCEMIA, WITH LONG-TERM CURRENT USE OF INSULIN (HCC): ICD-10-CM

## 2024-07-01 DIAGNOSIS — E11.65 TYPE 2 DIABETES MELLITUS WITH HYPERGLYCEMIA, WITH LONG-TERM CURRENT USE OF INSULIN (HCC): ICD-10-CM

## 2024-07-01 RX ORDER — EMPAGLIFLOZIN 25 MG/1
25 TABLET, FILM COATED ORAL DAILY
Qty: 90 TABLET | Refills: 0 | OUTPATIENT
Start: 2024-07-01

## 2024-07-01 NOTE — TELEPHONE ENCOUNTER
Last Appointment:  6/12/2024  Future Appointments   Date Time Provider Department Center   7/19/2024  2:00 PM Alice Lucas MD [facility] [place] [facility]   5/15/2025  1:15 PM Celso Ott MD [facility]/MED [facility]

## 2024-07-08 ENCOUNTER — CLINICAL DOCUMENTATION (OUTPATIENT)
Dept: VASCULAR SURGERY | Age: 69
End: 2024-07-08

## 2024-07-08 NOTE — PROGRESS NOTES
Discussed with patient's podiatrist at the [facility] on [address], Dr. Ching Deshpande, who informed me, that patient had a callus over the left fourth toe, that was excised, x-rays of the foot also revealed evidence of osteomyelitis of the distal phalanx that was not there, on the x-rays done in February of this year    Informed her, for now treat that conservatively if necessary based on wound cultures and antibiotics, once intervention is performed by Dr. Ernst, based upon the extent of the improvement of the arterial perfusion to the foot, either treated conservatively with antibiotics versus toe amputation if there is adequate collateral flow after intervention by Dr. Ernst    Informed her, that I will pass this information to Dr. Ernst, patient is scheduled for angiogram and intervention by him on 16 July    Ceslo Ott MD

## 2024-07-16 ENCOUNTER — HOSPITAL ENCOUNTER (OUTPATIENT)
Age: 69
Discharge: HOME | End: 2024-07-16
Attending: SURGERY | Admitting: SURGERY
Payer: COMMERCIAL

## 2024-07-16 VITALS
TEMPERATURE: 97.5 F | SYSTOLIC BLOOD PRESSURE: 153 MMHG | BODY MASS INDEX: 27.58 KG/M2 | OXYGEN SATURATION: 99 % | HEART RATE: 55 BPM | HEIGHT: 71 IN | WEIGHT: 197 LBS | DIASTOLIC BLOOD PRESSURE: 76 MMHG | RESPIRATION RATE: 16 BRPM

## 2024-07-16 DIAGNOSIS — I73.9 PERIPHERAL VASCULAR DISEASE, UNSPECIFIED (HCC): ICD-10-CM

## 2024-07-16 LAB
ABO + RH BLD: NORMAL
ANION GAP SERPL CALCULATED.3IONS-SCNC: 12 MMOL/L (ref 7–16)
ARM BAND NUMBER: NORMAL
BLOOD BANK SAMPLE EXPIRATION: NORMAL
BLOOD GROUP ANTIBODIES SERPL: NEGATIVE
BUN SERPL-MCNC: 19 MG/DL (ref 6–23)
CALCIUM SERPL-MCNC: 9.1 MG/DL (ref 8.6–10.2)
CHLORIDE SERPL-SCNC: 105 MMOL/L (ref 98–107)
CO2 SERPL-SCNC: 23 MMOL/L (ref 22–29)
CREAT SERPL-MCNC: 1 MG/DL (ref 0.7–1.2)
ERYTHROCYTE [DISTWIDTH] IN BLOOD BY AUTOMATED COUNT: 14.2 % (ref 11.5–15)
GFR, ESTIMATED: 79 ML/MIN/1.73M2
GLUCOSE SERPL-MCNC: 126 MG/DL (ref 74–99)
HCT VFR BLD AUTO: 42.3 % (ref 37–54)
HGB BLD-MCNC: 13.7 G/DL (ref 12.5–16.5)
MCH RBC QN AUTO: 29 PG (ref 26–35)
MCHC RBC AUTO-ENTMCNC: 32.4 G/DL (ref 32–34.5)
MCV RBC AUTO: 89.6 FL (ref 80–99.9)
PLATELET # BLD AUTO: 296 K/UL (ref 130–450)
PMV BLD AUTO: 10.4 FL (ref 7–12)
POTASSIUM SERPL-SCNC: 3.8 MMOL/L (ref 3.5–5)
RBC # BLD AUTO: 4.72 M/UL (ref 3.8–5.8)
SODIUM SERPL-SCNC: 140 MMOL/L (ref 132–146)
WBC OTHER # BLD: 7.2 K/UL (ref 4.5–11.5)

## 2024-07-16 PROCEDURE — 6360000002 HC RX W HCPCS

## 2024-07-16 PROCEDURE — 2709999900 HC NON-CHARGEABLE SUPPLY: Performed by: SURGERY

## 2024-07-16 PROCEDURE — 6370000000 HC RX 637 (ALT 250 FOR IP): Performed by: SURGERY

## 2024-07-16 PROCEDURE — 80048 BASIC METABOLIC PNL TOTAL CA: CPT

## 2024-07-16 PROCEDURE — 86901 BLOOD TYPING SEROLOGIC RH(D): CPT

## 2024-07-16 PROCEDURE — 86900 BLOOD TYPING SEROLOGIC ABO: CPT

## 2024-07-16 PROCEDURE — 2500000003 HC RX 250 WO HCPCS: Performed by: SURGERY

## 2024-07-16 PROCEDURE — 86850 RBC ANTIBODY SCREEN: CPT

## 2024-07-16 PROCEDURE — 37224 PR REVSC OPN/PRG FEM/POP W/ANGIOPLASTY UNI: CPT | Performed by: SURGERY

## 2024-07-16 PROCEDURE — 85027 COMPLETE CBC AUTOMATED: CPT

## 2024-07-16 PROCEDURE — 6360000004 HC RX CONTRAST MEDICATION: Performed by: SURGERY

## 2024-07-16 PROCEDURE — 75716 ARTERY X-RAYS ARMS/LEGS: CPT | Performed by: SURGERY

## 2024-07-16 PROCEDURE — 6360000002 HC RX W HCPCS: Performed by: SURGERY

## 2024-07-16 PROCEDURE — 75774 ARTERY X-RAY EACH VESSEL: CPT | Performed by: SURGERY

## 2024-07-16 PROCEDURE — 37224 HC FEM POP TERRITORY PLASTY: CPT | Performed by: SURGERY

## 2024-07-16 PROCEDURE — 2580000003 HC RX 258

## 2024-07-16 PROCEDURE — 37220 PR REVASCULARIZATION ILIAC ARTERY ANGIOP 1ST VSL: CPT | Performed by: SURGERY

## 2024-07-16 PROCEDURE — 75710 ARTERY X-RAYS ARM/LEG: CPT | Performed by: SURGERY

## 2024-07-16 PROCEDURE — C2623 CATH, TRANSLUMIN, DRUG-COAT: HCPCS | Performed by: SURGERY

## 2024-07-16 PROCEDURE — C1887 CATHETER, GUIDING: HCPCS | Performed by: SURGERY

## 2024-07-16 PROCEDURE — C1769 GUIDE WIRE: HCPCS | Performed by: SURGERY

## 2024-07-16 PROCEDURE — 37220 HC ILIAC TERRITORY PLASTY: CPT | Performed by: SURGERY

## 2024-07-16 PROCEDURE — C1760 CLOSURE DEV, VASC: HCPCS | Performed by: SURGERY

## 2024-07-16 PROCEDURE — C1725 CATH, TRANSLUMIN NON-LASER: HCPCS | Performed by: SURGERY

## 2024-07-16 PROCEDURE — C1894 INTRO/SHEATH, NON-LASER: HCPCS | Performed by: SURGERY

## 2024-07-16 RX ORDER — CLOPIDOGREL BISULFATE 75 MG/1
75 TABLET ORAL DAILY
Qty: 30 TABLET | Refills: 3 | Status: SHIPPED | OUTPATIENT
Start: 2024-07-16 | End: 2024-11-13

## 2024-07-16 RX ORDER — HYDRALAZINE HYDROCHLORIDE 20 MG/ML
INJECTION INTRAMUSCULAR; INTRAVENOUS PRN
Status: DISCONTINUED | OUTPATIENT
Start: 2024-07-16 | End: 2024-07-16 | Stop reason: HOSPADM

## 2024-07-16 RX ORDER — FENTANYL CITRATE 50 UG/ML
INJECTION, SOLUTION INTRAMUSCULAR; INTRAVENOUS PRN
Status: DISCONTINUED | OUTPATIENT
Start: 2024-07-16 | End: 2024-07-16 | Stop reason: HOSPADM

## 2024-07-16 RX ORDER — SODIUM CHLORIDE 0.9 % (FLUSH) 0.9 %
5-40 SYRINGE (ML) INJECTION PRN
Status: DISCONTINUED | OUTPATIENT
Start: 2024-07-16 | End: 2024-07-16 | Stop reason: HOSPADM

## 2024-07-16 RX ORDER — MIDAZOLAM HYDROCHLORIDE 1 MG/ML
INJECTION INTRAMUSCULAR; INTRAVENOUS PRN
Status: DISCONTINUED | OUTPATIENT
Start: 2024-07-16 | End: 2024-07-16 | Stop reason: HOSPADM

## 2024-07-16 RX ORDER — SODIUM CHLORIDE 0.9 % (FLUSH) 0.9 %
5-40 SYRINGE (ML) INJECTION EVERY 12 HOURS SCHEDULED
Status: DISCONTINUED | OUTPATIENT
Start: 2024-07-16 | End: 2024-07-16 | Stop reason: HOSPADM

## 2024-07-16 RX ORDER — SODIUM CHLORIDE 9 MG/ML
INJECTION, SOLUTION INTRAVENOUS PRN
Status: DISCONTINUED | OUTPATIENT
Start: 2024-07-16 | End: 2024-07-16 | Stop reason: HOSPADM

## 2024-07-16 RX ORDER — CLOPIDOGREL BISULFATE 75 MG/1
TABLET ORAL PRN
Status: DISCONTINUED | OUTPATIENT
Start: 2024-07-16 | End: 2024-07-16 | Stop reason: HOSPADM

## 2024-07-16 RX ORDER — HEPARIN SODIUM 1000 [USP'U]/ML
INJECTION, SOLUTION INTRAVENOUS; SUBCUTANEOUS PRN
Status: DISCONTINUED | OUTPATIENT
Start: 2024-07-16 | End: 2024-07-16 | Stop reason: HOSPADM

## 2024-07-16 RX ORDER — SODIUM CHLORIDE 9 MG/ML
INJECTION, SOLUTION INTRAVENOUS CONTINUOUS
Status: DISCONTINUED | OUTPATIENT
Start: 2024-07-16 | End: 2024-07-16 | Stop reason: HOSPADM

## 2024-07-16 RX ADMIN — CEFAZOLIN 2000 MG: 2 INJECTION, POWDER, FOR SOLUTION INTRAMUSCULAR; INTRAVENOUS at 07:46

## 2024-07-16 NOTE — PROGRESS NOTES
RN messaged Dr. WEBSTER about patient oozing from site for an hour and bed rest was supposed to be up at 1245. Order to hold pressure and he will be here to assess the patient.

## 2024-07-16 NOTE — PROGRESS NOTES
CLINICAL PHARMACY NOTE: MEDS TO BEDS    Total # of Prescriptions Filled: 1   The following medications were delivered to the patient:  Clopidogrel 75 mg    Additional Documentation:     Delivered meds to patient at bedside

## 2024-07-16 NOTE — H&P
Vascular Surgery History & Physical Exam    Chief Complaint: Peripheral vascular disease, L LE tissue loss    HISTORY OF PRESENT ILLNESS:    The patient is a 69 y.o. male who presents to the hospital for elective arteriogram with possible intervention.  The patient has a history of peripheral vascular disease and L LE tissue loss.    ROS : All others Negative if blank [], Positive if [x]  General   [] Fevers   [] Chills   [] Weight Loss   Skin   [x] Tissue Loss   Eyes   [] Wears Glasses/Contacts   [] Vision Changes   Respiratory    [] Shortness of breath   Cardiovascular   [] Chest Pain   [] Shortness of breath with exertion   Gastrointestinal   [] Abdominal Pain     Past Medical History:   Diagnosis Date    Atherosclerosis of native arteries of extremity with rest pain (HCC) 06/25/2024    Left foot    Cancer (HCC) PROSTATE    2005, patient reports bone mets    Erectile dysfunction     Hyperlipidemia     Hypertension 2005    Osteoarthritis, hip/pelvic region and thigh 03/29/2013    Prostate cancer (HCC) 02/26/2013    PVD (peripheral vascular disease) with claudication ([facility]) 03/30/2023    Ankle-brachial index of 0.79 on the right and 0.42 on the left    Tinea pedis of both feet 04/25/2024     Past Surgical History:   Procedure Laterality Date    BACK SURGERY      HIP SURGERY Right     LEG SURGERY      LUMBAR SPINE SURGERY  10/30/2017    PLIF L2 - S1   and laminectomy    PROSTATE BIOPSY       Current Medications:     Current Facility-Administered Medications:     0.9 % sodium chloride infusion, , IntraVENous, Continuous, Fortino Chew APRN - CNP    sodium chloride flush 0.9 % injection 5-40 mL, 5-40 mL, IntraVENous, 2 times per day, Fortino Chew APRN - CNP    sodium chloride flush 0.9 % injection 5-40 mL, 5-40 mL, IntraVENous, PRN, Fortino Chew APRN - CNP    0.9 % sodium chloride infusion, , IntraVENous, PRN, Fortino Chew APRN - CNP    ceFAZolin (ANCEF) 2,000 mg in sterile water 20 mL IV syringe, 2,000 mg,

## 2024-07-16 NOTE — PROGRESS NOTES
Heart monitor removed, cleaned and returned to nurse's station. IV's removed. Discharge instructions given. Patient has plavix from our pharmacy. All questions answered at this time.

## 2024-07-16 NOTE — OP NOTE
Cardiovascular Lab Procedure Report    Larry Mares  1955    Date : 7/16/2024  Surgeon: Kay Ernst M.D.  Pre-procedure Diagnosis: L LE tissue loss  Post-procedure Diagnosis: Same  Procedure:      Right  common femoral artery access   with US guidance    8 fr angioseal used for closure    Bilateral lower extremity angiogram    TF Left lower extremity angiogram   52381  74246 Angiogram with catheter in Left   common femoral, superficial femoral, popliteal artery   35955 Left SFA, Popliteal plasty with 4x200 evercross, 5x250 DCB impact   06748 Left external iliac lithotripsy 8x30 shockwave   12224 Right external iliac lithotripsy 8x30 shockwave   Anesthesia: Local with IV sedation  Assistants: Cath Lab Staff  Estimated Blood Loss: Minimal  Complications: none  Findings: Abdominal aorta :   Right Left Left s/p Intervention   Common Iliac Art Patent Patent Patent   External Iliac Art 70% stenosis s/p patent 70% stenosis Patent   Internal Iliac Art patent occluded occluded   Common Femoral Art Patent Patent Patent   Superficial Femoral Art Not imaged > 80% stenosis proximal  Distal occlusion Patent, distal 30% residual stenosis   Profunda Femoral Art Not imaged Patent Patent   AK Popliteal Art Not imaged occluded Patent   BK Popliteal Art Not imaged Distally occluded Patent   Anterior Tibial Art Not imaged occluded Occluded, recons from peroneal   Tibioperoneal Trunk Not imaged Patent Patent   Peroneal Art Not imaged Patent  Patent   Posterior Tibial Art Not imaged occluded Occluded, recons from peroneal     Procedure Details :  There was previous CTA  or catheter based diagnostic imaging preformed prior to today's procedure.  Timeout preformed identifying pt and procedure. Groins prepped and draped in sterile fashion. Patient given sedation as needed throughout the case.     Right  common femoral artery was noted to be patent and was accessed under ultrasound guidance after infiltrating with local.   Micropuncture placed, exchanged out for 5 fr sheath.     Advantage glide wire and contra catheter advanced into distal aorta. Bilateral lower extremity angiogram preformed. Catheter and wire used to access Left  iliac system and catheter placed at common femoral and angiogram of theLeft  lower extremity preformed. Significant occlusive disease was noted in the sfa, pop and tibials.     Patient was given heparin bolus and redosed as needed throughout the case.      A 7 fr destination sheath advanced to common femoral artery on the Left.  Imaging with the catheter in the sfa to further evaluate the sfa, popliteal lesion. 0.35 [place] blazer catheter and advantage glide wire used to traverse stenosis.   The catheter was placed in the below knee popliteal artery and angiogram was preformed to further evaluate the tibial arteries and runoff as not well visualized form proximal imaging.       The sfa pop lesion was treated with plasty as above.  Post dilation with DCB impact.    Lithotripsy tx of the L external iliac and than the right external iliac after pulling sheath back to right side with 8x30 shockwave.    There was evidence of residual stenosis.  Completion angiogram noted much improved filling distally and brisk flow though the stenotic area.      Sheath and wire pulled back to Right iliac system.  After femoral angiogram a 8 fr angioseal device used to close the Right common femoral artery    Postop Exam  Right DP monophasic  PT weakly biphasic  Left DP  monophasic  PT monophasic    Plan  Continue Medical Management with asa, pletal, statin and start plavix  Encourage tobacco cessation  No plans for further surgical intervention     Kay Ernst MD    PCP : Audra Hood MD  Vascular : Dr Ott

## 2024-07-16 NOTE — DISCHARGE INSTRUCTIONS
Discharge Instructions for Lower extremity angiogram    Remove R Groin dressing and change til no longer draining    Call Dr. Ernst's office [phone] for follow-up appointment.    Groin Care  - keep clean and dry  - ok to shower or sponge bath  - ok to clean site with lukewarm water and mild soap  - use a soft wash cloth to gently wipe the incision area  - do not scrub the incision areas  - no swimming or baths    Home Care    Follow these guidelines after surgery:   Rest. Try to move as tolerated. A mix of rest and light activity improves healing.   The incision area may be sore for a few days. To minimize pain and soreness:   Take pain medicine as directed.   Avoid strenuous activity and heavy lifting.       Diet    You can return to your regular diet. You may work with a dietician who will help you follow a heart-healthy diet.   Physical Activity    You will feel sore after the surgery. Try to walk steadily within two weeks. You may be able to return to normal activities within 1-3 weeks. While recovering, you will need to avoid strenuous activities, like heavy lifting.   Ask your doctor when you will be able to return to work.    Do not drive unless your doctor has given you permission to do so.    Medications    Your doctor may recommend:   Over-the-counter or prescription pain medicine   Aspirin or a cholesterol-lowering drug to prevent complications   If you had to stop medicines before the procedure, ask your doctor when you can start again. Medicines that may have been stopped include:   Anti-inflammatory drugs (eg, aspirin, ibuprofen)   Blood thinners, like warfarin (Coumadin)   Clopidogrel (Plavix)   When taking medicines:   Take your medicine as directed. Do not change the amount or the schedule.   Do not stop taking them without talking to your doctor.   Do not share them.   Know what results and side effects to look for. Report them to your doctor.   Some drugs can be dangerous when mixed.

## 2024-07-16 NOTE — CARE COORDINATION
Social Work Discharge Planning:  Patient admitted for PVD(peripheral vascular disease). Patient lives alone in a 1st floor apt with elevator access. Patient ambulates with a rollator and has a quad cane.  Patient relayed PCP was Dr. Hood, relayed he has a new doctor, doesn't remember the name. [facility] is a  [facility] [address] [facility]. Patient's relative who drives for  [place] transportation will transport the patient home at discharge. SW will continue to follow and assist with transition of care.   Electronically signed by MARLENE Rhodes on 7/16/2024 at 1:12 PM

## 2024-07-18 LAB — ECHO BSA: 2.12 M2

## 2024-07-19 ENCOUNTER — OFFICE VISIT (OUTPATIENT)
Dept: FAMILY MEDICINE CLINIC | Age: 69
End: 2024-07-19
Payer: COMMERCIAL

## 2024-07-19 VITALS
HEART RATE: 75 BPM | RESPIRATION RATE: 18 BRPM | OXYGEN SATURATION: 96 % | DIASTOLIC BLOOD PRESSURE: 64 MMHG | HEIGHT: 71 IN | TEMPERATURE: 97.8 F | SYSTOLIC BLOOD PRESSURE: 136 MMHG | WEIGHT: 193 LBS | BODY MASS INDEX: 27.02 KG/M2

## 2024-07-19 DIAGNOSIS — I73.9 PVD (PERIPHERAL VASCULAR DISEASE) (HCC): Primary | ICD-10-CM

## 2024-07-19 DIAGNOSIS — I10 ESSENTIAL HYPERTENSION: ICD-10-CM

## 2024-07-19 DIAGNOSIS — E11.65 TYPE 2 DIABETES MELLITUS WITH HYPERGLYCEMIA, WITH LONG-TERM CURRENT USE OF INSULIN (HCC): ICD-10-CM

## 2024-07-19 DIAGNOSIS — Z79.4 TYPE 2 DIABETES MELLITUS WITH HYPERGLYCEMIA, WITH LONG-TERM CURRENT USE OF INSULIN (HCC): ICD-10-CM

## 2024-07-19 LAB
CHP ED QC CHECK: NORMAL
GLUCOSE BLD-MCNC: 176 MG/DL

## 2024-07-19 PROCEDURE — 99213 OFFICE O/P EST LOW 20 MIN: CPT

## 2024-07-19 PROCEDURE — G8417 CALC BMI ABV UP PARAM F/U: HCPCS

## 2024-07-19 PROCEDURE — G8428 CUR MEDS NOT DOCUMENT: HCPCS

## 2024-07-19 PROCEDURE — 3044F HG A1C LEVEL LT 7.0%: CPT

## 2024-07-19 PROCEDURE — 3078F DIAST BP <80 MM HG: CPT

## 2024-07-19 PROCEDURE — 1124F ACP DISCUSS-NO DSCNMKR DOCD: CPT

## 2024-07-19 PROCEDURE — 3074F SYST BP LT 130 MM HG: CPT

## 2024-07-19 PROCEDURE — 2022F DILAT RTA XM EVC RTNOPTHY: CPT

## 2024-07-19 PROCEDURE — 82962 GLUCOSE BLOOD TEST: CPT

## 2024-07-19 PROCEDURE — 4004F PT TOBACCO SCREEN RCVD TLK: CPT

## 2024-07-19 PROCEDURE — 3017F COLORECTAL CA SCREEN DOC REV: CPT

## 2024-07-19 NOTE — PROGRESS NOTES
S: 69 y.o. male here for f/u PVD. Recent angioplasty with Dr. WEBSTER. Plavix added to asa and lipitor. On cilostazol.   HTN. Controlled.   HLD. Statin.   prostate CA on chemo. Upcoming Urology appt  DM.   Hemoglobin A1C   Date Value Ref Range Status   03/29/2024 6.3 % Final     O: VS: /64   Pulse 75   Temp 97.8 °F (36.6 °C) (Temporal)   Resp 18   Ht 1.803 m (5' 11\")   Wt 87.5 kg (193 lb)   SpO2 96%   BMI 26.92 kg/m²    General: NAD, alert and interacting appropriately.    CV:  RRR, no gallops, rubs, or murmurs    Resp: CTAB   Abd:  Soft, nontender   Ext:  LLE calf ttp, LLE calf circumference increased     Impression: PVD, LLE, HTN, HLD, prostate CA, DM  Plan:   Venous doppler LLE  F/u vascular  CPM HTN  CPM DM  F/u urology    Attending Physician Statement  I have discussed the case, including pertinent history and exam findings with the resident.  I agree with the documented assessment and plan.      
Ordered Vascular duplex lower extremity venous bilateral extremities to rule out DVT  - Continue home meds ASA, Plavix, Pletal  - Continue follow up with vascular surgery    3. Hypertension - Controlled  - Continue home meds  lisinopril, HCTZ, Norvasc      Return to Office: Return in about 4 weeks (around 8/16/2024).    Alice Hwang MD   This case was discussed with Dr. Manrique

## 2024-08-12 ENCOUNTER — OFFICE VISIT (OUTPATIENT)
Dept: VASCULAR SURGERY | Age: 69
End: 2024-08-12
Payer: COMMERCIAL

## 2024-08-12 VITALS — BODY MASS INDEX: 27.48 KG/M2 | WEIGHT: 197 LBS

## 2024-08-12 DIAGNOSIS — I73.9 PVD (PERIPHERAL VASCULAR DISEASE) WITH CLAUDICATION (HCC): Primary | ICD-10-CM

## 2024-08-12 PROCEDURE — 1124F ACP DISCUSS-NO DSCNMKR DOCD: CPT | Performed by: SURGERY

## 2024-08-12 PROCEDURE — G8427 DOCREV CUR MEDS BY ELIG CLIN: HCPCS | Performed by: SURGERY

## 2024-08-12 PROCEDURE — G8417 CALC BMI ABV UP PARAM F/U: HCPCS | Performed by: SURGERY

## 2024-08-12 PROCEDURE — 3017F COLORECTAL CA SCREEN DOC REV: CPT | Performed by: SURGERY

## 2024-08-12 PROCEDURE — 99212 OFFICE O/P EST SF 10 MIN: CPT | Performed by: SURGERY

## 2024-08-12 PROCEDURE — 4004F PT TOBACCO SCREEN RCVD TLK: CPT | Performed by: SURGERY

## 2024-08-12 NOTE — PROGRESS NOTES
Vascular Surgery Outpatient Followup    PCP : Alice Lucas MD  Podiatrist : Dr Laird  Vascular : Dr. Mcgarry    Previous Vascular Procedure  7/16/24 Left SFA, Popliteal plasty with 4x200 evercross, 5x250 DCB impact  Left external iliac lithotripsy 8x30 shockwave  Right external iliac lithotripsy 8x30 shockwave     HISTORY OF PRESENT ILLNESS:    This is a 69 y.o. male who presents in fu regarding L LE tissue loss.      He underwent angiogram and has had improvement in wounds and L LE pain.  [facility].      Past Medical History:        Diagnosis Date    Atherosclerosis of native arteries of extremity with rest pain ([facility]) 06/25/2024    Left foot    Cancer (HCC) PROSTATE    2005, patient reports bone mets    Erectile dysfunction     Hyperlipidemia     Hypertension 2005    Osteoarthritis, hip/pelvic region and thigh 03/29/2013    Prostate cancer (HCC) 02/26/2013    PVD (peripheral vascular disease) with claudication ([facility]) 03/30/2023    Ankle-brachial index of 0.79 on the right and 0.42 on the left    Tinea pedis of both feet 04/25/2024     Past Surgical History:        Procedure Laterality Date    BACK SURGERY      HIP SURGERY Right     INVASIVE VASCULAR N/A 7/16/2024    Aortagram abdominal performed by Kay Ernst MD at [facility] [place] [place] CARDIAC CATH LAB    INVASIVE VASCULAR N/A 7/16/2024    Angioplasty peripheral artery performed by Kay Ernst MD at [facility] [place] [place] CARDIAC CATH LAB    LEG SURGERY      LUMBAR SPINE SURGERY  10/30/2017    PLIF L2 - S1   and laminectomy    PROSTATE BIOPSY       Current Medications:   Current Outpatient Medications   Medication Sig Dispense Refill    clopidogrel (PLAVIX) 75 MG tablet Take 1 tablet by mouth daily for 120 doses 30 tablet 3    acetaminophen (TYLENOL) 500 MG tablet Take 1 tablet by mouth every 6 hours as needed for Pain 120 tablet 0    Alcohol Swabs (ALCOHOL PREP) PADS Use to clean injection site 4 times a day. Use to clean

## 2024-08-19 ENCOUNTER — HOSPITAL ENCOUNTER (OUTPATIENT)
Dept: ULTRASOUND IMAGING | Age: 69
Discharge: HOME | End: 2024-08-21
Payer: COMMERCIAL

## 2024-08-19 DIAGNOSIS — I73.9 PVD (PERIPHERAL VASCULAR DISEASE) (HCC): ICD-10-CM

## 2024-08-19 PROCEDURE — 93970 EXTREMITY STUDY: CPT

## 2024-08-21 ENCOUNTER — OFFICE VISIT (OUTPATIENT)
Dept: FAMILY MEDICINE CLINIC | Age: 69
End: 2024-08-21
Payer: COMMERCIAL

## 2024-08-21 VITALS
WEIGHT: 194 LBS | OXYGEN SATURATION: 98 % | SYSTOLIC BLOOD PRESSURE: 104 MMHG | BODY MASS INDEX: 27.16 KG/M2 | DIASTOLIC BLOOD PRESSURE: 63 MMHG | TEMPERATURE: 97.3 F | HEIGHT: 71 IN | HEART RATE: 58 BPM | RESPIRATION RATE: 18 BRPM

## 2024-08-21 DIAGNOSIS — E11.65 TYPE 2 DIABETES MELLITUS WITH HYPERGLYCEMIA, WITH LONG-TERM CURRENT USE OF INSULIN (HCC): Primary | ICD-10-CM

## 2024-08-21 DIAGNOSIS — I10 ESSENTIAL HYPERTENSION: ICD-10-CM

## 2024-08-21 DIAGNOSIS — Z79.4 TYPE 2 DIABETES MELLITUS WITH HYPERGLYCEMIA, WITH LONG-TERM CURRENT USE OF INSULIN (HCC): Primary | ICD-10-CM

## 2024-08-21 DIAGNOSIS — M25.552 BILATERAL HIP PAIN: ICD-10-CM

## 2024-08-21 DIAGNOSIS — M25.551 BILATERAL HIP PAIN: ICD-10-CM

## 2024-08-21 LAB
CHP ED QC CHECK: NORMAL
GLUCOSE BLD-MCNC: 94 MG/DL
HBA1C MFR BLD: 6 %

## 2024-08-21 PROCEDURE — 3074F SYST BP LT 130 MM HG: CPT

## 2024-08-21 PROCEDURE — 4004F PT TOBACCO SCREEN RCVD TLK: CPT

## 2024-08-21 PROCEDURE — G8417 CALC BMI ABV UP PARAM F/U: HCPCS

## 2024-08-21 PROCEDURE — G8428 CUR MEDS NOT DOCUMENT: HCPCS

## 2024-08-21 PROCEDURE — 99213 OFFICE O/P EST LOW 20 MIN: CPT

## 2024-08-21 PROCEDURE — 2022F DILAT RTA XM EVC RTNOPTHY: CPT

## 2024-08-21 PROCEDURE — 3078F DIAST BP <80 MM HG: CPT

## 2024-08-21 PROCEDURE — 83036 HEMOGLOBIN GLYCOSYLATED A1C: CPT

## 2024-08-21 PROCEDURE — 82962 GLUCOSE BLOOD TEST: CPT

## 2024-08-21 PROCEDURE — 1124F ACP DISCUSS-NO DSCNMKR DOCD: CPT

## 2024-08-21 PROCEDURE — 3044F HG A1C LEVEL LT 7.0%: CPT

## 2024-08-21 PROCEDURE — 3017F COLORECTAL CA SCREEN DOC REV: CPT

## 2024-08-21 RX ORDER — HYDROCHLOROTHIAZIDE 12.5 MG/1
12.5 TABLET ORAL EVERY MORNING
Qty: 90 TABLET | Refills: 0 | Status: SHIPPED
Start: 2024-08-21 | End: 2024-08-22 | Stop reason: SDUPTHER

## 2024-08-21 RX ORDER — CLOPIDOGREL BISULFATE 75 MG/1
75 TABLET ORAL DAILY
Qty: 30 TABLET | Refills: 3 | Status: SHIPPED
Start: 2024-08-21 | End: 2024-08-22 | Stop reason: SDUPTHER

## 2024-08-21 RX ORDER — DIPHENHYDRAMINE HYDROCHLORIDE 25 MG/1
1 CAPSULE, LIQUID FILLED ORAL ONCE
Qty: 1 KIT | Refills: 3 | Status: SHIPPED
Start: 2024-08-21 | End: 2024-08-22 | Stop reason: SDUPTHER

## 2024-08-21 NOTE — PROGRESS NOTES
[facility]  FAMILY MEDICINE RESIDENCY PROGRAM  DATE OF VISIT : 2024    Patient : Larry Mares   Age : 69 y.o.    : 1955   MRN : [id number]   ______________________________________________________________________    Chief Complaint:   Chief Complaint   Patient presents with    Results     US       HPI:   History obtained from the patient. Larry Mares is a 69 y.o. male with PMHx of HTN, HLD,DM, PVD s/p angiography, OA, Gout. who presents to the clinic for f/u of DM and hip pain.     S/P Angiogram: Denies Cardiopulmonary symptoms. Follows with vascular surgery. He is continuing on ASA, Plavix, Pletal, Statin. F/u in 2 months with a plan for SHIRLEY and PVR.Left leg pain has persisted since angiography but he states it is improving.DVT US ordered at last visit was negative for clots.    DM: A1c from  was 6.3. Does not check BG at home. States that CGM sensor used to fall off a lot. Amenable to fingerstick glucose at home. He has upcoming ophthalmology appointment for retinopathy assessment.   POCT glucose in clinic today was 94. He denies any symptoms of hypoglycemia.    Hip pain: Feels sore since 24. B/L Hips involved. It is cramping and aggravated when he leans forward. Denies unsteady gait, falls, bowel/bladder incontinence. Denies swelling, warmth or feeling of effusion/drainage from affected area. Declines physical therapy. States that PT was worsening his pain Sx in the past.  Prior imaging from  suggestive of degenerative changes.      Medications:    Current Outpatient Medications   Medication Sig Dispense Refill    Blood Glucose Monitoring Suppl (BLOOD GLUCOSE MONITOR SYSTEM) w/Device KIT 1 each by Does not apply route once for 1 dose Obtain fasting blood glucose every morning 1 kit 3    clopidogrel (PLAVIX) 75 MG tablet Take 1 tablet by mouth daily for 120 doses 30 tablet 3    empagliflozin (JARDIANCE) 25 MG tablet Take 1 tablet by mouth daily 90 tablet 0

## 2024-08-21 NOTE — PROGRESS NOTES
S: 69 y.o. male here for DM, PAD s/p angio, hip pain.   After angio had LLE swelling/ttp. Asa, plavix, pletal and statin. Swelling/ttp improving.   DM.   Hemoglobin A1C   Date Value Ref Range Status   08/21/2024 6.0 % Final    Sees eye doc.   Hip soreness. No unsteadiness/falls. Declined PT, says it made him worse in past.       O: VS: /63   Pulse 58   Temp 97.3 °F (36.3 °C) (Temporal)   Resp 18   Ht 1.803 m (5' 11\")   Wt 88 kg (194 lb)   SpO2 98%   BMI 27.06 kg/m²    General: NAD, alert and interacting appropriately.    Ext:  slight ttp LLE, improved from previous. Faint but present pulses. Monofilament w/ diminished sensation.     Impression: DM. B/l hip pain. H/o prostate CA. PAD s/p angio  Plan:   CPM DM  Xray hip, likely will need repeat MRI.   Will need repeat PSA and may need f/u Urology  Rtc 1 mo     Attending Physician Statement  I have discussed the case, including pertinent history and exam findings with the resident.  I agree with the documented assessment and plan.

## 2024-08-22 DIAGNOSIS — Z79.4 TYPE 2 DIABETES MELLITUS WITH HYPERGLYCEMIA, WITH LONG-TERM CURRENT USE OF INSULIN (HCC): ICD-10-CM

## 2024-08-22 DIAGNOSIS — I10 ESSENTIAL HYPERTENSION: ICD-10-CM

## 2024-08-22 DIAGNOSIS — E11.65 TYPE 2 DIABETES MELLITUS WITH HYPERGLYCEMIA, WITH LONG-TERM CURRENT USE OF INSULIN (HCC): ICD-10-CM

## 2024-08-27 RX ORDER — DIPHENHYDRAMINE HYDROCHLORIDE 25 MG/1
1 CAPSULE, LIQUID FILLED ORAL ONCE
Qty: 1 KIT | Refills: 3 | Status: SHIPPED | OUTPATIENT
Start: 2024-08-27 | End: 2024-08-27

## 2024-08-27 RX ORDER — LANCETS 30 GAUGE
1 EACH MISCELLANEOUS DAILY
Qty: 100 EACH | Refills: 11 | Status: SHIPPED | OUTPATIENT
Start: 2024-08-27

## 2024-08-27 RX ORDER — CLOPIDOGREL BISULFATE 75 MG/1
75 TABLET ORAL DAILY
Qty: 30 TABLET | Refills: 3 | Status: SHIPPED | OUTPATIENT
Start: 2024-08-27 | End: 2024-12-25

## 2024-08-27 RX ORDER — HYDROCHLOROTHIAZIDE 12.5 MG/1
12.5 TABLET ORAL EVERY MORNING
Qty: 90 TABLET | Refills: 0 | Status: SHIPPED | OUTPATIENT
Start: 2024-08-27

## 2024-09-19 DIAGNOSIS — E11.65 TYPE 2 DIABETES MELLITUS WITH HYPERGLYCEMIA, WITH LONG-TERM CURRENT USE OF INSULIN (HCC): Primary | ICD-10-CM

## 2024-09-19 DIAGNOSIS — Z79.4 TYPE 2 DIABETES MELLITUS WITH HYPERGLYCEMIA, WITH LONG-TERM CURRENT USE OF INSULIN (HCC): Primary | ICD-10-CM

## 2024-09-20 ENCOUNTER — OFFICE VISIT (OUTPATIENT)
Dept: FAMILY MEDICINE CLINIC | Age: 69
End: 2024-09-20
Payer: COMMERCIAL

## 2024-09-20 VITALS
RESPIRATION RATE: 16 BRPM | HEART RATE: 60 BPM | SYSTOLIC BLOOD PRESSURE: 102 MMHG | HEIGHT: 71 IN | DIASTOLIC BLOOD PRESSURE: 59 MMHG | TEMPERATURE: 97.3 F | BODY MASS INDEX: 27.16 KG/M2 | WEIGHT: 194 LBS | OXYGEN SATURATION: 95 %

## 2024-09-20 DIAGNOSIS — E11.49 OTHER DIABETIC NEUROLOGICAL COMPLICATION ASSOCIATED WITH TYPE 2 DIABETES MELLITUS (HCC): ICD-10-CM

## 2024-09-20 DIAGNOSIS — E11.65 TYPE 2 DIABETES MELLITUS WITH HYPERGLYCEMIA, UNSPECIFIED WHETHER LONG TERM INSULIN USE (HCC): ICD-10-CM

## 2024-09-20 DIAGNOSIS — K21.9 GASTROESOPHAGEAL REFLUX DISEASE, UNSPECIFIED WHETHER ESOPHAGITIS PRESENT: ICD-10-CM

## 2024-09-20 DIAGNOSIS — C61 CARCINOMA OF PROSTATE (HCC): Primary | ICD-10-CM

## 2024-09-20 DIAGNOSIS — I10 ESSENTIAL HYPERTENSION: ICD-10-CM

## 2024-09-20 LAB — PROSTATE SPECIFIC ANTIGEN: 0.19 NG/ML (ref 0–4)

## 2024-09-20 PROCEDURE — 2022F DILAT RTA XM EVC RTNOPTHY: CPT

## 2024-09-20 PROCEDURE — 3078F DIAST BP <80 MM HG: CPT

## 2024-09-20 PROCEDURE — 1124F ACP DISCUSS-NO DSCNMKR DOCD: CPT

## 2024-09-20 PROCEDURE — 99214 OFFICE O/P EST MOD 30 MIN: CPT

## 2024-09-20 PROCEDURE — G8417 CALC BMI ABV UP PARAM F/U: HCPCS

## 2024-09-20 PROCEDURE — 4004F PT TOBACCO SCREEN RCVD TLK: CPT

## 2024-09-20 PROCEDURE — G8428 CUR MEDS NOT DOCUMENT: HCPCS

## 2024-09-20 PROCEDURE — 3074F SYST BP LT 130 MM HG: CPT

## 2024-09-20 PROCEDURE — G2211 COMPLEX E/M VISIT ADD ON: HCPCS

## 2024-09-20 PROCEDURE — 3044F HG A1C LEVEL LT 7.0%: CPT

## 2024-09-20 PROCEDURE — 3017F COLORECTAL CA SCREEN DOC REV: CPT

## 2024-09-20 RX ORDER — DULOXETIN HYDROCHLORIDE 30 MG/1
30 CAPSULE, DELAYED RELEASE ORAL DAILY
Qty: 30 CAPSULE | Refills: 3 | Status: SHIPPED | OUTPATIENT
Start: 2024-09-20

## 2024-09-20 RX ORDER — FAMOTIDINE 20 MG/1
20 TABLET, FILM COATED ORAL DAILY
Qty: 90 TABLET | Refills: 0 | Status: SHIPPED | OUTPATIENT
Start: 2024-09-20

## 2024-09-20 RX ORDER — AMLODIPINE BESYLATE 10 MG/1
10 TABLET ORAL DAILY
Qty: 90 TABLET | Refills: 0 | Status: SHIPPED | OUTPATIENT
Start: 2024-09-20

## 2024-09-20 RX ORDER — ATORVASTATIN CALCIUM 80 MG/1
80 TABLET, FILM COATED ORAL NIGHTLY
Qty: 90 TABLET | Refills: 3 | Status: SHIPPED | OUTPATIENT
Start: 2024-09-20

## 2024-09-23 DIAGNOSIS — Z76.0 MEDICATION REFILL: ICD-10-CM

## 2024-09-24 RX ORDER — LISINOPRIL 40 MG/1
40 TABLET ORAL EVERY MORNING
Qty: 120 TABLET | Refills: 0 | Status: SHIPPED | OUTPATIENT
Start: 2024-09-24

## 2024-10-04 ENCOUNTER — HOSPITAL ENCOUNTER (OUTPATIENT)
Age: 69
Discharge: HOME | End: 2024-10-06
Payer: COMMERCIAL

## 2024-10-04 ENCOUNTER — HOSPITAL ENCOUNTER (OUTPATIENT)
Dept: GENERAL RADIOLOGY | Age: 69
Discharge: HOME | End: 2024-10-06
Payer: COMMERCIAL

## 2024-10-04 DIAGNOSIS — M25.552 BILATERAL HIP PAIN: ICD-10-CM

## 2024-10-04 DIAGNOSIS — M25.551 BILATERAL HIP PAIN: ICD-10-CM

## 2024-10-04 PROCEDURE — 73521 X-RAY EXAM HIPS BI 2 VIEWS: CPT

## 2024-10-22 ENCOUNTER — OFFICE VISIT (OUTPATIENT)
Dept: FAMILY MEDICINE CLINIC | Age: 69
End: 2024-10-22

## 2024-10-22 VITALS
SYSTOLIC BLOOD PRESSURE: 138 MMHG | RESPIRATION RATE: 18 BRPM | TEMPERATURE: 97.9 F | OXYGEN SATURATION: 98 % | HEIGHT: 71 IN | HEART RATE: 64 BPM | DIASTOLIC BLOOD PRESSURE: 62 MMHG | BODY MASS INDEX: 27.16 KG/M2 | WEIGHT: 194 LBS

## 2024-10-22 DIAGNOSIS — I73.9 PVD (PERIPHERAL VASCULAR DISEASE) WITH CLAUDICATION (HCC): ICD-10-CM

## 2024-10-22 DIAGNOSIS — Z76.0 MEDICATION REFILL: ICD-10-CM

## 2024-10-22 DIAGNOSIS — K21.9 GASTROESOPHAGEAL REFLUX DISEASE, UNSPECIFIED WHETHER ESOPHAGITIS PRESENT: ICD-10-CM

## 2024-10-22 DIAGNOSIS — M1A.9XX0 CHRONIC GOUT INVOLVING TOE WITHOUT TOPHUS, UNSPECIFIED CAUSE, UNSPECIFIED LATERALITY: ICD-10-CM

## 2024-10-22 DIAGNOSIS — M16.0 OSTEOARTHRITIS OF BOTH HIPS, UNSPECIFIED OSTEOARTHRITIS TYPE: Primary | ICD-10-CM

## 2024-10-22 RX ORDER — ALLOPURINOL 100 MG/1
100 TABLET ORAL DAILY
Qty: 30 TABLET | Refills: 3 | Status: SHIPPED | OUTPATIENT
Start: 2024-10-22

## 2024-10-22 RX ORDER — FAMOTIDINE 20 MG/1
20 TABLET, FILM COATED ORAL DAILY
Qty: 90 TABLET | Refills: 2 | Status: SHIPPED | OUTPATIENT
Start: 2024-10-22

## 2024-10-22 RX ORDER — ASPIRIN 81 MG/1
81 TABLET, CHEWABLE ORAL DAILY
Qty: 30 TABLET | Refills: 2 | Status: SHIPPED | OUTPATIENT
Start: 2024-10-22

## 2024-10-22 RX ORDER — LISINOPRIL 40 MG/1
40 TABLET ORAL EVERY MORNING
Qty: 120 TABLET | Refills: 2 | Status: SHIPPED | OUTPATIENT
Start: 2024-10-22

## 2024-10-22 RX ORDER — CILOSTAZOL 100 MG/1
100 TABLET ORAL 2 TIMES DAILY
Qty: 180 TABLET | Refills: 1 | Status: SHIPPED | OUTPATIENT
Start: 2024-10-22

## 2024-10-22 NOTE — PROGRESS NOTES
[facility]  FAMILY MEDICINE RESIDENCY PROGRAM  DATE OF VISIT : 10/24/2024    Patient : Larry Mares   Age : 69 y.o.    : 1955   MRN : [id number]   ______________________________________________________________________    Chief Complaint:   Chief Complaint   Patient presents with    Follow-up    Medication Refill     Patient has the medication bottles        HPI:   History obtained from the patient. Larry Mares is a 69 y.o. male who presents to the clinic for medication refill and follow up of hip xrays    He states he has no acute complaints or concerns today and feels overall well.  Patient had presented to clinic on 10/04/2024 with concern for bilateral hip pain.  X-rays obtained revealed severe bilateral hip osteoarthritis.  He states that the pain is still present but well-controlled with Tylenol.  He ambulates with a walker.  He is not at amenable to referral for PT at this time.  [facility] for [facility]omyelitis.  He has been referred to [facility] for further assessment and management.  He has an upcoming appointment on  at [facility].  Patient has an upcoming appointment with vascular surgery for follow-up of PAD.  He states he is compliant with all his medication regimen, and denies any side effects at this time.      Medications:    Current Outpatient Medications   Medication Sig Dispense Refill    lisinopril (PRINIVIL;ZESTRIL) 40 MG tablet Take 1 tablet by mouth every morning 120 tablet 2    aspirin 81 MG chewable tablet Take 1 tablet by mouth daily 30 tablet 2    allopurinol (ZYLOPRIM) 100 MG tablet Take 1 tablet by mouth daily 30 tablet 3    cilostazol (PLETAL) 100 MG tablet Take 1 tablet by mouth 2 times daily 180 tablet 1    famotidine (PEPCID) 20 MG tablet Take 1 tablet by mouth daily 90 tablet 2    atorvastatin (LIPITOR) 80 MG tablet Take 1 tablet by mouth nightly 90 tablet 3    amLODIPine

## 2024-10-22 NOTE — PROGRESS NOTES
[facility]  Precepting Note    Subjective:  Follow up and medications refill   Recent xrays completed  Follows with Podiatry and Vascular for PVD  Concern for possible osteo; has follow up with [facility]  Hip xrays with severe DJD, ambulates with walker. Uses Tylenol prn    ROS otherwise negative     Past medical, surgical, family and social history were reviewed, non-contributory, and unchanged unless otherwise stated.    Objective:    /62 (Site: Left Upper Arm, Position: Sitting, Cuff Size: Large Adult)   Pulse 64   Temp 97.9 °F (36.6 °C) (Temporal)   Resp 18   Ht 1.803 m (5' 11\")   Wt 88 kg (194 lb)   SpO2 98%   BMI 27.06 kg/m²     Exam is as noted by resident with the following changes, additions or corrections:      Assessment/Plan:  Hip DJD- has follow up with [facility]  HTN- Cont Lisinopril   Chronic medications refilled   Follow up 2 months        Attending Physician Statement  I have reviewed the chart, including any radiology or labs. I have discussed the case, including pertinent history and exam findings with the resident.  I agree with the assessment, plan and orders as documented by the resident.  Please refer to the resident note for additional information.      Electronically signed by Gavino Snow MD on 10/22/2024 at 9:33 AM

## 2024-10-23 ENCOUNTER — TELEPHONE (OUTPATIENT)
Dept: ORTHOPEDIC SURGERY | Age: 69
End: 2024-10-23

## 2024-10-23 NOTE — TELEPHONE ENCOUNTER
Future Appointments   Date Time Provider Department Center   11/11/2024  9:30 AM MATTHEW LOYD VAS US 1 SE[facility] C[facility] SE Rad/Car   11/11/2024 10:00 AM Kay Ernst MD VASC/MED [facility]   11/20/2024  9:00 AM Ishmael Nguyen MD SE Ortho [facility]   12/13/2024  9:00 AM [facility]   5/15/2025  1:15 PM Celso Ott MD VASC/MED [facility]

## 2024-10-23 NOTE — TELEPHONE ENCOUNTER
Referral received for patient via internal work-queue.     Referral reason/diagnosis: Osteoarthritis of both hips, unspecified osteoarthritis type     Routed to providers for recommendations.    Future Appointments   Date Time Provider Department Center   11/11/2024  9:30 AM MATTHEW COOK US 1 [facility] CARDIO SE Rad/Car   11/11/2024 10:00 AM Kay Ernst MD VASC/MED [facility]   12/13/2024  9:00 AM [facility]   5/15/2025  1:15 PM Celso Ott MD VAS/MED [facility]       Electronically signed by Adriana Lozano on 10/23/2024 at 12:30 PM

## 2024-10-29 DIAGNOSIS — Z79.4 TYPE 2 DIABETES MELLITUS WITH HYPERGLYCEMIA, WITH LONG-TERM CURRENT USE OF INSULIN (HCC): ICD-10-CM

## 2024-10-29 DIAGNOSIS — E11.65 TYPE 2 DIABETES MELLITUS WITH HYPERGLYCEMIA, WITH LONG-TERM CURRENT USE OF INSULIN (HCC): ICD-10-CM

## 2024-10-30 NOTE — TELEPHONE ENCOUNTER
Please refill :)    Last Appointment:  Visit date not found  Future Appointments   Date Time Provider Department Center   11/11/2024  9:30 AM MATTHEW LOYD VAS US 1 [facility] C[facility] SE Rad/Car   11/11/2024 10:00 AM Kay Ernst MD VASC/MED [facility]   11/20/2024  9:00 AM Ishmael Nguyen MD SE Ortho [facility]   12/13/2024  9:00 AM Alice Lucas MD [facility]   5/15/2025  1:15 PM [facility]

## 2024-10-31 RX ORDER — EMPAGLIFLOZIN 25 MG/1
25 TABLET, FILM COATED ORAL DAILY
Qty: 90 TABLET | Refills: 0 | Status: SHIPPED | OUTPATIENT
Start: 2024-10-31

## 2024-11-11 ENCOUNTER — TELEPHONE (OUTPATIENT)
Dept: VASCULAR SURGERY | Age: 69
End: 2024-11-11

## 2024-11-11 ENCOUNTER — OFFICE VISIT (OUTPATIENT)
Dept: VASCULAR SURGERY | Age: 69
End: 2024-11-11
Payer: COMMERCIAL

## 2024-11-11 ENCOUNTER — HOSPITAL ENCOUNTER (OUTPATIENT)
Dept: CARDIOLOGY | Age: 69
Discharge: HOME | End: 2024-11-13
Payer: COMMERCIAL

## 2024-11-11 VITALS — WEIGHT: 194 LBS | BODY MASS INDEX: 27.06 KG/M2

## 2024-11-11 DIAGNOSIS — I73.9 PVD (PERIPHERAL VASCULAR DISEASE) WITH CLAUDICATION (HCC): ICD-10-CM

## 2024-11-11 DIAGNOSIS — I73.9 PVD (PERIPHERAL VASCULAR DISEASE) (HCC): Primary | ICD-10-CM

## 2024-11-11 LAB
VAS LEFT ABI: 0.4
VAS LEFT ARM BP: 153 MMHG
VAS LEFT DORSALIS PEDIS BP: 62 MMHG
VAS LEFT PTA BP: 63 MMHG
VAS LEFT TBI: 0.14
VAS LEFT TOE PRESSURE: 23 MMHG
VAS RIGHT ABI: 0.73
VAS RIGHT ARM BP: 159 MMHG
VAS RIGHT DORSALIS PEDIS BP: 116 MMHG
VAS RIGHT PTA BP: 116 MMHG
VAS RIGHT TBI: 0.47
VAS RIGHT TOE PRESSURE: 74 MMHG

## 2024-11-11 PROCEDURE — 93923 UPR/LXTR ART STDY 3+ LVLS: CPT | Performed by: SURGERY

## 2024-11-11 PROCEDURE — 4004F PT TOBACCO SCREEN RCVD TLK: CPT | Performed by: NURSE PRACTITIONER

## 2024-11-11 PROCEDURE — G8417 CALC BMI ABV UP PARAM F/U: HCPCS | Performed by: NURSE PRACTITIONER

## 2024-11-11 PROCEDURE — G8427 DOCREV CUR MEDS BY ELIG CLIN: HCPCS | Performed by: NURSE PRACTITIONER

## 2024-11-11 PROCEDURE — G8484 FLU IMMUNIZE NO ADMIN: HCPCS | Performed by: NURSE PRACTITIONER

## 2024-11-11 PROCEDURE — 99213 OFFICE O/P EST LOW 20 MIN: CPT | Performed by: NURSE PRACTITIONER

## 2024-11-11 PROCEDURE — 93923 UPR/LXTR ART STDY 3+ LVLS: CPT

## 2024-11-11 PROCEDURE — 3017F COLORECTAL CA SCREEN DOC REV: CPT | Performed by: NURSE PRACTITIONER

## 2024-11-11 PROCEDURE — 1124F ACP DISCUSS-NO DSCNMKR DOCD: CPT | Performed by: NURSE PRACTITIONER

## 2024-11-11 NOTE — PROGRESS NOTES
Not on file     Unstable Housing in the Last Year: No     No family history on file.  Labs  Lab Results   Component Value Date    WBC 7.2 07/16/2024    HGB 13.7 07/16/2024    HCT 42.3 07/16/2024     07/16/2024    PROTIME 11.9 03/29/2024    INR 1.1 03/29/2024    APTT 30.5 03/29/2024    K 3.8 07/16/2024    BUN 19 07/16/2024    CREATININE 1.0 07/16/2024       PHYSICAL EXAM:    Wt 88 kg (194 lb)   BMI 27.06 kg/m²   CONSTITUTIONAL:   Awake, alert, cooperative  PSYCHIATRIC :  Oriented to time, place and person     Appropriate insight to disease process  EYES: Lids and lashes normal  ENT:  External ears and nose without lesions   Hearing deficits not noted  NECK: Supple, symmetrical, trachea midline  LUNGS:  No increased work of breathing                 Clear to auscultation  CARDIOVASCULAR:  S1S2  ABDOMEN:  soft, non-distended, non-tender   Aorta is not palpable  SKIN:   Normal skin color   Texture and turgor normal, no induration  EXTREMITIES:   R LE Edema absent  L LE Edema absent  R femoral 2+ L femoral 2+   R posterior tibial Weakly biphasic L posterior tibial monophasic   R dorsalis pedis monophasic L dorsalis pedis monophasic     RADIOLOGY:    A/P L LE tissue loss  S/p L LE angiogram with intervention  Arterial studies reviewed- no significant change of the bilateral lower extremities  Wounds improved- small callous left 4th toenail. No drainage or erythema  Pain resolved  Medical management with asa, plavix, pletal, and statin  If not improving could consider further intervention as had significant tibial disease  Fu in 6 months with abis and pvrs with Dr. Ott  Instructed pt to call sooner if there is infection found in bone and podiatry deems intervention is necessary- would recommend repeat arteriogram prior to any intervention    Pt seen and plan reviewed with Dr. Ernst.       Adri Erazo, ANASTASIA - CNP

## 2024-11-11 NOTE — TELEPHONE ENCOUNTER
I called Larry to inform him of his scheduled appointment at [facility] on May 5, 2025 at 12:30 pm. arrival time for a 1:00 pm. test. He is to enter by the emergency room in entrance B. He has to bring his insurance cards, photo Id., and a list of his medications with him.

## 2024-11-20 ENCOUNTER — OFFICE VISIT (OUTPATIENT)
Dept: ORTHOPEDIC SURGERY | Age: 69
End: 2024-11-20
Payer: COMMERCIAL

## 2024-11-20 ENCOUNTER — HOSPITAL ENCOUNTER (OUTPATIENT)
Dept: GENERAL RADIOLOGY | Age: 69
Discharge: HOME | End: 2024-11-22
Payer: COMMERCIAL

## 2024-11-20 VITALS
SYSTOLIC BLOOD PRESSURE: 150 MMHG | BODY MASS INDEX: 27.02 KG/M2 | WEIGHT: 193 LBS | HEART RATE: 82 BPM | HEIGHT: 71 IN | DIASTOLIC BLOOD PRESSURE: 67 MMHG | RESPIRATION RATE: 16 BRPM | OXYGEN SATURATION: 97 %

## 2024-11-20 DIAGNOSIS — M16.0 BILATERAL PRIMARY OSTEOARTHRITIS OF HIP: ICD-10-CM

## 2024-11-20 DIAGNOSIS — M16.0 BILATERAL PRIMARY OSTEOARTHRITIS OF HIP: Primary | ICD-10-CM

## 2024-11-20 DIAGNOSIS — M16.11 PRIMARY OSTEOARTHRITIS OF RIGHT HIP: ICD-10-CM

## 2024-11-20 DIAGNOSIS — M16.12 PRIMARY OSTEOARTHRITIS OF LEFT HIP: Primary | ICD-10-CM

## 2024-11-20 PROCEDURE — 4004F PT TOBACCO SCREEN RCVD TLK: CPT | Performed by: ORTHOPAEDIC SURGERY

## 2024-11-20 PROCEDURE — G8427 DOCREV CUR MEDS BY ELIG CLIN: HCPCS | Performed by: ORTHOPAEDIC SURGERY

## 2024-11-20 PROCEDURE — G8417 CALC BMI ABV UP PARAM F/U: HCPCS | Performed by: ORTHOPAEDIC SURGERY

## 2024-11-20 PROCEDURE — 3017F COLORECTAL CA SCREEN DOC REV: CPT | Performed by: ORTHOPAEDIC SURGERY

## 2024-11-20 PROCEDURE — 99203 OFFICE O/P NEW LOW 30 MIN: CPT | Performed by: ORTHOPAEDIC SURGERY

## 2024-11-20 PROCEDURE — 3078F DIAST BP <80 MM HG: CPT | Performed by: ORTHOPAEDIC SURGERY

## 2024-11-20 PROCEDURE — 73521 X-RAY EXAM HIPS BI 2 VIEWS: CPT

## 2024-11-20 PROCEDURE — 3077F SYST BP >= 140 MM HG: CPT | Performed by: ORTHOPAEDIC SURGERY

## 2024-11-20 PROCEDURE — G8484 FLU IMMUNIZE NO ADMIN: HCPCS | Performed by: ORTHOPAEDIC SURGERY

## 2024-11-20 PROCEDURE — 1124F ACP DISCUSS-NO DSCNMKR DOCD: CPT | Performed by: ORTHOPAEDIC SURGERY

## 2024-11-20 NOTE — PATIENT INSTRUCTIONS
You were ordered IR injection to left hip joint  by your Orthopedic Provider.  If you do not hear from that department please contact: IR Scheduling- [phone]    Please make sure your follow up appointment in office is scheduled shortly after the above testing is complete.  If your testing is completed significantly sooner than planned follow up contact office for appointment adjustment.       Follow-up in 3 months call with any questions concerns or issues with injection site.  If injection works well please call us to get injection right hip 3 to 4 weeks after the left.

## 2024-11-20 NOTE — PROGRESS NOTES
Chief Complaint   Patient presents with    New Patient     New patient OV for bilateral hip OA. Pt ambulating with four wheeled walker. Pt states 4/10 pain.        SUBJECTIVE: Patient very pleasant 69-year-old male comes in today with a chief complaint of bilateral hip pain.  His left hurts worse than the right.  He states has had significant pain for about a year but did receive injections over 10 years ago on his left hip at an office.  He has multiple medical issues currently.  He has severe vascular disease which she went through an angioplasty this summer for.  He is still having residual disease and is difficult to palpate his pulses even on examination although his feet are warm with good capillary refill.  He has had chronic wounds on his right foot which he recently got to heal.  He also has prostate cancer currently is on Lupron seeing urology for.  I had a long discussion with him today in the office about conservative treatment.  I explained my worry would be if we did a total hip which his hips on x-ray are severe, he would have an issue with either his vascular disease or potential foot wounds or even his other diseases causing him to be worse off than he is now.  He agrees.  I explained there are conservative options including intra-articular injection by radiology.  He would like to go forward with in the left hip.  We will hold off on the right due to his diabetes and the corticosteroid load.  If the left hip injection does work well we will proceed to do the right hip approximately a month later.  He is agreeable.          Past Medical History:   Diagnosis Date    Atherosclerosis of native arteries of extremity with rest pain (HCC) 06/25/2024    Left foot    Cancer (HCC) PROSTATE    2005, patient reports bone mets    Erectile dysfunction     Hyperlipidemia     Hypertension 2005    Osteoarthritis, hip/pelvic region and thigh 03/29/2013    Prostate cancer (HCC) 02/26/2013    PVD (peripheral

## 2024-12-13 ENCOUNTER — OFFICE VISIT (OUTPATIENT)
Dept: FAMILY MEDICINE CLINIC | Age: 69
End: 2024-12-13
Payer: COMMERCIAL

## 2024-12-13 VITALS
DIASTOLIC BLOOD PRESSURE: 60 MMHG | HEART RATE: 55 BPM | BODY MASS INDEX: 27.02 KG/M2 | OXYGEN SATURATION: 97 % | WEIGHT: 193 LBS | HEIGHT: 71 IN | SYSTOLIC BLOOD PRESSURE: 130 MMHG | RESPIRATION RATE: 18 BRPM | TEMPERATURE: 97.7 F

## 2024-12-13 DIAGNOSIS — E11.65 TYPE 2 DIABETES MELLITUS WITH HYPERGLYCEMIA, UNSPECIFIED WHETHER LONG TERM INSULIN USE (HCC): ICD-10-CM

## 2024-12-13 DIAGNOSIS — I73.9 PVD (PERIPHERAL VASCULAR DISEASE) WITH CLAUDICATION (HCC): ICD-10-CM

## 2024-12-13 DIAGNOSIS — E78.2 MIXED HYPERLIPIDEMIA: ICD-10-CM

## 2024-12-13 DIAGNOSIS — K21.9 GASTROESOPHAGEAL REFLUX DISEASE, UNSPECIFIED WHETHER ESOPHAGITIS PRESENT: ICD-10-CM

## 2024-12-13 DIAGNOSIS — Z76.0 MEDICATION REFILL: ICD-10-CM

## 2024-12-13 DIAGNOSIS — M1A.9XX0 CHRONIC GOUT INVOLVING TOE WITHOUT TOPHUS, UNSPECIFIED CAUSE, UNSPECIFIED LATERALITY: ICD-10-CM

## 2024-12-13 DIAGNOSIS — I10 ESSENTIAL HYPERTENSION: ICD-10-CM

## 2024-12-13 DIAGNOSIS — L84 CALLUS OF TOE: Primary | ICD-10-CM

## 2024-12-13 LAB
ALBUMIN: 3.8 G/DL (ref 3.5–5.2)
ALP BLD-CCNC: 138 U/L (ref 40–129)
ALT SERPL-CCNC: 17 U/L (ref 0–40)
ANION GAP SERPL CALCULATED.3IONS-SCNC: 13 MMOL/L (ref 7–16)
AST SERPL-CCNC: 19 U/L (ref 0–39)
BILIRUB SERPL-MCNC: 0.2 MG/DL (ref 0–1.2)
BUN BLDV-MCNC: 19 MG/DL (ref 6–23)
CALCIUM SERPL-MCNC: 9.5 MG/DL (ref 8.6–10.2)
CHLORIDE BLD-SCNC: 107 MMOL/L (ref 98–107)
CHOLESTEROL, TOTAL: 166 MG/DL
CO2: 23 MMOL/L (ref 22–29)
CREAT SERPL-MCNC: 1.2 MG/DL (ref 0.7–1.2)
CREATININE URINE: 218.6 MG/DL (ref 40–278)
GFR, ESTIMATED: 68 ML/MIN/1.73M2
GLUCOSE BLD-MCNC: 89 MG/DL (ref 74–99)
HBA1C MFR BLD: 6 %
HDLC SERPL-MCNC: 61 MG/DL
LDL CHOLESTEROL: 83 MG/DL
MICROALBUMIN/CREAT 24H UR: 30 MG/L (ref 0–19)
MICROALBUMIN/CREAT UR-RTO: 14 MCG/MG CREAT (ref 0–30)
POTASSIUM SERPL-SCNC: 4.4 MMOL/L (ref 3.5–5)
SODIUM BLD-SCNC: 143 MMOL/L (ref 132–146)
TOTAL PROTEIN: 7.4 G/DL (ref 6.4–8.3)
TRIGL SERPL-MCNC: 112 MG/DL
VLDLC SERPL CALC-MCNC: 22 MG/DL

## 2024-12-13 PROCEDURE — 99214 OFFICE O/P EST MOD 30 MIN: CPT

## 2024-12-13 PROCEDURE — G8484 FLU IMMUNIZE NO ADMIN: HCPCS

## 2024-12-13 PROCEDURE — 3017F COLORECTAL CA SCREEN DOC REV: CPT

## 2024-12-13 PROCEDURE — 3078F DIAST BP <80 MM HG: CPT

## 2024-12-13 PROCEDURE — 83036 HEMOGLOBIN GLYCOSYLATED A1C: CPT

## 2024-12-13 PROCEDURE — 1124F ACP DISCUSS-NO DSCNMKR DOCD: CPT

## 2024-12-13 PROCEDURE — G8417 CALC BMI ABV UP PARAM F/U: HCPCS

## 2024-12-13 PROCEDURE — 3044F HG A1C LEVEL LT 7.0%: CPT

## 2024-12-13 PROCEDURE — G8427 DOCREV CUR MEDS BY ELIG CLIN: HCPCS

## 2024-12-13 PROCEDURE — 3075F SYST BP GE 130 - 139MM HG: CPT

## 2024-12-13 PROCEDURE — 2022F DILAT RTA XM EVC RTNOPTHY: CPT

## 2024-12-13 PROCEDURE — 4004F PT TOBACCO SCREEN RCVD TLK: CPT

## 2024-12-13 RX ORDER — AMLODIPINE BESYLATE 10 MG/1
10 TABLET ORAL DAILY
Qty: 90 TABLET | Refills: 3 | Status: SHIPPED | OUTPATIENT
Start: 2024-12-13

## 2024-12-13 RX ORDER — DULOXETIN HYDROCHLORIDE 30 MG/1
30 CAPSULE, DELAYED RELEASE ORAL DAILY
Qty: 30 CAPSULE | Refills: 3 | Status: SHIPPED | OUTPATIENT
Start: 2024-12-13 | End: 2025-04-12

## 2024-12-13 RX ORDER — FAMOTIDINE 20 MG/1
20 TABLET, FILM COATED ORAL DAILY
Qty: 90 TABLET | Refills: 2 | Status: SHIPPED | OUTPATIENT
Start: 2024-12-13

## 2024-12-13 RX ORDER — LISINOPRIL 40 MG/1
40 TABLET ORAL EVERY MORNING
Qty: 120 TABLET | Refills: 2 | Status: SHIPPED | OUTPATIENT
Start: 2024-12-13 | End: 2025-12-08

## 2024-12-13 RX ORDER — CLOTRIMAZOLE 1 %
CREAM (GRAM) TOPICAL
Qty: 1 EACH | Refills: 10 | Status: SHIPPED | OUTPATIENT
Start: 2024-12-13

## 2024-12-13 RX ORDER — ASPIRIN 81 MG/1
81 TABLET, CHEWABLE ORAL DAILY
Qty: 30 TABLET | Refills: 2 | Status: SHIPPED | OUTPATIENT
Start: 2024-12-13

## 2024-12-13 RX ORDER — HYDROCHLOROTHIAZIDE 12.5 MG/1
12.5 TABLET ORAL EVERY MORNING
Qty: 90 TABLET | Refills: 3 | Status: SHIPPED | OUTPATIENT
Start: 2024-12-13

## 2024-12-13 RX ORDER — METFORMIN HYDROCHLORIDE 500 MG/1
500 TABLET, EXTENDED RELEASE ORAL
Qty: 30 TABLET | Refills: 3 | Status: SHIPPED | OUTPATIENT
Start: 2024-12-13 | End: 2025-04-12

## 2024-12-13 RX ORDER — CILOSTAZOL 100 MG/1
100 TABLET ORAL 2 TIMES DAILY
Qty: 180 TABLET | Refills: 3 | Status: SHIPPED | OUTPATIENT
Start: 2024-12-13 | End: 2025-12-08

## 2024-12-13 RX ORDER — ALLOPURINOL 100 MG/1
100 TABLET ORAL DAILY
Qty: 30 TABLET | Refills: 3 | Status: SHIPPED | OUTPATIENT
Start: 2024-12-13 | End: 2025-04-12

## 2024-12-13 RX ORDER — ATORVASTATIN CALCIUM 80 MG/1
80 TABLET, FILM COATED ORAL NIGHTLY
Qty: 90 TABLET | Refills: 3 | Status: SHIPPED | OUTPATIENT
Start: 2024-12-13

## 2024-12-13 NOTE — PROGRESS NOTES
Attending Physician Statement    S:   Chief Complaint   Patient presents with    Foot Pain     Left foot calus   UTD flu vax  UTD on Flu      Has  history of diabetes. Here for follow up of hip arthritis, bph, and callous on left foot.     He sees ortho for hip  .   He sees urology for bph    He has has callouses on his toe from previous diabetic foot ulcer.     Vascular surgery discontinued his plavix.     Diabetes - A1c from August was 6. He is on lantus metformin and jardiance. A1c today is 6.0   O: Blood pressure 130/60, pulse 55, temperature 97.7 °F (36.5 °C), resp. rate 18, height 1.803 m (5' 11\"), weight 87.5 kg (193 lb), SpO2 97%.   Exam:   Heart - RRR   Lungs - clear     A: Diabetes, hip arthritis , bph , pVD   P:  Continue current meds    Check bmp    Continue follow up with specialists.    Follow-up as ordered    Attending Attestation   I have discussed the case, including pertinent history and exam findings with the resident.  I also have personally seen and examined the patient.  I agree with the documented assessment and plan.         
pink and moist, no oropharyngeal erythema, no tonsillar edema or exudates  CVS: RRR, S1,S2, non-displaced PMI, no murmur, gallops or rubs  RESP: CTAB, no wheezing, rhonchi or crackles  ABD: Soft, non-TTP, normal bowel sounds, no rigidity, no rebound, no guarding, no organomegaly appreciated  EXT: no ulcers, rash, bruising or peripheral edema, pulses palpable, Full ROM RLE, Limited ROM in LLE 2/2 severe hip OA, normal muscle tone and bulk, 5/5 muscle strength in B/L UE and LE. Callus over Lt 4th toe and Rt great toe.  NEURO: AAOX4, follows commands, CN II - XII intact, no gross neurologic deficits, reflexes intact and symmetrical, sensation intact B/L  ___________________________________________________________________    Assessment & Plan:  Callus of Toe  - Follow up with Podiatry    Chronic gout involving toe without tophus, unspecified cause, unspecified laterality  - allopurinol (ZYLOPRIM) 100 MG tablet; Take 1 tablet by mouth daily  Dispense: 30 tablet; Refill: 3    Essential hypertension  - amLODIPine (NORVASC) 10 MG tablet; Take 1 tablet by mouth daily  Dispense: 90 tablet; Refill: 3  - hydroCHLOROthiazide 12.5 MG tablet; Take 1 tablet by mouth every morning  Dispense: 90 tablet; Refill: 3  - Comprehensive Metabolic Panel w/ Reflex to MG; Future    Type 2 diabetes mellitus with hyperglycemia, unspecified whether long term insulin use (HCC)  - atorvastatin (LIPITOR) 80 MG tablet; Take 1 tablet by mouth nightly  Dispense: 90 tablet; Refill: 3  - POCT glycosylated hemoglobin (Hb A1C)  - Comprehensive Metabolic Panel w/ Reflex to MG; Future  - Microalbumin, Ur; Future    PVD (peripheral vascular disease) with claudication (HCC)  - cilostazol (PLETAL) 100 MG tablet; Take 1 tablet by mouth 2 times daily  Dispense: 180 tablet; Refill: 3  - clotrimazole (LOTRIMIN AF) 1 % cream; Apply topically to the toes, in between the toes, foot up to the ankle 2 times daily, both legs, for 1 month  Dispense: 1 each; Refill: 10  -

## 2024-12-27 ENCOUNTER — HOSPITAL ENCOUNTER (OUTPATIENT)
Dept: INTERVENTIONAL RADIOLOGY/VASCULAR | Age: 69
Discharge: HOME | End: 2024-12-29
Payer: COMMERCIAL

## 2024-12-27 DIAGNOSIS — M16.12 PRIMARY OSTEOARTHRITIS OF LEFT HIP: ICD-10-CM

## 2024-12-27 PROCEDURE — 77002 NEEDLE LOCALIZATION BY XRAY: CPT

## 2024-12-27 PROCEDURE — 2709999900 IR FLUORO GUIDED NEEDLE PLACEMENT

## 2024-12-27 PROCEDURE — 20610 DRAIN/INJ JOINT/BURSA W/O US: CPT

## 2024-12-27 PROCEDURE — 6360000004 HC RX CONTRAST MEDICATION: Performed by: RADIOLOGY

## 2024-12-27 PROCEDURE — 6360000002 HC RX W HCPCS: Performed by: RADIOLOGY

## 2024-12-27 RX ORDER — IOPAMIDOL 755 MG/ML
INJECTION, SOLUTION INTRAVASCULAR PRN
Status: COMPLETED | OUTPATIENT
Start: 2024-12-27 | End: 2024-12-27

## 2024-12-27 RX ORDER — LIDOCAINE HYDROCHLORIDE 10 MG/ML
INJECTION, SOLUTION EPIDURAL; INFILTRATION; INTRACAUDAL; PERINEURAL PRN
Status: COMPLETED | OUTPATIENT
Start: 2024-12-27 | End: 2024-12-27

## 2024-12-27 RX ORDER — BUPIVACAINE HYDROCHLORIDE 2.5 MG/ML
INJECTION, SOLUTION EPIDURAL; INFILTRATION; INTRACAUDAL PRN
Status: COMPLETED | OUTPATIENT
Start: 2024-12-27 | End: 2024-12-27

## 2024-12-27 RX ORDER — LIDOCAINE HYDROCHLORIDE 20 MG/ML
INJECTION, SOLUTION INFILTRATION; PERINEURAL PRN
Status: COMPLETED | OUTPATIENT
Start: 2024-12-27 | End: 2024-12-27

## 2024-12-27 RX ORDER — TRIAMCINOLONE ACETONIDE 40 MG/ML
INJECTION, SUSPENSION INTRA-ARTICULAR; INTRAMUSCULAR PRN
Status: COMPLETED | OUTPATIENT
Start: 2024-12-27 | End: 2024-12-27

## 2024-12-27 RX ORDER — LIDOCAINE HYDROCHLORIDE AND EPINEPHRINE BITARTRATE 20; .01 MG/ML; MG/ML
INJECTION, SOLUTION SUBCUTANEOUS PRN
Status: COMPLETED | OUTPATIENT
Start: 2024-12-27 | End: 2024-12-27

## 2024-12-27 RX ADMIN — LIDOCAINE HYDROCHLORIDE,EPINEPHRINE BITARTRATE 5 ML: 20; .01 INJECTION, SOLUTION INFILTRATION; PERINEURAL at 13:17

## 2024-12-27 RX ADMIN — LIDOCAINE HYDROCHLORIDE 5 ML: 20 INJECTION, SOLUTION INFILTRATION; PERINEURAL at 13:17

## 2024-12-27 RX ADMIN — BUPIVACAINE HYDROCHLORIDE 1 ML: 2.5 INJECTION, SOLUTION EPIDURAL; INFILTRATION; INTRACAUDAL; PERINEURAL at 13:18

## 2024-12-27 RX ADMIN — LIDOCAINE HYDROCHLORIDE 1 ML: 10 INJECTION, SOLUTION EPIDURAL; INFILTRATION; INTRACAUDAL; PERINEURAL at 13:19

## 2024-12-27 RX ADMIN — TRIAMCINOLONE ACETONIDE 40 MG: 40 INJECTION, SUSPENSION INTRA-ARTICULAR; INTRAMUSCULAR at 13:19

## 2024-12-27 RX ADMIN — IOPAMIDOL 4 ML: 755 INJECTION, SOLUTION INTRAVENOUS at 13:26

## 2024-12-27 NOTE — PROCEDURES
1245 Patient arrived to Radiology department for left hip injection. Vital signs taken. Allergies, home medications, medical history, H&P and fasting instructions reviewed with patient. Paper chart reviewed for correct documents. Procedural instructions given, questions answered, understanding expressed and consent signed.

## 2025-02-11 DIAGNOSIS — E11.65 TYPE 2 DIABETES MELLITUS WITH HYPERGLYCEMIA, WITH LONG-TERM CURRENT USE OF INSULIN (HCC): ICD-10-CM

## 2025-02-11 DIAGNOSIS — Z79.4 TYPE 2 DIABETES MELLITUS WITH HYPERGLYCEMIA, WITH LONG-TERM CURRENT USE OF INSULIN (HCC): ICD-10-CM

## 2025-02-13 NOTE — TELEPHONE ENCOUNTER
Name of Medication(s) Requested:  Requested Prescriptions     Pending Prescriptions Disp Refills    JARDIANCE 25 MG tablet [Pharmacy Med Name: Jardiance 25 MG Oral Tablet] 90 tablet 0     Sig: Take 1 tablet by mouth once daily       Medication is on current medication list Yes    Dosage and directions were verified? Yes    Quantity verified: 90 day supply     Pharmacy Verified?  Yes    Last Appointment:  12/13/2024    Future appts:  Future Appointments   Date Time Provider Department Center   5/5/2025  1:00 PM [facility]   5/15/2025  1:15 PM Celso Ott MD VASC/TOM[facility]        (If no appt send self scheduling link. .REFILLAPPT)  Scheduling request sent?     [] Yes  [x] No    Does patient need updated?  [] Yes  [x] No

## 2025-02-17 RX ORDER — EMPAGLIFLOZIN 25 MG/1
25 TABLET, FILM COATED ORAL DAILY
Qty: 90 TABLET | Refills: 0 | Status: SHIPPED | OUTPATIENT
Start: 2025-02-17

## 2025-02-20 DIAGNOSIS — M1A.9XX0 CHRONIC GOUT INVOLVING TOE WITHOUT TOPHUS, UNSPECIFIED CAUSE, UNSPECIFIED LATERALITY: ICD-10-CM

## 2025-02-20 NOTE — TELEPHONE ENCOUNTER
Name of Medication(s) Requested:  Requested Prescriptions     Pending Prescriptions Disp Refills    allopurinol (ZYLOPRIM) 100 MG tablet [Pharmacy Med Name: Allopurinol 100 MG Oral Tablet] 30 tablet      Sig: Take 1 tablet by mouth once daily       Medication is on current medication list Yes    Dosage and directions were verified? Yes    Quantity verified: 30 day supply     Pharmacy Verified?  Yes    Last Appointment:  12/13/2024    Future appts:  Future Appointments   Date Time Provider Department Center   5/5/2025  1:00 PM [facility]   5/15/2025  1:15 PM Celso Ott MD [facility]        (If no appt send self scheduling link. .REFILLAPPT)  Scheduling request sent?     [] Yes  [x] No    Does patient need updated?  [] Yes  [x] No

## 2025-02-21 RX ORDER — ALLOPURINOL 100 MG/1
100 TABLET ORAL DAILY
Qty: 30 TABLET | Refills: 3 | Status: SHIPPED | OUTPATIENT
Start: 2025-02-21

## 2025-02-27 NOTE — TELEPHONE ENCOUNTER
Name of Medication(s) Requested:  Requested Prescriptions     Pending Prescriptions Disp Refills    clopidogrel (PLAVIX) 75 MG tablet [Pharmacy Med Name: Clopidogrel Bisulfate 75 MG Oral Tablet] 30 tablet 0     Sig: Take 1 tablet by mouth once daily       Medication is on current medication list Yes    Dosage and directions were verified? Yes    Quantity verified: 30 day supply     Pharmacy Verified?  Yes    Last Appointment:  12/13/2024    Future appts:  Future Appointments   Date Time Provider Department Center   5/5/2025  1:00 PM [facility]   5/15/2025  1:15 PM Celso Ott MD VASC/TOM[facility]        (If no appt send self scheduling link. .REFILLAPPT)  Scheduling request sent?     [] Yes  [] No    Does patient need updated?  [] Yes  [] No

## 2025-02-28 RX ORDER — CLOPIDOGREL BISULFATE 75 MG/1
75 TABLET ORAL DAILY
Qty: 30 TABLET | Refills: 0 | Status: SHIPPED | OUTPATIENT
Start: 2025-02-28

## 2025-03-28 RX ORDER — CLOPIDOGREL BISULFATE 75 MG/1
75 TABLET ORAL DAILY
Qty: 30 TABLET | Refills: 0 | Status: SHIPPED | OUTPATIENT
Start: 2025-03-28

## 2025-03-28 NOTE — TELEPHONE ENCOUNTER
Name of Medication(s) Requested:  Requested Prescriptions     Pending Prescriptions Disp Refills    clopidogrel (PLAVIX) 75 MG tablet [Pharmacy Med Name: Clopidogrel Bisulfate 75 MG Oral Tablet] 30 tablet 0     Sig: Take 1 tablet by mouth once daily       Medication is on current medication list Yes    Dosage and directions were verified? Yes    Quantity verified: 30 day supply     Pharmacy Verified?  Yes    Last Appointment:  12/13/2024    Future appts:  Future Appointments   Date Time Provider Department Center   5/5/2025  1:00 PM [facility]   5/15/2025  1:15 PM Celso Ott MD VASC/TOM[facility]        (If no appt send self scheduling link. .REFILLAPPT)  Scheduling request sent?     [x] Yes  [] No    Does patient need updated?  [] Yes  [x] No

## 2025-04-04 DIAGNOSIS — Z76.0 MEDICATION REFILL: ICD-10-CM

## 2025-04-04 RX ORDER — INSULIN GLARGINE 100 [IU]/ML
30 INJECTION, SOLUTION SUBCUTANEOUS NIGHTLY
Qty: 5 ADJUSTABLE DOSE PRE-FILLED PEN SYRINGE | Refills: 5 | Status: SHIPPED | OUTPATIENT
Start: 2025-04-04

## 2025-04-04 RX ORDER — METFORMIN HYDROCHLORIDE 500 MG/1
500 TABLET, EXTENDED RELEASE ORAL
Qty: 30 TABLET | Refills: 3 | Status: SHIPPED | OUTPATIENT
Start: 2025-04-04 | End: 2025-08-02

## 2025-04-04 NOTE — TELEPHONE ENCOUNTER
Name of Medication(s) Requested:  Requested Prescriptions     Pending Prescriptions Disp Refills    insulin glargine (LANTUS SOLOSTAR) 100 UNIT/ML injection pen 5 Adjustable Dose Pre-filled Pen Syringe 5     Sig: Inject 30 Units into the skin nightly    metFORMIN (GLUCOPHAGE-XR) 500 MG extended release tablet 30 tablet 3     Sig: Take 1 tablet by mouth daily (with breakfast)   Patient states he will call Monday to schedule appointment    Medication is on current medication list Yes    Dosage and directions were verified? Yes    Quantity verified: 30 day supply     Pharmacy Verified?  Yes    Last Appointment:  12/13/2024    Future appts:  Future Appointments   Date Time Provider Department Center   5/5/2025  1:00 PM [facility] VASCULAR LAB RM 2 SE[facility] Rad/Car   5/15/2025  1:15 PM Celso Ott MD [facility]        (If no appt send self scheduling link. .REFILLAPPT)  Scheduling request sent?     [] Yes  [x] No    Does patient need updated?  [] Yes  [x] No

## 2025-04-16 ENCOUNTER — TRANSCRIBE ORDERS (OUTPATIENT)
Dept: ADMINISTRATIVE | Age: 70
End: 2025-04-16

## 2025-04-16 DIAGNOSIS — C61 MALIGNANT NEOPLASM OF PROSTATE (HCC): Primary | ICD-10-CM

## 2025-05-06 ENCOUNTER — HOSPITAL ENCOUNTER (OUTPATIENT)
Dept: NUCLEAR MEDICINE | Age: 70
Discharge: HOME | End: 2025-05-08
Attending: UROLOGY
Payer: COMMERCIAL

## 2025-05-06 DIAGNOSIS — C61 MALIGNANT NEOPLASM OF PROSTATE (HCC): ICD-10-CM

## 2025-05-06 PROCEDURE — 3430000000 HC RX DIAGNOSTIC RADIOPHARMACEUTICAL: Performed by: RADIOLOGY

## 2025-05-06 PROCEDURE — 78815 PET IMAGE W/CT SKULL-THIGH: CPT

## 2025-05-06 PROCEDURE — A9596 HC RX DIAGNOSTIC RADIOPHARMACEUTICAL: HCPCS | Performed by: RADIOLOGY

## 2025-05-06 RX ADMIN — KIT FOR THE PREPARATION OF GALLIUM GA 68 GOZETOTIDE INJECTION 4.2 MILLICURIE: KIT INTRAVENOUS at 07:45

## 2025-05-09 ENCOUNTER — OFFICE VISIT (OUTPATIENT)
Dept: FAMILY MEDICINE CLINIC | Age: 70
End: 2025-05-09

## 2025-05-09 VITALS
RESPIRATION RATE: 18 BRPM | BODY MASS INDEX: 27.02 KG/M2 | TEMPERATURE: 97.7 F | OXYGEN SATURATION: 98 % | SYSTOLIC BLOOD PRESSURE: 133 MMHG | DIASTOLIC BLOOD PRESSURE: 58 MMHG | WEIGHT: 193 LBS | HEART RATE: 81 BPM | HEIGHT: 71 IN

## 2025-05-09 DIAGNOSIS — M1A.9XX0 CHRONIC GOUT INVOLVING TOE WITHOUT TOPHUS, UNSPECIFIED CAUSE, UNSPECIFIED LATERALITY: ICD-10-CM

## 2025-05-09 DIAGNOSIS — Z76.0 MEDICATION REFILL: ICD-10-CM

## 2025-05-09 DIAGNOSIS — I73.9 PVD (PERIPHERAL VASCULAR DISEASE) WITH CLAUDICATION: ICD-10-CM

## 2025-05-09 DIAGNOSIS — E11.65 TYPE 2 DIABETES MELLITUS WITH HYPERGLYCEMIA, WITH LONG-TERM CURRENT USE OF INSULIN (HCC): ICD-10-CM

## 2025-05-09 DIAGNOSIS — I10 ESSENTIAL HYPERTENSION: ICD-10-CM

## 2025-05-09 DIAGNOSIS — K21.9 GASTROESOPHAGEAL REFLUX DISEASE, UNSPECIFIED WHETHER ESOPHAGITIS PRESENT: ICD-10-CM

## 2025-05-09 DIAGNOSIS — E11.65 TYPE 2 DIABETES MELLITUS WITH HYPERGLYCEMIA, UNSPECIFIED WHETHER LONG TERM INSULIN USE (HCC): ICD-10-CM

## 2025-05-09 DIAGNOSIS — Z79.4 TYPE 2 DIABETES MELLITUS WITH HYPERGLYCEMIA, WITH LONG-TERM CURRENT USE OF INSULIN (HCC): ICD-10-CM

## 2025-05-09 RX ORDER — LISINOPRIL 40 MG/1
40 TABLET ORAL EVERY MORNING
Qty: 120 TABLET | Refills: 2 | Status: SHIPPED | OUTPATIENT
Start: 2025-05-09 | End: 2026-05-04

## 2025-05-09 RX ORDER — INSULIN GLARGINE 100 [IU]/ML
30 INJECTION, SOLUTION SUBCUTANEOUS NIGHTLY
Qty: 5 ADJUSTABLE DOSE PRE-FILLED PEN SYRINGE | Refills: 5 | Status: SHIPPED | OUTPATIENT
Start: 2025-05-09

## 2025-05-09 RX ORDER — CILOSTAZOL 100 MG/1
100 TABLET ORAL 2 TIMES DAILY
Qty: 180 TABLET | Refills: 3 | Status: SHIPPED | OUTPATIENT
Start: 2025-05-09 | End: 2026-05-04

## 2025-05-09 RX ORDER — ATORVASTATIN CALCIUM 80 MG/1
80 TABLET, FILM COATED ORAL NIGHTLY
Qty: 90 TABLET | Refills: 3 | Status: SHIPPED | OUTPATIENT
Start: 2025-05-09

## 2025-05-09 RX ORDER — HYDROCHLOROTHIAZIDE 12.5 MG/1
12.5 TABLET ORAL EVERY MORNING
Qty: 90 TABLET | Refills: 3 | Status: SHIPPED | OUTPATIENT
Start: 2025-05-09

## 2025-05-09 RX ORDER — CLOPIDOGREL BISULFATE 75 MG/1
75 TABLET ORAL DAILY
Qty: 30 TABLET | Refills: 0 | Status: SHIPPED | OUTPATIENT
Start: 2025-05-09

## 2025-05-09 RX ORDER — ASPIRIN 81 MG/1
81 TABLET, CHEWABLE ORAL DAILY
Qty: 30 TABLET | Refills: 2 | Status: SHIPPED | OUTPATIENT
Start: 2025-05-09

## 2025-05-09 RX ORDER — AMLODIPINE BESYLATE 10 MG/1
10 TABLET ORAL DAILY
Qty: 90 TABLET | Refills: 3 | Status: SHIPPED | OUTPATIENT
Start: 2025-05-09

## 2025-05-09 RX ORDER — ALLOPURINOL 100 MG/1
100 TABLET ORAL DAILY
Qty: 30 TABLET | Refills: 3 | Status: SHIPPED | OUTPATIENT
Start: 2025-05-09

## 2025-05-09 RX ORDER — DULOXETIN HYDROCHLORIDE 30 MG/1
30 CAPSULE, DELAYED RELEASE ORAL DAILY
Qty: 30 CAPSULE | Refills: 3 | Status: SHIPPED | OUTPATIENT
Start: 2025-05-09 | End: 2025-09-06

## 2025-05-09 RX ORDER — FAMOTIDINE 20 MG/1
20 TABLET, FILM COATED ORAL DAILY
Qty: 90 TABLET | Refills: 2 | Status: SHIPPED | OUTPATIENT
Start: 2025-05-09

## 2025-05-09 ASSESSMENT — PATIENT HEALTH QUESTIONNAIRE - PHQ9: DEPRESSION UNABLE TO ASSESS: PT REFUSES

## 2025-05-09 NOTE — PROGRESS NOTES
[facility]  FAMILY MEDICINE RESIDENCY PROGRAM  DATE OF VISIT : 2025    Patient : Larry Mares   Age : 70 y.o.    : 1955   MRN : [id number]   ______________________________________________________________________    Chief Complaint:   Chief Complaint   Patient presents with    Follow-up    OTHER     Review pet scan results     Medication Refill     Patient needs refills on his medication        HPI:   History obtained from the patient. Larry Mares is a 70 y.o. male who presents to the clinic for medication refill and overall wellness visit.    Patient reports no acute concerns today. leg pain is improved. Now wearing orthotic shoes and reports improvement in balance and ambulation.  [facility] and a history of prostate cancer respectively.  All labs have been stable thus far and he had a recent PET scan for his prostate that was negative for any recurrence of prostate cancer or metastatic disease.  Doing well overall.       Allergies:   Allergies   Allergen Reactions    Ibuprofen Swelling    Benzocaine      EDEMA        Medications:    Current Outpatient Medications   Medication Sig Dispense Refill    empagliflozin (JARDIANCE) 25 MG tablet Take 1 tablet by mouth daily 90 tablet 0    insulin glargine (LANTUS SOLOSTAR) 100 UNIT/ML injection pen Inject 30 Units into the skin nightly 5 Adjustable Dose Pre-filled Pen Syringe 5    aspirin 81 MG chewable tablet Take 1 tablet by mouth daily 30 tablet 2    hydroCHLOROthiazide 12.5 MG tablet Take 1 tablet by mouth every morning 90 tablet 3    famotidine (PEPCID) 20 MG tablet Take 1 tablet by mouth daily 90 tablet 2    DULoxetine (CYMBALTA) 30 MG extended release capsule Take 1 capsule by mouth daily 30 capsule 3    clopidogrel (PLAVIX) 75 MG tablet Take 1 tablet by mouth daily 30 tablet 0    cilostazol (PLETAL) 100 MG tablet Take 1 tablet by mouth 2 times daily 180 tablet 3    atorvastatin

## 2025-05-09 NOTE — PROGRESS NOTES
70 year-old male who presents today for routine follow-up and for medication refill.  He has no acute complaints or concerns today.  He follows with podiatry for neuropathy and with urology following a history of prostate cancer all labs have been stable thus far he had a recent PET scan for his prostate that was negative for any recurrence of prostate cancer or metastatic cystic disease.  Vitals are stable today.  Doing well overall.    Blood pressure (!) 133/58, pulse 81, temperature 97.7 °F (36.5 °C), temperature source Temporal, resp. rate 18, height 1.803 m (5' 11\"), weight 87.5 kg (193 lb), SpO2 98%.    HEENT WNL     Heart regular    Lungs clear    abd non-tender      No edema    Pulses intact     PLAN:  Continue all home medications.  Continue follow-up with podiatry and urology.  Follow-up in 3 months for repeat A1c/management of diabetes/health maintenance    Attending Physician Statement  I have discussed the case, including pertinent history and exam findings with the resident. I agree with the documented assessment and plan.

## 2025-05-29 ENCOUNTER — HOSPITAL ENCOUNTER (OUTPATIENT)
Dept: INTERVENTIONAL RADIOLOGY/VASCULAR | Age: 70
Discharge: HOME | End: 2025-05-31
Payer: COMMERCIAL

## 2025-05-29 DIAGNOSIS — I73.9 PVD (PERIPHERAL VASCULAR DISEASE): ICD-10-CM

## 2025-05-29 PROCEDURE — 93923 UPR/LXTR ART STDY 3+ LVLS: CPT

## 2025-05-29 PROCEDURE — 93923 UPR/LXTR ART STDY 3+ LVLS: CPT | Performed by: SURGERY

## 2025-06-03 DIAGNOSIS — Z79.4 TYPE 2 DIABETES MELLITUS WITH HYPERGLYCEMIA, WITH LONG-TERM CURRENT USE OF INSULIN (HCC): ICD-10-CM

## 2025-06-03 DIAGNOSIS — E11.65 TYPE 2 DIABETES MELLITUS WITH HYPERGLYCEMIA, WITH LONG-TERM CURRENT USE OF INSULIN (HCC): ICD-10-CM

## 2025-06-03 DIAGNOSIS — Z76.0 MEDICATION REFILL: ICD-10-CM

## 2025-06-03 DIAGNOSIS — M1A.9XX0 CHRONIC GOUT INVOLVING TOE WITHOUT TOPHUS, UNSPECIFIED CAUSE, UNSPECIFIED LATERALITY: ICD-10-CM

## 2025-06-03 RX ORDER — ALLOPURINOL 100 MG/1
100 TABLET ORAL DAILY
Qty: 30 TABLET | Refills: 0 | Status: SHIPPED | OUTPATIENT
Start: 2025-06-03

## 2025-06-03 RX ORDER — EMPAGLIFLOZIN 25 MG/1
25 TABLET, FILM COATED ORAL DAILY
Qty: 90 TABLET | Refills: 0 | Status: SHIPPED | OUTPATIENT
Start: 2025-06-03

## 2025-06-03 NOTE — TELEPHONE ENCOUNTER
Name of Medication(s) Requested:  Requested Prescriptions     Pending Prescriptions Disp Refills    allopurinol (ZYLOPRIM) 100 MG tablet [Pharmacy Med Name: Allopurinol 100 MG Oral Tablet] 30 tablet 0     Sig: Take 1 tablet by mouth once daily    JARDIANCE 25 MG tablet [Pharmacy Med Name: Jardiance 25 MG Oral Tablet] 90 tablet 0     Sig: Take 1 tablet by mouth once daily       Medication is on current medication list Yes    Dosage and directions were verified? Yes    Quantity verified: 90 day supply     Pharmacy Verified?  Yes    Last Appointment:  5/9/2025    Future appts:  No future appointments.     (If no appt send self scheduling link. .REFILLAPPT)  Scheduling request sent?     [] Yes  [x] No    Does patient need updated?  [] Yes  [x] No

## 2025-06-19 ENCOUNTER — TELEPHONE (OUTPATIENT)
Dept: FAMILY MEDICINE CLINIC | Age: 70
End: 2025-06-19

## 2025-06-19 NOTE — TELEPHONE ENCOUNTER
Pharmacy called to request new scripts for glucose meter, lancetts, strips.  Separate script for each

## 2025-06-26 DIAGNOSIS — Z79.4 TYPE 2 DIABETES MELLITUS WITH HYPERGLYCEMIA, WITH LONG-TERM CURRENT USE OF INSULIN (HCC): ICD-10-CM

## 2025-06-26 DIAGNOSIS — E11.65 TYPE 2 DIABETES MELLITUS WITH HYPERGLYCEMIA, WITH LONG-TERM CURRENT USE OF INSULIN (HCC): ICD-10-CM

## 2025-06-26 RX ORDER — AVOBENZONE, HOMOSALATE, OCTISALATE, OCTOCRYLENE 30; 40; 45; 26 MG/ML; MG/ML; MG/ML; MG/ML
1 CREAM TOPICAL DAILY
Qty: 300 EACH | Refills: 1 | Status: SHIPPED | OUTPATIENT
Start: 2025-06-26

## 2025-06-26 RX ORDER — BLOOD-GLUCOSE METER
1 EACH MISCELLANEOUS ONCE
Qty: 1 KIT | Refills: 0 | Status: SHIPPED | OUTPATIENT
Start: 2025-06-26 | End: 2025-06-26

## 2025-07-21 RX ORDER — PEN NEEDLE, DIABETIC 29 G X1/2"
NEEDLE, DISPOSABLE MISCELLANEOUS
Qty: 100 EACH | Refills: 0 | Status: SHIPPED | OUTPATIENT
Start: 2025-07-21

## 2025-07-21 NOTE — TELEPHONE ENCOUNTER
Name of Medication(s) Requested:  Requested Prescriptions     Pending Prescriptions Disp Refills    BD ULTRA-FINE PEN NEEDLES 29G X 12.7MM MISC [Pharmacy Med Name: BD UF PEN NDL 29GX12.7MM MIS] 100 each 0     Sig: USE  ONCE DAILY AS DIRECTED       Medication is on current medication list Yes    Dosage and directions were verified? Yes    Quantity verified: 90 day supply     Pharmacy Verified?  Yes    Last Appointment:  5/9/2025    Future appts:  Future Appointments   Date Time Provider Department Center   8/11/2025  2:15 PM Alice Lucas MD [facility]        (If no appt send self scheduling link. .REFILLAPPT)  Scheduling request sent?     [] Yes  [x] No    Does patient need updated?  [] Yes  [x] No

## 2025-08-11 ENCOUNTER — OFFICE VISIT (OUTPATIENT)
Dept: FAMILY MEDICINE CLINIC | Age: 70
End: 2025-08-11
Payer: COMMERCIAL

## 2025-08-11 VITALS
OXYGEN SATURATION: 98 % | DIASTOLIC BLOOD PRESSURE: 62 MMHG | HEIGHT: 71 IN | SYSTOLIC BLOOD PRESSURE: 122 MMHG | RESPIRATION RATE: 18 BRPM | TEMPERATURE: 98.6 F | WEIGHT: 190 LBS | BODY MASS INDEX: 26.6 KG/M2 | HEART RATE: 80 BPM

## 2025-08-11 DIAGNOSIS — E11.65 TYPE 2 DIABETES MELLITUS WITH HYPERGLYCEMIA, WITH LONG-TERM CURRENT USE OF INSULIN (HCC): Primary | ICD-10-CM

## 2025-08-11 DIAGNOSIS — Z79.4 TYPE 2 DIABETES MELLITUS WITH HYPERGLYCEMIA, WITH LONG-TERM CURRENT USE OF INSULIN (HCC): Primary | ICD-10-CM

## 2025-08-11 DIAGNOSIS — I73.9 PVD (PERIPHERAL VASCULAR DISEASE) WITH CLAUDICATION: ICD-10-CM

## 2025-08-11 DIAGNOSIS — L84 CALLUS OF TOE: ICD-10-CM

## 2025-08-11 LAB — HBA1C MFR BLD: 5.5 %

## 2025-08-11 PROCEDURE — 1124F ACP DISCUSS-NO DSCNMKR DOCD: CPT

## 2025-08-11 PROCEDURE — 83036 HEMOGLOBIN GLYCOSYLATED A1C: CPT

## 2025-08-11 PROCEDURE — 4004F PT TOBACCO SCREEN RCVD TLK: CPT

## 2025-08-11 PROCEDURE — 2022F DILAT RTA XM EVC RTNOPTHY: CPT

## 2025-08-11 PROCEDURE — 3017F COLORECTAL CA SCREEN DOC REV: CPT

## 2025-08-11 PROCEDURE — 3044F HG A1C LEVEL LT 7.0%: CPT

## 2025-08-11 PROCEDURE — G8428 CUR MEDS NOT DOCUMENT: HCPCS

## 2025-08-11 PROCEDURE — 3078F DIAST BP <80 MM HG: CPT

## 2025-08-11 PROCEDURE — 99213 OFFICE O/P EST LOW 20 MIN: CPT

## 2025-08-11 PROCEDURE — G8417 CALC BMI ABV UP PARAM F/U: HCPCS

## 2025-08-11 PROCEDURE — 3074F SYST BP LT 130 MM HG: CPT

## 2025-08-11 SDOH — ECONOMIC STABILITY: FOOD INSECURITY: WITHIN THE PAST 12 MONTHS, THE FOOD YOU BOUGHT JUST DIDN'T LAST AND YOU DIDN'T HAVE MONEY TO GET MORE.: NEVER TRUE

## 2025-08-11 SDOH — ECONOMIC STABILITY: FOOD INSECURITY: WITHIN THE PAST 12 MONTHS, YOU WORRIED THAT YOUR FOOD WOULD RUN OUT BEFORE YOU GOT MONEY TO BUY MORE.: NEVER TRUE

## 2025-08-11 ASSESSMENT — PATIENT HEALTH QUESTIONNAIRE - PHQ9
SUM OF ALL RESPONSES TO PHQ QUESTIONS 1-9: 0
2. FEELING DOWN, DEPRESSED OR HOPELESS: NOT AT ALL
SUM OF ALL RESPONSES TO PHQ QUESTIONS 1-9: 0
1. LITTLE INTEREST OR PLEASURE IN DOING THINGS: NOT AT ALL

## (undated) DEVICE — SHEATH INTRO 5FR L11CM 0.038IN SIL TRICSP VLV W/ GWIRE

## (undated) DEVICE — INFLATION DEVICE KIT: Brand: ENCORE™ 26 ADVANTAGE KIT

## (undated) DEVICE — CATHETER ANGIOPLSTY L130CM BAL L250MM DIA5MM GWIRE 0.035IN

## (undated) DEVICE — Device

## (undated) DEVICE — ANGIO-SEAL VIP VASCULAR CLOSURE DEVICE: Brand: ANGIO-SEAL

## (undated) DEVICE — CANNULA NSL CANN NSL L25FT TBNG AD O2 SUP SFT UC

## (undated) DEVICE — DESTINATION RENAL GUIDING SHEATH: Brand: DESTINATION

## (undated) DEVICE — CATHETER GUID OTW 0.035 IN 6 FRX135 CM 5 FR TRAILBLAZER

## (undated) DEVICE — CATHETER PTCA L130CM BLLN L120MM DIA4MM PACLITAXEL OVR THE

## (undated) DEVICE — CATHETER ANGIOPLSTY OTW 035 5 FRX135 CM 4X200 MM EVERCROSS

## (undated) DEVICE — RADIFOCUS GLIDEWIRE ADVANTAGE GUIDEWIRE: Brand: GLIDEWIRE ADVANTAGE

## (undated) DEVICE — CATHETER IVL L6 SHOCKWAVE 8.0MM  X 30MM

## (undated) DEVICE — CATHETER GUID ANGLED 0.056 INX135CM SUPP BRAIDED TRAILBLAZER

## (undated) DEVICE — MICROPUNCTURE INTRODUCER SET SILHOUETTE TRANSITIONLESS PUSH-PLUS DESIGN - STIFFENED CANNULA WITH STAINLESS STEEL WIRE GUIDE: Brand: MICROPUNCTURE

## (undated) DEVICE — PACK SURG CARDIAC CATH